# Patient Record
Sex: MALE | Race: WHITE | Employment: OTHER | URBAN - METROPOLITAN AREA
[De-identification: names, ages, dates, MRNs, and addresses within clinical notes are randomized per-mention and may not be internally consistent; named-entity substitution may affect disease eponyms.]

---

## 2017-08-10 ENCOUNTER — HOSPITAL ENCOUNTER (EMERGENCY)
Age: 77
Discharge: HOME | End: 2017-08-10
Attending: FAMILY MEDICINE

## 2017-08-10 VITALS
DIASTOLIC BLOOD PRESSURE: 84 MMHG | HEIGHT: 72 IN | HEART RATE: 103 BPM | WEIGHT: 179.7 LBS | SYSTOLIC BLOOD PRESSURE: 114 MMHG | TEMPERATURE: 97.6 F | BODY MASS INDEX: 24.34 KG/M2 | RESPIRATION RATE: 16 BRPM | OXYGEN SATURATION: 93 %

## 2017-08-10 DIAGNOSIS — R05.9 COUGH: Primary | ICD-10-CM

## 2017-08-10 RX ORDER — PREDNISONE 5 MG/1
TABLET ORAL
Qty: 21 TAB | Refills: 0 | Status: SHIPPED | OUTPATIENT
Start: 2017-08-10 | End: 2018-03-09

## 2017-08-10 RX ORDER — IPRATROPIUM BROMIDE AND ALBUTEROL SULFATE 2.5; .5 MG/3ML; MG/3ML
3 SOLUTION RESPIRATORY (INHALATION)
Status: COMPLETED | OUTPATIENT
Start: 2017-08-10 | End: 2017-08-10

## 2017-08-10 RX ORDER — IPRATROPIUM BROMIDE AND ALBUTEROL SULFATE 2.5; .5 MG/3ML; MG/3ML
3 SOLUTION RESPIRATORY (INHALATION)
Status: DISCONTINUED | OUTPATIENT
Start: 2017-08-10 | End: 2017-08-10 | Stop reason: HOSPADM

## 2017-08-10 RX ORDER — BENZONATATE 100 MG/1
100 CAPSULE ORAL
Qty: 30 CAP | Refills: 0 | Status: SHIPPED | OUTPATIENT
Start: 2017-08-10 | End: 2017-08-17

## 2017-08-10 RX ADMIN — IPRATROPIUM BROMIDE AND ALBUTEROL SULFATE 3 ML: 2.5; .5 SOLUTION RESPIRATORY (INHALATION) at 08:13

## 2017-08-10 NOTE — DISCHARGE INSTRUCTIONS
Cough: Care Instructions  Your Care Instructions  A cough is your body's response to something that bothers your throat or airways. Many things can cause a cough. You might cough because of a cold or the flu, bronchitis, or asthma. Smoking, postnasal drip, allergies, and stomach acid that backs up into your throat also can cause coughs. A cough is a symptom, not a disease. Most coughs stop when the cause, such as a cold, goes away. You can take a few steps at home to cough less and feel better. Follow-up care is a key part of your treatment and safety. Be sure to make and go to all appointments, and call your doctor if you are having problems. It's also a good idea to know your test results and keep a list of the medicines you take. How can you care for yourself at home? · Drink lots of water and other fluids. This helps thin the mucus and soothes a dry or sore throat. Honey or lemon juice in hot water or tea may ease a dry cough. · Take cough medicine as directed by your doctor. · Prop up your head on pillows to help you breathe and ease a dry cough. · Try cough drops to soothe a dry or sore throat. Cough drops don't stop a cough. Medicine-flavored cough drops are no better than candy-flavored drops or hard candy. · Do not smoke. Avoid secondhand smoke. If you need help quitting, talk to your doctor about stop-smoking programs and medicines. These can increase your chances of quitting for good. When should you call for help? Call 911 anytime you think you may need emergency care. For example, call if:  · You have severe trouble breathing. Call your doctor now or seek immediate medical care if:  · You cough up blood. · You have new or worse trouble breathing. · You have a new or higher fever. · You have a new rash.   Watch closely for changes in your health, and be sure to contact your doctor if:  · You cough more deeply or more often, especially if you notice more mucus or a change in the color of your mucus. · You have new symptoms, such as a sore throat, an earache, or sinus pain. · You do not get better as expected. Where can you learn more? Go to http://donavan-kamryn.info/. Enter D279 in the search box to learn more about \"Cough: Care Instructions. \"  Current as of: March 25, 2017  Content Version: 11.3  © 6534-9337 [employer]. Care instructions adapted under license by [employer] (which disclaims liability or warranty for this information). If you have questions about a medical condition or this instruction, always ask your healthcare professional. [address] any warranty or liability for your use of this information.

## 2017-08-10 NOTE — UC PROVIDER NOTE
Patient is a 68 y.o. male presenting with respiratory distress syndrome. The history is provided by the patient. Respiratory Distress   This is a new problem. The average episode lasts 3 days. The current episode started more than 2 days ago. The problem has not changed since onset. Associated symptoms include cough, sputum production and wheezing. Pertinent negatives include no fever, no headaches, no coryza, no rhinorrhea, no sore throat, no swollen glands, no ear pain, no neck pain, no hemoptysis, no PND, no orthopnea, no chest pain, no syncope, no abdominal pain, no rash, no leg pain, no leg swelling and no claudication. Risk factors include smoking. He has tried beta-agonist inhalers for the symptoms. The treatment provided mild relief. He has had prior hospitalizations. He has had prior ED visits. He has had no prior ICU admissions. Associated medical issues include COPD and chronic lung disease. Past Medical History:   Diagnosis Date    AAA (abdominal aortic aneurysm) (HCC)     Asthma     Cancer ([facility]     kidney ca    COPD     [facility]     Gastrointestinal disorder     ruptured esphogus    Hypertension     Other ill-defined conditions         Past Surgical History:   Procedure Laterality Date    ABDOMEN SURGERY PROC UNLISTED      hernia    ABDOMEN SURGERY PROC UNLISTED      colon resection    HX GI      part of colon removed, ruptured esophagus    HX OTHER SURGICAL      kidney removed    [facility]           Family History   Problem Relation Age of Onset    Hypertension Mother         Social History     Social History    Marital status:      Spouse name: N/A    Number of children: N/A    Years of education: N/A     Occupational History    Not on file.      Social History Main Topics    Smoking status: Current Every Day Smoker     Packs/day: 0.50     Years: 60.00    Smokeless tobacco: Not on file    Alcohol use Yes      Comment: 2 drinks a month    Drug use: Not on file    Sexual activity: Not on file     Other Topics Concern    Not on file     Social History Narrative    ** Merged History Encounter **                     ALLERGIES: Niacin and Niacin    Review of Systems   Constitutional: Negative for chills and fever. HENT: Negative for ear pain, rhinorrhea and sore throat. Respiratory: Positive for cough, sputum production and wheezing. Negative for hemoptysis. Cardiovascular: Negative for chest pain, orthopnea, claudication, leg swelling, syncope and PND. Gastrointestinal: Negative for abdominal pain. Musculoskeletal: Negative for neck pain. Skin: Negative for rash. Neurological: Negative for headaches. Vitals:    08/10/17 0805 08/10/17 0839 08/10/17 0903   BP: 114/84     Pulse: (!) 106 (!) 107 (!) 103   Resp: 22 16    Temp: 97.6 °F (36.4 °C)     SpO2: (!) 89% 91% 93%   Weight: 81.5 kg (179 lb 11.2 oz)     Height: 6' (1.829 m)         Physical Exam   Constitutional: He is oriented to person, place, and time. He appears well-developed and well-nourished. No distress. HENT:   Right Ear: External ear normal.   Left Ear: External ear normal.   Eyes: Conjunctivae and EOM are normal.   Neck: Normal range of motion. Neck supple. No JVD present. No tracheal deviation present. No thyromegaly present. Cardiovascular: Regular rhythm and normal heart sounds. Tachycardic    Pulmonary/Chest: He is in respiratory distress. He has no wheezes. He has no rales. He exhibits no tenderness. Lymphadenopathy:     He has no cervical adenopathy. Neurological: He is alert and oriented to person, place, and time. Skin: Skin is warm and dry. Psychiatric: He has a normal mood and affect. His behavior is normal. Judgment and thought content normal.   Nursing note and vitals reviewed. MDM     Differential Diagnosis; Clinical Impression; Plan:     CLINICAL IMPRESSION:  Cough  (primary encounter diagnosis)    Plan:  1. HHN with Duoneb given x 2. Improvement noted  2. Tessalon   3. Medrol dosepack  Risk of Significant Complications, Morbidity, and/or Mortality:   Presenting problems: Moderate  Diagnostic procedures: Moderate  Management options:   Moderate  Progress:   Patient progress:  Stable      Procedures

## 2017-08-10 NOTE — UC NOTE
Pt reports feeling much better. Does not appear to be in any distress at this time.  Pts sats are at 93%, pt states that is his \"norm\"

## 2018-01-01 ENCOUNTER — HOSPITAL ENCOUNTER (OUTPATIENT)
Dept: GENERAL RADIOLOGY | Age: 78
Discharge: HOME | End: 2018-05-11
Attending: INTERNAL MEDICINE
Payer: MEDICARE

## 2018-01-01 ENCOUNTER — PATIENT OUTREACH (OUTPATIENT)
Dept: CASE MANAGEMENT | Age: 78
End: 2018-01-01

## 2018-01-01 DIAGNOSIS — C90.00 MYELOMA ASSOCIATED AMYLOIDOSIS (HCC): ICD-10-CM

## 2018-01-01 DIAGNOSIS — Z51.11 ENCOUNTER FOR ANTINEOPLASTIC CHEMOTHERAPY: ICD-10-CM

## 2018-01-01 DIAGNOSIS — R11.2 NAUSEA WITH VOMITING: ICD-10-CM

## 2018-01-01 DIAGNOSIS — K90.3 PANCREATIC COLIPASE DEFICIENCY: ICD-10-CM

## 2018-01-01 DIAGNOSIS — E85.9 MYELOMA ASSOCIATED AMYLOIDOSIS (HCC): ICD-10-CM

## 2018-01-01 PROCEDURE — 77075 RADEX OSSEOUS SURVEY COMPL: CPT

## 2018-02-19 ENCOUNTER — APPOINTMENT (OUTPATIENT)
Dept: GENERAL RADIOLOGY | Age: 78
DRG: 871 | End: 2018-02-19
Attending: PHYSICIAN ASSISTANT
Payer: MEDICARE

## 2018-02-19 ENCOUNTER — HOSPITAL ENCOUNTER (INPATIENT)
Age: 78
LOS: 18 days | Discharge: NURSING, REHAB OR LONG-TERM CARE | DRG: 871 | End: 2018-03-09
Attending: EMERGENCY MEDICINE | Admitting: INTERNAL MEDICINE
Payer: MEDICARE

## 2018-02-19 ENCOUNTER — APPOINTMENT (OUTPATIENT)
Dept: CT IMAGING | Age: 78
DRG: 871 | End: 2018-02-19
Attending: EMERGENCY MEDICINE
Payer: MEDICARE

## 2018-02-19 DIAGNOSIS — J10.1 INFLUENZA A: ICD-10-CM

## 2018-02-19 DIAGNOSIS — R06.02 SOB (SHORTNESS OF BREATH): ICD-10-CM

## 2018-02-19 DIAGNOSIS — R53.81 DEBILITY: ICD-10-CM

## 2018-02-19 DIAGNOSIS — J96.00 ACUTE RESPIRATORY FAILURE, UNSPECIFIED WHETHER WITH HYPOXIA OR HYPERCAPNIA (HCC): Primary | ICD-10-CM

## 2018-02-19 DIAGNOSIS — K59.00 CONSTIPATION, UNSPECIFIED CONSTIPATION TYPE: ICD-10-CM

## 2018-02-19 DIAGNOSIS — R53.1 WEAKNESS: ICD-10-CM

## 2018-02-19 DIAGNOSIS — Z71.89 COUNSELING REGARDING GOALS OF CARE: ICD-10-CM

## 2018-02-19 DIAGNOSIS — R53.83 FATIGUE, UNSPECIFIED TYPE: ICD-10-CM

## 2018-02-19 PROBLEM — R09.02 HYPOXEMIA: Status: ACTIVE | Noted: 2018-02-19

## 2018-02-19 LAB
ALBUMIN SERPL-MCNC: 2.7 G/DL (ref 3.5–5)
ALBUMIN/GLOB SERPL: 0.4 {RATIO} (ref 1.1–2.2)
ALP SERPL-CCNC: 76 U/L (ref 45–117)
ALT SERPL-CCNC: 19 U/L (ref 12–78)
AMORPH CRY URNS QL MICRO: ABNORMAL
ANION GAP SERPL CALC-SCNC: 10 MMOL/L (ref 5–15)
ANION GAP SERPL CALC-SCNC: 7 MMOL/L (ref 5–15)
APPEARANCE UR: ABNORMAL
ARTERIAL PATENCY WRIST A: YES
AST SERPL-CCNC: 27 U/L (ref 15–37)
BACTERIA URNS QL MICRO: ABNORMAL /HPF
BASE DEFICIT BLDA-SCNC: 5.5 MMOL/L
BASOPHILS # BLD: 0 K/UL (ref 0–0.1)
BASOPHILS NFR BLD: 0 % (ref 0–1)
BDY SITE: ABNORMAL
BILIRUB SERPL-MCNC: 0.4 MG/DL (ref 0.2–1)
BILIRUB UR QL: NEGATIVE
BUN SERPL-MCNC: 41 MG/DL (ref 6–20)
BUN SERPL-MCNC: 48 MG/DL (ref 6–20)
BUN/CREAT SERPL: 16 (ref 12–20)
BUN/CREAT SERPL: 16 (ref 12–20)
CALCIUM SERPL-MCNC: 10.6 MG/DL (ref 8.5–10.1)
CALCIUM SERPL-MCNC: 11.1 MG/DL (ref 8.5–10.1)
CHLORIDE SERPL-SCNC: 103 MMOL/L (ref 97–108)
CHLORIDE SERPL-SCNC: 103 MMOL/L (ref 97–108)
CO2 SERPL-SCNC: 21 MMOL/L (ref 21–32)
CO2 SERPL-SCNC: 23 MMOL/L (ref 21–32)
COLOR UR: ABNORMAL
CREAT SERPL-MCNC: 2.49 MG/DL (ref 0.7–1.3)
CREAT SERPL-MCNC: 3.01 MG/DL (ref 0.7–1.3)
D DIMER PPP FEU-MCNC: 2.65 MG/L FEU (ref 0–0.65)
DIFFERENTIAL METHOD BLD: ABNORMAL
EOSINOPHIL # BLD: 0 K/UL (ref 0–0.4)
EOSINOPHIL NFR BLD: 0 % (ref 0–7)
EPITH CASTS URNS QL MICRO: ABNORMAL /LPF
ERYTHROCYTE [DISTWIDTH] IN BLOOD BY AUTOMATED COUNT: 17.6 % (ref 11.5–14.5)
FLUAV AG NPH QL IA: POSITIVE
FLUBV AG NOSE QL IA: NEGATIVE
GAS FLOW.O2 O2 DELIVERY SYS: 2 L/MIN
GLOBULIN SER CALC-MCNC: 6.7 G/DL (ref 2–4)
GLUCOSE BLD STRIP.AUTO-MCNC: 109 MG/DL (ref 65–100)
GLUCOSE BLD STRIP.AUTO-MCNC: 132 MG/DL (ref 65–100)
GLUCOSE SERPL-MCNC: 110 MG/DL (ref 65–100)
GLUCOSE SERPL-MCNC: 117 MG/DL (ref 65–100)
GLUCOSE UR STRIP.AUTO-MCNC: NEGATIVE MG/DL
HCO3 BLDA-SCNC: 19 MMOL/L (ref 22–26)
HCT VFR BLD AUTO: 40.5 % (ref 36.6–50.3)
HGB BLD-MCNC: 12.8 G/DL (ref 12.1–17)
HGB UR QL STRIP: ABNORMAL
IMM GRANULOCYTES # BLD: 0 K/UL (ref 0–0.04)
IMM GRANULOCYTES NFR BLD AUTO: 0 % (ref 0–0.5)
KETONES UR QL STRIP.AUTO: NEGATIVE MG/DL
LACTATE SERPL-SCNC: 1.3 MMOL/L (ref 0.4–2)
LEUKOCYTE ESTERASE UR QL STRIP.AUTO: NEGATIVE
LYMPHOCYTES # BLD: 0.8 K/UL (ref 0.8–3.5)
LYMPHOCYTES NFR BLD: 7 % (ref 12–49)
MCH RBC QN AUTO: 27.2 PG (ref 26–34)
MCHC RBC AUTO-ENTMCNC: 31.6 G/DL (ref 30–36.5)
MCV RBC AUTO: 86.2 FL (ref 80–99)
MONOCYTES # BLD: 0.6 K/UL (ref 0–1)
MONOCYTES NFR BLD: 5 % (ref 5–13)
NEUTS BAND NFR BLD MANUAL: 14 %
NEUTS SEG # BLD: 10 K/UL (ref 1.8–8)
NEUTS SEG NFR BLD: 74 % (ref 32–75)
NITRITE UR QL STRIP.AUTO: NEGATIVE
NRBC # BLD: 0 K/UL (ref 0–0.01)
NRBC BLD-RTO: 0 PER 100 WBC
PCO2 BLDA: 34 MMHG (ref 35–45)
PH BLDA: 7.37 [PH] (ref 7.35–7.45)
PH UR STRIP: 5.5 [PH] (ref 5–8)
PLATELET # BLD AUTO: 261 K/UL (ref 150–400)
PMV BLD AUTO: 10.7 FL (ref 8.9–12.9)
PO2 BLDA: 70 MMHG (ref 80–100)
POTASSIUM SERPL-SCNC: 4.3 MMOL/L (ref 3.5–5.1)
POTASSIUM SERPL-SCNC: 4.3 MMOL/L (ref 3.5–5.1)
PROT SERPL-MCNC: 9.4 G/DL (ref 6.4–8.2)
PROT UR STRIP-MCNC: 100 MG/DL
RBC # BLD AUTO: 4.7 M/UL (ref 4.1–5.7)
RBC #/AREA URNS HPF: ABNORMAL /HPF (ref 0–5)
RBC MORPH BLD: ABNORMAL
SAO2 % BLD: 94 % (ref 92–97)
SAO2% DEVICE SAO2% SENSOR NAME: ABNORMAL
SERVICE CMNT-IMP: ABNORMAL
SERVICE CMNT-IMP: ABNORMAL
SODIUM SERPL-SCNC: 133 MMOL/L (ref 136–145)
SODIUM SERPL-SCNC: 134 MMOL/L (ref 136–145)
SP GR UR REFRACTOMETRY: 1.02 (ref 1–1.03)
SPECIMEN SITE: ABNORMAL
TROPONIN I SERPL-MCNC: <0.04 NG/ML
UROBILINOGEN UR QL STRIP.AUTO: 0.2 EU/DL (ref 0.2–1)
WBC # BLD AUTO: 11.4 K/UL (ref 4.1–11.1)
WBC MORPH BLD: ABNORMAL
WBC URNS QL MICRO: ABNORMAL /HPF (ref 0–4)

## 2018-02-19 PROCEDURE — 74011000250 HC RX REV CODE- 250: Performed by: EMERGENCY MEDICINE

## 2018-02-19 PROCEDURE — 82803 BLOOD GASES ANY COMBINATION: CPT | Performed by: PHYSICIAN ASSISTANT

## 2018-02-19 PROCEDURE — 94761 N-INVAS EAR/PLS OXIMETRY MLT: CPT

## 2018-02-19 PROCEDURE — 87040 BLOOD CULTURE FOR BACTERIA: CPT | Performed by: PHYSICIAN ASSISTANT

## 2018-02-19 PROCEDURE — 93005 ELECTROCARDIOGRAM TRACING: CPT

## 2018-02-19 PROCEDURE — 82962 GLUCOSE BLOOD TEST: CPT

## 2018-02-19 PROCEDURE — 71045 X-RAY EXAM CHEST 1 VIEW: CPT

## 2018-02-19 PROCEDURE — 36600 WITHDRAWAL OF ARTERIAL BLOOD: CPT | Performed by: PHYSICIAN ASSISTANT

## 2018-02-19 PROCEDURE — 74011000250 HC RX REV CODE- 250: Performed by: PHYSICIAN ASSISTANT

## 2018-02-19 PROCEDURE — 94640 AIRWAY INHALATION TREATMENT: CPT

## 2018-02-19 PROCEDURE — 65660000000 HC RM CCU STEPDOWN

## 2018-02-19 PROCEDURE — 81001 URINALYSIS AUTO W/SCOPE: CPT | Performed by: PHYSICIAN ASSISTANT

## 2018-02-19 PROCEDURE — 96365 THER/PROPH/DIAG IV INF INIT: CPT

## 2018-02-19 PROCEDURE — 84484 ASSAY OF TROPONIN QUANT: CPT | Performed by: EMERGENCY MEDICINE

## 2018-02-19 PROCEDURE — 74011250637 HC RX REV CODE- 250/637: Performed by: EMERGENCY MEDICINE

## 2018-02-19 PROCEDURE — 71250 CT THORAX DX C-: CPT

## 2018-02-19 PROCEDURE — 77030029684 HC NEB SM VOL KT MONA -A

## 2018-02-19 PROCEDURE — 87804 INFLUENZA ASSAY W/OPTIC: CPT | Performed by: PHYSICIAN ASSISTANT

## 2018-02-19 PROCEDURE — 85379 FIBRIN DEGRADATION QUANT: CPT | Performed by: EMERGENCY MEDICINE

## 2018-02-19 PROCEDURE — 74011250636 HC RX REV CODE- 250/636: Performed by: INTERNAL MEDICINE

## 2018-02-19 PROCEDURE — 74011250637 HC RX REV CODE- 250/637: Performed by: INTERNAL MEDICINE

## 2018-02-19 PROCEDURE — 85025 COMPLETE CBC W/AUTO DIFF WBC: CPT | Performed by: PHYSICIAN ASSISTANT

## 2018-02-19 PROCEDURE — 36415 COLL VENOUS BLD VENIPUNCTURE: CPT | Performed by: EMERGENCY MEDICINE

## 2018-02-19 PROCEDURE — 80048 BASIC METABOLIC PNL TOTAL CA: CPT | Performed by: EMERGENCY MEDICINE

## 2018-02-19 PROCEDURE — 74011250636 HC RX REV CODE- 250/636: Performed by: EMERGENCY MEDICINE

## 2018-02-19 PROCEDURE — 74011000250 HC RX REV CODE- 250: Performed by: INTERNAL MEDICINE

## 2018-02-19 PROCEDURE — 80053 COMPREHEN METABOLIC PANEL: CPT | Performed by: PHYSICIAN ASSISTANT

## 2018-02-19 PROCEDURE — 83605 ASSAY OF LACTIC ACID: CPT | Performed by: PHYSICIAN ASSISTANT

## 2018-02-19 PROCEDURE — 99285 EMERGENCY DEPT VISIT HI MDM: CPT

## 2018-02-19 RX ORDER — DEXTROSE 50 % IN WATER (D50W) INTRAVENOUS SYRINGE
12.5-25 AS NEEDED
Status: DISCONTINUED | OUTPATIENT
Start: 2018-02-19 | End: 2018-03-09 | Stop reason: HOSPADM

## 2018-02-19 RX ORDER — ALBUTEROL SULFATE 0.83 MG/ML
2.5 SOLUTION RESPIRATORY (INHALATION)
Status: COMPLETED | OUTPATIENT
Start: 2018-02-19 | End: 2018-02-19

## 2018-02-19 RX ORDER — OSELTAMIVIR PHOSPHATE 30 MG/1
30 CAPSULE ORAL 2 TIMES DAILY
Status: DISCONTINUED | OUTPATIENT
Start: 2018-02-19 | End: 2018-02-20

## 2018-02-19 RX ORDER — MAGNESIUM SULFATE 100 %
4 CRYSTALS MISCELLANEOUS AS NEEDED
Status: DISCONTINUED | OUTPATIENT
Start: 2018-02-19 | End: 2018-03-09 | Stop reason: HOSPADM

## 2018-02-19 RX ORDER — FLUTICASONE FUROATE AND VILANTEROL 100; 25 UG/1; UG/1
1 POWDER RESPIRATORY (INHALATION) DAILY
Status: DISCONTINUED | OUTPATIENT
Start: 2018-02-20 | End: 2018-03-09 | Stop reason: HOSPADM

## 2018-02-19 RX ORDER — ENOXAPARIN SODIUM 100 MG/ML
40 INJECTION SUBCUTANEOUS EVERY 24 HOURS
Status: DISCONTINUED | OUTPATIENT
Start: 2018-02-19 | End: 2018-02-19 | Stop reason: DRUGHIGH

## 2018-02-19 RX ORDER — SODIUM CHLORIDE 9 MG/ML
50 INJECTION, SOLUTION INTRAVENOUS CONTINUOUS
Status: DISCONTINUED | OUTPATIENT
Start: 2018-02-19 | End: 2018-02-24

## 2018-02-19 RX ORDER — CLOPIDOGREL BISULFATE 75 MG/1
75 TABLET ORAL DAILY
Status: DISCONTINUED | OUTPATIENT
Start: 2018-02-20 | End: 2018-02-26

## 2018-02-19 RX ORDER — MONTELUKAST SODIUM 10 MG/1
10 TABLET ORAL DAILY
Status: DISCONTINUED | OUTPATIENT
Start: 2018-02-20 | End: 2018-03-09 | Stop reason: HOSPADM

## 2018-02-19 RX ORDER — ONDANSETRON 2 MG/ML
4 INJECTION INTRAMUSCULAR; INTRAVENOUS
Status: DISCONTINUED | OUTPATIENT
Start: 2018-02-19 | End: 2018-03-09 | Stop reason: HOSPADM

## 2018-02-19 RX ORDER — DILTIAZEM HYDROCHLORIDE 240 MG/1
240 CAPSULE, COATED, EXTENDED RELEASE ORAL DAILY
Status: DISCONTINUED | OUTPATIENT
Start: 2018-02-20 | End: 2018-03-09 | Stop reason: HOSPADM

## 2018-02-19 RX ORDER — IPRATROPIUM BROMIDE AND ALBUTEROL SULFATE 2.5; .5 MG/3ML; MG/3ML
3 SOLUTION RESPIRATORY (INHALATION)
Status: DISCONTINUED | OUTPATIENT
Start: 2018-02-19 | End: 2018-02-24

## 2018-02-19 RX ORDER — SODIUM CHLORIDE 0.9 % (FLUSH) 0.9 %
5-10 SYRINGE (ML) INJECTION AS NEEDED
Status: DISCONTINUED | OUTPATIENT
Start: 2018-02-19 | End: 2018-03-09 | Stop reason: HOSPADM

## 2018-02-19 RX ORDER — INSULIN LISPRO 100 [IU]/ML
INJECTION, SOLUTION INTRAVENOUS; SUBCUTANEOUS
Status: DISCONTINUED | OUTPATIENT
Start: 2018-02-19 | End: 2018-02-21

## 2018-02-19 RX ORDER — ACETAMINOPHEN 325 MG/1
650 TABLET ORAL
Status: DISCONTINUED | OUTPATIENT
Start: 2018-02-19 | End: 2018-03-09 | Stop reason: HOSPADM

## 2018-02-19 RX ORDER — ENOXAPARIN SODIUM 100 MG/ML
30 INJECTION SUBCUTANEOUS EVERY 24 HOURS
Status: DISCONTINUED | OUTPATIENT
Start: 2018-02-20 | End: 2018-02-20

## 2018-02-19 RX ORDER — IPRATROPIUM BROMIDE AND ALBUTEROL SULFATE 2.5; .5 MG/3ML; MG/3ML
3 SOLUTION RESPIRATORY (INHALATION)
Status: DISCONTINUED | OUTPATIENT
Start: 2018-02-19 | End: 2018-03-09 | Stop reason: HOSPADM

## 2018-02-19 RX ORDER — GUAIFENESIN 600 MG/1
600 TABLET, EXTENDED RELEASE ORAL 2 TIMES DAILY
Status: DISCONTINUED | OUTPATIENT
Start: 2018-02-19 | End: 2018-03-09 | Stop reason: HOSPADM

## 2018-02-19 RX ORDER — GUAIFENESIN 100 MG/5ML
81 LIQUID (ML) ORAL DAILY
Status: DISCONTINUED | OUTPATIENT
Start: 2018-02-20 | End: 2018-03-09 | Stop reason: HOSPADM

## 2018-02-19 RX ADMIN — METHYLPREDNISOLONE SODIUM SUCCINATE 40 MG: 40 INJECTION, POWDER, FOR SOLUTION INTRAMUSCULAR; INTRAVENOUS at 23:13

## 2018-02-19 RX ADMIN — ALBUTEROL SULFATE 2.5 MG: 2.5 SOLUTION RESPIRATORY (INHALATION) at 16:01

## 2018-02-19 RX ADMIN — IPRATROPIUM BROMIDE AND ALBUTEROL SULFATE 3 ML: .5; 3 SOLUTION RESPIRATORY (INHALATION) at 23:15

## 2018-02-19 RX ADMIN — OSELTAMIVIR PHOSPHATE 30 MG: 30 CAPSULE ORAL at 21:13

## 2018-02-19 RX ADMIN — ALBUTEROL SULFATE 2.5 MG: 2.5 SOLUTION RESPIRATORY (INHALATION) at 21:02

## 2018-02-19 RX ADMIN — CEFTRIAXONE SODIUM 1 G: 1 INJECTION, POWDER, FOR SOLUTION INTRAMUSCULAR; INTRAVENOUS at 18:33

## 2018-02-19 RX ADMIN — SODIUM CHLORIDE 75 ML/HR: 900 INJECTION, SOLUTION INTRAVENOUS at 23:09

## 2018-02-19 RX ADMIN — CEFTRIAXONE SODIUM 1 G: 1 INJECTION, POWDER, FOR SOLUTION INTRAMUSCULAR; INTRAVENOUS at 23:11

## 2018-02-19 RX ADMIN — GUAIFENESIN 600 MG: 600 TABLET, EXTENDED RELEASE ORAL at 23:15

## 2018-02-19 NOTE — IP AVS SNAPSHOT
[address] [facility] 
[phone] Patient: Annamarie Gale MRN: [id number] XTR:1[birth year]102 About your hospitalization You were admitted on:  February 19, 2018 You last received care in the:  [facility] CARDIOPULMONARY CARE You were discharged on:  March 9, 2018 Why you were hospitalized Your primary diagnosis was:  Not on File Your diagnoses also included:  Hypoxemia Follow-up Information Follow up With Details Comments Contact Info [facility] On 3/9/2018 This is your post-acute skilled nursing facility rehabilitation provider. [address] [facility] 
[phone] Sofía Curran Go in 25 days MD's clinic will contact patient directly to schedule appointment. Mary 4 [phone] Jeana Oliva MD Go in 1 week Continue OP Chemotherapy treatment as scheduled by MD's office  [address] [facility] 
[phone] Maddi Norris MD In 1 month  [facility] 7 [id number] [phone] Roopa Davis MD   Patient can only remember the practice name and not the physician Eleuterio Sibley MD In 3 weeks Will need a repeat EGD in 2-3 weeks when pt is stable. Pt must be off from ASA and Plavix for repeat EGD  [address] [facility] [address] [facility] 
[phone] Discharge Orders None A check alicia indicates which time of day the medication should be taken. My Medications START taking these medications Instructions Each Dose to Equal  
 Morning Noon Evening Bedtime  
 albuterol-ipratropium 2.5 mg-0.5 mg/3 ml Nebu Commonly known as:  Altagracia Elam Your last dose was: Your next dose is:    
   
   
 3 mL by Nebulization route every four (4) hours as needed. 3 mL  
    
   
   
   
  
 docusate sodium 100 mg capsule Commonly known as:  Dana Bui Your last dose was: Your next dose is: Take 1 Cap by mouth two (2) times a day for 90 days. 100 mg  
    
   
   
   
  
 insulin lispro 100 unit/mL injection Commonly known as:  HUMALOG Your last dose was: Your next dose is: INITIATE CORRECTIVE INSULIN PROTOCOL (TOAN): RX TOAN Normal Sensitivity (Average weight) For Blood Sugar (mg/dl) of:             111-150=2 units 151-200=4 units 201-250=6 units 251-300=9 units 301-350=12 units Over 350= Call MD  
     
   
   
   
  
 pantoprazole 40 mg tablet Commonly known as:  PROTONIX Your last dose was: Your next dose is: Take 1 Tab by mouth Before breakfast and dinner for 60 days. 40 mg  
    
   
   
   
  
 sucralfate 100 mg/mL suspension Commonly known as:  Kathleen Dubosegel Your last dose was: Your next dose is: Take 10 mL by mouth Before breakfast, lunch, dinner and at bedtime for 60 days. 1 g CHANGE how you take these medications Instructions Each Dose to Equal  
 Morning Noon Evening Bedtime  
 albuterol 90 mcg/actuation inhaler Commonly known as:  PROVENTIL HFA, VENTOLIN HFA, PROAIR HFA What changed:  Another medication with the same name was removed. Continue taking this medication, and follow the directions you see here. Your last dose was: Your next dose is: Take 2 Puffs by inhalation every six (6) hours as needed for Wheezing and Shortness of Breath. 2 Puff  
    
   
   
   
  
 glipiZIDE 10 mg tablet Commonly known as:  Annetta Castro What changed:  Another medication with the same name was removed. Continue taking this medication, and follow the directions you see here. Your last dose was: Your next dose is: Take 5 mg by mouth daily. 5 mg CONTINUE taking these medications Instructions Each Dose to Equal  
 Morning Noon Evening Bedtime  
 acetaminophen 325 mg tablet Commonly known as:  TYLENOL Your last dose was: Your next dose is: Take 2 Tabs by mouth every four (4) hours as needed for Fever (For temp greater than or equal to 38.5 C or 101.3 F (Unless hepatic failure or contrindicated). Give first line for fever. ).  
 650 mg  
    
   
   
   
  
 aspirin 81 mg chewable tablet Your last dose was: Your next dose is: Take 1 Tab by mouth daily. 81 mg  
    
   
   
   
  
 clopidogrel 75 mg Tab Commonly known as:  PLAVIX Your last dose was: Your next dose is: Take 1 Tab by mouth daily. 75 mg  
    
   
   
   
  
 dilTIAZem  mg ER capsule Commonly known as:  CARDIZEM CD Your last dose was: Your next dose is: Take 1 Cap by mouth daily. 240 mg  
    
   
   
   
  
 insulin  unit/mL injection Commonly known as:  Herber Parrot Your last dose was: Your next dose is:    
   
   
 25 Units by SubCUTAneous route nightly. 25 Units  
    
   
   
   
  
 montelukast 10 mg tablet Commonly known as:  SINGULAIR Your last dose was: Your next dose is: Take 10 mg by mouth daily. 10 mg  
    
   
   
   
  
 rosuvastatin 40 mg tablet Commonly known as:  CRESTOR Your last dose was: Your next dose is: Take 20 mg by mouth nightly. 20 mg  
    
   
   
   
  
 SPIRIVA WITH HANDIHALER 18 mcg inhalation capsule Generic drug:  tiotropium Your last dose was: Your next dose is: Take 1 Cap by inhalation daily. 1 Cap SYMBICORT 160-4.5 mcg/actuation Hfaa Generic drug:  budesonide-formoterol Your last dose was: Your next dose is: Take 2 Puffs by inhalation daily.     Indications: COPD ASSOCIATED WITH CHRONIC BRONCHITIS  
 2 Puff VITAMIN D3 1,000 unit Cap Generic drug:  cholecalciferol Your last dose was: Your next dose is: Take 1,000 Units by mouth daily. 1000 Units STOP taking these medications   
 loratadine 10 mg Cap NovoLIN R Regular U-100 Insuln 100 unit/mL injection Generic drug:  insulin regular  
   
  
 predniSONE 5 mg dose pack Commonly known as:  STERAPRED  
   
  
 raNITIdine 150 mg tablet Commonly known as:  ZANTAC Where to Get Your Medications These medications were sent to [facility]  AT [address] [place] [id number] Phone:  [phone]  
  albuterol-ipratropium 2.5 mg-0.5 mg/3 ml Nebu Information on where to get these meds will be given to you by the nurse or doctor. ! Ask your nurse or doctor about these medications  
  docusate sodium 100 mg capsule  
 insulin lispro 100 unit/mL injection  
 pantoprazole 40 mg tablet  
 sucralfate 100 mg/mL suspension Discharge Instructions HOSPITALIST DISCHARGE INSTRUCTIONS 
 
NAME: Chris Cruz :  1940 MRN:  [id number] Date/Time:  3/9/2018 8:52 AM 
 
ADMIT DATE: 2018 DISCHARGE DATE: 3/9/2018 Attending Physician: Familia Berger MD 
 
 
Medications: Per above medication reconciliation. Pain Management: per above medications Recommended diet: Diabetic Diet. Nutrition consult follow up Recommended activity: PT/OT Eval and Treat Wound care: Right buttock skin tear: cleanse daily with soap and water, pat dry. Apply a sacral foam dressing to protect and heal. 
 
Indwelling devices:  None Supplemental Oxygen: None Required Lab work: Per SNF routine Glucose management:  Accucheck ACHS with sliding scale per SNF protocol Code status: Full Will need a repeat EGD in 2-3 weeks when pt is stable. Pt must be off from ASA and Plavix for repeat EGD. Follow up with Dr Pancho Crum for this Please arrange or confirm weekly chemotherapy with Dr Venice Grace office. Outside physician follow up: Follow-up Information Follow up With Details Comments Contact Info [facility] On 3/9/2018 This is your post-acute skilled nursing facility rehabilitation provider. [address] [place] [address] 
[phone] oSfía Curran Go in 25 days MD's clinic will contact patient directly to schedule appointment. Mary 4 [phone] Bárbara Piedra MD Go in 1 week Continue OP Chemotherapy treatment as scheduled by MD's office  [address] [facility] 
[phone] Delphine Merino MD In 1 month  [facility] 7 [address] [phone] Phys Other, MD   Patient can only remember the practice name and not the physician Skilled nursing facility/ SNF MD responsible for above on discharge. Information obtained by : 
I understand that if any problems occur once I am at home I am to contact my physician. I understand and acknowledge receipt of the instructions indicated above. Physician's or R.N.'s Signature                                                                  Date/Time Patient or Repres ACO Transitions of Care Introducing [employer] [address] Jacqueline Fisher offers a voluntary care coordination program to provide high quality service and care to [facility] fee-for-service beneficiaries. Matthew Matias was designed to help you enhance your health and well-being through the following services: ? Transitions of Care  support for individuals who are transitioning from one care setting to another (example: Hospital to home). ? Chronic and Complex Care Coordination  support for individuals and caregivers of those with serious or chronic illnesses or with more than one chronic (ongoing) condition and those who take a number of different medications. If you meet specific medical criteria, a [address] [place] [address] Rd may call you directly to coordinate your care with your primary care physician and your other care providers. For questions about the [facility] programs, please, contact your physicians office. For general questions or additional information about Accountable Care Organizations: 
Please visit www.medicare.gov/acos. html or call 1-800-MEDICARE (1-741.711.1405) TTY users should call 5-848.186.3921. Ticketfly Announcement We are excited to announce that we are making your provider's discharge notes available to you in Ticketfly. You will see these notes when they are completed and signed by the physician that discharged you from your recent hospital stay. If you have any questions or concerns about any information you see in Ticketfly, please call the Health Information Department where you were seen or reach out to your Primary Care Provider for more information about your plan of care. Introducing [facility]! [facility] introduces Ticketfly patient portal. Now you can access parts of your medical record, email your doctor's office, and request medication refills online. 1. In your internet browser, go to [email or web address] 2. Click on the First Time User? Click Here link in the Sign In box. You will see the New Member Sign Up page. 3. Enter your Ticketfly Access Code exactly as it appears below.  You will not need to use this code after youve completed the sign-up process. If you do not sign up before the expiration date, you must request a new code. · "[employer] Access Code: [email or web address] Expires: 6/7/2018  9:03 AM 
 
4. Enter the last four digits of your Social Security Number (xxxx) and Date of Birth (mm/dd/yyyy) as indicated and click Submit. You will be taken to the next sign-up page. 5. Create a "[employer] ID. This will be your "[employer] login ID and cannot be changed, so think of one that is secure and easy to remember. 6. Create a "[employer] password. You can change your password at any time. 7. Enter your Password Reset Question and Answer. This can be used at a later time if you forget your password. 8. Enter your e-mail address. You will receive e-mail notification when new information is available in [address] 9. Click Sign Up. You can now view and download portions of your medical record. 10. Click the Download Summary menu link to download a portable copy of your medical information. If you have questions, please visit the Frequently Asked Questions section of the "[employer] website. Remember, "[employer] is NOT to be used for urgent needs. For medical emergencies, dial 911. Now available from your iPhone and Android! Providers Seen During Your Hospitalization Provider Specialty Primary office phone Erin Newman MD Emergency Medicine [phone] Nuno Coleman MD Emergency Medicine [phone] Chichi Richardson MD Internal Medicine [phone] Lucinda Anderson MD Internal Medicine [phone] Your Primary Care Physician (PCP) Primary Care Physician Office Phone Office Fax OTHER, PHYS ** None ** ** None ** You are allergic to the following Allergen Reactions Niacin Rash Niacin Unknown (comments) Hot flashes Recent Documentation Height Weight BMI Smoking Status 1.829 m 76 kg 22.72 kg/m2 Current Every Day Smoker Emergency Contacts Name Discharge Info Relation Home Work Mobile [address] [facility] CAREGIVER [3] Spouse [3] [phone] Patient Belongings The following personal items are in your possession at time of discharge: 
  Dental Appliances: None  Visual Aid: Glasses   Hearing Aids/Status: At home  Home Medications: None   Jewelry: None  Clothing: At bedside    Other Valuables: None Please provide this summary of care documentation to your next provider. Signatures-by signing, you are acknowledging that this After Visit Summary has been reviewed with you and you have received a copy. Patient Signature:  ____________________________________________________________ Date:  ____________________________________________________________  
  
[facility] Provider Signature:  ____________________________________________________________ Date:  ____________________________________________________________

## 2018-02-19 NOTE — IP AVS SNAPSHOT
[address] [facility] 
[phone] Patient: Lon Wolff MRN: [id number] XUB:0/80/6919 A check alicia indicates which time of day the medication should be taken. My Medications START taking these medications Instructions Each Dose to Equal  
 Morning Noon Evening Bedtime  
 albuterol-ipratropium 2.5 mg-0.5 mg/3 ml Nebu Commonly known as:  Cony Zoltan Your last dose was: Your next dose is:    
   
   
 3 mL by Nebulization route every four (4) hours as needed. 3 mL  
    
   
   
   
  
 docusate sodium 100 mg capsule Commonly known as:  Arlekenya Lavender Your last dose was: Your next dose is: Take 1 Cap by mouth two (2) times a day for 90 days. 100 mg  
    
   
   
   
  
 insulin lispro 100 unit/mL injection Commonly known as:  HUMALOG Your last dose was: Your next dose is: INITIATE CORRECTIVE INSULIN PROTOCOL (TOAN): RX TOAN Normal Sensitivity (Average weight) For Blood Sugar (mg/dl) of:             111-150=2 units 151-200=4 units 201-250=6 units 251-300=9 units 301-350=12 units Over 350= Call MD  
     
   
   
   
  
 pantoprazole 40 mg tablet Commonly known as:  PROTONIX Your last dose was: Your next dose is: Take 1 Tab by mouth Before breakfast and dinner for 60 days. 40 mg  
    
   
   
   
  
 sucralfate 100 mg/mL suspension Commonly known as:  Isabela Gibes Your last dose was: Your next dose is: Take 10 mL by mouth Before breakfast, lunch, dinner and at bedtime for 60 days. 1 g CHANGE how you take these medications Instructions Each Dose to Equal  
 Morning Noon Evening Bedtime  
 albuterol 90 mcg/actuation inhaler Commonly known as:  PROVENTIL HFA, VENTOLIN HFA, PROAIR HFA What changed:  Another medication with the same name was removed.  Continue taking this medication, and follow the directions you see here. Your last dose was: Your next dose is: Take 2 Puffs by inhalation every six (6) hours as needed for Wheezing and Shortness of Breath. 2 Puff  
    
   
   
   
  
 glipiZIDE 10 mg tablet Commonly known as:  Fausto Minor What changed:  Another medication with the same name was removed. Continue taking this medication, and follow the directions you see here. Your last dose was: Your next dose is: Take 5 mg by mouth daily. 5 mg CONTINUE taking these medications Instructions Each Dose to Equal  
 Morning Noon Evening Bedtime  
 acetaminophen 325 mg tablet Commonly known as:  TYLENOL Your last dose was: Your next dose is: Take 2 Tabs by mouth every four (4) hours as needed for Fever (For temp greater than or equal to 38.5 C or 101.3 F (Unless hepatic failure or contrindicated). Give first line for fever. ).  
 650 mg  
    
   
   
   
  
 aspirin 81 mg chewable tablet Your last dose was: Your next dose is: Take 1 Tab by mouth daily. 81 mg  
    
   
   
   
  
 clopidogrel 75 mg Tab Commonly known as:  PLAVIX Your last dose was: Your next dose is: Take 1 Tab by mouth daily. 75 mg  
    
   
   
   
  
 dilTIAZem  mg ER capsule Commonly known as:  CARDIZEM CD Your last dose was: Your next dose is: Take 1 Cap by mouth daily. 240 mg  
    
   
   
   
  
 insulin  unit/mL injection Commonly known as:  Westgate Manges Your last dose was: Your next dose is:    
   
   
 25 Units by SubCUTAneous route nightly. 25 Units  
    
   
   
   
  
 montelukast 10 mg tablet Commonly known as:  SINGULAIR Your last dose was: Your next dose is: Take 10 mg by mouth daily.   
 10 mg  
    
   
   
   
 rosuvastatin 40 mg tablet Commonly known as:  CRESTOR Your last dose was: Your next dose is: Take 20 mg by mouth nightly. 20 mg  
    
   
   
   
  
 SPIRIVA WITH HANDIHALER 18 mcg inhalation capsule Generic drug:  tiotropium Your last dose was: Your next dose is: Take 1 Cap by inhalation daily. 1 Cap SYMBICORT 160-4.5 mcg/actuation Hfaa Generic drug:  budesonide-formoterol Your last dose was: Your next dose is: Take 2 Puffs by inhalation daily. Indications: COPD ASSOCIATED WITH CHRONIC BRONCHITIS  
 2 Puff VITAMIN D3 1,000 unit Cap Generic drug:  cholecalciferol Your last dose was: Your next dose is: Take 1,000 Units by mouth daily. 1000 Units STOP taking these medications   
 loratadine 10 mg Cap NovoLIN R Regular U-100 Insuln 100 unit/mL injection Generic drug:  insulin regular  
   
  
 predniSONE 5 mg dose pack Commonly known as:  STERAPRED  
   
  
 raNITIdine 150 mg tablet Commonly known as:  ZANTAC Where to Get Your Medications These medications were sent to [address] AT [address] [place] [id number] Phone:  [phone]  
  albuterol-ipratropium 2.5 mg-0.5 mg/3 ml Nebu Information on where to get these meds will be given to you by the nurse or doctor. ! Ask your nurse or doctor about these medications  
  docusate sodium 100 mg capsule  
 insulin lispro 100 unit/mL injection  
 pantoprazole 40 mg tablet  
 sucralfate 100 mg/mL suspension

## 2018-02-19 NOTE — ED PROVIDER NOTES
EMERGENCY DEPARTMENT HISTORY AND PHYSICAL EXAM      Date: 2/19/2018  Patient Name: Caleb Harper     I have seen and evaluated this patient in the Express Care portion of triage for SOB and cough. The patients care will begin now and orders have been placed. This patient will be seen and provided further care in the Emergency Room. Written by Choco Posada, a scribe for TR Mclaughlin.    History of Presenting Illness     Chief Complaint   Patient presents with    Flu     per pt since saturday       History Provided By: Patient    HPI: Caleb Harper, 66 y.o. male with PMHx significant for PNA (2 years ago), presents ambulatory to the ED by referral from a walk-in clinic for possible influenza, with cc of subjective fever, constant SOB, productive cough with yellow sputum, and elevated HR x 2 days. Pt's sats were in the 70s upon arrival to the ED. He denies wearing oxygen at baseline. Pt notes taking Prednisone 2 days ago without relief. He denies any CP, nausea, vomiting , diarrhea, rashes, or other new symptoms at this time. PCP: Roopa Davis MD    There are no other complaints, changes, or physical findings at this time. Current Facility-Administered Medications   Medication Dose Route Frequency Provider Last Rate Last Dose    sodium chloride (NS) flush 5-10 mL  5-10 mL IntraVENous PRN SARAH Echevarria        oseltamivir (TAMIFLU) capsule 30 mg  30 mg Oral BID Michael Wolff MD        albuterol (PROVENTIL VENTOLIN) nebulizer solution 2.5 mg  2.5 mg Nebulization NOW Michael Wolff MD         Current Outpatient Prescriptions   Medication Sig Dispense Refill    predniSONE (STERAPRED) 5 mg dose pack See administration instruction per 5mg dose pack 21 Tab 0    insulin NPH (NOVOLIN N, HUMULIN N) 100 unit/mL injection 25 Units by SubCUTAneous route nightly.  1 Vial 1    acetaminophen (TYLENOL) 325 mg tablet Take 2 Tabs by mouth every four (4) hours as needed for Fever (For temp greater than or equal to 38.5 C or 101.3 F (Unless hepatic failure or contrindicated). Give first line for fever. ). 40 Tab 0    aspirin 81 mg chewable tablet Take 1 Tab by mouth daily. 30 Tab 1    clopidogrel (PLAVIX) 75 mg tablet Take 1 Tab by mouth daily. 30 Tab 1    cholecalciferol (VITAMIN D3) 1,000 unit cap Take 1,000 Units by mouth daily.  glipiZIDE (GLUCOTROL) 10 mg tablet Take 2.5 mg by mouth every evening.  insulin regular (NOVOLIN R) 100 unit/mL injection by SubCUTAneous route.  rosuvastatin (CRESTOR) 40 mg tablet Take 20 mg by mouth nightly.  tiotropium (SPIRIVA WITH HANDIHALER) 18 mcg inhalation capsule Take 1 Cap by inhalation daily.  ranitidine (ZANTAC) 150 mg tablet Take 150 mg by mouth daily.  glipiZIDE (GLUCOTROL) 10 mg tablet Take 5 mg by mouth daily.  budesonide-formoterol (SYMBICORT) 160-4.5 mcg/actuation HFA inhaler Take 2 Puffs by inhalation daily. Indications: COPD ASSOCIATED WITH CHRONIC BRONCHITIS      albuterol (PROVENTIL HFA, VENTOLIN HFA) 90 mcg/Actuation inhaler Take 2 Puffs by inhalation every six (6) hours as needed for Wheezing and Shortness of Breath. 1 Inhaler 2    albuterol (PROVENTIL VENTOLIN) 2.5 mg /3 mL (0.083 %) nebulizer solution 1.25 mg by Nebulization route once.  loratadine 10 mg cap Take 10 mg by mouth daily.  diltiazem CD (CARDIZEM CD) 240 mg ER capsule Take 1 Cap by mouth daily. 30 Cap 2    montelukast (SINGULAIR) 10 mg tablet Take 10 mg by mouth daily.          Past History     Past Medical History:  Past Medical History:   Diagnosis Date    [facility]     Asthma     Cancer [facility]     kidney ca    COPD     Diabetes [facility]     Gastrointestinal disorder     ruptured esphogus    Hypertension     Other ill-defined conditions(799.89)        Past Surgical History:  Past Surgical History:   Procedure Laterality Date    ABDOMEN SURGERY PROC UNLISTED      hernia    ABDOMEN SURGERY PROC UNLISTED colon resection    HX GI      part of colon removed, ruptured esophagus    HX OTHER SURGICAL      kidney removed    REMV KIDNEY,COMPLICATED         Family History:  Family History   Problem Relation Age of Onset    Hypertension Mother        Social History:  Social History   Substance Use Topics    Smoking status: Current Every Day Smoker     Packs/day: 0.50     Years: 60.00    Smokeless tobacco: Never Used    Alcohol use Yes      Comment: 2 drinks a month       Allergies: Allergies   Allergen Reactions    Niacin Rash    Niacin Unknown (comments)     Hot flashes         Review of Systems   Review of Systems   Constitutional: Positive for fever. Negative for activity change, appetite change, chills, fatigue and unexpected weight change. HENT: Negative. Negative for congestion, hearing loss, rhinorrhea, sneezing and voice change. Eyes: Negative. Negative for pain and visual disturbance. Respiratory: Positive for cough and shortness of breath. Negative for apnea, choking and chest tightness. Cardiovascular: Negative. Negative for chest pain and palpitations. Gastrointestinal: Negative. Negative for abdominal distention, abdominal pain, blood in stool, diarrhea, nausea and vomiting. Genitourinary: Negative. Negative for difficulty urinating, flank pain, frequency and urgency. No discharge   Musculoskeletal: Negative. Negative for arthralgias, back pain, myalgias and neck stiffness. Skin: Negative. Negative for color change and rash. Neurological: Negative. Negative for dizziness, seizures, syncope, speech difficulty, weakness, numbness and headaches. Hematological: Negative for adenopathy. Psychiatric/Behavioral: Negative. Negative for agitation, behavioral problems, dysphoric mood and suicidal ideas. The patient is not nervous/anxious. Physical Exam   Physical Exam   Constitutional: He is oriented to person, place, and time.  He appears well-developed and well-nourished. No distress. HENT:   Head: Normocephalic and atraumatic. Mouth/Throat: Oropharynx is clear and moist. No oropharyngeal exudate. Eyes: Conjunctivae and EOM are normal. Pupils are equal, round, and reactive to light. Right eye exhibits no discharge. Left eye exhibits no discharge. Neck: Normal range of motion. Neck supple. Cardiovascular: Regular rhythm and intact distal pulses. Tachycardia present. Exam reveals no gallop and no friction rub. No murmur heard. Pulmonary/Chest: Effort normal. Tachypnea noted. No respiratory distress. He has wheezes (mild diffuse wheezing throughout). He has no rales. He exhibits no tenderness. Cough on exam   Abdominal: Soft. Bowel sounds are normal. He exhibits no distension and no mass. There is no tenderness. There is no rebound and no guarding. Musculoskeletal: Normal range of motion. He exhibits no edema. Lymphadenopathy:     He has no cervical adenopathy. Neurological: He is alert and oriented to person, place, and time. No cranial nerve deficit. Coordination normal.   Skin: Skin is warm and dry. No rash noted. No erythema. Psychiatric: He has a normal mood and affect. Nursing note and vitals reviewed.         Diagnostic Study Results     Labs -     Recent Results (from the past 12 hour(s))   LACTIC ACID    Collection Time: 02/19/18  3:07 PM   Result Value Ref Range    Lactic acid 1.3 0.4 - 2.0 MMOL/L   URINALYSIS W/ RFLX MICROSCOPIC    Collection Time: 02/19/18  3:07 PM   Result Value Ref Range    Color YELLOW/STRAW      Appearance CLOUDY (A) CLEAR      Specific gravity 1.020 1.003 - 1.030      pH (UA) 5.5 5.0 - 8.0      Protein 100 (A) NEG mg/dL    Glucose NEGATIVE  NEG mg/dL    Ketone NEGATIVE  NEG mg/dL    Bilirubin NEGATIVE  NEG      Blood LARGE (A) NEG      Urobilinogen 0.2 0.2 - 1.0 EU/dL    Nitrites NEGATIVE  NEG      Leukocyte Esterase NEGATIVE  NEG      WBC 0-4 0 - 4 /hpf    RBC 0-5 0 - 5 /hpf    Epithelial cells FEW FEW /lpf Bacteria 1+ (A) NEG /hpf    Amorphous Crystals 2+ (A) NEG   METABOLIC PANEL, COMPREHENSIVE    Collection Time: 02/19/18  3:07 PM   Result Value Ref Range    Sodium 133 (L) 136 - 145 mmol/L    Potassium 4.3 3.5 - 5.1 mmol/L    Chloride 103 97 - 108 mmol/L    CO2 23 21 - 32 mmol/L    Anion gap 7 5 - 15 mmol/L    Glucose 117 (H) 65 - 100 mg/dL    BUN 41 (H) 6 - 20 MG/DL    Creatinine 2.49 (H) 0.70 - 1.30 MG/DL    BUN/Creatinine ratio 16 12 - 20      GFR est AA 31 (L) >60 ml/min/1.73m2    GFR est non-AA 25 (L) >60 ml/min/1.73m2    Calcium 11.1 (H) 8.5 - 10.1 MG/DL    Bilirubin, total 0.4 0.2 - 1.0 MG/DL    ALT (SGPT) 19 12 - 78 U/L    AST (SGOT) 27 15 - 37 U/L    Alk. phosphatase 76 45 - 117 U/L    Protein, total 9.4 (H) 6.4 - 8.2 g/dL    Albumin 2.7 (L) 3.5 - 5.0 g/dL    Globulin 6.7 (H) 2.0 - 4.0 g/dL    A-G Ratio 0.4 (L) 1.1 - 2.2     CBC WITH AUTOMATED DIFF    Collection Time: 02/19/18  3:07 PM   Result Value Ref Range    WBC 11.4 (H) 4.1 - 11.1 K/uL    RBC 4.70 4. 10 - 5.70 M/uL    HGB 12.8 12.1 - 17.0 g/dL    HCT 40.5 36.6 - 50.3 %    MCV 86.2 80.0 - 99.0 FL    MCH 27.2 26.0 - 34.0 PG    MCHC 31.6 30.0 - 36.5 g/dL    RDW 17.6 (H) 11.5 - 14.5 %    PLATELET 940 552 - 032 K/uL    MPV 10.7 8.9 - 12.9 FL    NRBC 0.0 0  WBC    ABSOLUTE NRBC 0.00 0.00 - 0.01 K/uL    NEUTROPHILS 74 32 - 75 %    BAND NEUTROPHILS 14 %    LYMPHOCYTES 7 (L) 12 - 49 %    MONOCYTES 5 5 - 13 %    EOSINOPHILS 0 0 - 7 %    BASOPHILS 0 0 - 1 %    IMMATURE GRANULOCYTES 0 0.0 - 0.5 %    ABS. NEUTROPHILS 10.0 (H) 1.8 - 8.0 K/UL    ABS. LYMPHOCYTES 0.8 0.8 - 3.5 K/UL    ABS. MONOCYTES 0.6 0.0 - 1.0 K/UL    ABS. EOSINOPHILS 0.0 0.0 - 0.4 K/UL    ABS. BASOPHILS 0.0 0.0 - 0.1 K/UL    ABS. IMM.  GRANS. 0.0 0.00 - 0.04 K/UL    DF MANUAL      RBC COMMENTS ANISOCYTOSIS      WBC COMMENTS LEFT SHIFT     INFLUENZA A & B AG (RAPID TEST)    Collection Time: 02/19/18  3:07 PM   Result Value Ref Range    Influenza A Antigen POSITIVE (A) NEG      Influenza B Antigen NEGATIVE  NEG     BLOOD GAS, ARTERIAL    Collection Time: 02/19/18  3:50 PM   Result Value Ref Range    pH 7.37 7.35 - 7.45      PCO2 34 (L) 35.0 - 45.0 mmHg    PO2 70 (L) 80 - 100 mmHg    O2 SAT 94 92 - 97 %    BICARBONATE 19 (L) 22 - 26 mmol/L    BASE DEFICIT 5.5 mmol/L    O2 METHOD NASAL O2      O2 FLOW RATE 2.00 L/min    Sample source ARTERIAL      SITE RIGHT RADIAL      NICOLE'S TEST YES     GLUCOSE, POC    Collection Time: 02/19/18  6:37 PM   Result Value Ref Range    Glucose (POC) 109 (H) 65 - 100 mg/dL    Performed by Romayne Pellant        Radiologic Studies -     CT Results  (Last 48 hours)               02/19/18 1744  CT CHEST WO CONT Final result    Impression:   impression: Prominent emphysema. Bibasilar infiltrates. CONTRAST: None. TECHNIQUE:  5 mm axial images were obtained through the chest. Coronal and   sagittal reconstructions were generated. CT dose reduction was achieved through   use of a standardized protocol tailored for this examination and automatic   exposure control for dose modulation. The absence of intravenous contrast reduces the sensitivity for evaluation of   the mediastinum and upper abdominal organs. Narrative:  INDICATION: Shortness of breath       COMPARISON: None the axilla, the mediastinum and the digna show no significant   abnormality or adenopathy with the limitation of no intravenous contrast. There   are some diffuse vascular calcifications and tortuosity of the thoracic aorta. There is no pericardial or pleural effusion. The heart size is normal. There is   prominent emphysema. There are bilateral posterior basilar infiltrates. CXR Results  (Last 48 hours)               02/19/18 1600  XR CHEST PORT Final result    Impression:  impression: No focal infiltrate seen. Narrative:  Clinical indication: Sepsis. Portable AP semierect view of the chest is obtained. Comparison September 9, 2016.  There is some mild increase interstitial prominence at the lung bases   likely chronic. There is no focal consolidation. Pleural thickening is seen   bilaterally and also stable. Medical Decision Making   I am the first provider for this patient. I reviewed the vital signs, available nursing notes, past medical history, past surgical history, family history and social history. Vital Signs-Reviewed the patient's vital signs. Patient Vitals for the past 12 hrs:   Temp Pulse Resp BP SpO2   02/19/18 1730 - (!) 120 26 120/59 91 %   02/19/18 1329 - 84 - 101/55 -   02/19/18 1312 98.7 °F (37.1 °C) (!) 135 30 146/76 90 %     SpO2: 93% on nasal cannula  Cardiac Monitor: 129 bpm    EKG interpretation: (Preliminary) 1509  Rhythm: sinus tachycardia with premature supraventricular complexes; and regular . Rate (approx.): 128 bpm; Axis: normal; AR interval: normal; QRS interval: normal ; ST/T wave: normal;  Written by Santo Musa, ED Scribe, as dictated by Britt Mckenna. Batsheva Hernandez MD.    Records Reviewed: Nursing Notes and Old Medical Records    Provider Notes (Medical Decision Making):   DDx: influenza, PNA, acute respiratory failure    ED Course:   Initial assessment performed. The patients presenting problems have been discussed, and they are in agreement with the care plan formulated and outlined with them. I have encouraged them to ask questions as they arise throughout their visit.    3:09 PM - I suspect that this patient has an active infection. 3:09 PM - The patient met criteria for severe sepsis at this time.     PROVIDER SEPSIS PHYSICAL EXAM EVAL  Vital signs reviewed (see nursing documentation for further details):  Vitals:    02/19/18 1312 02/19/18 1329 02/19/18 1730   BP: 146/76 101/55 120/59   Pulse: (!) 135 84 (!) 120   Resp: 30  26   Temp: 98.7 °F (37.1 °C)     SpO2: 90%  91%     Cardiac exam:regualr rate  Pulmonary exam:clear bilateral breath sounds  Peripheral pulses:normal pulses distally  Capillary refill:brisk  Skin exam:pink  Exam performed Olvin Marinelli MD    CONSULT NOTE:  8:44 PM  Aditya Anguiano MD spoke with Kaylan Green MD,  Specialty: Hospitalist  Discussed patient's hx, disposition, and available diagnostic and imaging results. Reviewed care plans. Consultant agrees with plans as outlined. Will admit. Disposition:  PLAN:  1. Admit to Hospitalist    ADMIT NOTE:  8:46 PM  Patient is being admitted to the hospital by Dr. Yony Sumner. The results of their tests and reasons for their admission have been discussed with them and/or available family. They convey agreement and understanding for the need to be admitted and for their admission diagnosis. Consultation has been made with the inpatient physician specialist for hospitalization. Diagnosis     Clinical Impression:   1. Acute respiratory failure, unspecified whether with hypoxia or hypercapnia ([address]    2. Influenza A        Attestations:    ATTESTATION:  This note is prepared by Scar Vogel, acting as Scribe for [employer] Keith [address] Latoya Anguiano MD: The scribe's documentation has been prepared under my direction and personally reviewed by me in its entirety. I confirm that the note above accurately reflects all work, treatment, procedures, and medical decision making performed by me.

## 2018-02-19 NOTE — ED TRIAGE NOTES
Assumed care of this patient. He is alert and oriented x4. Placed on monitor x3. Patient presented with c/o cough, shortness of breath. O2 sats on arrival were in low 70s. Patient doesn't wear O2 typically. Patient reporting that he has been experiencing congestion. Patient reports that he started taking prednisone on Saturday.

## 2018-02-20 ENCOUNTER — APPOINTMENT (OUTPATIENT)
Dept: ULTRASOUND IMAGING | Age: 78
DRG: 871 | End: 2018-02-20
Attending: EMERGENCY MEDICINE
Payer: MEDICARE

## 2018-02-20 LAB
ANION GAP SERPL CALC-SCNC: 11 MMOL/L (ref 5–15)
ATRIAL RATE: 128 BPM
BUN SERPL-MCNC: 60 MG/DL (ref 6–20)
BUN/CREAT SERPL: 17 (ref 12–20)
CALCIUM SERPL-MCNC: 10 MG/DL (ref 8.5–10.1)
CALCULATED P AXIS, ECG09: 73 DEGREES
CALCULATED R AXIS, ECG10: 39 DEGREES
CALCULATED T AXIS, ECG11: 73 DEGREES
CHLORIDE SERPL-SCNC: 103 MMOL/L (ref 97–108)
CO2 SERPL-SCNC: 19 MMOL/L (ref 21–32)
CREAT SERPL-MCNC: 3.63 MG/DL (ref 0.7–1.3)
DIAGNOSIS, 93000: NORMAL
EST. AVERAGE GLUCOSE BLD GHB EST-MCNC: 235 MG/DL
GLUCOSE BLD STRIP.AUTO-MCNC: 200 MG/DL (ref 65–100)
GLUCOSE BLD STRIP.AUTO-MCNC: 292 MG/DL (ref 65–100)
GLUCOSE BLD STRIP.AUTO-MCNC: 397 MG/DL (ref 65–100)
GLUCOSE SERPL-MCNC: 201 MG/DL (ref 65–100)
HBA1C MFR BLD: 9.8 % (ref 4.2–6.3)
P-R INTERVAL, ECG05: 164 MS
POTASSIUM SERPL-SCNC: 4.8 MMOL/L (ref 3.5–5.1)
Q-T INTERVAL, ECG07: 284 MS
QRS DURATION, ECG06: 86 MS
QTC CALCULATION (BEZET), ECG08: 414 MS
SERVICE CMNT-IMP: ABNORMAL
SODIUM SERPL-SCNC: 133 MMOL/L (ref 136–145)
VENTRICULAR RATE, ECG03: 128 BPM

## 2018-02-20 PROCEDURE — 82962 GLUCOSE BLOOD TEST: CPT

## 2018-02-20 PROCEDURE — 74011250637 HC RX REV CODE- 250/637: Performed by: INTERNAL MEDICINE

## 2018-02-20 PROCEDURE — 87040 BLOOD CULTURE FOR BACTERIA: CPT

## 2018-02-20 PROCEDURE — 36415 COLL VENOUS BLD VENIPUNCTURE: CPT | Performed by: EMERGENCY MEDICINE

## 2018-02-20 PROCEDURE — 94640 AIRWAY INHALATION TREATMENT: CPT

## 2018-02-20 PROCEDURE — 74011000250 HC RX REV CODE- 250: Performed by: INTERNAL MEDICINE

## 2018-02-20 PROCEDURE — 80048 BASIC METABOLIC PNL TOTAL CA: CPT | Performed by: EMERGENCY MEDICINE

## 2018-02-20 PROCEDURE — 74011250636 HC RX REV CODE- 250/636: Performed by: EMERGENCY MEDICINE

## 2018-02-20 PROCEDURE — 83036 HEMOGLOBIN GLYCOSYLATED A1C: CPT | Performed by: INTERNAL MEDICINE

## 2018-02-20 PROCEDURE — 74011636637 HC RX REV CODE- 636/637: Performed by: INTERNAL MEDICINE

## 2018-02-20 PROCEDURE — 76770 US EXAM ABDO BACK WALL COMP: CPT

## 2018-02-20 PROCEDURE — 77010033678 HC OXYGEN DAILY

## 2018-02-20 PROCEDURE — 65660000000 HC RM CCU STEPDOWN

## 2018-02-20 PROCEDURE — 74011636637 HC RX REV CODE- 636/637

## 2018-02-20 PROCEDURE — 74011250637 HC RX REV CODE- 250/637: Performed by: EMERGENCY MEDICINE

## 2018-02-20 PROCEDURE — 74011250636 HC RX REV CODE- 250/636: Performed by: INTERNAL MEDICINE

## 2018-02-20 RX ORDER — HEPARIN SODIUM 5000 [USP'U]/ML
5000 INJECTION, SOLUTION INTRAVENOUS; SUBCUTANEOUS EVERY 8 HOURS
Status: DISCONTINUED | OUTPATIENT
Start: 2018-02-21 | End: 2018-02-26

## 2018-02-20 RX ORDER — CALCIUM CARBONATE 200(500)MG
400 TABLET,CHEWABLE ORAL ONCE
Status: COMPLETED | OUTPATIENT
Start: 2018-02-20 | End: 2018-02-20

## 2018-02-20 RX ORDER — VANCOMYCIN 2 GRAM/500 ML IN 0.9 % SODIUM CHLORIDE INTRAVENOUS
2000 ONCE
Status: COMPLETED | OUTPATIENT
Start: 2018-02-20 | End: 2018-02-20

## 2018-02-20 RX ORDER — OSELTAMIVIR PHOSPHATE 30 MG/1
30 CAPSULE ORAL DAILY
Status: COMPLETED | OUTPATIENT
Start: 2018-02-21 | End: 2018-02-23

## 2018-02-20 RX ADMIN — VANCOMYCIN HYDROCHLORIDE 2000 MG: 10 INJECTION, POWDER, LYOPHILIZED, FOR SOLUTION INTRAVENOUS at 16:25

## 2018-02-20 RX ADMIN — DILTIAZEM HYDROCHLORIDE 240 MG: 240 CAPSULE, COATED, EXTENDED RELEASE ORAL at 09:45

## 2018-02-20 RX ADMIN — CEFTRIAXONE SODIUM 1 G: 1 INJECTION, POWDER, FOR SOLUTION INTRAMUSCULAR; INTRAVENOUS at 22:27

## 2018-02-20 RX ADMIN — INSULIN LISPRO 6 UNITS: 100 INJECTION, SOLUTION INTRAVENOUS; SUBCUTANEOUS at 21:51

## 2018-02-20 RX ADMIN — GUAIFENESIN 600 MG: 600 TABLET, EXTENDED RELEASE ORAL at 09:44

## 2018-02-20 RX ADMIN — INSULIN LISPRO 3 UNITS: 100 INJECTION, SOLUTION INTRAVENOUS; SUBCUTANEOUS at 09:43

## 2018-02-20 RX ADMIN — IPRATROPIUM BROMIDE AND ALBUTEROL SULFATE 3 ML: .5; 3 SOLUTION RESPIRATORY (INHALATION) at 05:14

## 2018-02-20 RX ADMIN — Medication 10 ML: at 21:17

## 2018-02-20 RX ADMIN — INSULIN LISPRO 3 UNITS: 100 INJECTION, SOLUTION INTRAVENOUS; SUBCUTANEOUS at 12:07

## 2018-02-20 RX ADMIN — IPRATROPIUM BROMIDE AND ALBUTEROL SULFATE 3 ML: .5; 3 SOLUTION RESPIRATORY (INHALATION) at 21:23

## 2018-02-20 RX ADMIN — IPRATROPIUM BROMIDE AND ALBUTEROL SULFATE 3 ML: .5; 3 SOLUTION RESPIRATORY (INHALATION) at 12:11

## 2018-02-20 RX ADMIN — OSELTAMIVIR PHOSPHATE 30 MG: 30 CAPSULE ORAL at 09:45

## 2018-02-20 RX ADMIN — GUAIFENESIN 600 MG: 600 TABLET, EXTENDED RELEASE ORAL at 18:32

## 2018-02-20 RX ADMIN — MONTELUKAST SODIUM 10 MG: 10 TABLET, COATED ORAL at 09:45

## 2018-02-20 RX ADMIN — IPRATROPIUM BROMIDE AND ALBUTEROL SULFATE 3 ML: .5; 3 SOLUTION RESPIRATORY (INHALATION) at 09:56

## 2018-02-20 RX ADMIN — SODIUM CHLORIDE 500 ML: 900 INJECTION, SOLUTION INTRAVENOUS at 15:22

## 2018-02-20 RX ADMIN — METHYLPREDNISOLONE SODIUM SUCCINATE 40 MG: 40 INJECTION, POWDER, FOR SOLUTION INTRAMUSCULAR; INTRAVENOUS at 05:14

## 2018-02-20 RX ADMIN — INSULIN HUMAN 25 UNITS: 100 INJECTION, SUSPENSION SUBCUTANEOUS at 21:17

## 2018-02-20 RX ADMIN — INSULIN HUMAN 25 UNITS: 100 INJECTION, SUSPENSION SUBCUTANEOUS at 03:18

## 2018-02-20 RX ADMIN — SODIUM CHLORIDE 125 ML/HR: 900 INJECTION, SOLUTION INTRAVENOUS at 20:09

## 2018-02-20 RX ADMIN — AZITHROMYCIN 250 MG: 500 INJECTION, POWDER, LYOPHILIZED, FOR SOLUTION INTRAVENOUS at 21:26

## 2018-02-20 RX ADMIN — ENOXAPARIN SODIUM 30 MG: 30 INJECTION SUBCUTANEOUS at 09:43

## 2018-02-20 RX ADMIN — METHYLPREDNISOLONE SODIUM SUCCINATE 40 MG: 40 INJECTION, POWDER, FOR SOLUTION INTRAMUSCULAR; INTRAVENOUS at 21:17

## 2018-02-20 RX ADMIN — SODIUM CHLORIDE 75 ML/HR: 900 INJECTION, SOLUTION INTRAVENOUS at 09:57

## 2018-02-20 RX ADMIN — CLOPIDOGREL BISULFATE 75 MG: 75 TABLET ORAL at 09:44

## 2018-02-20 RX ADMIN — METHYLPREDNISOLONE SODIUM SUCCINATE 40 MG: 40 INJECTION, POWDER, FOR SOLUTION INTRAMUSCULAR; INTRAVENOUS at 16:23

## 2018-02-20 RX ADMIN — ASPIRIN 81 MG 81 MG: 81 TABLET ORAL at 09:44

## 2018-02-20 RX ADMIN — UMECLIDINIUM 1 PUFF: 62.5 AEROSOL, POWDER ORAL at 09:47

## 2018-02-20 RX ADMIN — FLUTICASONE FUROATE AND VILANTEROL TRIFENATATE 1 PUFF: 100; 25 POWDER RESPIRATORY (INHALATION) at 09:47

## 2018-02-20 RX ADMIN — CALCIUM CARBONATE (ANTACID) CHEW TAB 500 MG 400 MG: 500 CHEW TAB at 15:45

## 2018-02-20 RX ADMIN — AZITHROMYCIN 250 MG: 500 INJECTION, POWDER, LYOPHILIZED, FOR SOLUTION INTRAVENOUS at 00:06

## 2018-02-20 NOTE — H&P
Hospitalist Admission Note    NAME: Bethany Mccabe   :  1940   MRN:  [id number]     Date/Time:  2018 8:59 PM    Patient PCP: Roopa Davis MD  _____________________________________________________________________  Given the patient's current clinical presentation, I have a high level of concern for decompensation if discharged from the emergency department. Complex decision making was performed, which includes reviewing the patient's available past medical records, laboratory results, and x-ray films. My assessment of this patient's clinical condition and my plan of care is as follows. Assessment / Plan:    Influenza with PNA  COPD exacerbation with hypoxemia  -CT chest Prominent emphysema. Bibasilar infiltrates  -start Rocephin and zithromax, mucinex  -start IV steroids and scheduled duonebs. Continue his symbicort and spiriva  -oxygen prn. Try wean off  -start tamiflu    DIANA  -suspect pre renal.  Start IVFs. Recheck labs in AM     HTN  -continue cardizem    Diabetes type 2  -continue NPH with SSI. 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  male with a history of COPD, DM, HTN, CVA and MM who was referred to this ER because of SOB and low oxygen saturations. Patient started 2 days ago with chills, cough, yellow sputum, SOB and difficulty sleeping. He has some prednisone at home on standby and he took 2 tablets.    His symptoms got worse and he presented to a walk in clinic today and found his oxygen to be in the 70s so he was referred to the ER. CT chest demonstrates bibasilar infiltrates and he is also influenza positive. We were asked to admit for work up and evaluation of the above problems. Past Medical History:   Diagnosis Date    AAA (abdominal aortic aneurysm) (HCC)     Asthma     Cancer ([facility] [address]     kidney ca    COPD     Diabetes ([facility] [address]     Gastrointestinal disorder     ruptured esphogus    Hypertension     Other ill-defined conditions(799.89)         Past Surgical History:   Procedure Laterality Date    ABDOMEN SURGERY PROC UNLISTED      hernia    ABDOMEN SURGERY PROC UNLISTED      colon resection    HX GI      part of colon removed, ruptured esophagus    HX OTHER SURGICAL      kidney removed    REMV KIDNEY,COMPLICATED         Social History   Substance Use Topics    Smoking status: Current Every Day Smoker     Packs/day: 0.50     Years: 60.00    Smokeless tobacco: Never Used    Alcohol use Yes      Comment: 2 drinks a month        Family History   Problem Relation Age of Onset    Hypertension Mother      Allergies   Allergen Reactions    Niacin Rash    Niacin Unknown (comments)     Hot flashes        Prior to Admission medications    Medication Sig Start Date End Date Taking? Authorizing Provider   predniSONE (STERAPRED) 5 mg dose pack See administration instruction per 5mg dose pack 8/10/17  Yes SARAH Atkinson   insulin NPH (NOVOLIN N, HUMULIN N) 100 unit/mL injection 25 Units by SubCUTAneous route nightly. 9/12/16  Yes Kyler Sanchez MD   acetaminophen (TYLENOL) 325 mg tablet Take 2 Tabs by mouth every four (4) hours as needed for Fever (For temp greater than or equal to 38.5 C or 101.3 F (Unless hepatic failure or contrindicated). Give first line for fever. ). 9/12/16  Yes Kyler Sanchez MD   aspirin 81 mg chewable tablet Take 1 Tab by mouth daily. 9/12/16  Yes Kyler Sanchez MD   clopidogrel (PLAVIX) 75 mg tablet Take 1 Tab by mouth daily.  9/12/16  Yes Kyler Sanchez MD   cholecalciferol (VITAMIN D3) 1,000 unit cap Take 1,000 Units by mouth daily. Yes Roopa Davis MD   glipiZIDE (GLUCOTROL) 10 mg tablet Take 2.5 mg by mouth every evening. Yes Roopa Davis MD   insulin regular (NOVOLIN R) 100 unit/mL injection by SubCUTAneous route. Yes Roopa Davis MD   rosuvastatin (CRESTOR) 40 mg tablet Take 20 mg by mouth nightly. Yes Roopa Davis MD   tiotropium (SPIRIVA WITH HANDIHALER) 18 mcg inhalation capsule Take 1 Cap by inhalation daily. Yes Historical Provider   ranitidine (ZANTAC) 150 mg tablet Take 150 mg by mouth daily. Yes Historical Provider   glipiZIDE (GLUCOTROL) 10 mg tablet Take 5 mg by mouth daily. Yes Historical Provider   budesonide-formoterol (SYMBICORT) 160-4.5 mcg/actuation HFA inhaler Take 2 Puffs by inhalation daily. Indications: COPD ASSOCIATED WITH CHRONIC BRONCHITIS   Yes Roopa Davis MD   albuterol (PROVENTIL HFA, VENTOLIN HFA) 90 mcg/Actuation inhaler Take 2 Puffs by inhalation every six (6) hours as needed for Wheezing and Shortness of Breath. 4/28/11  Yes Tati Hancock MD   albuterol (PROVENTIL VENTOLIN) 2.5 mg /3 mL (0.083 %) nebulizer solution 1.25 mg by Nebulization route once. Yes Roopa Davis MD   loratadine 10 mg cap Take 10 mg by mouth daily. Roopa Davis MD   diltiazem CD (CARDIZEM CD) 240 mg ER capsule Take 1 Cap by mouth daily. 4/28/11   Tati Hancock MD   montelukast (SINGULAIR) 10 mg tablet Take 10 mg by mouth daily.     Roopa Davis MD       REVIEW OF SYSTEMS:       Total of 12 systems reviewed as follows:       POSITIVE= underlined text  Negative = text not underlined  General:  fever, chills, sweats, generalized weakness, weight loss/gain,      loss of appetite   Eyes:    blurred vision, eye pain, loss of vision, double vision  ENT:    rhinorrhea, pharyngitis   Respiratory:   cough, sputum production, SOB, HUERTA, wheezing, pleuritic pain   Cardiology:   chest pain, palpitations, orthopnea, PND, edema, syncope Gastrointestinal:  abdominal pain , N/V, diarrhea, dysphagia, constipation, bleeding   Genitourinary:  frequency, urgency, dysuria, hematuria, incontinence   Muskuloskeletal :  arthralgia, myalgia, back pain  Hematology:  easy bruising, nose or gum bleeding, lymphadenopathy   Dermatological: rash, ulceration, pruritis, color change / jaundice  Endocrine:   hot flashes or polydipsia   Neurological:  headache, dizziness, confusion, focal weakness, paresthesia,     Speech difficulties, memory loss, gait difficulty  Psychological: Feelings of anxiety, depression, agitation    Objective:   VITALS:    Visit Vitals    /59    Pulse (!) 120    Temp 98.7 °F (37.1 °C)    Resp 26    Ht 6' (1.829 m)    Wt 77.7 kg (171 lb 4.8 oz)    SpO2 91%    BMI 23.23 kg/m2       PHYSICAL EXAM:    General:    Alert, cooperative, no distress, appears stated age. HEENT: Atraumatic, anicteric sclerae, pink conjunctivae     No oral ulcers, mucosa moist, throat clear, dentition fair  Neck:  Supple, symmetrical,  thyroid: non tender  Lungs:   Coarse BS, rhonchi and mild wheezing  Chest wall:  No tenderness  No Accessory muscle use. Heart:   Regular  rhythm,  No edema  Abdomen:   Soft, non-tender. Not distended. Bowel sounds normal  Extremities: No cyanosis. No clubbing,  Skin turgor normal, Capillary refill normal, Radial dial pulse 2+  Skin:     Not pale. Not Jaundiced  No rashes   Psych:  Good insight. Not depressed. Not anxious or agitated. Neurologic: EOMs intact. No facial asymmetry. No aphasia or slurred speech. Symmetrical strength, Sensation grossly intact.  Alert and oriented X 4.     _______________________________________________________________________  Care Plan discussed with:    Comments   Patient x    Family      RN     Care Manager                    Consultant:      _______________________________________________________________________  Expected  Disposition:   Home with Family x   HH/PT/OT/RN    SNF/LTC [facility]    ________________________________________________________________________  TOTAL TIME:  39  Minutes    Critical Care Provided     Minutes non procedure based      Comments    x Reviewed previous records   >50% of visit spent in counseling and coordination of care  Discussion with patient and/or family and questions answered       Given the patient's current clinical presentation, I have a high level of concern for decompensation if discharged from the ED. Complex decision making was performed which includes reviewing the patient's available past medical records, laboratory results, and Xray films. I have also directly communicated my plan and discussed this case with the involved ED physician.     ____________________________________________________________________  Isabela Dunlap MD    Procedures: see electronic medical records for all procedures/Xrays and details which were not copied into this note but were reviewed prior to creation of Plan.     LAB DATA REVIEWED:    Recent Results (from the past 24 hour(s))   LACTIC ACID    Collection Time: 02/19/18  3:07 PM   Result Value Ref Range    Lactic acid 1.3 0.4 - 2.0 MMOL/L   URINALYSIS W/ RFLX MICROSCOPIC    Collection Time: 02/19/18  3:07 PM   Result Value Ref Range    Color YELLOW/STRAW      Appearance CLOUDY (A) CLEAR      Specific gravity 1.020 1.003 - 1.030      pH (UA) 5.5 5.0 - 8.0      Protein 100 (A) NEG mg/dL    Glucose NEGATIVE  NEG mg/dL    Ketone NEGATIVE  NEG mg/dL    Bilirubin NEGATIVE  NEG      Blood LARGE (A) NEG      Urobilinogen 0.2 0.2 - 1.0 EU/dL    Nitrites NEGATIVE  NEG      Leukocyte Esterase NEGATIVE  NEG      WBC 0-4 0 - 4 /hpf    RBC 0-5 0 - 5 /hpf    Epithelial cells FEW FEW /lpf    Bacteria 1+ (A) NEG /hpf    Amorphous Crystals 2+ (A) NEG   METABOLIC PANEL, COMPREHENSIVE    Collection Time: 02/19/18  3:07 PM   Result Value Ref Range    Sodium 133 (L) 136 - 145 mmol/L    Potassium 4.3 3.5 - 5.1 mmol/L    Chloride 103 97 - 108 mmol/L    CO2 23 21 - 32 mmol/L    Anion gap 7 5 - 15 mmol/L    Glucose 117 (H) 65 - 100 mg/dL    BUN 41 (H) 6 - 20 MG/DL    Creatinine 2.49 (H) 0.70 - 1.30 MG/DL    BUN/Creatinine ratio 16 12 - 20      GFR est AA 31 (L) >60 ml/min/1.73m2    GFR est non-AA 25 (L) >60 ml/min/1.73m2    Calcium 11.1 (H) 8.5 - 10.1 MG/DL    Bilirubin, total 0.4 0.2 - 1.0 MG/DL    ALT (SGPT) 19 12 - 78 U/L    AST (SGOT) 27 15 - 37 U/L    Alk. phosphatase 76 45 - 117 U/L    Protein, total 9.4 (H) 6.4 - 8.2 g/dL    Albumin 2.7 (L) 3.5 - 5.0 g/dL    Globulin 6.7 (H) 2.0 - 4.0 g/dL    A-G Ratio 0.4 (L) 1.1 - 2.2     CBC WITH AUTOMATED DIFF    Collection Time: 02/19/18  3:07 PM   Result Value Ref Range    WBC 11.4 (H) 4.1 - 11.1 K/uL    RBC 4.70 4. 10 - 5.70 M/uL    HGB 12.8 12.1 - 17.0 g/dL    HCT 40.5 36.6 - 50.3 %    MCV 86.2 80.0 - 99.0 FL    MCH 27.2 26.0 - 34.0 PG    MCHC 31.6 30.0 - 36.5 g/dL    RDW 17.6 (H) 11.5 - 14.5 %    PLATELET 394 471 - 438 K/uL    MPV 10.7 8.9 - 12.9 FL    NRBC 0.0 0  WBC    ABSOLUTE NRBC 0.00 0.00 - 0.01 K/uL    NEUTROPHILS 74 32 - 75 %    BAND NEUTROPHILS 14 %    LYMPHOCYTES 7 (L) 12 - 49 %    MONOCYTES 5 5 - 13 %    EOSINOPHILS 0 0 - 7 %    BASOPHILS 0 0 - 1 %    IMMATURE GRANULOCYTES 0 0.0 - 0.5 %    ABS. NEUTROPHILS 10.0 (H) 1.8 - 8.0 K/UL    ABS. LYMPHOCYTES 0.8 0.8 - 3.5 K/UL    ABS. MONOCYTES 0.6 0.0 - 1.0 K/UL    ABS. EOSINOPHILS 0.0 0.0 - 0.4 K/UL    ABS. BASOPHILS 0.0 0.0 - 0.1 K/UL    ABS. IMM.  GRANS. 0.0 0.00 - 0.04 K/UL    DF MANUAL      RBC COMMENTS ANISOCYTOSIS      WBC COMMENTS LEFT SHIFT     INFLUENZA A & B AG (RAPID TEST)    Collection Time: 02/19/18  3:07 PM   Result Value Ref Range    Influenza A Antigen POSITIVE (A) NEG      Influenza B Antigen NEGATIVE  NEG     BLOOD GAS, ARTERIAL    Collection Time: 02/19/18  3:50 PM   Result Value Ref Range    pH 7.37 7.35 - 7.45      PCO2 34 (L) 35.0 - 45.0 mmHg    PO2 70 (L) 80 - 100 mmHg    O2 SAT 94 92 - 97 %    BICARBONATE 19 (L) 22 - 26 mmol/L    BASE DEFICIT 5.5 mmol/L    O2 METHOD NASAL O2      O2 FLOW RATE 2.00 L/min    Sample source ARTERIAL      SITE RIGHT RADIAL      NICOLE'S TEST YES     GLUCOSE, POC    Collection Time: 02/19/18  6:37 PM   Result Value Ref Range    Glucose (POC) 109 (H) 65 - 100 mg/dL    Performed by [employer]

## 2018-02-20 NOTE — PROGRESS NOTES
Problem: Falls - Risk of  Goal: *Absence of Falls  Document Conrad Fall Risk and appropriate interventions in the flowsheet.    Outcome: Progressing Towards Goal  Fall Risk Interventions:  Mobility Interventions: Assess mobility with egress test, Mechanical lift, OT consult for ADLs, PT Consult for mobility concerns, PT Consult for assist device competence         Medication Interventions: Evaluate medications/consider consulting pharmacy, Patient to call before getting OOB, Teach patient to arise slowly, Utilize gait belt for transfers/ambulation

## 2018-02-20 NOTE — ED NOTES
Offered to reposition patient in stretcher. Patient comfortable at this time. Neb administered, fluids continue infusing. Will continue to monitor.

## 2018-02-20 NOTE — PROGRESS NOTES
1900 Received report from [employer]. Assumed patient care. 2045 Pt . Pt currently taking Solumedrol every 8 hours. Contacted on call physician x3. Dr Edith Osgood verbally ordered Humalog 6 units. Administered to pt and will monitor. Bedside shift change report given to Alda Jackson RN (oncoming nurse) by Nela Rizo RN (offgoing nurse). Report included the following information SBAR, Kardex, Intake/Output, MAR, Recent Results and Cardiac Rhythm NSR.

## 2018-02-20 NOTE — PROGRESS NOTES
Hospitalist Progress Note    NAME: Jose D Aguirre   :  1940   MRN:  [id number]       Assessment / Plan:    Acute hypoxemic respiratory failure secondary to influenza A with pneumonia and acute on chronic COPD exacerbation  Sepsis developing on admission  -patient still requiring oxygen, will wean as tolerated we will try to obtain some sputum for cultures continue Rocephin and azithromycin for possible community-acquired pneumonia as well as Tamiflu. We will continue IV steroids and nebulizer treatments and add a flutter valve to improve expectoration  -CT chest Prominent emphysema. Bibasilar infiltrates      Acute kidney injury on chronic kidney disease stage II patient's creatinine normally 1.1 was 2.49 on admission and today his creatinine is up to 3.63 and he denies any urine output I cannot palpate his bladder he looks dry on exam we will check a postvoid residual bladder scan and ultrasound. his urine had specific gravity 1030 no infection we will give him an IV fluid bolus and increase his IV fluids I suspect that this is prerenal but consider nephrology if his renal function worsens next    Positive blood cultures his cultures 1 out of 2- no microbiology for Gram stain available yet we will repeat blood cultures and follow-up cultures to narrow antibiotics consider broadening antibiotics depending on Gram stain, will add vancomycin in the meantime and consider infectious disease as needed.         HTN  -continue cardizem if BP tolerates     Diabetes type 2 uncontrolled  -continue NPH with SSI.   Hold po meds  Wean steroids      Hx CVA  -continue plavix and statin     Multiple myeloma  Hx renal cell cancer s/p nephrectomy  S/p colectomy for diverticulitis  Hx esophageal rupture from violent vomiting  Abdominal aortic aneurysm   -hx of aortic sleeve years ago     Code Status:  Full  Surrogate Decision Maker: wife     DVT Prophylaxis: heparin sq, mobilize as tolerated  GI Prophylaxis: not indicated     Baseline: Body mass index is 23.23 kg/(m^2). Subjective:     Chief Complaint / Reason for Physician Visit  Hypoxemia     Patient is a long-term ongoing smoker who got his flu shot and is up-to-date with pneumonia shot and over the weekend got progressively more short of breath coughing up yellow sputum feeling white headed dizzy. He says he feels better than last night when he came in. He has not coughed anything up today. Denies any chest pain. Does not normally use oxygen but is currently on oxygen and feels overall winded and tired. He has very poor appetite. He had keep keeping up with his fluid intake at home but really has not had much to drink here and says he has not urinated yet today. He denies any nausea vomiting or abdominal pain. Patient was evaluated at 1:15 PM    Discussed with RN events overnight. Review of Systems:  Symptom Y/N Comments  Symptom Y/N Comments   Fever/Chills    Chest Pain n    Poor Appetite y   Edema     Cough y   Abdominal Pain n    Sputum y   Joint Pain     SOB/HUERTA y   Pruritis/Rash     Nausea/vomit n   Tolerating PT/OT     Diarrhea    Tolerating Diet     Constipation    Other       Could NOT obtain due to:      Objective:     VITALS:   Last 24hrs VS reviewed since prior progress note.  Most recent are:  Patient Vitals for the past 24 hrs:   Temp Pulse Resp BP SpO2   02/20/18 1927 97.5 °F (36.4 °C) 86 22 127/66 94 %   02/20/18 1752 98 °F (36.7 °C) 91 22 142/64 93 %   02/20/18 1515 - 92 16 116/53 96 %   02/20/18 1500 - 96 19 126/58 96 %   02/20/18 1445 - 95 17 116/53 93 %   02/20/18 1430 - 97 17 123/53 95 %   02/20/18 1415 - 99 17 121/53 96 %   02/20/18 1400 - 99 19 130/56 95 %   02/20/18 1345 - 99 17 111/53 95 %   02/20/18 1330 - (!) 102 19 111/53 94 %   02/20/18 1315 - (!) 104 17 113/61 94 %   02/20/18 1300 - (!) 109 19 114/69 95 %   02/20/18 1245 - (!) 107 22 132/62 95 %   02/20/18 1230 - (!) 107 22 135/63 95 %   02/20/18 1215 - (!) 104 18 122/65 94 %   02/20/18 1200 - 100 20 121/59 94 %   02/20/18 1154 98.1 °F (36.7 °C) - - - -   02/20/18 1145 - 99 18 117/58 93 %   02/20/18 1100 - (!) 102 19 110/49 93 %   02/20/18 1045 - (!) 103 19 114/53 93 %   02/20/18 1030 - (!) 103 23 116/56 94 %   02/20/18 1015 - (!) 102 21 108/58 94 %   02/20/18 1000 - 96 20 115/56 95 %   02/20/18 0945 - 99 20 108/52 96 %   02/20/18 0930 - (!) 101 19 110/67 95 %   02/20/18 0915 - (!) 104 24 106/59 93 %   02/20/18 0900 - (!) 107 21 121/65 93 %   02/20/18 0845 - 100 23 101/62 94 %   02/20/18 0830 - 96 19 104/61 96 %   02/20/18 0815 - 98 19 117/61 96 %   02/20/18 0800 - 95 17 115/54 96 %   02/20/18 0745 - 98 19 110/54 93 %   02/20/18 0730 - 98 19 115/50 95 %   02/20/18 0700 - 100 19 107/53 95 %   02/20/18 0645 - (!) 102 20 100/54 93 %   02/20/18 0630 - (!) 104 18 118/50 94 %   02/20/18 0600 - (!) 102 19 111/52 94 %   02/20/18 0545 - 100 20 116/53 94 %   02/20/18 0530 - (!) 101 19 127/57 95 %   02/20/18 0515 - 99 20 118/54 93 %   02/20/18 0500 - 99 21 101/56 93 %   02/20/18 0445 - 100 23 106/59 93 %   02/20/18 0400 - (!) 102 21 109/53 94 %   02/20/18 0330 - (!) 104 22 115/56 94 %   02/20/18 0300 - (!) 103 20 105/55 94 %   02/20/18 0245 - (!) 105 22 113/58 94 %   02/20/18 0200 - (!) 110 26 112/57 92 %   02/20/18 0145 - (!) 110 24 116/55 95 %   02/20/18 0100 - (!) 110 24 109/53 95 %   02/20/18 0001 - (!) 122 29 116/55 94 %   02/19/18 2313 - (!) 117 26 133/52 92 %   02/19/18 2100 - (!) 116 26 120/49 93 %   02/19/18 1945 - (!) 115 25 122/59 92 %       Intake/Output Summary (Last 24 hours) at 02/20/18 1938  Last data filed at 02/20/18 1524   Gross per 24 hour   Intake                0 ml   Output              600 ml   Net             -600 ml        PHYSICAL EXAM:  He is mildly ill-appearing on nasal cannula oriented ×3 awake and alert no respiratory distress occasional cough extraocular movements are intact sclera nonicteric conjunctiva pink oropharynx very dry mucous membranes dry no thrush or lesions neck is supple nontender cardiovascular regular rate slightly tachycardic no obvious murmurs rubs or gallops lungs diffuse bilateral wheezing and rhonchi moderate air movement no crackles appreciated abdomen bowel sounds present soft nontender no obvious bladder distention bowel sounds present extremities no clubbing cyanosis or edema neuro exam nonfocal    Reviewed most current lab test results and cultures  YES  Reviewed most current radiology test results   YES  Review and summation of old records today    NO  Reviewed patient's current orders and MAR    YES  PMH/SH reviewed - no change compared to H&P  ________________________________________________________________________  Care Plan discussed with:    Comments   Patient y    Family      RN y    Care Manager     Consultant                        Multidiciplinary team rounds were held today with , nursing, pharmacist and clinical coordinator. Patient's plan of care was discussed; medications were reviewed and discharge planning was addressed. ________________________________________________________________________  Total NON critical care TIME:     Minutes    Total CRITICAL CARE TIME Spent:   Minutes non procedure based      Comments   >50% of visit spent in counseling and coordination of care     ________________________________________________________________________  Marisol Downey MD     Procedures: see electronic medical records for all procedures/Xrays and details which were not copied into this note but were reviewed prior to creation of Plan. LABS:  I reviewed today's most current labs and imaging studies.   Pertinent labs include:  Recent Labs      02/19/18   1507   WBC  11.4*   HGB  12.8   HCT  40.5   PLT  261     Recent Labs      02/20/18   0514  02/19/18 2102 02/19/18   1507   NA  133*  134*  133*   K  4.8  4.3  4.3   CL  103  103  103   CO2  19*  21  23   GLU  201*  110*  117*   BUN  60*  48*  41*   CREA  3.63* 3.01*  2.49*   CA  10.0  10.6*  11.1*   ALB   --    --   2.7*   TBILI   --    --   0.4   SGOT   --    --   27   ALT   --    --   19       Signed: Emily Duckworth MD

## 2018-02-20 NOTE — PROGRESS NOTES
Pharmacy Automatic Renal Dosing Protocol - Antimicrobials/Vancomycin    Indication for Antimicrobials: Influenza A Positive/CAP/bacteremia    Current Regimen of Each Antimicrobial:  Oseltamivir 30 mg BID (Start Date ; Day # 2)  Azithromycin 250 mg IV q 24 hours (start Date: ; day #1)  Ceftriaxone 1 g IV q 24 hours (start date: ; day #2)  Vancomycin IV (Start date: ; Day #1)    Vancomycin trough goal: 15-20 mcg/mL    Vancomycin Resulted Levels: N/A    Previous Antimicrobial Therapy: N/A    Significant Cultures:   -blood: no growth x 14 hours-PRELIM  -influenza AG: influenza A +-FINAL    Labs:  Recent Labs      18   0514  18   2102  18   1507   CREA  3.63*  3.01*  2.49*   BUN  60*  48*  41*   WBC   --    --   11.4*     Temp (24hrs), Av.1 °F (36.7 °C), Min:98.1 °F (36.7 °C), Max:98.1 °F (36.7 °C)    Paralysis, amputations, malnutrition: N/A  Creatinine Clearance (mL/min) or Dialysis: 18.4    Impression/Plan:    Scr con't to worsen 3.01-->3.63 mg/dL   Vancomycin IV initiated as a 2000 mg IV (~25 mg/kg) x 1 loading dose, followed by \"dosing per levels\" as renal function too unstable at this time to place on a scheduled regimen   No vancomycin level has been ordered to be collected yet at this time, renal function will need to be evaluated  to determine vancomycin maintenance regimen and level collection   Oseltamivir dosing adjusted to 30 mg daily per protocol for renal function and indication-please consider placing a 5 day stop date on regimen   Azithromycin/ceftriaxone not renally adjusted   BMP daily     Pharmacy will follow daily and adjust medications as appropriate for renal function and/or serum levels.     Thank you,    Chu Basilio, PharmD, [address] [facility] Specialist

## 2018-02-20 NOTE — ED NOTES
Bedside and Verbal shift change report given to Raul Ruiz RN (oncoming nurse). Report included the following information: SBAR, ED Summary, MAR and Recent Results.

## 2018-02-20 NOTE — ED NOTES
TRANSFER - OUT REPORT:    Verbal report given to [facility] (name) on Michelle Hamilton  being transferred to [facility](unit) for routine progression of care       Report consisted of patients Situation, Background, Assessment and   Recommendations(SBAR). Information from the following report(s) SBAR, Kardex and ED Summary was reviewed with the receiving nurse. Lines:   Peripheral IV 02/20/18 Left Antecubital (Active)   Site Assessment Clean, dry, & intact 2/20/2018  4:33 PM   Phlebitis Assessment 0 2/20/2018  4:33 PM   Infiltration Assessment 0 2/20/2018  4:33 PM   Dressing Status Clean, dry, & intact 2/20/2018  4:33 PM       Peripheral IV 02/20/18 Right Antecubital (Active)   Site Assessment Clean, dry, & intact 2/20/2018  4:34 PM   Phlebitis Assessment 0 2/20/2018  4:34 PM   Infiltration Assessment 0 2/20/2018  4:34 PM   Dressing Status Clean, dry, & intact 2/20/2018  4:34 PM        Opportunity for questions and clarification was provided.       Patient transported with:   [employer]

## 2018-02-20 NOTE — ED NOTES
Bedside and Verbal shift change received from Adi Dhaliwal RN. Report included the following information: SBAR, ED Summary, MAR and Recent Results. Nebulizer administered at [id number], next one was due at 0000. Dose at 0000 held by previous RN, will administer 0400 dose to satisfy q4 hour requirement.

## 2018-02-20 NOTE — PROGRESS NOTES
Pharmacy Automatic Renal Dosing Protocol - Antimicrobials    Indication for Antimicrobials: Influenza A Positive    Current Regimen of Each Antimicrobial:  Oseltamivir 75 mg BID (Start Date ; Day # 1)    Previous Antimicrobial Therapy:    Vancomycin Goal Level:     Measured / Extrapolated Vancomycin Level:     Significant Cultures:       Labs:  Recent Labs      18   1507   CREA  2.49*   BUN  41*   WBC  11.4*     Temp (24hrs), Av.7 °F (37.1 °C), Min:98.7 °F (37.1 °C), Max:98.7 °F (37.1 °C)        Paralysis, amputations, malnutrition:   Creatinine Clearance (mL/min) or Dialysis: 26.8    Impression/Plan:   Dose adjusted to 30 mg BID per renal dosing protocol  BMP daily     Pharmacy will follow daily and adjust medications as appropriate for renal function and/or serum levels. Thank you,  Oralia Vasquez, [address] [facility]      Recommended duration of therapy  http://[facility]/Pharmacy/Clinical%20Companion/Duration%20of%20ABX%20therapy. docx    Renal Dosing  http://[facility]/[place]/[facility]/Pharmacy/Clinical%20Companion/Renal%20Dosing%35q84614. pdf

## 2018-02-20 NOTE — ED NOTES
Patient's 2130 dose of NPH not administered prior to this RN assuming care. Spoke with pharmacy who states it is fine to administer NPH at this time. Will administer as ordered.

## 2018-02-20 NOTE — PROGRESS NOTES
TRANSFER - IN REPORT:    Verbal report received from Aline Cain (name) on Delfina Stovall  being received from ED (unit) for routine progression of care      Report consisted of patients Situation, Background, Assessment and   Recommendations(SBAR). Information from the following report(s) SBAR was reviewed with the receiving nurse. Opportunity for questions and clarification was provided. Primary Nurse Isabella Paredes and Burt Romo RN performed a dual skin assessment on this patient No impairment noted  Sidney score is [address] called to come get pt's cash. Bedside shift change report given to Kofi Anderson (oncoming nurse) by Amadou Marte RN (offgoing nurse). Report included the following information SBAR. SHIFT SUMMARY:            [address] NURSING NOTE   Admission Date 2/19/2018   Admission Diagnosis Hypoxemia   Consults None      Cardiac Monitoring [x] Yes [] No      Purposeful Hourly Rounding [x] Yes    Conrad Score Total Score: 2   Conrad score 3 or > [x] Bed Alarm [] Avasys [] 1:1 sitter [] Patient refused (Place signed refusal form in chart)   Sidney Score Sidney Score: 18   Sidney score 14 or < [] PMT consult [] Wound Care consult    []  Specialty bed  [] Nutrition consult      Influenza Vaccine Received Flu Vaccine for Current Season (usually Sept-March): Yes    Patient/Guardian Refused (Notify MD): No      Oxygen needs?  [] Room air Oxygen @  []1L    []2L    [x]3L   []4L    []5L   []6L     Use home O2? [] Yes [x] No  Perform O2 challenge test using  smartphrase (.Homeoxygen)      Last bowel movement Last Bowel Movement Date: 02/19/18      Urinary Catheter             LDAs               Peripheral IV 02/20/18 Left Antecubital (Active)   Site Assessment Clean, dry, & intact 2/20/2018  4:33 PM   Phlebitis Assessment 0 2/20/2018  4:33 PM   Infiltration Assessment 0 2/20/2018  4:33 PM   Dressing Status Clean, dry, & intact 2/20/2018  4:33 PM       Peripheral IV 02/20/18 Right Antecubital (Active)   Site Assessment Clean, dry, & intact 2/20/2018  4:34 PM   Phlebitis Assessment 0 2/20/2018  4:34 PM   Infiltration Assessment 0 2/20/2018  4:34 PM   Dressing Status Clean, dry, & intact 2/20/2018  4:34 PM                         Readmission Risk Assessment Tool Score High Risk            29       Total Score        2 . Living with Significant Other. Assisted Living. LTAC. SNF. or   Rehab    5 Pt.  Coverage (Medicare=5 , Medicaid, or Self-Pay=4)    22 Charlson Comorbidity Score (Age + Comorbid Conditions)        Criteria that do not apply:    Has Seen PCP in Last 6 Months (Yes=3, No=0)    Patient Length of Stay (>5 days = 3)    IP Visits Last 12 Months (1-3=4, 4=9, >4=11)       Expected Length of Stay - - -   Actual Length of Stay 1

## 2018-02-20 NOTE — ED NOTES
Bedside and Verbal shift change report given to Fili Fernando.TOI (oncoming nurse) by Satish Pagan RN (offgoing nurse). Report included the following information SBAR, Kardex, ED Summary, MAR, Accordion and Recent Results.

## 2018-02-21 ENCOUNTER — APPOINTMENT (OUTPATIENT)
Dept: GENERAL RADIOLOGY | Age: 78
DRG: 871 | End: 2018-02-21
Attending: INTERNAL MEDICINE
Payer: MEDICARE

## 2018-02-21 LAB
ANION GAP SERPL CALC-SCNC: 8 MMOL/L (ref 5–15)
BASOPHILS # BLD: 0 K/UL (ref 0–0.1)
BASOPHILS NFR BLD: 0 % (ref 0–1)
BNP SERPL-MCNC: 2557 PG/ML (ref 0–450)
BUN SERPL-MCNC: 80 MG/DL (ref 6–20)
BUN/CREAT SERPL: 17 (ref 12–20)
CALCIUM SERPL-MCNC: 9.4 MG/DL (ref 8.5–10.1)
CHLORIDE SERPL-SCNC: 106 MMOL/L (ref 97–108)
CK SERPL-CCNC: 98 U/L (ref 39–308)
CO2 SERPL-SCNC: 20 MMOL/L (ref 21–32)
CREAT SERPL-MCNC: 4.77 MG/DL (ref 0.7–1.3)
CREAT UR-MCNC: 34.6 MG/DL
DIFFERENTIAL METHOD BLD: ABNORMAL
EOSINOPHIL # BLD: 0 K/UL (ref 0–0.4)
EOSINOPHIL #/AREA URNS HPF: NEGATIVE /[HPF]
EOSINOPHIL NFR BLD: 0 % (ref 0–7)
ERYTHROCYTE [DISTWIDTH] IN BLOOD BY AUTOMATED COUNT: 17.4 % (ref 11.5–14.5)
GLUCOSE BLD STRIP.AUTO-MCNC: 248 MG/DL (ref 65–100)
GLUCOSE BLD STRIP.AUTO-MCNC: 294 MG/DL (ref 65–100)
GLUCOSE BLD STRIP.AUTO-MCNC: 320 MG/DL (ref 65–100)
GLUCOSE BLD STRIP.AUTO-MCNC: 398 MG/DL (ref 65–100)
GLUCOSE SERPL-MCNC: 303 MG/DL (ref 65–100)
HCT VFR BLD AUTO: 34 % (ref 36.6–50.3)
HGB BLD-MCNC: 10.7 G/DL (ref 12.1–17)
IMM GRANULOCYTES # BLD: 0 K/UL (ref 0–0.04)
IMM GRANULOCYTES NFR BLD AUTO: 0 % (ref 0–0.5)
LYMPHOCYTES # BLD: 0.4 K/UL (ref 0.8–3.5)
LYMPHOCYTES NFR BLD: 3 % (ref 12–49)
MAGNESIUM SERPL-MCNC: 2.1 MG/DL (ref 1.6–2.4)
MCH RBC QN AUTO: 27 PG (ref 26–34)
MCHC RBC AUTO-ENTMCNC: 31.5 G/DL (ref 30–36.5)
MCV RBC AUTO: 85.6 FL (ref 80–99)
MONOCYTES # BLD: 0.6 K/UL (ref 0–1)
MONOCYTES NFR BLD: 5 % (ref 5–13)
NEUTS SEG # BLD: 11 K/UL (ref 1.8–8)
NEUTS SEG NFR BLD: 92 % (ref 32–75)
NRBC # BLD: 0 K/UL (ref 0–0.01)
NRBC BLD-RTO: 0 PER 100 WBC
PHOSPHATE SERPL-MCNC: 4.8 MG/DL (ref 2.6–4.7)
PLATELET # BLD AUTO: 199 K/UL (ref 150–400)
PMV BLD AUTO: 10.1 FL (ref 8.9–12.9)
POTASSIUM SERPL-SCNC: 4.7 MMOL/L (ref 3.5–5.1)
PROT UR-MCNC: 125 MG/DL (ref 0–11.9)
PROT/CREAT UR-RTO: 3.6
RBC # BLD AUTO: 3.97 M/UL (ref 4.1–5.7)
RBC MORPH BLD: ABNORMAL
RBC MORPH BLD: ABNORMAL
SERVICE CMNT-IMP: ABNORMAL
SODIUM SERPL-SCNC: 134 MMOL/L (ref 136–145)
TSH SERPL DL<=0.05 MIU/L-ACNC: 0.03 UIU/ML (ref 0.36–3.74)
WBC # BLD AUTO: 12 K/UL (ref 4.1–11.1)

## 2018-02-21 PROCEDURE — 87205 SMEAR GRAM STAIN: CPT | Performed by: INTERNAL MEDICINE

## 2018-02-21 PROCEDURE — 97161 PT EVAL LOW COMPLEX 20 MIN: CPT | Performed by: PHYSICAL THERAPIST

## 2018-02-21 PROCEDURE — G8978 MOBILITY CURRENT STATUS: HCPCS | Performed by: PHYSICAL THERAPIST

## 2018-02-21 PROCEDURE — G8979 MOBILITY GOAL STATUS: HCPCS | Performed by: PHYSICAL THERAPIST

## 2018-02-21 PROCEDURE — 74011636637 HC RX REV CODE- 636/637: Performed by: INTERNAL MEDICINE

## 2018-02-21 PROCEDURE — 83735 ASSAY OF MAGNESIUM: CPT | Performed by: EMERGENCY MEDICINE

## 2018-02-21 PROCEDURE — 97116 GAIT TRAINING THERAPY: CPT | Performed by: PHYSICAL THERAPIST

## 2018-02-21 PROCEDURE — 82962 GLUCOSE BLOOD TEST: CPT

## 2018-02-21 PROCEDURE — 77075 RADEX OSSEOUS SURVEY COMPL: CPT

## 2018-02-21 PROCEDURE — 74011000250 HC RX REV CODE- 250: Performed by: INTERNAL MEDICINE

## 2018-02-21 PROCEDURE — 74011250636 HC RX REV CODE- 250/636: Performed by: INTERNAL MEDICINE

## 2018-02-21 PROCEDURE — 84443 ASSAY THYROID STIM HORMONE: CPT

## 2018-02-21 PROCEDURE — 84156 ASSAY OF PROTEIN URINE: CPT | Performed by: INTERNAL MEDICINE

## 2018-02-21 PROCEDURE — 97530 THERAPEUTIC ACTIVITIES: CPT | Performed by: PHYSICAL THERAPIST

## 2018-02-21 PROCEDURE — 84100 ASSAY OF PHOSPHORUS: CPT | Performed by: EMERGENCY MEDICINE

## 2018-02-21 PROCEDURE — 77010033678 HC OXYGEN DAILY

## 2018-02-21 PROCEDURE — 74011636637 HC RX REV CODE- 636/637: Performed by: EMERGENCY MEDICINE

## 2018-02-21 PROCEDURE — 84155 ASSAY OF PROTEIN SERUM: CPT | Performed by: INTERNAL MEDICINE

## 2018-02-21 PROCEDURE — 74011250636 HC RX REV CODE- 250/636: Performed by: EMERGENCY MEDICINE

## 2018-02-21 PROCEDURE — 94640 AIRWAY INHALATION TREATMENT: CPT

## 2018-02-21 PROCEDURE — 82784 ASSAY IGA/IGD/IGG/IGM EACH: CPT | Performed by: INTERNAL MEDICINE

## 2018-02-21 PROCEDURE — 74011250637 HC RX REV CODE- 250/637: Performed by: INTERNAL MEDICINE

## 2018-02-21 PROCEDURE — 85025 COMPLETE CBC W/AUTO DIFF WBC: CPT | Performed by: EMERGENCY MEDICINE

## 2018-02-21 PROCEDURE — 74011250637 HC RX REV CODE- 250/637: Performed by: EMERGENCY MEDICINE

## 2018-02-21 PROCEDURE — 36415 COLL VENOUS BLD VENIPUNCTURE: CPT | Performed by: EMERGENCY MEDICINE

## 2018-02-21 PROCEDURE — 80048 BASIC METABOLIC PNL TOTAL CA: CPT | Performed by: EMERGENCY MEDICINE

## 2018-02-21 PROCEDURE — 83883 ASSAY NEPHELOMETRY NOT SPEC: CPT | Performed by: INTERNAL MEDICINE

## 2018-02-21 PROCEDURE — 83880 ASSAY OF NATRIURETIC PEPTIDE: CPT | Performed by: EMERGENCY MEDICINE

## 2018-02-21 PROCEDURE — 82550 ASSAY OF CK (CPK): CPT | Performed by: INTERNAL MEDICINE

## 2018-02-21 PROCEDURE — 65660000000 HC RM CCU STEPDOWN

## 2018-02-21 RX ORDER — PANTOPRAZOLE SODIUM 40 MG/1
40 TABLET, DELAYED RELEASE ORAL
Status: DISCONTINUED | OUTPATIENT
Start: 2018-02-22 | End: 2018-02-21

## 2018-02-21 RX ORDER — AZITHROMYCIN 250 MG/1
250 TABLET, FILM COATED ORAL
Status: COMPLETED | OUTPATIENT
Start: 2018-02-21 | End: 2018-02-23

## 2018-02-21 RX ORDER — MAG HYDROX/ALUMINUM HYD/SIMETH 200-200-20
30 SUSPENSION, ORAL (FINAL DOSE FORM) ORAL
Status: COMPLETED | OUTPATIENT
Start: 2018-02-22 | End: 2018-02-21

## 2018-02-21 RX ORDER — PANTOPRAZOLE SODIUM 40 MG/1
40 TABLET, DELAYED RELEASE ORAL
Status: DISCONTINUED | OUTPATIENT
Start: 2018-02-22 | End: 2018-02-22

## 2018-02-21 RX ORDER — INSULIN LISPRO 100 [IU]/ML
INJECTION, SOLUTION INTRAVENOUS; SUBCUTANEOUS
Status: DISCONTINUED | OUTPATIENT
Start: 2018-02-21 | End: 2018-03-09 | Stop reason: HOSPADM

## 2018-02-21 RX ORDER — INSULIN GLARGINE 100 [IU]/ML
30 INJECTION, SOLUTION SUBCUTANEOUS
Status: DISCONTINUED | OUTPATIENT
Start: 2018-02-21 | End: 2018-02-22

## 2018-02-21 RX ADMIN — GUAIFENESIN 600 MG: 600 TABLET, EXTENDED RELEASE ORAL at 09:01

## 2018-02-21 RX ADMIN — INSULIN LISPRO 5 UNITS: 100 INJECTION, SOLUTION INTRAVENOUS; SUBCUTANEOUS at 12:43

## 2018-02-21 RX ADMIN — ASPIRIN 81 MG 81 MG: 81 TABLET ORAL at 09:01

## 2018-02-21 RX ADMIN — INSULIN LISPRO 10 UNITS: 100 INJECTION, SOLUTION INTRAVENOUS; SUBCUTANEOUS at 16:53

## 2018-02-21 RX ADMIN — AZITHROMYCIN 250 MG: 250 TABLET, FILM COATED ORAL at 21:33

## 2018-02-21 RX ADMIN — METHYLPREDNISOLONE SODIUM SUCCINATE 40 MG: 40 INJECTION, POWDER, FOR SOLUTION INTRAMUSCULAR; INTRAVENOUS at 05:02

## 2018-02-21 RX ADMIN — HEPARIN SODIUM 5000 UNITS: 5000 INJECTION, SOLUTION INTRAVENOUS; SUBCUTANEOUS at 16:53

## 2018-02-21 RX ADMIN — SODIUM CHLORIDE 125 ML/HR: 900 INJECTION, SOLUTION INTRAVENOUS at 05:00

## 2018-02-21 RX ADMIN — UMECLIDINIUM 1 PUFF: 62.5 AEROSOL, POWDER ORAL at 09:07

## 2018-02-21 RX ADMIN — IPRATROPIUM BROMIDE AND ALBUTEROL SULFATE 3 ML: .5; 3 SOLUTION RESPIRATORY (INHALATION) at 20:53

## 2018-02-21 RX ADMIN — IPRATROPIUM BROMIDE AND ALBUTEROL SULFATE 3 ML: .5; 3 SOLUTION RESPIRATORY (INHALATION) at 00:20

## 2018-02-21 RX ADMIN — INSULIN LISPRO 5 UNITS: 100 INJECTION, SOLUTION INTRAVENOUS; SUBCUTANEOUS at 21:33

## 2018-02-21 RX ADMIN — CEFTRIAXONE SODIUM 1 G: 1 INJECTION, POWDER, FOR SOLUTION INTRAMUSCULAR; INTRAVENOUS at 22:10

## 2018-02-21 RX ADMIN — IPRATROPIUM BROMIDE AND ALBUTEROL SULFATE 3 ML: .5; 3 SOLUTION RESPIRATORY (INHALATION) at 11:29

## 2018-02-21 RX ADMIN — INSULIN GLARGINE 30 UNITS: 100 INJECTION, SOLUTION SUBCUTANEOUS at 16:53

## 2018-02-21 RX ADMIN — DILTIAZEM HYDROCHLORIDE 240 MG: 240 CAPSULE, COATED, EXTENDED RELEASE ORAL at 09:01

## 2018-02-21 RX ADMIN — MONTELUKAST SODIUM 10 MG: 10 TABLET, COATED ORAL at 09:01

## 2018-02-21 RX ADMIN — ALUMINUM HYDROXIDE, MAGNESIUM HYDROXIDE, AND SIMETHICONE 30 ML: 200; 200; 20 SUSPENSION ORAL at 23:52

## 2018-02-21 RX ADMIN — IPRATROPIUM BROMIDE AND ALBUTEROL SULFATE 3 ML: .5; 3 SOLUTION RESPIRATORY (INHALATION) at 23:59

## 2018-02-21 RX ADMIN — CLOPIDOGREL BISULFATE 75 MG: 75 TABLET ORAL at 09:01

## 2018-02-21 RX ADMIN — PANTOPRAZOLE SODIUM 40 MG: 40 TABLET, DELAYED RELEASE ORAL at 23:52

## 2018-02-21 RX ADMIN — SODIUM CHLORIDE 125 ML/HR: 900 INJECTION, SOLUTION INTRAVENOUS at 12:45

## 2018-02-21 RX ADMIN — Medication 10 ML: at 21:34

## 2018-02-21 RX ADMIN — IPRATROPIUM BROMIDE AND ALBUTEROL SULFATE 3 ML: .5; 3 SOLUTION RESPIRATORY (INHALATION) at 15:35

## 2018-02-21 RX ADMIN — Medication 10 ML: at 05:02

## 2018-02-21 RX ADMIN — IPRATROPIUM BROMIDE AND ALBUTEROL SULFATE 3 ML: .5; 3 SOLUTION RESPIRATORY (INHALATION) at 04:10

## 2018-02-21 RX ADMIN — FLUTICASONE FUROATE AND VILANTEROL TRIFENATATE 1 PUFF: 100; 25 POWDER RESPIRATORY (INHALATION) at 09:07

## 2018-02-21 RX ADMIN — SODIUM CHLORIDE 125 ML/HR: 900 INJECTION, SOLUTION INTRAVENOUS at 21:54

## 2018-02-21 RX ADMIN — IPRATROPIUM BROMIDE AND ALBUTEROL SULFATE 3 ML: .5; 3 SOLUTION RESPIRATORY (INHALATION) at 08:11

## 2018-02-21 RX ADMIN — HEPARIN SODIUM 5000 UNITS: 5000 INJECTION, SOLUTION INTRAVENOUS; SUBCUTANEOUS at 09:01

## 2018-02-21 RX ADMIN — INSULIN LISPRO 3 UNITS: 100 INJECTION, SOLUTION INTRAVENOUS; SUBCUTANEOUS at 09:01

## 2018-02-21 RX ADMIN — GUAIFENESIN 600 MG: 600 TABLET, EXTENDED RELEASE ORAL at 18:42

## 2018-02-21 RX ADMIN — OSELTAMIVIR PHOSPHATE 30 MG: 30 CAPSULE ORAL at 10:01

## 2018-02-21 NOTE — CONSULTS
Hematology Oncology Consultation    Reason for consult: Smoldering Myeloma, DIANA    Admitting Diagnosis:    HPI:   Donya Brooke is a 66 y.o.   male who has a history of ckd stage 2-3a. Last seen by  Dr. Concha Conley (calcium was high in Nov '17). He cut back on cheese and dairy. 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 Has been followed expectantly. He has been admitted with the flu with PNA. He presented to the ER with SOB and low oxygen saturations. He had chills, cough, yellow sputum, SOB and difficulty sleeping for 2 days prior to admission. Feroz Gramajo took some prednisone at home on standby. His appetite has not been good, but he has been hydrating quite well (lots of water, gatorade and carnation shakes). No rash. No edema. No muscle or new bone pains. Has had worsening renal function on this admission, despite hydration. His calcium has responded to fluids    Impression/Plan:  Smoldering Myeloma/MGUS - now with FLU, and worsened renal function. Baseline Creat 1.2, now at 4.7, was hypercalcemic on admit also. Dr. Concha Conley had recommended a bone marrow biopsy as an outpatient .  -I do think his Myeloma status needs re-assessment. Agree with [facility], add quant Immunoglobulins, Skeletal survey to assess for lytic lesions and consider a Bone marrow biopsy as an inpatient. Discussed risks and benefits . He would like to proceed.  Given his worsening renal failure, needs to be done as inpatient    Thanks Dr. Avelina Berger:  Reviewed    Labs:    Recent Results (from the past 24 hour(s))   CULTURE, BLOOD, PAIRED    Collection Time: 02/20/18  3:55 PM   Result Value Ref Range    Special Requests: NO SPECIAL REQUESTS      Culture result: NO GROWTH AFTER 18 HOURS     GLUCOSE, POC    Collection Time: 02/20/18  8:33 PM   Result Value Ref Range    Glucose (POC) 397 (H) 65 - 100 mg/dL    Performed by Cory Orozco (PCT)    METABOLIC PANEL, BASIC    Collection Time: 02/21/18  3:20 AM   Result Value Ref Range Sodium 134 (L) 136 - 145 mmol/L    Potassium 4.7 3.5 - 5.1 mmol/L    Chloride 106 97 - 108 mmol/L    CO2 20 (L) 21 - 32 mmol/L    Anion gap 8 5 - 15 mmol/L    Glucose 303 (H) 65 - 100 mg/dL    BUN 80 (H) 6 - 20 MG/DL    Creatinine 4.77 (H) 0.70 - 1.30 MG/DL    BUN/Creatinine ratio 17 12 - 20      GFR est AA 14 (L) >60 ml/min/1.73m2    GFR est non-AA 12 (L) >60 ml/min/1.73m2    Calcium 9.4 8.5 - 10.1 MG/DL   CBC WITH AUTOMATED DIFF    Collection Time: 02/21/18  3:20 AM   Result Value Ref Range    WBC 12.0 (H) 4.1 - 11.1 K/uL    RBC 3.97 (L) 4.10 - 5.70 M/uL    HGB 10.7 (L) 12.1 - 17.0 g/dL    HCT 34.0 (L) 36.6 - 50.3 %    MCV 85.6 80.0 - 99.0 FL    MCH 27.0 26.0 - 34.0 PG    MCHC 31.5 30.0 - 36.5 g/dL    RDW 17.4 (H) 11.5 - 14.5 %    PLATELET 892 468 - 264 K/uL    MPV 10.1 8.9 - 12.9 FL    NRBC 0.0 0  WBC    ABSOLUTE NRBC 0.00 0.00 - 0.01 K/uL    NEUTROPHILS 92 (H) 32 - 75 %    LYMPHOCYTES 3 (L) 12 - 49 %    MONOCYTES 5 5 - 13 %    EOSINOPHILS 0 0 - 7 %    BASOPHILS 0 0 - 1 %    IMMATURE GRANULOCYTES 0 0.0 - 0.5 %    ABS. NEUTROPHILS 11.0 (H) 1.8 - 8.0 K/UL    ABS. LYMPHOCYTES 0.4 (L) 0.8 - 3.5 K/UL    ABS. MONOCYTES 0.6 0.0 - 1.0 K/UL    ABS. EOSINOPHILS 0.0 0.0 - 0.4 K/UL    ABS. BASOPHILS 0.0 0.0 - 0.1 K/UL    ABS. IMM.  GRANS. 0.0 0.00 - 0.04 K/UL    DF MANUAL      RBC COMMENTS ANISOCYTOSIS  1+        RBC COMMENTS CATHIE CELLS  PRESENT       MAGNESIUM    Collection Time: 02/21/18  3:20 AM   Result Value Ref Range    Magnesium 2.1 1.6 - 2.4 mg/dL   PHOSPHORUS    Collection Time: 02/21/18  3:20 AM   Result Value Ref Range    Phosphorus 4.8 (H) 2.6 - 4.7 MG/DL   TSH 3RD GENERATION    Collection Time: 02/21/18  3:20 AM   Result Value Ref Range    TSH 0.03 (L) 0.36 - 3.74 uIU/mL   NT-PRO BNP    Collection Time: 02/21/18  3:20 AM   Result Value Ref Range    NT pro-BNP 2557 (H) 0 - 450 PG/ML   GLUCOSE, POC    Collection Time: 02/21/18  8:12 AM   Result Value Ref Range    Glucose (POC) 248 (H) 65 - 100 mg/dL Performed by Jeremy Tony    GLUCOSE, POC    Collection Time: 02/21/18 12:07 PM   Result Value Ref Range    Glucose (POC) 294 (H) 65 - 100 mg/dL    Performed by Jeremy Tony    CK    Collection Time: 02/21/18  2:23 PM   Result Value Ref Range    CK 98 39 - 308 U/L   PROTEIN/CREATININE RATIO, URINE    Collection Time: 02/21/18  2:35 PM   Result Value Ref Range    Protein, urine random 125 (H) 0.0 - 11.9 mg/dL    Creatinine, urine 34.60 mg/dL    Protein/Creat. urine Ratio 3.6         History:  Past Medical History:   Diagnosis Date    AAA (abdominal aortic aneurysm) (HCC)     Asthma     Cancer ([facility]     kidney ca    COPD     Diabetes ([facility]     Gastrointestinal disorder     ruptured esphogus    Hypertension     Other ill-defined conditions(799.89)       Past Surgical History:   Procedure Laterality Date    ABDOMEN SURGERY PROC UNLISTED      hernia    ABDOMEN SURGERY PROC UNLISTED      colon resection    HX GI      part of colon removed, ruptured esophagus    HX OTHER SURGICAL      kidney removed    Zayda Mann        Prior to Admission medications    Medication Sig Start Date End Date Taking? Authorizing Provider   predniSONE (STERAPRED) 5 mg dose pack See administration instruction per 5mg dose pack 8/10/17  Yes SARAH Stanford   insulin NPH (NOVOLIN N, HUMULIN N) 100 unit/mL injection 25 Units by SubCUTAneous route nightly. 9/12/16  Yes Kaleb Iqbal MD   acetaminophen (TYLENOL) 325 mg tablet Take 2 Tabs by mouth every four (4) hours as needed for Fever (For temp greater than or equal to 38.5 C or 101.3 F (Unless hepatic failure or contrindicated). Give first line for fever. ). 9/12/16  Yes Kaleb Iqbal MD   aspirin 81 mg chewable tablet Take 1 Tab by mouth daily. 9/12/16  Yes Kaleb Iqbal MD   clopidogrel (PLAVIX) 75 mg tablet Take 1 Tab by mouth daily.  9/12/16  Yes Kaleb Iqbal MD   cholecalciferol (VITAMIN D3) 1,000 unit cap Take 1,000 Units by mouth daily. Yes Roopa Davis MD   glipiZIDE (GLUCOTROL) 10 mg tablet Take 2.5 mg by mouth every evening. Yes Roopa Davis MD   insulin regular (NOVOLIN R) 100 unit/mL injection by SubCUTAneous route. Yes Roopa Davis MD   rosuvastatin (CRESTOR) 40 mg tablet Take 20 mg by mouth nightly. Yes Roopa Davis MD   tiotropium (SPIRIVA WITH HANDIHALER) 18 mcg inhalation capsule Take 1 Cap by inhalation daily. Yes Historical Provider   ranitidine (ZANTAC) 150 mg tablet Take 150 mg by mouth daily. Yes Historical Provider   glipiZIDE (GLUCOTROL) 10 mg tablet Take 5 mg by mouth daily. Yes Historical Provider   budesonide-formoterol (SYMBICORT) 160-4.5 mcg/actuation HFA inhaler Take 2 Puffs by inhalation daily. Indications: COPD ASSOCIATED WITH CHRONIC BRONCHITIS   Yes Roopa Davis MD   albuterol (PROVENTIL HFA, VENTOLIN HFA) 90 mcg/Actuation inhaler Take 2 Puffs by inhalation every six (6) hours as needed for Wheezing and Shortness of Breath. 4/28/11  Yes Malachi Newell MD   albuterol (PROVENTIL VENTOLIN) 2.5 mg /3 mL (0.083 %) nebulizer solution 1.25 mg by Nebulization route once. Yes Roopa Davis MD   loratadine 10 mg cap Take 10 mg by mouth daily. Roopa Daivs MD   diltiazem CD (CARDIZEM CD) 240 mg ER capsule Take 1 Cap by mouth daily. 4/28/11   Malachi Newell MD   montelukast (SINGULAIR) 10 mg tablet Take 10 mg by mouth daily.     Roopa Davis MD     Allergies   Allergen Reactions    Niacin Rash    Niacin Unknown (comments)     Hot flashes      Social History   Substance Use Topics    Smoking status: Current Every Day Smoker     Packs/day: 0.50     Years: 60.00    Smokeless tobacco: Never Used    Alcohol use Yes      Comment: 2 drinks a month      Family History   Problem Relation Age of Onset    Hypertension Mother         Current Medications:  Current Facility-Administered Medications   Medication Dose Route Frequency    VANCOMYCIN INFORMATION NOTE   Other Rx Dosing/Monitoring    oseltamivir (TAMIFLU) capsule 30 mg  30 mg Oral DAILY    heparin (porcine) injection 5,000 Units  5,000 Units SubCUTAneous Q8H    Vancomycin Dosing per Levels  1 Each Other DAILY    sodium chloride (NS) flush 5-10 mL  5-10 mL IntraVENous PRN    guaiFENesin ER (MUCINEX) tablet 600 mg  600 mg Oral BID    albuterol-ipratropium (DUO-NEB) 2.5 MG-0.5 MG/3 ML  3 mL Nebulization Q4H RT    albuterol-ipratropium (DUO-NEB) 2.5 MG-0.5 MG/3 ML  3 mL Nebulization Q4H PRN    azithromycin (ZITHROMAX) 250 mg in 0.9% sodium chloride 250 mL IVPB  250 mg IntraVENous Q24H    cefTRIAXone (ROCEPHIN) 1 g/50 mL NS IVPB  1 g IntraVENous Q24H    0.9% sodium chloride infusion  125 mL/hr IntraVENous CONTINUOUS    acetaminophen (TYLENOL) tablet 650 mg  650 mg Oral Q4H PRN    ondansetron (ZOFRAN) injection 4 mg  4 mg IntraVENous Q4H PRN    insulin lispro (HUMALOG) injection   SubCUTAneous AC&HS    glucose chewable tablet 16 g  4 Tab Oral PRN    dextrose (D50W) injection syrg 12.5-25 g  12.5-25 g IntraVENous PRN    glucagon (GLUCAGEN) injection 1 mg  1 mg IntraMUSCular PRN    aspirin chewable tablet 81 mg  81 mg Oral DAILY    clopidogrel (PLAVIX) tablet 75 mg  75 mg Oral DAILY    dilTIAZem CD (CARDIZEM CD) capsule 240 mg  240 mg Oral DAILY    insulin NPH (NOVOLIN N, HUMULIN N) injection 25 Units  25 Units SubCUTAneous QHS    montelukast (SINGULAIR) tablet 10 mg  10 mg Oral DAILY    umeclidinium (INCRUSE ELLIPTA) 62.5 mcg/actuation  1 Puff Inhalation DAILY    fluticasone-vilanterol (BREO ELLIPTA) 100mcg-25mcg/puff  1 Puff Inhalation DAILY    methylPREDNISolone (PF) (SOLU-MEDROL) injection 40 mg  40 mg IntraVENous Q8H         Review of Systems:  12 point ROS done. Negative except for symptoms mentioned in HPI. Physical Exam:  MS-Alert, or X 3, on Nasal O2  General -Well nourished,   Neck-Supple, No JVD  CVS-S1,S2 normal  Chest wall-ok  RS-CTA b/l  Abdomen- Soft, NT,ND, BS+  Extre- No c/c/e  Neuro- No FND.

## 2018-02-21 NOTE — PROGRESS NOTES
Problem: Falls - Risk of  Goal: *Absence of Falls  Document Conrad Fall Risk and appropriate interventions in the flowsheet.    Outcome: Progressing Towards Goal  Fall Risk Interventions:  Mobility Interventions: Bed/chair exit alarm         Medication Interventions: Bed/chair exit alarm

## 2018-02-21 NOTE — PROGRESS NOTES
ADULT PROTOCOL: JET AEROSOL ASSESSMENT    Patient  Boris Oconnor     66 y.o.   male     2/20/2018  10:29 PM    Breath Sounds Pre Procedure: Right Breath Sounds: Scattered wheezing                               Left Breath Sounds: Scattered wheezing    Breath Sounds Post Procedure: Right Breath Sounds: Scattered wheezing                                 Left Breath Sounds: Scattered wheezing    Breathing pattern: Pre procedure Breathing Pattern: Regular          Post procedure Breathing Pattern: Regular    Heart Rate: Pre procedure Pulse: 89           Post procedure Pulse: 88    Resp Rate: Pre procedure Respirations: 20           Post procedure Respirations: 20            Cough: Pre procedure Cough: Non-productive               Post procedure Cough: Non-productive      Oxygen: O2 Device: Nasal cannula   Flow rate (L/min) 3     Changed: NO    SpO2: Pre procedure SpO2: 93 %   with oxygen              Post procedure SpO2: 94 %  with oxygen    Nebulizer Therapy: Current medications Aerosolized Medications: DuoNeb      Changed: NO, pt takes home nebs    Smoking History:    Smoking status: Current Every Day Smoker       Packs/day: 0.50       Years: 60.00         Problem List:   Patient Active Problem List   Diagnosis Code    Acute respiratory failure ([facility]) J96.00    COPD with acute exacerbation ([facility] J44.1    HTN (hypertension) I10    HTN (hypertension) I10    COPD (chronic obstructive pulmonary disease) ([facility]) J44.9    Cancer of kidney ([facility] C64.9    Hypoglycemia, unspecified E16.2    Acute renal failure ([facility] N17.9    Hyperkalemia E87.5    S/P partial colectomy Z90.49    Diarrhea R19.7    DM (diabetes mellitus), type 2, uncontrolled ([facility] E11.65    S/p nephrectomy Z90.5    AAA (abdominal aortic aneurysm) ([facility]) I71.4    Gastrointestinal disorder K92.9    Cancer ([facility] C80.1    Anemia D64.9    Multiple myeloma ([facility] C90.00    Stroke (cerebrum) ([facility]) I63.9    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

## 2018-02-21 NOTE — PROGRESS NOTES
Bedside shift change report given to Brielle Chan RN (oncoming nurse) by Eric Zhang RN (offgoing nurse). Report included the following information SBAR. Bedside shift change report given to Jose Eduardo Dey (oncoming nurse) by Brielle Chan RN (offgoing nurse). Report included the following information SBAR. SHIFT SUMMARY:            [address] NURSING NOTE   Admission Date 2/19/2018   Admission Diagnosis Hypoxemia   Consults IP CONSULT TO NEPHROLOGY  IP CONSULT TO ONCOLOGY      Cardiac Monitoring [x] Yes [] No      Purposeful Hourly Rounding [x] Yes    Conrad Score Total Score: 2   Conrad score 3 or > [x] Bed Alarm [] Avasys [] 1:1 sitter [] Patient refused (Place signed refusal form in chart)   Sidney Score Sidney Score: 18   Sidney score 14 or < [] PMT consult [] Wound Care consult    []  Specialty bed  [] Nutrition consult      Influenza Vaccine Received Flu Vaccine for Current Season (usually Sept-March): Yes    Patient/Guardian Refused (Notify MD): No      Oxygen needs? [] Room air Oxygen @  []1L    []2L    [x]3L   []4L    []5L   []6L     Use home O2? [] Yes [x] No  Perform O2 challenge test using  smartphrase (.Homeoxygen)      Last bowel movement Last Bowel Movement Date: 02/20/18      Urinary Catheter             LDAs               Peripheral IV 02/20/18 Left Antecubital (Active)   Site Assessment Clean, dry, & intact 2/21/2018  7:59 AM   Phlebitis Assessment 0 2/21/2018  7:59 AM   Infiltration Assessment 0 2/21/2018  7:59 AM   Dressing Status Clean, dry, & intact 2/21/2018  7:59 AM   Dressing Type Tape 2/21/2018  7:59 AM   Hub Color/Line Status Pink 2/21/2018  7:59 AM                         Readmission Risk Assessment Tool Score High Risk            29       Total Score        2 . Living with Significant Other. Assisted Living. LTAC. SNF. or   Rehab    5 Pt.  Coverage (Medicare=5 , Medicaid, or Self-Pay=4)    22 Charlson Comorbidity Score (Age + Comorbid Conditions)        Criteria that do not apply:    Has Seen PCP in Last 6 Months (Yes=3, No=0)    Patient Length of Stay (>5 days = 3)    IP Visits Last 12 Months (1-3=4, 4=9, >4=11)       Expected Length of Stay 4d 21h   Actual Length of Stay 2

## 2018-02-21 NOTE — PROGRESS NOTES
ADULT PROTOCOL: JET AEROSOL  REASSESSMENT    Patient  Ana Lombardi     66 y.o.   male     2/21/2018  10:29 AM    Breath Sounds Pre Procedure: Right Breath Sounds: Diminished, Coarse                               Left Breath Sounds: Diminished, Coarse    Breath Sounds Post Procedure: Right Breath Sounds: Diminished, Coarse                                 Left Breath Sounds: Diminished, Coarse    Breathing pattern: Pre procedure Breathing Pattern: Regular          Post procedure Breathing Pattern: Regular    Heart Rate: Pre procedure Pulse: 76           Post procedure Pulse: 88    Resp Rate: Pre procedure Respirations: 16           Post procedure Respirations: 16      Cough: Pre procedure Cough: Non-productive               Post procedure Cough: Non-productive      Oxygen: O2 Device: Nasal cannula   Flow rate (L/min) 3 lpm     Changed: NO    SpO2: Pre procedure SpO2: 95 %   with oxygen              Post procedure SpO2: 95 %  with oxygen    Nebulizer Therapy: Current medications Aerosolized Medications: DuoNeb      Changed: NO    Smoking History: current smoker    Problem List:   Patient Active Problem List   Diagnosis Code    Acute respiratory failure ([facility]) J96.00    COPD with acute exacerbation ([facility]) J44.1    HTN (hypertension) I10    HTN (hypertension) I10    COPD (chronic obstructive pulmonary disease) ([facility]) J44.9    [facility] C64.9    Hypoglycemia, unspecified E16.2    Acute renal failure ([facility] N17.9    Hyperkalemia E87.5    S/P partial colectomy Z90.49    Diarrhea R19.7    DM (diabetes mellitus), type 2, uncontrolled ([facility] E11.65    S/p nephrectomy Z90.5    AAA (abdominal aortic aneurysm) ([facility] I71.4    Gastrointestinal disorder K92.9    Cancer ([facility] C80.1    Anemia D64.9    Multiple myeloma ([facility] C90.00    Stroke (cerebrum) ([facility]) I63.9    Thrombotic stroke involving left cerebellar artery ([facility] S35.096    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

## 2018-02-21 NOTE — DIABETES MGMT
DTC Progress Note    Recommendations/ Comments: If appropriate, please consider adding NPH to be dosed with steroids. Would recommend NPH 20 units q 8 hours with steroids    Chart reviewed on [facility]il Alfredo. Patient is a 66 y.o. male with known diabetes on NPH and Glipizide at home. A1c:   Lab Results   Component Value Date/Time    Hemoglobin A1c 9.8 (H) 02/20/2018 05:14 AM    Hemoglobin A1c 8.7 (H) 09/10/2016 02:07 AM       Recent Glucose Results:   Lab Results   Component Value Date/Time     (H) 02/21/2018 03:20 AM    GLUCPOC 248 (H) 02/21/2018 08:12 AM    GLUCPOC 397 (H) 02/20/2018 08:33 PM    GLUCPOC 292 (H) 02/20/2018 11:35 AM        Lab Results   Component Value Date/Time    Creatinine 4.77 (H) 02/21/2018 03:20 AM     Estimated Creatinine Clearance: 14 mL/min (based on Cr of 4.77). Active Orders   Diet    DIET DIABETIC CONSISTENT CARB Regular        PO intake: No data found. Current hospital DM medication: Lispro Correctional insulin with normal sensitivity, NPH 25 units at bedtime    Will continue to follow as needed. Thank you.   Masha Sage, [address] [place] [address], [address]  Office:  [phone]

## 2018-02-21 NOTE — PROGRESS NOTES
Hospitalist Progress Note    NAME: Braxton Del Rosario   :  1940   MRN:  [id number]       Assessment / Plan:    Acute hypoxemic respiratory failure secondary to influenza A with pneumonia and acute on chronic COPD exacerbation  Sepsis developing on admission  -patient still requiring oxygen, will wean as tolerated we will try to obtain some sputum for cultures   -continue Rocephin and azithromycin for possible community-acquired pneumonia as well as Tamiflu. -We will continue IV steroids and nebulizer treatments and a flutter valve to improve expectoration  -CT chest Prominent emphysema. Bibasilar infiltrates      Acute kidney injury on chronic kidney disease stage II  - patient's creatinine normally 1.1 was 2.49 on admission and today his creatinine is up to 4.7  -urine output improved with IVFs  -renal us no hydro, prior nephrectomy  -cont IVFs, looks more hydrated on exam today  appreciate renal eval  -eval for MM and onc consult    Positive blood cultures his cultures 1 out of 2- appears to be CNS, likely contaminant, will dc vanco tomorrow if f/u cx negative.       HTN  -continue cardizem if BP tolerates     Diabetes type 2 uncontrolled  -2nd to steroids  -aic 9.8  -change NPH to lantus with adjust SSI. 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      Dispo- home hopefully, with home health, ? o2         Subjective:     Chief Complaint / Reason for Physician Visit  Hypoxemia     2/20 Patient is a long-term ongoing smoker who got his flu shot and is up-to-date with pneumonia shot and over the weekend got progressively more short of breath coughing up yellow sputum feeling white headed dizzy. He says he feels better than last night when he came in. He has not coughed anything up today. Denies any chest pain. Does not normally use oxygen but is currently on oxygen and feels overall winded and tired. He has very poor appetite. He had keep keeping up with his fluid intake at home but really has not had much to drink here and says he has not urinated yet today. He denies any nausea vomiting or abdominal pain. 2/21 pt still no appetite, but overall feels better. denies any trouble urination today, says good output improved since yesterday. SOB better,+ cough but unable to bring up sputum now, no cp    Patient was evaluated at 10:45 AM    Discussed with RN events overnight. Review of Systems:  Symptom Y/N Comments  Symptom Y/N Comments   Fever/Chills    Chest Pain n    Poor Appetite y   Edema     Cough y   Abdominal Pain n    Sputum n   Joint Pain     SOB/HUERTA y   Pruritis/Rash     Nausea/vomit n   Tolerating PT/OT     Diarrhea    Tolerating Diet     Constipation    Other       Could NOT obtain due to:      Objective:     VITALS:   Last 24hrs VS reviewed since prior progress note.  Most recent are:  Patient Vitals for the past 24 hrs:   Temp Pulse Resp BP SpO2   02/21/18 1620 - - - - 94 %   02/21/18 1618 - 91 - 126/61 (!) 85 %   02/21/18 1608 - 98 - 127/65 91 %   02/21/18 1601 - 85 - - 94 %   02/21/18 1555 97.6 °F (36.4 °C) 86 - 141/63 (!) 88 %   02/21/18 1535 - - - - 93 %   02/21/18 1129 - - - - 92 %   02/21/18 1114 97.6 °F (36.4 °C) 83 20 149/68 92 %   02/21/18 0833 97.8 °F (36.6 °C) - - - -   02/21/18 0819 - 77 20 136/71 100 %   02/21/18 0811 - - - - 95 %   02/21/18 0410 - - - - 91 %   02/21/18 0356 97.5 °F (36.4 °C) 72 20 116/62 93 %   02/21/18 0020 - - - - 96 %   02/20/18 2320 97.3 °F (36.3 °C) 75 22 116/54 96 %   02/20/18 2124 - - - - 93 %   02/20/18 1927 97.5 °F (36.4 °C) 86 22 127/66 94 %   02/20/18 1752 98 °F (36.7 °C) 91 22 142/64 93 %       Intake/Output Summary (Last 24 hours) at 02/21/18 1638  Last data filed at 02/21/18 0328   Gross per 24 hour   Intake          3841.67 ml   Output              525 ml   Net          3316.67 ml        PHYSICAL EXAM:  He is mildly ill-appearing on nasal cannula but looks brighter than yesterday, oriented ×3 awake and alert no respiratory distress occasional cough extraocular movements are intact sclera nonicteric conjunctiva pink oropharynx, less dry mucous membranes dry no thrush or lesions neck is supple nontender cardiovascular regular rate no obvious murmurs rubs or gallops lungs  bilateral wheezing and rhonchi moderate air movement improved from yesterday no crackles appreciated abdomen bowel sounds present soft nontender no obvious bladder distention bowel sounds present extremities no clubbing cyanosis or edema neuro exam nonfocal    Reviewed most current lab test results and cultures  YES  Reviewed most current radiology test results   YES  Review and summation of old records today    NO  Reviewed patient's current orders and MAR    YES  PMH/ reviewed - no change compared to H&P  ________________________________________________________________________  Care Plan discussed with:    Comments   Patient y    Family  y    RN y    Care Manager     Consultant  y                      Multidiciplinary team rounds were held today with , nursing, pharmacist and clinical coordinator. Patient's plan of care was discussed; medications were reviewed and discharge planning was addressed.      ________________________________________________________________________  Total NON critical care TIME:     Minutes    Total CRITICAL CARE TIME Spent:   Minutes non procedure based      Comments   >50% of visit spent in counseling and coordination of care     ________________________________________________________________________  Olga Larson MD     Procedures: see electronic medical records for all procedures/Xrays and details which were not copied into this note but were reviewed prior to creation of Plan. LABS:  I reviewed today's most current labs and imaging studies.   Pertinent labs include:  Recent Labs      02/21/18   0320  02/19/18   1507   WBC  12.0*  11.4*   HGB  10.7*  12.8   HCT  34.0*  40.5   PLT  199  261     Recent Labs      02/21/18   0320  02/20/18   0514  02/19/18   2102  02/19/18   1507   NA  134*  133*  134*  133*   K  4.7  4.8  4.3  4.3   CL  106  103  103  103   CO2  20*  19*  21  23   GLU  303*  201*  110*  117*   BUN  80*  60*  48*  41*   CREA  4.77*  3.63*  3.01*  2.49*   CA  9.4  10.0  10.6*  11.1*   MG  2.1   --    --    --    PHOS  4.8*   --    --    --    ALB   --    --    --   2.7*   TBILI   --    --    --   0.4   SGOT   --    --    --   27   ALT   --    --    --   19       Signed: Olga Larson MD

## 2018-02-21 NOTE — PROGRESS NOTES
Problem: Mobility Impaired (Adult and Pediatric)  Goal: *Acute Goals and Plan of Care (Insert Text)  Physical Therapy Goals  Initiated 2/21/2018  1. Patient will move from supine to sit and sit to supine , scoot up and down and roll side to side in bed with independence within 7 day(s). 2.  Patient will transfer from bed to chair and chair to bed with independence using the least restrictive device within 7 day(s). 3.  Patient will perform sit to stand with independence within 7 day(s). 4.  Patient will ambulate with independence for 200 feet with the least restrictive device within 7 day(s). 5.  Patient will ascend/descend 12 stairs with 1 handrail(s) with modified independence within 7 day(s). physical Therapy EVALUATION  Patient: Bethany Mccabe (51 y.o. male)  Date: 2/21/2018  Primary Diagnosis: Hypoxemia        Precautions: falls; droplet- flu       ASSESSMENT :  Based on the objective data described below, the patient presents with generalized weakness and impaired functional mobility, balance, endurance and activity tolerance. Patient requiring supervision to [facility] for overall mobility. Gait is unsteady with attempts to use RW secondary to increased impulsiveness with ambulation. . O2 sats decreasing to 85% on 2 l/min with minima exertion and patient symptomatic with increased c/o SOB and dizziness. Prior to admission patient was completely independent with all mobility. Anticipate good progress with therapy once respiratory status improves. Depending on further progress with therapy recommend HHPT vs none. Of note, patient's wife was diagnosed with flu today. If patient requires any assistance at discharge, wife may not be able to provide supervision. Patient will benefit from skilled intervention to address the above impairments.   Patients rehabilitation potential is considered to be Good  Factors which may influence rehabilitation potential include:   [x]         None noted  []         Mental ability/status  []         Medical condition  []         Home/family situation and support systems  []         Safety awareness  []         Pain tolerance/management  []         Other:      PLAN :  Recommendations and Planned Interventions:  [x]           Bed Mobility Training             []    Neuromuscular Re-Education  [x]           Transfer Training                   []    Orthotic/Prosthetic Training  [x]           Gait Training                         []    Modalities  []           Therapeutic Exercises           []    Edema Management/Control  [x]           Therapeutic Activities            [x]    Patient and Family Training/Education  [x]           Other (comment):stairs    Frequency/Duration: Patient will be followed by physical therapy  4 times a week to address goals. Discharge Recommendations: To Be Determined by further progress with therapy- HHPT vs none  Further Equipment Recommendations for Discharge: none anticiapted     SUBJECTIVE:   Patient stated I just started coughing after the breathing treatment.     OBJECTIVE DATA SUMMARY:   HISTORY:    Past Medical History:   Diagnosis Date    [facility]     Asthma     Cancer ([facility] [address] [facility]     kidney ca    COPD     Diabetes [facility] [facility]     Gastrointestinal disorder     ruptured esphogus    Hypertension     Other ill-defined conditions(799.89)      Past Surgical History:   Procedure Laterality Date    ABDOMEN SURGERY PROC UNLISTED      hernia    ABDOMEN SURGERY PROC UNLISTED      colon resection    HX GI      part of colon removed, ruptured esophagus    HX OTHER SURGICAL      kidney removed    REMV KIDNEY,COMPLICATED       Prior Level of Function/Home Situation: patient reports complete independence with all mobility and ADLs      Home Situation  Home Environment: Private residence  # Steps to Enter: 0 (den on ground floor but 2 steps to rest of house)  Rails to Enter: Yes  [employer] : Right  One/Two Story Residence: Two story (sleeps on couch downstairs)  # of Interior Steps: 12  Living Alone: No  Support Systems: Spouse/Significant Other/Partner  Patient Expects to be Discharged to[de-identified] Private residence  Current DME Used/Available at Home: Grab bars, Raised toilet seat, Cane, straight, Wheelchair  Tub or Shower Type: Tub/Shower combination    EXAMINATION/PRESENTATION/DECISION MAKING:   Critical Behavior:  Neurologic State: Alert, Appropriate for age           Hearing: Auditory  Auditory Impairment: Hard of hearing, bilateral, Hearing aid(s)  Hearing Aids/Status:  At home    Range Of Motion:  AROM: Generally decreased, functional                       Strength:    Strength: Generally decreased, functional                    Tone & Sensation:   Tone: Normal                              Coordination:  Coordination: Within functional limits  Functional Mobility:  Bed Mobility:     Supine to Sit: Supervision     Scooting: Supervision  Transfers:  Sit to Stand: Contact guard assistance  Stand to Sit: Contact guard assistance        Bed to Chair: Contact guard assistance              Balance:   Sitting: Intact  Standing: Impaired  Standing - Static: Good  Standing - Dynamic : Fair  Ambulation/Gait Training:  Distance (ft): 15 Feet (ft)  Assistive Device: Walker, rolling;Gait belt  Ambulation - Level of Assistance: Contact guard assistance        Gait Abnormalities: Decreased step clearance (increased forward flexion)        Base of Support: Widened     Speed/Veronica: Shuffled  Step Length: Left shortened;Right shortened      Gait is unsteady secondary to impulsiveness; widened base of support and short shuffled steps       Functional Measure:    Elder Mobility Scale    9/20         EMS and G-code impairment scale:  Percentage of impairment CH  0% CI  1-19% CJ  20-39% CK  40-59% CL  60-79% CM  80-99% CN  100%   EMS Score 0-20 20 17-19 13-16 9-12 5-8 1-4 0      Scores under 10  generally these patients are dependent in mobility maneuvers; require help with  basic ADL, such as transfers, toileting and dressing. Scores between 10  13  generally these patients are borderline in terms of safe mobility and  independence in ADL i.e. they require some help with some mobility maneuvers. Scores over 14  Generally these patients are able to perform mobility maneuvers alone and safely  and are independent in basic ADL. G codes: In compliance with CMSs Claims Based Outcome Reporting, the following G-code set was chosen for this patient based on their primary functional limitation being treated: The outcome measure chosen to determine the severity of the functional limitation was the Elderly Mobility scale with a score of 9/20 which was correlated with the impairment scale. ? Mobility - Walking and Moving Around:     - CURRENT STATUS: CK - 40%-59% impaired, limited or restricted    - GOAL STATUS: CI - 1%-19% impaired, limited or restricted    - D/C STATUS:  ---------------To be determined---------------       Pain:  Pain Scale 1: Numeric (0 - 10)  Pain Intensity 1: 0              Activity Tolerance:   Pulse Oximetry Assessment    91% at rest on 1LPM  88% sitting on 1 LPM  92% sitting on 2 LPM  85% while ambulating on 2LPM  91% post ambulation on 3 l/min  Please refer to the flowsheet for vital signs taken during this treatment. After treatment:   [x]         Patient left in no apparent distress sitting up in chair  []         Patient left in no apparent distress in bed  [x]         Call bell left within reach  [x]         Nursing notified  [x]         Caregiver present- nurse  [x]         Bed alarm activated    COMMUNICATION/EDUCATION:   The patients plan of care was discussed with: Registered Nurse. [x]         Fall prevention education was provided and the patient/caregiver indicated understanding. [x]         Patient/family have participated as able in goal setting and plan of care.   [x]         Patient/family agree to work toward stated goals and plan of care. []         Patient understands intent and goals of therapy, but is neutral about his/her participation. []         Patient is unable to participate in goal setting and plan of care.     Thank you for this referral.  Jonathan Ambrocio, PT   Time Calculation: 44 mins

## 2018-02-21 NOTE — CONSULTS
Consultation Note    NAME: Lovely Pham   :  1940   MRN:  [id number]     Date/Time:  2018 9:07 AM    I have been asked to see this patient by Dr. Mayra Lara  for advice/opinion re: DIANA. 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 Calcium 11.1 on admission, now 9.4    Check spot urine protein:creatinine; check cpk; will repeat free light chains, immunofixation, spep; check urine eos; consulting Oncology     Agree with IVF's       Subjective:   CHIEF COMPLAINT:  diana    HISTORY OF PRESENT ILLNESS:     Alfred Lozada is a 66 y.o.   male who has a history of ckd stage 2-3a. Last seen by me in the office in . He follows every 6 months with Dr. Kye Corona (calcium was high in ). He cut back on cheese and dairy. He has been admitted with the flu with PNA. He presented to the ER with SOB and low oxygen saturations. He had chills, cough, yellow sputum, SOB and difficulty sleeping for 2 days prior to admission. He took some prednisone at home on standby. His appetite has not been good, but he has been hydrating quite well (lots of water, gatorade and carnation shakes). No rash. No edema. No muscle or new bone pains. We discussed his rapidly worsening kidney function; he would want to try HD if it is needed.     Past Medical History:   Diagnosis Date    AAA (abdominal aortic aneurysm) ([address]     Asthma     [facility]     kidney ca    COPD     [facility]     Gastrointestinal disorder     ruptured esphogus    Hypertension     Other ill-defined conditions(089.89)       Past Surgical History:   Procedure Laterality Date    ABDOMEN SURGERY PROC UNLISTED      hernia    ABDOMEN SURGERY PROC UNLISTED      colon resection    HX GI      part of colon removed, ruptured esophagus    HX OTHER SURGICAL      kidney removed    REMV KIDNEY,COMPLICATED       Social History   Substance Use Topics    Smoking status: Current Every Day Smoker     Packs/day: 0.50     Years: 60.00    Smokeless tobacco: Never Used    Alcohol use Yes      Comment: 2 drinks a month      Family History   Problem Relation Age of Onset    Hypertension Mother       Allergies   Allergen Reactions    Niacin Rash    Niacin Unknown (comments)     Hot flashes      Prior to Admission medications    Medication Sig Start Date End Date Taking? Authorizing Provider   predniSONE (STERAPRED) 5 mg dose pack See administration instruction per 5mg dose pack 8/10/17  Yes SARAH Sanchez   insulin NPH (NOVOLIN N, HUMULIN N) 100 unit/mL injection 25 Units by SubCUTAneous route nightly. 9/12/16  Yes Jefry Preciado MD   acetaminophen (TYLENOL) 325 mg tablet Take 2 Tabs by mouth every four (4) hours as needed for Fever (For temp greater than or equal to 38.5 C or 101.3 F (Unless hepatic failure or contrindicated). Give first line for fever. ). 9/12/16  Yes Jefry Preciado MD   aspirin 81 mg chewable tablet Take 1 Tab by mouth daily. 9/12/16  Yes Jefry Preciado MD   clopidogrel (PLAVIX) 75 mg tablet Take 1 Tab by mouth daily. 9/12/16  Yes Jefry Preciado MD   cholecalciferol (VITAMIN D3) 1,000 unit cap Take 1,000 Units by mouth daily. Yes Roopa Davis MD   glipiZIDE (GLUCOTROL) 10 mg tablet Take 2.5 mg by mouth every evening. Yes Roopa Davis MD   insulin regular (NOVOLIN R) 100 unit/mL injection by SubCUTAneous route. Yes Roopa Davis MD   rosuvastatin (CRESTOR) 40 mg tablet Take 20 mg by mouth nightly. Yes Roopa Davis MD   tiotropium (SPIRIVA WITH HANDIHALER) 18 mcg inhalation capsule Take 1 Cap by inhalation daily. Yes Historical Provider   ranitidine (ZANTAC) 150 mg tablet Take 150 mg by mouth daily.    Yes Historical Provider   glipiZIDE (GLUCOTROL) 10 mg tablet Take 5 mg by mouth daily. Yes Historical Provider   budesonide-formoterol (SYMBICORT) 160-4.5 mcg/actuation HFA inhaler Take 2 Puffs by inhalation daily. Indications: COPD ASSOCIATED WITH CHRONIC BRONCHITIS   Yes Roopa Davis MD   albuterol (PROVENTIL HFA, VENTOLIN HFA) 90 mcg/Actuation inhaler Take 2 Puffs by inhalation every six (6) hours as needed for Wheezing and Shortness of Breath. 4/28/11  Yes Debbie Brandon MD   albuterol (PROVENTIL VENTOLIN) 2.5 mg /3 mL (0.083 %) nebulizer solution 1.25 mg by Nebulization route once. Yes Roopa Davis MD   loratadine 10 mg cap Take 10 mg by mouth daily. Roopa Davis MD   diltiazem CD (CARDIZEM CD) 240 mg ER capsule Take 1 Cap by mouth daily. 4/28/11   Debbie Brandon MD   montelukast (SINGULAIR) 10 mg tablet Take 10 mg by mouth daily.     Roopa Davis MD     REVIEW OF SYSTEMS:     []  Unable to obtain reliable ROS due to  [] mental status  [] sedated   [] intubated   [x] Total of 12 systems reviewed as follows:  Constitutional: + fever, + chills, negative weight loss  Eyes:   negative visual changes  ENT:   negative sore throat, tongue or lip swelling  Respiratory:  + cough, + dyspnea  Cards:  negative for chest pain, palpitations, lower extremity edema  GI:   negative for nausea, vomiting, diarrhea, and abdominal pain  :  negative for frequency, dysuria  Integument:  negative for rash and pruritus  Heme:  negative for easy bruising and gum/nose bleeding  Musculoskel: negative for myalgias,  back pain and muscle weakness  Neuro:  negative for headaches, dizziness, vertigo  Psych:  negative for feelings of anxiety, depression   Travel?: none    Objective:   VITALS:    Visit Vitals    /71 (BP 1 Location: Right arm, BP Patient Position: At rest)    Pulse 77    Temp 97.8 °F (36.6 °C)    Resp 20    Ht 6' (1.829 m)    Wt 78 kg (171 lb 15.3 oz)    SpO2 100%    BMI 23.32 kg/m2     PHYSICAL EXAM:  Gen:  [x]  WD [x]  WN  [] cachectic []  thin []  obese []  disheveled             []  ill apearing  []   Critical  []   Chronic    [x]  No acute distress    HEENT:   [x] NC/AT/PERRLA/EOMI    [] pink conjunctivae      [] pale conjunctivae                  PERRL  [] yes  [] no      [] moist mucosa    [] dry mucosa    hearing intact to voice [x] yes  [] No                 NECK:   supple [x] yes  [] no        masses [] yes  [] No               thyroid  []  non tender  []  tender    RESP:   [] CTA bilaterally/no wheezing/rhonchi/rales/crackles    [] rhonchi bilaterally - no dullness  [] wheezing   [x] rhonchi   [] crackles     use of accessory muscles [] yes [x] no    CARD:   [x]  regular rate and rhythm/No murmurs/rubs/gallops    murmur  [] yes ()  [] no      Rubs  [] yes  [] no       Gallops [] yes  [] no    Rate []  regular  []  irregular        carotid bruits  [] Right  []  Left                 LE edema [] yes  [x] no           JVP  []  yes   [x]  no    ABD:    [x] soft/non distended/non tender/+bowel sounds/no HSM    []  Rigid    tenderness [] yes [] no   Liver enlargement  []  yes []  no                Spleen enlargement  []  yes []  no     distended []  yes [] no     bowel sound  [] hypoactive   [] hyperactive    LYMPH:    Neck []  yes [x]  no       Axillae []  yes []  no    SKIN:   Rashes []  yes   [x]  no    Ulcers []  yes   []  no               [] tight to palpitation    skin turgor []  good  [] poor  [] decreased               Cyanosis/clubbing []  yes []  no    NEUR:   [x] cranial nerves II-XII grossly intact       [] Cranial nerves deficit                 []  facial droop    []  slurred speech   [] aphasic     [] Strength normal     []  weakness  []  LUE  []   RUE/ []  LLE  []   RLE    follows commands  [x]  yes []  no           PSYCH:   insight [] poor [x] good   Alert and Oriented to  [x] person  [x] place  [x]  time                    [] depressed [] anxious [] agitated  [] lethargic [] stuporous [] sedated     LAB DATA REVIEWED:    Recent Labs      02/21/18   0320  02/19/18   1507   WBC  12.0*  11.4*   HGB  10.7*  12.8   HCT  34.0*  40.5   PLT  199  261     Recent Labs      02/21/18   0320  02/20/18   0514  02/19/18   2102   NA  134*  133*  134*   K  4.7  4.8  4.3   CL  106  103  103   CO2  20*  19*  21   BUN  80*  60*  48*   CREA  4.77*  3.63*  3.01*   GLU  303*  201*  110*   CA  9.4  10.0  10.6*   MG  2.1   --    --    PHOS  4.8*   --    --      Recent Labs      02/19/18   1507   SGOT  27   ALT  19   AP  76   TBILI  0.4   ALB  2.7*   GLOB  6.7*     No results for input(s): INR, PTP, APTT in the last 72 hours. No lab exists for component: INREXT   No results for input(s): FE, TIBC, PSAT, FERR in the last 72 hours. Recent Labs      02/19/18   1550   PH  7.37   PCO2  34*   PO2  70*     No results for input(s): CPK, CKMB in the last 72 hours. No lab exists for component: TROPONINI  Lab Results   Component Value Date/Time    Glucose (POC) 248 (H) 02/21/2018 08:12 AM    Glucose (POC) 397 (H) 02/20/2018 08:33 PM    Glucose (POC) 292 (H) 02/20/2018 11:35 AM    Glucose (POC) 200 (H) 02/20/2018 08:39 AM    Glucose (POC) 132 (H) 02/19/2018 10:59 PM       Procedures: see electronic medical records for all procedures/Xrays and details which were not copied into this note but were reviewed prior to creation of Plan.    ________________________________________________________________________       ___________________________________________________  Consulting Physician:  Alberto Prader, MD

## 2018-02-21 NOTE — PROGRESS NOTES
Pharmacy Automatic Renal Dosing Protocol - Antimicrobials/Vancomycin    Indication for Antimicrobials: Influenza A Positive/CAP/bacteremia    Current Regimen of Each Antimicrobial:  Oseltamivir 30 mg daily (Start Date ; Day # 3)  Azithromycin 250 mg IV q 24 hours (start Date: ; day #2)  Ceftriaxone 1 g IV q 24 hours (start date: ; day #3)  Vancomycin IV (Start date: ; Day #2)      Impression/Plan:    Possible contaminant in blood culture, but too soon to tell   Scr con't to worsen - continuing to hold vancomycin today, will also get a random level tomorrow AM.  Suspect that much of loading dose has note been eliminated.  Oseltamivir to continue at 30 mg daily per protocol for renal function and indication-please consider placing a 5 day stop date on regimen   Azithromycin/ceftriaxone not renally adjusted   BMP daily       Vancomycin trough goal: 15-20 mcg/mL    Vancomycin Resulted Levels: N/A    Previous Antimicrobial Therapy: N/A    Significant Cultures:    blood: staph - pending  -blood: no growth x 2d- pending   blood: ng 18 hours  -influenza AG: influenza A +-FINAL    Labs:  Recent Labs      18   0320  18   0514  18   2102  18   1507   CREA  4.77*  3.63*  3.01*  2.49*   BUN  80*  60*  48*  41*   WBC  12.0*   --    --   11.4*     Temp (24hrs), Av.6 °F (36.4 °C), Min:97.3 °F (36.3 °C), Max:98 °F (36.7 °C)    Paralysis, amputations, malnutrition: N/A  Creatinine Clearance (mL/min) or Dialysis: 14    Pharmacy will follow daily and adjust medications as appropriate for renal function and/or serum levels.     Thank you,  Trip Montano, PHARMD

## 2018-02-21 NOTE — PROGRESS NOTES
1900 Received report from [facility]. Assumed patient care. 18 Pt complaining of acid reflux. Takes Zantac at home but does not currently have ordered while here. VSS. Contacted on call physician. Orders placed by Dr Yadira Veras for Protonix 40mg before breakfast and a dose now. Now dose of Mylanta also ordered. Medications administered to pt. Will continue to check in to see if he is getting any relief.       0100 Heparin injection held due to CT Bx bone marrow and aspiration on 2/22/18.           0430 Pt /76. No prn medications for BP. Contacted on call physician. Dr Yadira Veras gave verbal order for Hydralazine 20mg IV every 6 hours prn for BP >160/90. Order placed. Bedside shift change report given to Glynn Chow RN (oncoming nurse) by Miriam Olivo RN (offgoing nurse). Report included the following information SBAR, Kardex, Intake/Output, MAR, Recent Results and Cardiac Rhythm Sinus Arrythmia.

## 2018-02-22 ENCOUNTER — APPOINTMENT (OUTPATIENT)
Dept: INTERVENTIONAL RADIOLOGY/VASCULAR | Age: 78
DRG: 871 | End: 2018-02-22
Attending: INTERNAL MEDICINE
Payer: MEDICARE

## 2018-02-22 ENCOUNTER — APPOINTMENT (OUTPATIENT)
Dept: CT IMAGING | Age: 78
DRG: 871 | End: 2018-02-22
Attending: INTERNAL MEDICINE
Payer: MEDICARE

## 2018-02-22 LAB
ALBUMIN SERPL-MCNC: 2.2 G/DL (ref 3.5–5)
ALBUMIN/GLOB SERPL: 0.4 {RATIO} (ref 1.1–2.2)
ALP SERPL-CCNC: 76 U/L (ref 45–117)
ALT SERPL-CCNC: 24 U/L (ref 12–78)
ANION GAP SERPL CALC-SCNC: 12 MMOL/L (ref 5–15)
AST SERPL-CCNC: 22 U/L (ref 15–37)
BASOPHILS # BLD: 0 K/UL (ref 0–0.1)
BASOPHILS NFR BLD: 0 % (ref 0–1)
BILIRUB DIRECT SERPL-MCNC: <0.1 MG/DL (ref 0–0.2)
BILIRUB SERPL-MCNC: 0.4 MG/DL (ref 0.2–1)
BUN SERPL-MCNC: 107 MG/DL (ref 6–20)
BUN/CREAT SERPL: 19 (ref 12–20)
CALCIUM SERPL-MCNC: 10 MG/DL (ref 8.5–10.1)
CHLORIDE SERPL-SCNC: 103 MMOL/L (ref 97–108)
CO2 SERPL-SCNC: 20 MMOL/L (ref 21–32)
CREAT SERPL-MCNC: 5.49 MG/DL (ref 0.7–1.3)
DIFFERENTIAL METHOD BLD: ABNORMAL
EOSINOPHIL # BLD: 0 K/UL (ref 0–0.4)
EOSINOPHIL NFR BLD: 0 % (ref 0–7)
ERYTHROCYTE [DISTWIDTH] IN BLOOD BY AUTOMATED COUNT: 17.5 % (ref 11.5–14.5)
ERYTHROCYTE [SEDIMENTATION RATE] IN BLOOD: 69 MM/HR (ref 0–20)
GLOBULIN SER CALC-MCNC: 5.7 G/DL (ref 2–4)
GLUCOSE BLD STRIP.AUTO-MCNC: 326 MG/DL (ref 65–100)
GLUCOSE BLD STRIP.AUTO-MCNC: 346 MG/DL (ref 65–100)
GLUCOSE BLD STRIP.AUTO-MCNC: 433 MG/DL (ref 65–100)
GLUCOSE BLD STRIP.AUTO-MCNC: 437 MG/DL (ref 65–100)
GLUCOSE SERPL-MCNC: 329 MG/DL (ref 65–100)
HCT VFR BLD AUTO: 35.1 % (ref 36.6–50.3)
HGB BLD-MCNC: 11.6 G/DL (ref 12.1–17)
IMM GRANULOCYTES # BLD: 0.1 K/UL (ref 0–0.04)
IMM GRANULOCYTES NFR BLD AUTO: 1 % (ref 0–0.5)
LYMPHOCYTES # BLD: 0.4 K/UL (ref 0.8–3.5)
LYMPHOCYTES NFR BLD: 3 % (ref 12–49)
MAGNESIUM SERPL-MCNC: 2.3 MG/DL (ref 1.6–2.4)
MCH RBC QN AUTO: 27.2 PG (ref 26–34)
MCHC RBC AUTO-ENTMCNC: 33 G/DL (ref 30–36.5)
MCV RBC AUTO: 82.4 FL (ref 80–99)
MONOCYTES # BLD: 0.5 K/UL (ref 0–1)
MONOCYTES NFR BLD: 4 % (ref 5–13)
NEUTS SEG # BLD: 11.8 K/UL (ref 1.8–8)
NEUTS SEG NFR BLD: 92 % (ref 32–75)
NRBC # BLD: 0 K/UL (ref 0–0.01)
NRBC BLD-RTO: 0 PER 100 WBC
PHOSPHATE SERPL-MCNC: 3.7 MG/DL (ref 2.6–4.7)
PLATELET # BLD AUTO: 236 K/UL (ref 150–400)
PMV BLD AUTO: 10.6 FL (ref 8.9–12.9)
POTASSIUM SERPL-SCNC: 3.8 MMOL/L (ref 3.5–5.1)
PROT SERPL-MCNC: 7.9 G/DL (ref 6.4–8.2)
RBC # BLD AUTO: 4.26 M/UL (ref 4.1–5.7)
RBC MORPH BLD: ABNORMAL
SERVICE CMNT-IMP: ABNORMAL
SODIUM SERPL-SCNC: 135 MMOL/L (ref 136–145)
T3FREE SERPL-MCNC: 2.2 PG/ML (ref 2.2–4)
T4 FREE SERPL-MCNC: 1.4 NG/DL (ref 0.8–1.5)
VANCOMYCIN SERPL-MCNC: 18.6 UG/ML
WBC # BLD AUTO: 12.8 K/UL (ref 4.1–11.1)

## 2018-02-22 PROCEDURE — 74011250636 HC RX REV CODE- 250/636: Performed by: RADIOLOGY

## 2018-02-22 PROCEDURE — 86335 IMMUNFIX E-PHORSIS/URINE/CSF: CPT | Performed by: INTERNAL MEDICINE

## 2018-02-22 PROCEDURE — 84439 ASSAY OF FREE THYROXINE: CPT

## 2018-02-22 PROCEDURE — 77030003666 HC NDL SPINAL BD -A

## 2018-02-22 PROCEDURE — 74011000250 HC RX REV CODE- 250: Performed by: EMERGENCY MEDICINE

## 2018-02-22 PROCEDURE — 5A1D70Z PERFORMANCE OF URINARY FILTRATION, INTERMITTENT, LESS THAN 6 HOURS PER DAY: ICD-10-PCS | Performed by: INTERNAL MEDICINE

## 2018-02-22 PROCEDURE — 74011250637 HC RX REV CODE- 250/637: Performed by: EMERGENCY MEDICINE

## 2018-02-22 PROCEDURE — 74011636637 HC RX REV CODE- 636/637: Performed by: NURSE PRACTITIONER

## 2018-02-22 PROCEDURE — 74011250636 HC RX REV CODE- 250/636: Performed by: EMERGENCY MEDICINE

## 2018-02-22 PROCEDURE — 80076 HEPATIC FUNCTION PANEL: CPT | Performed by: INTERNAL MEDICINE

## 2018-02-22 PROCEDURE — 84100 ASSAY OF PHOSPHORUS: CPT

## 2018-02-22 PROCEDURE — 77030003375 HC MRK BIOP BEEK -A

## 2018-02-22 PROCEDURE — 74011636637 HC RX REV CODE- 636/637: Performed by: EMERGENCY MEDICINE

## 2018-02-22 PROCEDURE — 88184 FLOWCYTOMETRY/ TC 1 MARKER: CPT | Performed by: INTERNAL MEDICINE

## 2018-02-22 PROCEDURE — 87340 HEPATITIS B SURFACE AG IA: CPT

## 2018-02-22 PROCEDURE — 85097 BONE MARROW INTERPRETATION: CPT | Performed by: INTERNAL MEDICINE

## 2018-02-22 PROCEDURE — 77030018719 HC DRSG PTCH ANTIMIC J&J -A

## 2018-02-22 PROCEDURE — 88264 CHROMOSOME ANALYSIS 20-25: CPT | Performed by: INTERNAL MEDICINE

## 2018-02-22 PROCEDURE — 77030031139 HC SUT VCRL2 J&J -A

## 2018-02-22 PROCEDURE — 88185 FLOWCYTOMETRY/TC ADD-ON: CPT | Performed by: INTERNAL MEDICINE

## 2018-02-22 PROCEDURE — 74011000250 HC RX REV CODE- 250: Performed by: RADIOLOGY

## 2018-02-22 PROCEDURE — 88313 SPECIAL STAINS GROUP 2: CPT | Performed by: INTERNAL MEDICINE

## 2018-02-22 PROCEDURE — 84481 FREE ASSAY (FT-3): CPT

## 2018-02-22 PROCEDURE — 77001 FLUOROGUIDE FOR VEIN DEVICE: CPT

## 2018-02-22 PROCEDURE — 88342 IMHCHEM/IMCYTCHM 1ST ANTB: CPT | Performed by: INTERNAL MEDICINE

## 2018-02-22 PROCEDURE — 99152 MOD SED SAME PHYS/QHP 5/>YRS: CPT

## 2018-02-22 PROCEDURE — 74011250637 HC RX REV CODE- 250/637: Performed by: INTERNAL MEDICINE

## 2018-02-22 PROCEDURE — 86706 HEP B SURFACE ANTIBODY: CPT

## 2018-02-22 PROCEDURE — 82962 GLUCOSE BLOOD TEST: CPT

## 2018-02-22 PROCEDURE — C1892 INTRO/SHEATH,FIXED,PEEL-AWAY: HCPCS

## 2018-02-22 PROCEDURE — 82232 ASSAY OF BETA-2 PROTEIN: CPT | Performed by: INTERNAL MEDICINE

## 2018-02-22 PROCEDURE — 65660000000 HC RM CCU STEPDOWN

## 2018-02-22 PROCEDURE — 88365 INSITU HYBRIDIZATION (FISH): CPT | Performed by: INTERNAL MEDICINE

## 2018-02-22 PROCEDURE — 80048 BASIC METABOLIC PNL TOTAL CA: CPT

## 2018-02-22 PROCEDURE — 07DR3ZX EXTRACTION OF ILIAC BONE MARROW, PERCUTANEOUS APPROACH, DIAGNOSTIC: ICD-10-PCS | Performed by: RADIOLOGY

## 2018-02-22 PROCEDURE — 85025 COMPLETE CBC W/AUTO DIFF WBC: CPT

## 2018-02-22 PROCEDURE — 74011000250 HC RX REV CODE- 250: Performed by: INTERNAL MEDICINE

## 2018-02-22 PROCEDURE — C1752 CATH,HEMODIALYSIS,SHORT-TERM: HCPCS

## 2018-02-22 PROCEDURE — 77030028872 HC BN BIOP NDL ON CNTRL TY TELE -C

## 2018-02-22 PROCEDURE — 83735 ASSAY OF MAGNESIUM: CPT

## 2018-02-22 PROCEDURE — 94640 AIRWAY INHALATION TREATMENT: CPT

## 2018-02-22 PROCEDURE — 80202 ASSAY OF VANCOMYCIN: CPT

## 2018-02-22 PROCEDURE — 85652 RBC SED RATE AUTOMATED: CPT | Performed by: INTERNAL MEDICINE

## 2018-02-22 PROCEDURE — 88374 M/PHMTRC ALYS ISHQUANT/SEMIQ: CPT | Performed by: INTERNAL MEDICINE

## 2018-02-22 PROCEDURE — 02HV33Z INSERTION OF INFUSION DEVICE INTO SUPERIOR VENA CAVA, PERCUTANEOUS APPROACH: ICD-10-PCS | Performed by: RADIOLOGY

## 2018-02-22 PROCEDURE — 88305 TISSUE EXAM BY PATHOLOGIST: CPT | Performed by: INTERNAL MEDICINE

## 2018-02-22 PROCEDURE — 88237 TISSUE CULTURE BONE MARROW: CPT | Performed by: INTERNAL MEDICINE

## 2018-02-22 PROCEDURE — 88311 DECALCIFY TISSUE: CPT | Performed by: INTERNAL MEDICINE

## 2018-02-22 PROCEDURE — 90935 HEMODIALYSIS ONE EVALUATION: CPT

## 2018-02-22 PROCEDURE — 36415 COLL VENOUS BLD VENIPUNCTURE: CPT

## 2018-02-22 RX ORDER — INSULIN GLARGINE 100 [IU]/ML
35 INJECTION, SOLUTION SUBCUTANEOUS
Status: DISCONTINUED | OUTPATIENT
Start: 2018-02-22 | End: 2018-02-24

## 2018-02-22 RX ORDER — LIDOCAINE HYDROCHLORIDE 20 MG/ML
20 INJECTION, SOLUTION INFILTRATION; PERINEURAL ONCE
Status: COMPLETED | OUTPATIENT
Start: 2018-02-22 | End: 2018-02-22

## 2018-02-22 RX ORDER — SODIUM CHLORIDE 9 MG/ML
25 INJECTION, SOLUTION INTRAVENOUS CONTINUOUS
Status: DISCONTINUED | OUTPATIENT
Start: 2018-02-22 | End: 2018-02-22

## 2018-02-22 RX ORDER — LANOLIN ALCOHOL/MO/W.PET/CERES
3 CREAM (GRAM) TOPICAL
Status: DISCONTINUED | OUTPATIENT
Start: 2018-02-22 | End: 2018-03-09 | Stop reason: HOSPADM

## 2018-02-22 RX ORDER — PANTOPRAZOLE SODIUM 40 MG/1
40 TABLET, DELAYED RELEASE ORAL
Status: DISCONTINUED | OUTPATIENT
Start: 2018-02-22 | End: 2018-02-22 | Stop reason: SDUPTHER

## 2018-02-22 RX ORDER — PANTOPRAZOLE SODIUM 40 MG/1
40 TABLET, DELAYED RELEASE ORAL
Status: DISCONTINUED | OUTPATIENT
Start: 2018-02-22 | End: 2018-03-09 | Stop reason: HOSPADM

## 2018-02-22 RX ORDER — INSULIN LISPRO 100 [IU]/ML
15 INJECTION, SOLUTION INTRAVENOUS; SUBCUTANEOUS ONCE
Status: COMPLETED | OUTPATIENT
Start: 2018-02-22 | End: 2018-02-22

## 2018-02-22 RX ORDER — MIDAZOLAM HYDROCHLORIDE 1 MG/ML
5 INJECTION, SOLUTION INTRAMUSCULAR; INTRAVENOUS
Status: DISCONTINUED | OUTPATIENT
Start: 2018-02-22 | End: 2018-03-09 | Stop reason: HOSPADM

## 2018-02-22 RX ORDER — FENTANYL CITRATE 50 UG/ML
100 INJECTION, SOLUTION INTRAMUSCULAR; INTRAVENOUS
Status: DISCONTINUED | OUTPATIENT
Start: 2018-02-22 | End: 2018-02-22

## 2018-02-22 RX ORDER — INSULIN LISPRO 100 [IU]/ML
5 INJECTION, SOLUTION INTRAVENOUS; SUBCUTANEOUS
Status: DISCONTINUED | OUTPATIENT
Start: 2018-02-22 | End: 2018-03-09 | Stop reason: HOSPADM

## 2018-02-22 RX ORDER — HEPARIN SODIUM 200 [USP'U]/100ML
400 INJECTION, SOLUTION INTRAVENOUS ONCE
Status: COMPLETED | OUTPATIENT
Start: 2018-02-22 | End: 2018-02-22

## 2018-02-22 RX ORDER — LIDOCAINE HYDROCHLORIDE 10 MG/ML
10 INJECTION, SOLUTION EPIDURAL; INFILTRATION; INTRACAUDAL; PERINEURAL
Status: COMPLETED | OUTPATIENT
Start: 2018-02-22 | End: 2018-02-22

## 2018-02-22 RX ORDER — HEPARIN 100 UNIT/ML
300 SYRINGE INTRAVENOUS ONCE
Status: COMPLETED | OUTPATIENT
Start: 2018-02-22 | End: 2018-02-22

## 2018-02-22 RX ORDER — SUCRALFATE 1 G/10ML
1 SUSPENSION ORAL
Status: DISCONTINUED | OUTPATIENT
Start: 2018-02-22 | End: 2018-03-09 | Stop reason: HOSPADM

## 2018-02-22 RX ORDER — HYDRALAZINE HYDROCHLORIDE 20 MG/ML
20 INJECTION INTRAMUSCULAR; INTRAVENOUS
Status: DISCONTINUED | OUTPATIENT
Start: 2018-02-22 | End: 2018-03-09 | Stop reason: HOSPADM

## 2018-02-22 RX ORDER — LIDOCAINE HYDROCHLORIDE 10 MG/ML
10 INJECTION, SOLUTION EPIDURAL; INFILTRATION; INTRACAUDAL; PERINEURAL
Status: DISPENSED | OUTPATIENT
Start: 2018-02-22 | End: 2018-02-23

## 2018-02-22 RX ORDER — INSULIN GLARGINE 100 [IU]/ML
10 INJECTION, SOLUTION SUBCUTANEOUS
Status: COMPLETED | OUTPATIENT
Start: 2018-02-22 | End: 2018-02-22

## 2018-02-22 RX ADMIN — INSULIN GLARGINE 35 UNITS: 100 INJECTION, SOLUTION SUBCUTANEOUS at 21:14

## 2018-02-22 RX ADMIN — GUAIFENESIN 600 MG: 600 TABLET, EXTENDED RELEASE ORAL at 11:23

## 2018-02-22 RX ADMIN — PANTOPRAZOLE SODIUM 40 MG: 40 TABLET, DELAYED RELEASE ORAL at 17:14

## 2018-02-22 RX ADMIN — SODIUM CHLORIDE 125 ML/HR: 900 INJECTION, SOLUTION INTRAVENOUS at 06:28

## 2018-02-22 RX ADMIN — GUAIFENESIN 600 MG: 600 TABLET, EXTENDED RELEASE ORAL at 17:14

## 2018-02-22 RX ADMIN — MONTELUKAST SODIUM 10 MG: 10 TABLET, COATED ORAL at 11:23

## 2018-02-22 RX ADMIN — INSULIN LISPRO 15 UNITS: 100 INJECTION, SOLUTION INTRAVENOUS; SUBCUTANEOUS at 13:15

## 2018-02-22 RX ADMIN — IPRATROPIUM BROMIDE AND ALBUTEROL SULFATE 3 ML: .5; 3 SOLUTION RESPIRATORY (INHALATION) at 11:26

## 2018-02-22 RX ADMIN — AZITHROMYCIN 250 MG: 250 TABLET, FILM COATED ORAL at 21:14

## 2018-02-22 RX ADMIN — CLOPIDOGREL BISULFATE 75 MG: 75 TABLET ORAL at 11:23

## 2018-02-22 RX ADMIN — HEPARIN SODIUM IN SODIUM CHLORIDE: 200 INJECTION INTRAVENOUS at 16:28

## 2018-02-22 RX ADMIN — INSULIN LISPRO 5 UNITS: 100 INJECTION, SOLUTION INTRAVENOUS; SUBCUTANEOUS at 21:14

## 2018-02-22 RX ADMIN — MIDAZOLAM 1 MG: 1 INJECTION INTRAMUSCULAR; INTRAVENOUS at 09:00

## 2018-02-22 RX ADMIN — METHYLPREDNISOLONE SODIUM SUCCINATE 40 MG: 40 INJECTION, POWDER, FOR SOLUTION INTRAMUSCULAR; INTRAVENOUS at 21:15

## 2018-02-22 RX ADMIN — FENTANYL CITRATE 25 MCG: 50 INJECTION, SOLUTION INTRAMUSCULAR; INTRAVENOUS at 09:02

## 2018-02-22 RX ADMIN — INSULIN LISPRO 5 UNITS: 100 INJECTION, SOLUTION INTRAVENOUS; SUBCUTANEOUS at 17:18

## 2018-02-22 RX ADMIN — OSELTAMIVIR PHOSPHATE 30 MG: 30 CAPSULE ORAL at 11:23

## 2018-02-22 RX ADMIN — SODIUM CHLORIDE, PRESERVATIVE FREE 500 UNITS: 5 INJECTION INTRAVENOUS at 16:28

## 2018-02-22 RX ADMIN — SUCRALFATE 1 G: 1 SUSPENSION ORAL at 17:14

## 2018-02-22 RX ADMIN — IPRATROPIUM BROMIDE AND ALBUTEROL SULFATE 3 ML: .5; 3 SOLUTION RESPIRATORY (INHALATION) at 04:17

## 2018-02-22 RX ADMIN — LIDOCAINE HYDROCHLORIDE 8 ML: 10 INJECTION, SOLUTION EPIDURAL; INFILTRATION; INTRACAUDAL; PERINEURAL at 09:04

## 2018-02-22 RX ADMIN — SUCRALFATE 1 G: 1 SUSPENSION ORAL at 21:14

## 2018-02-22 RX ADMIN — INSULIN GLARGINE 10 UNITS: 100 INJECTION, SOLUTION SUBCUTANEOUS at 17:13

## 2018-02-22 RX ADMIN — ASPIRIN 81 MG 81 MG: 81 TABLET ORAL at 11:23

## 2018-02-22 RX ADMIN — PANTOPRAZOLE SODIUM 40 MG: 40 TABLET, DELAYED RELEASE ORAL at 11:22

## 2018-02-22 RX ADMIN — HYDRALAZINE HYDROCHLORIDE 20 MG: 20 INJECTION INTRAMUSCULAR; INTRAVENOUS at 06:31

## 2018-02-22 RX ADMIN — FLUTICASONE FUROATE AND VILANTEROL TRIFENATATE 1 PUFF: 100; 25 POWDER RESPIRATORY (INHALATION) at 11:24

## 2018-02-22 RX ADMIN — LIDOCAINE HYDROCHLORIDE 10 ML: 20 INJECTION, SOLUTION INFILTRATION; PERINEURAL at 16:28

## 2018-02-22 RX ADMIN — IPRATROPIUM BROMIDE AND ALBUTEROL SULFATE 3 ML: .5; 3 SOLUTION RESPIRATORY (INHALATION) at 20:48

## 2018-02-22 RX ADMIN — DILTIAZEM HYDROCHLORIDE 240 MG: 240 CAPSULE, COATED, EXTENDED RELEASE ORAL at 11:23

## 2018-02-22 RX ADMIN — LIDOCAINE HYDROCHLORIDE 40 ML: 20 SOLUTION ORAL; TOPICAL at 17:14

## 2018-02-22 RX ADMIN — HEPARIN SODIUM 5000 UNITS: 5000 INJECTION, SOLUTION INTRAVENOUS; SUBCUTANEOUS at 17:14

## 2018-02-22 RX ADMIN — UMECLIDINIUM 1 PUFF: 62.5 AEROSOL, POWDER ORAL at 11:24

## 2018-02-22 RX ADMIN — METHYLPREDNISOLONE SODIUM SUCCINATE 40 MG: 40 INJECTION, POWDER, FOR SOLUTION INTRAMUSCULAR; INTRAVENOUS at 11:22

## 2018-02-22 RX ADMIN — HEPARIN SODIUM 5000 UNITS: 5000 INJECTION, SOLUTION INTRAVENOUS; SUBCUTANEOUS at 11:23

## 2018-02-22 RX ADMIN — INSULIN LISPRO 10 UNITS: 100 INJECTION, SOLUTION INTRAVENOUS; SUBCUTANEOUS at 17:19

## 2018-02-22 NOTE — PROCEDURES
PROCEDURE:Harvinder catheter placement. INDICATION:dialysis. ANESTHESIA:local.  COMPLICATION:NONE. SPECIMENS REMOVED:none. BLOOD LOSS:NONE. /ASSISTANT:DARBY Sanchez RECOMMENDATIONS:may use. CONSENT OBTAINED:YES.  NOTES:none.

## 2018-02-22 NOTE — DISCHARGE INSTRUCTIONS
HOSPITALIST DISCHARGE INSTRUCTIONS    NAME: Suzette Bello   :  1940   MRN:  [id number]     Date/Time:  3/9/2018 8:52 AM    ADMIT DATE: 2018   DISCHARGE DATE: 3/9/2018     Attending Physician: Herminio Acharya MD      Medications: Per above medication reconciliation. Pain Management: per above medications    Recommended diet: Diabetic Diet. Nutrition consult follow up    Recommended activity: PT/OT Eval and Treat    Wound care: Right buttock skin tear: cleanse daily with soap and water, pat dry. Apply a sacral foam dressing to protect and heal.    Indwelling devices:  None    Supplemental Oxygen: None    Required Lab work: Per SNF routine    Glucose management:  Accucheck ACHS with sliding scale per SNF protocol    Code status: Full    Will need a repeat EGD in 2-3 weeks when pt is stable. Pt must be off from ASA and Plavix for repeat EGD. Follow up with Dr Li Montoya for this    Please arrange or confirm weekly chemotherapy with Dr Brooks Repress office. Outside physician follow up: Follow-up Information     Follow up With Details Comments Contact [facility] On 3/9/2018 This is your post-acute skilled nursing facility rehabilitation provider. [address] [facility]  [phone]      Briana Curran in 25 days MD's clinic will contact patient directly to schedule appointment. [address]  [phone]    Carina Boyd MD Go in 1 week Continue OP Chemotherapy treatment as scheduled by MD's office  [address] [facility] [address]  [address]      Cezar Adkins MD In 1 month  [address] [facility] [address] [phone]      Roopa Davis MD   Patient can only remember the practice name and not the physician                 Skilled nursing facility/ SNF MD responsible for above on discharge.          Information obtained by :  I understand that if any problems occur once I am at home I am to contact my physician. I understand and acknowledge receipt of the instructions indicated above.                                                                                                                                            Physician's or R.N.'s Signature                                                                  Date/Time                                                                                                                                              Patient or Repres

## 2018-02-22 NOTE — PROGRESS NOTES
Reviewed the chart and patient continues to need 3L of oxygen. Therapy has mobilized and indicated patient may need home health. Will follow hospital course for may need to arrange home oxygen and home health services prior to discharge.     [phone]

## 2018-02-22 NOTE — H&P
Radiology History and Physical    Patient: Irina Vance 66 y.o. male       Chief Complaint: Flu (per pt since saturday)      History of Present Illness: ct guided bone marrow biopsy and aspirate    History:    Past Medical History:   Diagnosis Date    [facility]     Asthma     Cancer ([facility]     kidney ca    COPD     Diabetes ([facility]     Gastrointestinal disorder     ruptured esphogus    Hypertension     Other ill-defined conditions(799.89)      Family History   Problem Relation Age of Onset    Hypertension Mother      Social History     Social History    Marital status:      Spouse name: N/A    Number of children: N/A    Years of education: N/A     Occupational History    Not on file. Social History Main Topics    Smoking status: Current Every Day Smoker     Packs/day: 0.50     Years: 60.00    Smokeless tobacco: Never Used    Alcohol use Yes      Comment: 2 drinks a month    Drug use: No    Sexual activity: Not on file     Other Topics Concern    Not on file     Social History Narrative    ** Merged History Encounter **            Allergies:    Allergies   Allergen Reactions    Niacin Rash    Niacin Unknown (comments)     Hot flashes       Current Medications:  Current Facility-Administered Medications   Medication Dose Route Frequency    hydrALAZINE (APRESOLINE) 20 mg/mL injection 20 mg  20 mg IntraVENous Q6H PRN    midazolam (VERSED) injection 5 mg  5 mg IntraVENous Multiple    lidocaine (PF) (XYLOCAINE) 10 mg/mL (1 %) injection 10 mL  10 mL SubCUTAneous RAD ONCE    heparin (porcine) pf 300 Units  300 Units InterCATHeter ONCE    heparinized saline 2 units/mL infusion 800 Units  400 mL Irrigation ONCE    lidocaine (XYLOCAINE) 20 mg/mL (2 %) injection 400 mg  20 mL SubCUTAneous ONCE    mylanta/viscous lidocaine (TOAN)(GI COCKTAIL)  40 mL Oral ONCE    pantoprazole (PROTONIX) tablet 40 mg  40 mg Oral ACB&D    sucralfate (CARAFATE) 100 mg/mL oral suspension 1 g  1 g Oral AC&HS    insulin glargine (LANTUS) injection 10 Units  10 Units SubCUTAneous NOW    insulin lispro (HUMALOG) injection 5 Units  5 Units SubCUTAneous TIDAC    methylPREDNISolone (PF) (SOLU-MEDROL) injection 40 mg  40 mg IntraVENous Q12H    insulin glargine (LANTUS) injection 30 Units  30 Units SubCUTAneous QHS    insulin lispro (HUMALOG) injection   SubCUTAneous AC&HS    azithromycin (ZITHROMAX) tablet 250 mg  250 mg Oral QHS    oseltamivir (TAMIFLU) capsule 30 mg  30 mg Oral DAILY    heparin (porcine) injection 5,000 Units  5,000 Units SubCUTAneous Q8H    sodium chloride (NS) flush 5-10 mL  5-10 mL IntraVENous PRN    guaiFENesin ER (MUCINEX) tablet 600 mg  600 mg Oral BID    albuterol-ipratropium (DUO-NEB) 2.5 MG-0.5 MG/3 ML  3 mL Nebulization Q4H RT    albuterol-ipratropium (DUO-NEB) 2.5 MG-0.5 MG/3 ML  3 mL Nebulization Q4H PRN    cefTRIAXone (ROCEPHIN) 1 g/50 mL NS IVPB  1 g IntraVENous Q24H    0.9% sodium chloride infusion  50 mL/hr IntraVENous CONTINUOUS    acetaminophen (TYLENOL) tablet 650 mg  650 mg Oral Q4H PRN    ondansetron (ZOFRAN) injection 4 mg  4 mg IntraVENous Q4H PRN    glucose chewable tablet 16 g  4 Tab Oral PRN    dextrose (D50W) injection syrg 12.5-25 g  12.5-25 g IntraVENous PRN    glucagon (GLUCAGEN) injection 1 mg  1 mg IntraMUSCular PRN    aspirin chewable tablet 81 mg  81 mg Oral DAILY    clopidogrel (PLAVIX) tablet 75 mg  75 mg Oral DAILY    dilTIAZem CD (CARDIZEM CD) capsule 240 mg  240 mg Oral DAILY    montelukast (SINGULAIR) tablet 10 mg  10 mg Oral DAILY    umeclidinium (INCRUSE ELLIPTA) 62.5 mcg/actuation  1 Puff Inhalation DAILY    fluticasone-vilanterol (BREO ELLIPTA) 100mcg-25mcg/puff  1 Puff Inhalation DAILY        Physical Exam:  Blood pressure 171/59, pulse 78, temperature 97.6 °F (36.4 °C), resp. rate 22, height 6' (1.829 m), weight 80.3 kg (177 lb 1.6 oz), SpO2 92 %.   LUNG: clear to auscultation bilaterally, HEART: regular rate and rhythm, S1, S2 normal, no murmur, click, rub or gallop      Alerts:    Hospital Problems  Date Reviewed: 9/12/2016          Codes Class Noted POA    Hypoxemia ICD-10-CM: R09.02  ICD-9-CM: 799.02  2/19/2018 Unknown              Laboratory:      Recent Labs      02/22/18   0023   HGB  11.6*   HCT  35.1*   WBC  12.8*   PLT  236   BUN  107*   CREA  5.49*   K  3.8         Plan of Care/Planned Procedure:  Risks, benefits, and alternatives reviewed with patient and he agrees to proceed with the procedure.        Derrick Messer MD

## 2018-02-22 NOTE — PROGRESS NOTES
ADULT PROTOCOL: JET AEROSOL  REASSESSMENT    Patient  Latoya Parish     66 y.o.   male     2/22/2018  3:28 PM    Breath Sounds Pre Procedure: Right Breath Sounds: Coarse                               Left Breath Sounds: Coarse    Breath Sounds Post Procedure: Right Breath Sounds: Coarse                                 Left Breath Sounds: Coarse    Breathing pattern: Pre procedure Breathing Pattern: Regular          Post procedure Breathing Pattern: Regular    Heart Rate: Pre procedure Pulse: 105           Post procedure Pulse: 87    Resp Rate: Pre procedure Respirations: 20           Post procedure Respirations: 20      Cough: Pre procedure Cough: Non-productive               Post procedure Cough: Moist      Oxygen: O2 Device: Nasal cannula   3L       SpO2: Pre procedure SpO2: 92 %                 Post procedure SpO2: 92 %      Nebulizer Therapy: Current medications Aerosolized Medications: DuoNeb q4hrs      Changed: NO    Smoking History:  Current    Problem List:   Patient Active Problem List   Diagnosis Code    Acute respiratory failure (HCC) J96.00    COPD with acute exacerbation ([facility]) J44.1    HTN (hypertension) I10    HTN (hypertension) I10    COPD (chronic obstructive pulmonary disease) ([facility]) J44.9    Cancer of kidney ([facility] C64.9    Hypoglycemia, unspecified E16.2    Acute renal failure ([facility] N17.9    Hyperkalemia E87.5    S/P partial colectomy Z90.49    Diarrhea R19.7    DM (diabetes mellitus), type 2, uncontrolled ([facility] E11.65    S/p nephrectomy Z90.5    AAA (abdominal aortic aneurysm) ([facility]) I71.4    Gastrointestinal disorder K92.9    Cancer ([facility] C80.1    Anemia D64.9    Multiple myeloma ([facility]) C90.00    Stroke (cerebrum) ([facility]) I63.9    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

## 2018-02-22 NOTE — DIABETES MGMT
DTC Progress Note    Recommendations/ Comments: Noted NPH changed to lantus last night. BG today 400s. Please consider beginning NPH 15units Q12hrs linked and timed with solu-medrol as the two medications will peak together and aid in greater BG control. NPH should be given in addition to lantus and humalog correction already ordered. Once po intake improves, pt may also benefit from prandial humalog. Chart reviewed on [facility]. Patient is a 66 y.o. male with known diabetes on NPH and Glipizide at home. A1c:   Lab Results   Component Value Date/Time    Hemoglobin A1c 9.8 (H) 02/20/2018 05:14 AM    Hemoglobin A1c 8.7 (H) 09/10/2016 02:07 AM       Recent Glucose Results:   Lab Results   Component Value Date/Time     (H) 02/22/2018 12:23 AM    GLUCPOC 437 (H) 02/22/2018 11:34 AM    GLUCPOC 433 (H) 02/22/2018 10:51 AM    GLUCPOC 320 (H) 02/21/2018 09:04 PM        Lab Results   Component Value Date/Time    Creatinine 5.49 (H) 02/22/2018 12:23 AM     Estimated Creatinine Clearance: 12.2 mL/min (based on Cr of 5.49). Active Orders   Diet    DIET DIABETIC CONSISTENT CARB Regular        PO intake: No data found. Current hospital DM medication: Lispro Correctional insulin with normal sensitivity, lantus 30units Qhs    Will continue to follow as needed. Thank you.   RA Branham, RN, [address]

## 2018-02-22 NOTE — PROGRESS NOTES
Resting comfortably post procedure (bone marrow biopsy). Sitting up in bed able to take sips of water. Dressing at [facility] BX site clean and dry. Will monitor, and call report.

## 2018-02-22 NOTE — PROGRESS NOTES
1900 Received report from [place], [address] [facility]. Assumed patient care. Bedside shift change report given to Deann Cohen RN (oncoming nurse) by Larry Moncada RN (offgoing nurse). Report included the following information SBAR, Kardex, Intake/Output, MAR, Recent Results and Cardiac Rhythm NSR.

## 2018-02-22 NOTE — PROGRESS NOTES
Hospitalist Progress Note    NAME: Susan Art   :  1940   MRN:  [id number]       Interim Hospital Summary: 66 y.o. male whom presented on 2018 with      Assessment / Plan:  Smoldering Myeloma/MGUS  Multiple myeloma  - heme/onc following; bone marrow bx done. perceived as transformation from a smoldering myeloma to active myeloma. SPEP, S FLC assay pending. 24 hour urine for UPEP/Bence Reece proteins. DIANA on CKD  Hx renal cell cancer s/p nephrectomy  - solitary kidney (left); Permacath placement today. Will start on HD.  - rapidly worsening renal function; creat 5.49 today vs 2.49 upon admission  - nephrology following    Acute hypoxemic respiratory failure secondary to influenza A with pneumonia acute on chronic COPD exacerbation  Sepsis developing on admission  - wean off O2 as tolerate; O2 Sat 93% @ 3L via n/c  - staph coag (-); perceived as contaminant at this time. Vanc has been d/c'd.  - continue with tamiflu  - continue with IV steroid, zithromax, rocephin  - continue with breo and duoneb  - send sputum cx if avilable  - CT chest Prominent emphysema. Bibasilar infiltrates      HTN  - continue cardizem if BP tolerates      Diabetes type 2 uncontrolled  - continue NPH with SSI.  Hold po meds  - Hgb A1C 9.8      Hx CVA  - continue plavix and statin    GERD  - increase PPI & sucralfate was added. 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 Recommended Disposition: Home health      Subjective:     Chief Complaint / Reason for Physician Visit  \"i have heartburn\". Discussed with RN events overnight.      Review of Systems:  Symptom Y/N Comments  Symptom Y/N Comments   Fever/Chills n   Chest Pain n    Poor Appetite    Edema     Cough y   Abdominal Pain     Sputum n   Joint Pain     SOB/HUERTA y   Pruritis/Rash     Nausea/vomit n   Tolerating PT/OT     Diarrhea n   Tolerating Diet     Constipation n   Other       Could NOT obtain due to:      Objective:     VITALS:   Last 24hrs VS reviewed since prior progress note. Most recent are:  Patient Vitals for the past 24 hrs:   Temp Pulse Resp BP SpO2   02/22/18 1705 - - - - 93 %   02/22/18 1650 97.4 °F (36.3 °C) 94 18 126/56 93 %   02/22/18 1539 97.6 °F (36.4 °C) - - - -   02/22/18 1126 - - - - 92 %   02/22/18 1101 96 °F (35.6 °C) - 22 171/59 91 %   02/22/18 0945 - 78 18 147/58 98 %   02/22/18 0930 - 76 18 148/59 97 %   02/22/18 0925 - 78 18 144/61 97 %   02/22/18 0910 - 84 20 135/56 100 %   02/22/18 0905 - 84 16 124/64 100 %   02/22/18 0900 - 84 18 140/71 100 %   02/22/18 0820 - 86 22 153/59 100 %   02/22/18 0609 96.8 °F (36 °C) 81 20 176/76 90 %   02/22/18 0417 - - - - 94 %   02/22/18 0414 97.2 °F (36.2 °C) 92 20 189/76 90 %   02/21/18 2359 - - - - 92 %   02/21/18 2301 97.6 °F (36.4 °C) 93 20 173/78 91 %   02/21/18 2053 - - - - 91 %   02/21/18 1908 97.6 °F (36.4 °C) 76 18 146/60 93 %       Intake/Output Summary (Last 24 hours) at 02/22/18 1759  Last data filed at 02/22/18 0643   Gross per 24 hour   Intake           3702.5 ml   Output              975 ml   Net           2727.5 ml        PHYSICAL EXAM:  General: WD, WN. Alert, cooperative, no acute distress    EENT:  EOMI. Anicteric sclerae. MMM  Resp:  Coarse breath sounds in apex with a few wheezing  No accessory muscle use  CV:  Regular  rhythm,  No edema  GI:  Soft, Non distended, Non tender.  +Bowel sounds  Neurologic:  Alert and oriented X 3, normal speech,   Psych:   Good insight. Not anxious nor agitated  Skin:  No rashes.   No jaundice    Reviewed most current lab test results and cultures  YES  Reviewed most current radiology test results   YES  Review and summation of old records today    NO  Reviewed patient's current orders and MAR    YES  PMH/SH reviewed - no change compared to H&P  ________________________________________________________________________  Care Plan discussed with:    Comments   Patient y    Family      RN y    Care Manager y    Consultant  y Dr. Cathryn Ahumada                    y 1915 [address] team rounds were held today with , nursing, pharmacist and clinical coordinator. Patient's plan of care was discussed; medications were reviewed and discharge planning was addressed. ________________________________________________________________________  Total NON critical care TIME:  30   Minutes    Total CRITICAL CARE TIME Spent:   Minutes non procedure based      Comments   >50% of visit spent in counseling and coordination of care     ________________________________________________________________________  Yaima Esparza NP     Procedures: see electronic medical records for all procedures/Xrays and details which were not copied into this note but were reviewed prior to creation of Plan. LABS:  I reviewed today's most current labs and imaging studies.   Pertinent labs include:  Recent Labs      02/22/18   0023  02/21/18   0320   WBC  12.8*  12.0*   HGB  11.6*  10.7*   HCT  35.1*  34.0*   PLT  236  199     Recent Labs      02/22/18   1110  02/22/18   0023  02/21/18   0320  02/20/18   0514   NA   --   135*  134*  133*   K   --   3.8  4.7  4.8   CL   --   103  106  103   CO2   --   20*  20*  19*   GLU   --   329*  303*  201*   BUN   --   107*  80*  60*   CREA   --   5.49*  4.77*  3.63*   CA   --   10.0  9.4  10.0   MG   --   2.3  2.1   --    PHOS   --   3.7  4.8*   --    ALB  2.2*   --    --    --    TBILI  0.4   --    --    --    SGOT  22   --    --    --    ALT  24   --    --    --        Signed: )David Gil, NP

## 2018-02-22 NOTE — ROUTINE PROCESS
Name of procedure: Bone Marrow Biopsy      Complications, if any, r/t procedure: none      Sedation medications given: 1 mg Versed, 25 mcg Fentanyl      Sedation tolerated:  well      VS : Stable vs Unstable: stable    Post Procedure Care Needed/order sets in [facility]: yes      Any other specific needs to pt. Care: n/a    Report called to Katy Maldonado RN at ext. [phone]. SBAR items covered.

## 2018-02-22 NOTE — PROGRESS NOTES
[facility] Progress Note      Luis Esquivel  1940  [id number]      2/22/2018  12:24 PM    Follow-up for: Smoldering Myeloma, DIANA     [x] Chart notes since last visit reviewed     [x] Medications reviewed for allergies and interactions    Patient complains of the following:    Just had his BMB. Recd some sedation with versed and fentanyl. Feels more tired today and mildly sob with more wheezing. Subjective:     12 point ROS negative except as in HPI and above      Objective:     Patient Vitals for the past 24 hrs:   BP Temp Pulse Resp SpO2 Weight   02/22/18 1126 - - - - 92 % -   02/22/18 1101 171/59 96 °F (35.6 °C) - 22 91 % -   02/22/18 0945 147/58 - 78 18 98 % -   02/22/18 0930 148/59 - 76 18 97 % -   02/22/18 0925 144/61 - 78 18 97 % -   02/22/18 0910 135/56 - 84 20 100 % -   02/22/18 0905 124/64 - 84 16 100 % -   02/22/18 0900 140/71 - 84 18 100 % -   02/22/18 0820 153/59 - 86 22 100 % -   02/22/18 0609 176/76 96.8 °F (36 °C) 81 20 90 % -   02/22/18 0417 - - - - 94 % -   02/22/18 0414 189/76 97.2 °F (36.2 °C) 92 20 90 % -   02/21/18 2359 - - - - 92 % -   02/21/18 2301 173/78 97.6 °F (36.4 °C) 93 20 91 % -   02/21/18 2133 - - - - - 80.3 kg (177 lb 1.6 oz)   02/21/18 2053 - - - - 91 % -   02/21/18 1908 146/60 97.6 °F (36.4 °C) 76 18 93 % -   02/21/18 1620 - - - - 94 % -   02/21/18 1618 126/61 - 91 - (!) 85 % -   02/21/18 1608 127/65 - 98 - 91 % -   02/21/18 1601 - - 85 - 94 % -   02/21/18 1555 141/63 97.6 °F (36.4 °C) 86 16 (!) 88 % -   02/21/18 1535 - - - - 93 % -       Physical Exam:  MS-Alert, or X 3  General - chronically ill appearing  Neck-Supple, No JVD  CVS-S1,S2 normal  RS-Scattered b/l wheezes  Abdomen- Soft, NT,ND, BS+  Extre- No c/c/e  Neuro- No FND.     Available labs reviewed:  Labs:    Recent Results (from the past 24 hour(s))   CK    Collection Time: 02/21/18  2:23 PM   Result Value Ref Range    CK 98 39 - 308 U/L   EOSINOPHILS, URINE    Collection Time: 02/21/18  2:35 PM Result Value Ref Range    Eosinophils,urine NEGATIVE      PROTEIN/CREATININE RATIO, URINE    Collection Time: 02/21/18  2:35 PM   Result Value Ref Range    Protein, urine random 125 (H) 0.0 - 11.9 mg/dL    Creatinine, urine 34.60 mg/dL    Protein/Creat. urine Ratio 3.6     GLUCOSE, POC    Collection Time: 02/21/18  4:11 PM   Result Value Ref Range    Glucose (POC) 398 (H) 65 - 100 mg/dL    Performed by [address] [place] [address], POC    Collection Time: 02/21/18  9:04 PM   Result Value Ref Range    Glucose (POC) 320 (H) 65 - 100 mg/dL    Performed by [facility]    METABOLIC PANEL, BASIC    Collection Time: 02/22/18 12:23 AM   Result Value Ref Range    Sodium 135 (L) 136 - 145 mmol/L    Potassium 3.8 3.5 - 5.1 mmol/L    Chloride 103 97 - 108 mmol/L    CO2 20 (L) 21 - 32 mmol/L    Anion gap 12 5 - 15 mmol/L    Glucose 329 (H) 65 - 100 mg/dL     (H) 6 - 20 MG/DL    Creatinine 5.49 (H) 0.70 - 1.30 MG/DL    BUN/Creatinine ratio 19 12 - 20      GFR est AA 12 (L) >60 ml/min/1.73m2    GFR est non-AA 10 (L) >60 ml/min/1.73m2    Calcium 10.0 8.5 - 10.1 MG/DL   VANCOMYCIN, RANDOM    Collection Time: 02/22/18 12:23 AM   Result Value Ref Range    Vancomycin, random 18.6 UG/ML   CBC WITH AUTOMATED DIFF    Collection Time: 02/22/18 12:23 AM   Result Value Ref Range    WBC 12.8 (H) 4.1 - 11.1 K/uL    RBC 4.26 4.10 - 5.70 M/uL    HGB 11.6 (L) 12.1 - 17.0 g/dL    HCT 35.1 (L) 36.6 - 50.3 %    MCV 82.4 80.0 - 99.0 FL    MCH 27.2 26.0 - 34.0 PG    MCHC 33.0 30.0 - 36.5 g/dL    RDW 17.5 (H) 11.5 - 14.5 %    PLATELET 839 270 - 643 K/uL    MPV 10.6 8.9 - 12.9 FL    NRBC 0.0 0  WBC    ABSOLUTE NRBC 0.00 0.00 - 0.01 K/uL    NEUTROPHILS 92 (H) 32 - 75 %    LYMPHOCYTES 3 (L) 12 - 49 %    MONOCYTES 4 (L) 5 - 13 %    EOSINOPHILS 0 0 - 7 %    BASOPHILS 0 0 - 1 %    IMMATURE GRANULOCYTES 1 (H) 0.0 - 0.5 %    ABS. NEUTROPHILS 11.8 (H) 1.8 - 8.0 K/UL    ABS. LYMPHOCYTES 0.4 (L) 0.8 - 3.5 K/UL    ABS.  MONOCYTES 0.5 0.0 - 1.0 K/UL    ABS. EOSINOPHILS 0.0 0.0 - 0.4 K/UL    ABS. BASOPHILS 0.0 0.0 - 0.1 K/UL    ABS. IMM. GRANS. 0.1 (H) 0.00 - 0.04 K/UL    DF AUTOMATED      RBC COMMENTS ANISOCYTOSIS  1+       MAGNESIUM    Collection Time: 02/22/18 12:23 AM   Result Value Ref Range    Magnesium 2.3 1.6 - 2.4 mg/dL   PHOSPHORUS    Collection Time: 02/22/18 12:23 AM   Result Value Ref Range    Phosphorus 3.7 2.6 - 4.7 MG/DL   T4, FREE    Collection Time: 02/22/18 12:23 AM   Result Value Ref Range    T4, Free 1.4 0.8 - 1.5 NG/DL   T3, FREE    Collection Time: 02/22/18 12:23 AM   Result Value Ref Range    Free Triiodothyronine (T3) 2.2 2.2 - 4.0 pg/mL   GLUCOSE, POC    Collection Time: 02/22/18 10:51 AM   Result Value Ref Range    Glucose (POC) 433 (H) 65 - 100 mg/dL    Performed by Reilly Kamara    HEPATIC FUNCTION PANEL    Collection Time: 02/22/18 11:10 AM   Result Value Ref Range    Protein, total 7.9 6.4 - 8.2 g/dL    Albumin 2.2 (L) 3.5 - 5.0 g/dL    Globulin 5.7 (H) 2.0 - 4.0 g/dL    A-G Ratio 0.4 (L) 1.1 - 2.2      Bilirubin, total 0.4 0.2 - 1.0 MG/DL    Bilirubin, direct <0.1 0.0 - 0.2 MG/DL    Alk. phosphatase 76 45 - 117 U/L    AST (SGOT) 22 15 - 37 U/L    ALT (SGPT) 24 12 - 78 U/L   SED RATE (ESR)    Collection Time: 02/22/18 11:10 AM   Result Value Ref Range    Sed rate, automated 69 (H) 0 - 20 mm/hr   GLUCOSE, POC    Collection Time: 02/22/18 11:34 AM   Result Value Ref Range    Glucose (POC) 437 (H) 65 - 100 mg/dL    Performed by Barbie Hayes (PCT)        Imaging:  CXR Results  (Last 48 hours)    None        CT Results  (Last 48 hours)               02/22/18 0928  CT BX BONE MARROW AND ASPIRATION Final result    Impression:  Impression: CT-guided bone marrow biopsy and aspirate. Intravenous sedation   performed with 1mg Versed 25 mcg of fentanyl with pulse oximetry and nursing   assistance provided for monitoring.            Narrative:  Clinical Indication: Myeloma           Using CT guidance, after adequate skin preparation and local anesthesia with a   posterior approach, a 14-gauge bone biopsy needle was placed into the posterior   aspect of the iliac bone. Bone marrow aspirate and biopsy were performed , patient tolerated the procedure   well. Sample appears adequate. Drill technique                       Assessment:   Influenza  Rapidly worsening DIANA - Baseline Creat 1.1 to 1.2  Known Smoldering Myeloma- since 2012- Followed by Dr. Sandhya Rojo- last BMB was in 5/2012- Had 10% plasma cells. Hypercalcemia  DM- worsened by steroids    Plan:     - Await Bone marrow biopsy results- done this am. Have d/w Dr. Ag Santos from Pathology. She will page me with prelim results after reviewing his aspirate.  -Skeletal survey - 2/21/2018 - with multiple small lytic lesions in skull, humeri  The entire picture is very concerning for transformation from a smoldering myeloma to active Myeloma- SPEP, S FLC assay is still pending. Check 24 Hr urine for UPEP/Bence Reece proteins. Given the very rapid deterioration of his renal function and a very high index of suspicion for Active myeloma, would like to consider starting chemotherapy - possibly with high Dose Dex and Velcade, Cytocan, nova if his prelim bmb aspirate shows marked increase in plasma cells. I have discussed this with Mr. Rickie Draper myself today and once I have his BMB rersults I will reach out to his wife, who is at home with the Flu. He will need optimization of his Blood sugars -His anti-Myeloma therapy will make this worse. ? Endocrinology Consult  D/W Dr. Denis Stack- she is considering starting dialysis and we discussed the possibility of Plasmapharesis- but without his SPEP and FLC assay results, it is difficult to take that decision. Unfortunately , in this hospital- It can take 5-7 days to get spep and flc assay results!! His Pulmonary status also needs some optimizing.

## 2018-02-22 NOTE — ROUTINE PROCESS
Started 24 hr urine at 10:40 on Thursday, will end at 10:40 am on Friday 2/23. [facility]. Pt educated on both procedures.

## 2018-02-22 NOTE — PROGRESS NOTES
NAME: Suzette Bello        :  1940        MRN:  [id number]        Assessment :    Plan:  --DIANA  Solitary kidney (left)  Smoldering Myeloma (follows with Dr. Alexandra Roger)  Hypercalcemia  Mild anemia  DM2  HTN  Influenza A  COPD --Baseline creatinine is 1.1 to 1.2; now on a rapid rise (4.8 to 5.5); BUN > 100     Will go ahead and initiate HD today    If high light chains, and he gets started on chemo (which is likely), then I think will plasma exchange as well     Calcium 11.1 on admission, now 10     spot urine protein:creatinine 3.6 grams    W/u in progress for progression of smoldering myeloma; Dr. Li Montoya following       Subjective:     Chief Complaint:  Feeling poorly today. SOB. Nausea. Heartburn. He is making some urine still. S/p bone marrow biopsy. We had a long discussion about the above. We discussed the HD procedure. He is still agreeable to HD. Review of Systems:    Symptom Y/N Comments  Symptom Y/N Comments   Fever/Chills    Chest Pain n    Poor Appetite y   Edema n    Cough y   Abdominal Pain     Sputum    Joint Pain     SOB/HUERTA y   Pruritis/Rash     Nausea/vomit y   Tolerating PT/OT     Diarrhea    Tolerating Diet     Constipation    Other       Could not obtain due to:      Objective:     VITALS:   Last 24hrs VS reviewed since prior progress note. Most recent are:  Visit Vitals    /76 (BP 1 Location: Right arm, BP Patient Position: At rest)    Pulse 81    Temp 96.8 °F (36 °C)    Resp 20    Ht 6' (1.829 m)    Wt 80.3 kg (177 lb 1.6 oz)    SpO2 90%    BMI 24.02 kg/m2       Intake/Output Summary (Last 24 hours) at 18 07  Last data filed at 18 6004   Gross per 24 hour   Intake           3702.5 ml   Output              975 ml   Net           2727.5 ml      Telemetry Reviewed:     PHYSICAL EXAM:  General: Ill appearing. WD, WN. Alert, cooperative, no acute distress  Resp:  Wheezing/Rhonchi. CV:  Regular  rhythm,  No murmur (), No Rubs, No Gallops. No edema  GI:  Soft, Non distended, Non tender.  +Bowel sounds, no HSM      Lab Data Reviewed: (see below)    Medications Reviewed: (see below)    PMH/SH reviewed - no change compared to H&P  ________________________________________________________________________  Care Plan discussed with:  Patient y    Family      RN y    Care Manager                    Consultant:          Comments   >50% of visit spent in counseling and coordination of care       ________________________________________________________________________  Thompson Garcia MD     Procedures: see electronic medical records for all procedures/Xrays and details which  were not copied into this note but were reviewed prior to creation of Plan. LABS:  Recent Labs      02/22/18   0023  02/21/18   0320   WBC  12.8*  12.0*   HGB  11.6*  10.7*   HCT  35.1*  34.0*   PLT  236  199     Recent Labs      02/22/18   0023  02/21/18   0320  02/20/18   0514   NA  135*  134*  133*   K  3.8  4.7  4.8   CL  103  106  103   CO2  20*  20*  19*   BUN  107*  80*  60*   CREA  5.49*  4.77*  3.63*   GLU  329*  303*  201*   CA  10.0  9.4  10.0   MG  2.3  2.1   --    PHOS  3.7  4.8*   --      Recent Labs      02/19/18   1507   SGOT  27   AP  76   TP  9.4*   ALB  2.7*   GLOB  6.7*     No results for input(s): INR, PTP, APTT in the last 72 hours. No lab exists for component: INREXT   No results for input(s): FE, TIBC, PSAT, FERR in the last 72 hours.    No results found for: FOL, RBCF   Recent Labs      02/19/18   1550   PH  7.37   PCO2  34*   PO2  70*     Recent Labs      02/21/18   1423   CPK  98     No components found for: [place]  Lab Results   Component Value Date/Time    Color YELLOW/STRAW 02/19/2018 03:07 PM    Appearance CLOUDY (A) 02/19/2018 03:07 PM    Specific gravity 1.020 02/19/2018 03:07 PM    pH (UA) 5.5 02/19/2018 03:07 PM    Protein 100 (A) 02/19/2018 03:07 PM    Glucose NEGATIVE  02/19/2018 03:07 PM Ketone NEGATIVE  02/19/2018 03:07 PM    Bilirubin NEGATIVE  02/19/2018 03:07 PM    Urobilinogen 0.2 02/19/2018 03:07 PM    Nitrites NEGATIVE  02/19/2018 03:07 PM    Leukocyte Esterase NEGATIVE  02/19/2018 03:07 PM    Epithelial cells FEW 02/19/2018 03:07 PM    Bacteria 1+ (A) 02/19/2018 03:07 PM    WBC 0-4 02/19/2018 03:07 PM    RBC 0-5 02/19/2018 03:07 PM       MEDICATIONS:  Current Facility-Administered Medications   Medication Dose Route Frequency    hydrALAZINE (APRESOLINE) 20 mg/mL injection 20 mg  20 mg IntraVENous Q6H PRN    VANCOMYCIN INFORMATION NOTE   Other Rx Dosing/Monitoring    methylPREDNISolone (PF) (SOLU-MEDROL) injection 40 mg  40 mg IntraVENous Q12H    insulin glargine (LANTUS) injection 30 Units  30 Units SubCUTAneous QHS    insulin lispro (HUMALOG) injection   SubCUTAneous AC&HS    azithromycin (ZITHROMAX) tablet 250 mg  250 mg Oral QHS    pantoprazole (PROTONIX) tablet 40 mg  40 mg Oral ACB    oseltamivir (TAMIFLU) capsule 30 mg  30 mg Oral DAILY    heparin (porcine) injection 5,000 Units  5,000 Units SubCUTAneous Q8H    Vancomycin Dosing per Levels  1 Each Other DAILY    sodium chloride (NS) flush 5-10 mL  5-10 mL IntraVENous PRN    guaiFENesin ER (MUCINEX) tablet 600 mg  600 mg Oral BID    albuterol-ipratropium (DUO-NEB) 2.5 MG-0.5 MG/3 ML  3 mL Nebulization Q4H RT    albuterol-ipratropium (DUO-NEB) 2.5 MG-0.5 MG/3 ML  3 mL Nebulization Q4H PRN    cefTRIAXone (ROCEPHIN) 1 g/50 mL NS IVPB  1 g IntraVENous Q24H    0.9% sodium chloride infusion  125 mL/hr IntraVENous CONTINUOUS    acetaminophen (TYLENOL) tablet 650 mg  650 mg Oral Q4H PRN    ondansetron (ZOFRAN) injection 4 mg  4 mg IntraVENous Q4H PRN    glucose chewable tablet 16 g  4 Tab Oral PRN    dextrose (D50W) injection syrg 12.5-25 g  12.5-25 g IntraVENous PRN    glucagon (GLUCAGEN) injection 1 mg  1 mg IntraMUSCular PRN    aspirin chewable tablet 81 mg  81 mg Oral DAILY    clopidogrel (PLAVIX) tablet 75 mg  75 mg Oral DAILY    dilTIAZem CD (CARDIZEM CD) capsule 240 mg  240 mg Oral DAILY    montelukast (SINGULAIR) tablet 10 mg  10 mg Oral DAILY    umeclidinium (INCRUSE ELLIPTA) 62.5 mcg/actuation  1 Puff Inhalation DAILY    fluticasone-vilanterol (BREO ELLIPTA) 100mcg-25mcg/puff  1 Puff Inhalation DAILY

## 2018-02-22 NOTE — PROGRESS NOTES
Problem: Falls - Risk of  Goal: *Absence of Falls  Document Conrad Fall Risk and appropriate interventions in the flowsheet.    Outcome: Progressing Towards Goal  Fall Risk Interventions:  Mobility Interventions: Assess mobility with egress test         Medication Interventions: Bed/chair exit alarm

## 2018-02-23 ENCOUNTER — APPOINTMENT (OUTPATIENT)
Dept: GENERAL RADIOLOGY | Age: 78
DRG: 871 | End: 2018-02-23
Attending: EMERGENCY MEDICINE
Payer: MEDICARE

## 2018-02-23 LAB
ALBUMIN SERPL ELPH-MCNC: 2.2 G/DL (ref 2.9–4.4)
ALBUMIN/GLOB SERPL: 0.5 {RATIO} (ref 0.7–1.7)
ALPHA1 GLOB SERPL ELPH-MCNC: 0.4 G/DL (ref 0–0.4)
ALPHA2 GLOB SERPL ELPH-MCNC: 1 G/DL (ref 0.4–1)
ANION GAP SERPL CALC-SCNC: 10 MMOL/L (ref 5–15)
B-GLOBULIN SERPL ELPH-MCNC: 0.8 G/DL (ref 0.7–1.3)
BASOPHILS # BLD: 0 K/UL (ref 0–0.1)
BASOPHILS NFR BLD: 0 % (ref 0–1)
BUN SERPL-MCNC: 82 MG/DL (ref 6–20)
BUN/CREAT SERPL: 18 (ref 12–20)
CALCIUM SERPL-MCNC: 9.3 MG/DL (ref 8.5–10.1)
CHLORIDE SERPL-SCNC: 107 MMOL/L (ref 97–108)
CO2 SERPL-SCNC: 20 MMOL/L (ref 21–32)
CREAT SERPL-MCNC: 4.64 MG/DL (ref 0.7–1.3)
DIFFERENTIAL METHOD BLD: ABNORMAL
EOSINOPHIL # BLD: 0 K/UL (ref 0–0.4)
EOSINOPHIL NFR BLD: 0 % (ref 0–7)
ERYTHROCYTE [DISTWIDTH] IN BLOOD BY AUTOMATED COUNT: 17.5 % (ref 11.5–14.5)
GAMMA GLOB SERPL ELPH-MCNC: 2.1 G/DL (ref 0.4–1.8)
GLOBULIN SER CALC-MCNC: 4.2 G/DL (ref 2.2–3.9)
GLUCOSE BLD STRIP.AUTO-MCNC: 244 MG/DL (ref 65–100)
GLUCOSE BLD STRIP.AUTO-MCNC: 289 MG/DL (ref 65–100)
GLUCOSE BLD STRIP.AUTO-MCNC: 365 MG/DL (ref 65–100)
GLUCOSE SERPL-MCNC: 243 MG/DL (ref 65–100)
HBV SURFACE AB SER QL: NONREACTIVE
HBV SURFACE AB SER-ACNC: <3.1 MIU/ML
HBV SURFACE AG SER QL: <0.1 INDEX
HBV SURFACE AG SER QL: NEGATIVE
HCT VFR BLD AUTO: 32.5 % (ref 36.6–50.3)
HGB BLD-MCNC: 10.8 G/DL (ref 12.1–17)
IGA SERPL-MCNC: 108 MG/DL (ref 61–437)
IGG SERPL-MCNC: 2293 MG/DL (ref 700–1600)
IGM SERPL-MCNC: 52 MG/DL (ref 15–143)
IMM GRANULOCYTES # BLD: 0 K/UL (ref 0–0.04)
IMM GRANULOCYTES NFR BLD AUTO: 0 % (ref 0–0.5)
KAPPA LC FREE SER-MCNC: 26.6 MG/L (ref 3.3–19.4)
KAPPA LC FREE/LAMBDA FREE SER: 0.01 {RATIO} (ref 0.26–1.65)
LAMBDA LC FREE SERPL-MCNC: 1780.4 MG/L (ref 5.7–26.3)
LYMPHOCYTES # BLD: 0.2 K/UL (ref 0.8–3.5)
LYMPHOCYTES NFR BLD: 2 % (ref 12–49)
M PROTEIN SERPL ELPH-MCNC: 1.9 G/DL
MAGNESIUM SERPL-MCNC: 2.6 MG/DL (ref 1.6–2.4)
MCH RBC QN AUTO: 27.1 PG (ref 26–34)
MCHC RBC AUTO-ENTMCNC: 33.2 G/DL (ref 30–36.5)
MCV RBC AUTO: 81.5 FL (ref 80–99)
MONOCYTES # BLD: 0.1 K/UL (ref 0–1)
MONOCYTES NFR BLD: 1 % (ref 5–13)
NEUTS BAND NFR BLD MANUAL: 2 %
NEUTS SEG # BLD: 8.2 K/UL (ref 1.8–8)
NEUTS SEG NFR BLD: 95 % (ref 32–75)
NRBC # BLD: 0.02 K/UL (ref 0–0.01)
NRBC BLD-RTO: 0.2 PER 100 WBC
PLATELET # BLD AUTO: 206 K/UL (ref 150–400)
PMV BLD AUTO: 9.9 FL (ref 8.9–12.9)
POTASSIUM SERPL-SCNC: 4.6 MMOL/L (ref 3.5–5.1)
PROT PATTERN SERPL IFE-IMP: ABNORMAL
PROT SERPL-MCNC: 6.4 G/DL (ref 6–8.5)
RBC # BLD AUTO: 3.99 M/UL (ref 4.1–5.7)
RBC MORPH BLD: ABNORMAL
SERVICE CMNT-IMP: ABNORMAL
SODIUM SERPL-SCNC: 137 MMOL/L (ref 136–145)
WBC # BLD AUTO: 8.5 K/UL (ref 4.1–11.1)

## 2018-02-23 PROCEDURE — C1892 INTRO/SHEATH,FIXED,PEEL-AWAY: HCPCS

## 2018-02-23 PROCEDURE — 77030031139 HC SUT VCRL2 J&J -A

## 2018-02-23 PROCEDURE — 74011000258 HC RX REV CODE- 258: Performed by: INTERNAL MEDICINE

## 2018-02-23 PROCEDURE — 77010033678 HC OXYGEN DAILY

## 2018-02-23 PROCEDURE — 74011636637 HC RX REV CODE- 636/637: Performed by: EMERGENCY MEDICINE

## 2018-02-23 PROCEDURE — 85025 COMPLETE CBC W/AUTO DIFF WBC: CPT | Performed by: INTERNAL MEDICINE

## 2018-02-23 PROCEDURE — 83735 ASSAY OF MAGNESIUM: CPT | Performed by: INTERNAL MEDICINE

## 2018-02-23 PROCEDURE — 74011250636 HC RX REV CODE- 250/636: Performed by: INTERNAL MEDICINE

## 2018-02-23 PROCEDURE — 74011250636 HC RX REV CODE- 250/636: Performed by: NURSE PRACTITIONER

## 2018-02-23 PROCEDURE — P9045 ALBUMIN (HUMAN), 5%, 250 ML: HCPCS | Performed by: INTERNAL MEDICINE

## 2018-02-23 PROCEDURE — 74011250637 HC RX REV CODE- 250/637: Performed by: INTERNAL MEDICINE

## 2018-02-23 PROCEDURE — 6A551Z3 PHERESIS OF PLASMA, MULTIPLE: ICD-10-PCS | Performed by: INTERNAL MEDICINE

## 2018-02-23 PROCEDURE — 36514 APHERESIS PLASMA: CPT

## 2018-02-23 PROCEDURE — 36415 COLL VENOUS BLD VENIPUNCTURE: CPT | Performed by: INTERNAL MEDICINE

## 2018-02-23 PROCEDURE — 71046 X-RAY EXAM CHEST 2 VIEWS: CPT

## 2018-02-23 PROCEDURE — 74011250637 HC RX REV CODE- 250/637: Performed by: EMERGENCY MEDICINE

## 2018-02-23 PROCEDURE — 65660000000 HC RM CCU STEPDOWN

## 2018-02-23 PROCEDURE — C1788 PORT, INDWELLING, IMP: HCPCS

## 2018-02-23 PROCEDURE — 94640 AIRWAY INHALATION TREATMENT: CPT

## 2018-02-23 PROCEDURE — 74011000250 HC RX REV CODE- 250: Performed by: INTERNAL MEDICINE

## 2018-02-23 PROCEDURE — 74011250636 HC RX REV CODE- 250/636: Performed by: EMERGENCY MEDICINE

## 2018-02-23 PROCEDURE — 74011636637 HC RX REV CODE- 636/637: Performed by: NURSE PRACTITIONER

## 2018-02-23 PROCEDURE — 74011250637 HC RX REV CODE- 250/637: Performed by: NURSE PRACTITIONER

## 2018-02-23 PROCEDURE — 80048 BASIC METABOLIC PNL TOTAL CA: CPT | Performed by: INTERNAL MEDICINE

## 2018-02-23 PROCEDURE — 82962 GLUCOSE BLOOD TEST: CPT

## 2018-02-23 PROCEDURE — 77030010507 HC ADH SKN DERMBND J&J -B

## 2018-02-23 PROCEDURE — 97530 THERAPEUTIC ACTIVITIES: CPT

## 2018-02-23 RX ORDER — GRANISETRON HYDROCHLORIDE 1 MG/ML
1 INJECTION, SOLUTION INTRAVENOUS ONCE
Status: DISCONTINUED | OUTPATIENT
Start: 2018-02-23 | End: 2018-02-23

## 2018-02-23 RX ORDER — ONDANSETRON 2 MG/ML
4 INJECTION INTRAMUSCULAR; INTRAVENOUS
Status: ACTIVE | OUTPATIENT
Start: 2018-02-24 | End: 2018-02-26

## 2018-02-23 RX ORDER — ALBUMIN HUMAN 50 G/1000ML
175 SOLUTION INTRAVENOUS
Status: DISCONTINUED | OUTPATIENT
Start: 2018-02-23 | End: 2018-03-09 | Stop reason: HOSPADM

## 2018-02-23 RX ORDER — GRANISETRON HYDROCHLORIDE 1 MG/ML
1 INJECTION, SOLUTION INTRAVENOUS ONCE
Status: COMPLETED | OUTPATIENT
Start: 2018-02-24 | End: 2018-02-24

## 2018-02-23 RX ORDER — INSULIN GLARGINE 100 [IU]/ML
15 INJECTION, SOLUTION SUBCUTANEOUS DAILY
Status: DISCONTINUED | OUTPATIENT
Start: 2018-02-24 | End: 2018-02-24

## 2018-02-23 RX ADMIN — HEPARIN SODIUM 5000 UNITS: 5000 INJECTION, SOLUTION INTRAVENOUS; SUBCUTANEOUS at 09:48

## 2018-02-23 RX ADMIN — UMECLIDINIUM 1 PUFF: 62.5 AEROSOL, POWDER ORAL at 09:55

## 2018-02-23 RX ADMIN — ASPIRIN 81 MG 81 MG: 81 TABLET ORAL at 09:48

## 2018-02-23 RX ADMIN — IPRATROPIUM BROMIDE AND ALBUTEROL SULFATE 3 ML: .5; 3 SOLUTION RESPIRATORY (INHALATION) at 21:23

## 2018-02-23 RX ADMIN — SUCRALFATE 1 G: 1 SUSPENSION ORAL at 09:47

## 2018-02-23 RX ADMIN — CEFTRIAXONE SODIUM 1 G: 1 INJECTION, POWDER, FOR SOLUTION INTRAMUSCULAR; INTRAVENOUS at 00:31

## 2018-02-23 RX ADMIN — GUAIFENESIN 600 MG: 600 TABLET, EXTENDED RELEASE ORAL at 17:06

## 2018-02-23 RX ADMIN — HEPARIN SODIUM 5000 UNITS: 5000 INJECTION, SOLUTION INTRAVENOUS; SUBCUTANEOUS at 00:31

## 2018-02-23 RX ADMIN — FLUTICASONE FUROATE AND VILANTEROL TRIFENATATE 1 PUFF: 100; 25 POWDER RESPIRATORY (INHALATION) at 09:53

## 2018-02-23 RX ADMIN — IPRATROPIUM BROMIDE AND ALBUTEROL SULFATE 3 ML: .5; 3 SOLUTION RESPIRATORY (INHALATION) at 15:06

## 2018-02-23 RX ADMIN — OSELTAMIVIR PHOSPHATE 30 MG: 30 CAPSULE ORAL at 09:48

## 2018-02-23 RX ADMIN — GUAIFENESIN 600 MG: 600 TABLET, EXTENDED RELEASE ORAL at 09:48

## 2018-02-23 RX ADMIN — INSULIN LISPRO 15 UNITS: 100 INJECTION, SOLUTION INTRAVENOUS; SUBCUTANEOUS at 11:37

## 2018-02-23 RX ADMIN — SUCRALFATE 1 G: 1 SUSPENSION ORAL at 17:06

## 2018-02-23 RX ADMIN — INSULIN GLARGINE 35 UNITS: 100 INJECTION, SOLUTION SUBCUTANEOUS at 22:00

## 2018-02-23 RX ADMIN — DILTIAZEM HYDROCHLORIDE 240 MG: 240 CAPSULE, COATED, EXTENDED RELEASE ORAL at 09:48

## 2018-02-23 RX ADMIN — PANTOPRAZOLE SODIUM 40 MG: 40 TABLET, DELAYED RELEASE ORAL at 17:06

## 2018-02-23 RX ADMIN — PANTOPRAZOLE SODIUM 40 MG: 40 TABLET, DELAYED RELEASE ORAL at 09:48

## 2018-02-23 RX ADMIN — AZITHROMYCIN 250 MG: 250 TABLET, FILM COATED ORAL at 21:10

## 2018-02-23 RX ADMIN — INSULIN LISPRO 5 UNITS: 100 INJECTION, SOLUTION INTRAVENOUS; SUBCUTANEOUS at 17:54

## 2018-02-23 RX ADMIN — CLOPIDOGREL BISULFATE 75 MG: 75 TABLET ORAL at 09:48

## 2018-02-23 RX ADMIN — SUCRALFATE 1 G: 1 SUSPENSION ORAL at 11:37

## 2018-02-23 RX ADMIN — METHYLPREDNISOLONE SODIUM SUCCINATE 40 MG: 40 INJECTION, POWDER, FOR SOLUTION INTRAMUSCULAR; INTRAVENOUS at 09:48

## 2018-02-23 RX ADMIN — IPRATROPIUM BROMIDE AND ALBUTEROL SULFATE 3 ML: .5; 3 SOLUTION RESPIRATORY (INHALATION) at 11:45

## 2018-02-23 RX ADMIN — IPRATROPIUM BROMIDE AND ALBUTEROL SULFATE 3 ML: .5; 3 SOLUTION RESPIRATORY (INHALATION) at 03:46

## 2018-02-23 RX ADMIN — IPRATROPIUM BROMIDE AND ALBUTEROL SULFATE 3 ML: .5; 3 SOLUTION RESPIRATORY (INHALATION) at 07:27

## 2018-02-23 RX ADMIN — CALCIUM GLUCONATE 1 G: 94 INJECTION, SOLUTION INTRAVENOUS at 18:37

## 2018-02-23 RX ADMIN — METHYLPREDNISOLONE SODIUM SUCCINATE 40 MG: 40 INJECTION, POWDER, FOR SOLUTION INTRAMUSCULAR; INTRAVENOUS at 21:11

## 2018-02-23 RX ADMIN — ALBUMIN (HUMAN) 175 G: 12.5 INJECTION, SOLUTION INTRAVENOUS at 18:38

## 2018-02-23 RX ADMIN — SUCRALFATE 1 G: 1 SUSPENSION ORAL at 21:10

## 2018-02-23 RX ADMIN — CEFTRIAXONE SODIUM 1 G: 1 INJECTION, POWDER, FOR SOLUTION INTRAMUSCULAR; INTRAVENOUS at 21:10

## 2018-02-23 RX ADMIN — INSULIN LISPRO 4 UNITS: 100 INJECTION, SOLUTION INTRAVENOUS; SUBCUTANEOUS at 17:53

## 2018-02-23 RX ADMIN — HEPARIN SODIUM 5000 UNITS: 5000 INJECTION, SOLUTION INTRAVENOUS; SUBCUTANEOUS at 17:06

## 2018-02-23 RX ADMIN — INSULIN LISPRO 2 UNITS: 100 INJECTION, SOLUTION INTRAVENOUS; SUBCUTANEOUS at 22:08

## 2018-02-23 RX ADMIN — MONTELUKAST SODIUM 10 MG: 10 TABLET, COATED ORAL at 09:48

## 2018-02-23 NOTE — PROGRESS NOTES
Hospitalist Progress Note    NAME: Dioni Davis   :  1940   MRN:  [id number]       Interim Hospital Summary: 66 y.o. male whom presented on 2018 with      Assessment / Plan:  Smoldering Myeloma/MGUS  Multiple myeloma  - heme/onc following; bone marrow bx done. perceived as transformation from a smoldering myeloma to active myeloma. - Grossly abnormal bone marrow; plan to start with plasma exchange in conjunction with chemo; Cytoxan with 20% dose reduction and full dose of Velcade with 40 mg Dex after HD and plamspharesis. This will be a weekly regimen;next dose due on Mar 2    - SPEP, S FLC assay pending. 24 hour urine for UPEP/Bence Reece proteins. DIANA on CKD  Hx renal cell cancer s/p nephrectomy  - solitary kidney (left); Permacath placement today. HD started on 2018  - rapidly worsening renal function; creat 4.64 today vs 2.49 upon admission  - nephrology following; HD every other day with plasmapheresis in alternative day      Acute hypoxemic respiratory failure secondary to influenza A with pneumonia acute on chronic COPD exacerbation  Sepsis developing on admission  - wean off O2 as tolerate; O2 Sat 93% @ 3L via n/c  - staph coag (-); perceived as contaminant at this time. Vanc has been d/c'd.  - continue with tamiflu  - continue with IV steroid, zithromax, rocephin  - continue with breo and duoneb  - send sputum cx if avilable  - repeat CXR: New small left pleural effusion. - CT chest Prominent emphysema.  Bibasilar infiltrates      HTN  - continue cardizem       Diabetes type 2 uncontrolled  - Lantus BID; dose will be adjusted as we monitor her blood glucose  - Hgb A1C 9.8      Hx CVA  - continue plavix and statin     GERD  - continue with PPI & sucralfate was added.     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       Recommended Disposition: Home health   Pt will be transfer to heme/onc floor, continue with telemetry monitoring         Subjective:     Chief Complaint / Reason for Physician Visit  \"I am feeling tired\". Discussed with RN events overnight. Review of Systems:  Symptom Y/N Comments  Symptom Y/N Comments   Fever/Chills n   Chest Pain n    Poor Appetite    Edema     Cough y   Abdominal Pain     Sputum n   Joint Pain     SOB/HUERTA y   Pruritis/Rash     Nausea/vomit n   Tolerating PT/OT     Diarrhea n   Tolerating Diet     Constipation n   Other       Could NOT obtain due to:      Objective:     VITALS:   Last 24hrs VS reviewed since prior progress note. Most recent are:  Patient Vitals for the past 24 hrs:   Temp Pulse Resp BP SpO2   02/23/18 1506 - - - - 93 %   02/23/18 1450 97.6 °F (36.4 °C) 67 20 115/53 93 %   02/23/18 1145 - - - - 91 %   02/23/18 1059 97.5 °F (36.4 °C) 73 20 136/58 95 %   02/23/18 0830 97.6 °F (36.4 °C) 81 20 141/64 93 %   02/23/18 0727 - - - - 94 %   02/23/18 0352 97.5 °F (36.4 °C) 75 18 133/68 92 %   02/23/18 0346 - - - - 95 %   02/23/18 0055 - 72 18 116/62 -   02/23/18 0025 - 71 - 116/61 -   02/22/18 2355 - 64 - 110/59 -   02/22/18 2325 - 64 20 115/63 -   02/22/18 2255 97.6 °F (36.4 °C) 67 20 133/71 -   02/22/18 2225 97.6 °F (36.4 °C) 60 - 129/89 -   02/22/18 2048 - - - - 92 %   02/22/18 1905 97.5 °F (36.4 °C) 80 18 117/54 94 %       Intake/Output Summary (Last 24 hours) at 02/23/18 1744  Last data filed at 02/23/18 0657   Gross per 24 hour   Intake          1657.91 ml   Output             1075 ml   Net           582.91 ml        PHYSICAL EXAM:  General: Ill apearing. Alert, cooperative, no acute distress    EENT:  EOMI. Anicteric sclerae. MMM  Resp:  Clear in apex with decreased breath sounds at bases, no wheezing or rales.   No accessory muscle use  CV:  Regular rhythm,  No edema  GI:  Soft, Non distended, Non tender.  +Bowel sounds  Neurologic:  Alert and oriented X 3, normal speech,   Psych:   Good insight. Not anxious nor agitated  Skin:  No rashes. No jaundice    Reviewed most current lab test results and cultures  YES  Reviewed most current radiology test results   YES  Review and summation of old records today    NO  Reviewed patient's current orders and MAR    YES  PMH/SH reviewed - no change compared to H&P  ________________________________________________________________________  Care Plan discussed with:    Comments   Patient y    Family      RN y    Care Manager y    Consultant                       y Multidiciplinary team rounds were held today with , nursing, pharmacist and clinical coordinator. Patient's plan of care was discussed; medications were reviewed and discharge planning was addressed. ________________________________________________________________________  Total NON critical care TIME:  30  Minutes    Total CRITICAL CARE TIME Spent:   Minutes non procedure based      Comments   >50% of visit spent in counseling and coordination of care     ________________________________________________________________________  Ryan Decker NP     Procedures: see electronic medical records for all procedures/Xrays and details which were not copied into this note but were reviewed prior to creation of Plan. LABS:  I reviewed today's most current labs and imaging studies.   Pertinent labs include:  Recent Labs      02/23/18   0351  02/22/18   0023  02/21/18   0320   WBC  8.5  12.8*  12.0*   HGB  10.8*  11.6*  10.7*   HCT  32.5*  35.1*  34.0*   PLT  206  236  199     Recent Labs      02/23/18   0351  02/22/18   1110  02/22/18   0023  02/21/18   0320   NA  137   --   135*  134*   K  4.6   --   3.8  4.7   CL  107   --   103  106   CO2  20*   --   20*  20*   GLU  243*   --   329*  303*   BUN  82*   --   107*  80*   CREA  4.64*   --   5.49*  4.77* CA  9.3   --   10.0  9.4   MG  2.6*   --   2.3  2.1   PHOS   --    --   3.7  4.8*   ALB   --   2.2*   --    --    TBILI   --   0.4   --    --    SGOT   --   22   --    --    ALT   --   24   --    --        Signed: )David Waite Chi, NP

## 2018-02-23 NOTE — PROGRESS NOTES
Problem: Mobility Impaired (Adult and Pediatric)  Goal: *Acute Goals and Plan of Care (Insert Text)  Physical Therapy Goals  Initiated 2/21/2018  1. Patient will move from supine to sit and sit to supine , scoot up and down and roll side to side in bed with independence within 7 day(s). 2.  Patient will transfer from bed to chair and chair to bed with independence using the least restrictive device within 7 day(s). 3.  Patient will perform sit to stand with independence within 7 day(s). 4.  Patient will ambulate with independence for 200 feet with the least restrictive device within 7 day(s). 5.  Patient will ascend/descend 12 stairs with 1 handrail(s) with modified independence within 7 day(s). physical Therapy TREATMENT  Patient: Andrea Farris (68 y.o. male)  Date: 2/23/2018  Diagnosis: Hypoxemia <principal problem not specified>       Precautions:    Chart, physical therapy assessment, plan of care and goals were reviewed. ASSESSMENT:  Pt received supine in bed and agreeable to working with therapy. Pt on 3L/min supplemental oxygen at rest and removed for walking oximetry test. Pt maintained saturations with bed mobility however did exhibit increased SOB and mouth breathing. Sit<>stand with SBA. Required use of RW d/t instability and weakness. Pt ambulated around the foot of the bed and quickly became excessively fatigued. Replaced NC on 3L/min and returned to sitting on the EOB. Transitioned to supine once pt was able to recover breathing rate and oxygen saturations recovered. Pt required 3-4 minutes on supplemental oxygen to recover O2 saturations >90%. Left supine in bed with all needs met. Pt moves well, but is limited by severely decreased activity tolerance and impaired pulmonary response to activity.      Pulse Oximetry Assessment    93% at rest on room air  86% while ambulating on room air  98% at rest on 3LPM  90% while ambulating on 3LPM    Progression toward goals:  []    Improving appropriately and progressing toward goals  [x]    Improving slowly and progressing toward goals  []    Not making progress toward goals and plan of care will be adjusted     PLAN:  Patient continues to benefit from skilled intervention to address the above impairments. Continue treatment per established plan of care. Discharge Recommendations:  Home Health  Further Equipment Recommendations for Discharge:  portable oxygen and RW     SUBJECTIVE:   Patient stated I think I'm about pooped out.     OBJECTIVE DATA SUMMARY:   Critical Behavior:  Neurologic State: Alert           Functional Mobility Training:  Bed Mobility:     Supine to Sit: Supervision              Transfers:  Sit to Stand: Stand-by assistance  Stand to Sit: Stand-by assistance                             Balance:  Sitting: Intact  Standing: Impaired  Standing - Static: Good  Standing - Dynamic : Fair  Ambulation/Gait Training:  Distance (ft): 12 Feet (ft)  Assistive Device: Walker, rolling;Gait belt  Ambulation - Level of Assistance: Contact guard assistance        Gait Abnormalities: Decreased step clearance;Shuffling gait        Base of Support: Widened     Speed/Veronica: Pace decreased (<100 feet/min); Shuffled  Step Length: Right shortened;Left shortened           Activity Tolerance:   Fair-limited activity d/t SOB  Please refer to the flowsheet for vital signs taken during this treatment.   After treatment:   []    Patient left in no apparent distress sitting up in chair  [x]    Patient left in no apparent distress in bed  [x]    Call bell left within reach  [x]    Nursing notified  []    Caregiver present  [x]    Bed alarm activated    COMMUNICATION/COLLABORATION:   The patients plan of care was discussed with: Registered Nurse    Jessica Garcia, PT   Time Calculation: 15 mins

## 2018-02-23 NOTE — ROUTINE PROCESS
Called to unit per nurse as pt having oozing at [facility] site. Area assessed and clot formation noted at entrance. Area reinforced and tegaderm placed. Pt HOB elevated. Night nurse in to observe site. Holding pressure at area and leaving clot process taught. No further oozing at reinforcement.

## 2018-02-23 NOTE — PROCEDURES
[facility]   [phone]   Vitals Pre Post Assessment Pre Post   /73 130/60 LOC A&Ox4 A&Ox4   HR 62 66 Lungs Clear&bronchovescicularx5, even&unlaboured. Clear&broncho  vescicularx5, even&unlabour  ed. Temp 97.8 97.7 Cardiac Reg rate&NSR Reg rate&NSR   Resp 22 20 Skin Warm & dry Warm & dry   Weight 80kg 80kg Edema None noted None noted   Height 6' 0\" 6' 0\" Pain denies denies     Plasmapheresis Orders   Product: 5% Albumin IV                                            Volume: 3.5L                                               Duration: min Start: 1815 End: 1915 Total: >70min     Fluids    Volume Processed: 7241ml   Plasma Removed: 4242ml   Replacement Fluid: 3490ml   Citrate: 85ml   NS: 100ml (solvent for Calcium Gluconate)     Access   Type & Location: RIJ catheter   Comments: Newly placed-placement verified radiologically. Exhibits good flows upon aspiration; new dressing dry and intact and s drainage nor inflammation noted around biopatch. Post-tx all possible blood returned via full rinceback. Both catheter ports flushed and indwelled c 0.9% NS and capped and taped. Meds Given   Name Dose Route        Calcium Gluconate 1gram administered IVPB over tx course s difficulties               Labs   Obtained/Reviewed  Critical Results Called CBC, plts  MD aware     Comments:  Pre-tx consents obtained/iceboat time-out performed. Tx progressed well and without incident; writer left pts room c pt in no new acute distress and denying complaints c stable vss WNL, bed in lowest position c side rails upx3, wheels lockedx4, and call bell within reach. Reported off to [employer] Deb Martínez RN.

## 2018-02-23 NOTE — PROGRESS NOTES
[facility] Hematology-Oncology Progress Note      Luis Esquivel  1940  [id number]      2/23/2018  12:24 PM    Follow-up for: Smoldering Myeloma, DIANA     [x] Chart notes since last visit reviewed     [x] Medications reviewed for allergies and interactions    Patient complains of the following:    Feels better today, less wheezing when seen earlier today. Subjective:     12 point ROS negative except as in HPI and above      Objective:     Patient Vitals for the past 24 hrs:   BP Temp Temp src Pulse Resp SpO2 Weight   02/23/18 1506 - - - - - 93 % -   02/23/18 1450 115/53 97.6 °F (36.4 °C) - 67 20 93 % -   02/23/18 1145 - - - - - 91 % -   02/23/18 1059 136/58 97.5 °F (36.4 °C) - 73 20 95 % -   02/23/18 0830 141/64 97.6 °F (36.4 °C) - 81 20 93 % -   02/23/18 0727 - - - - - 94 % -   02/23/18 0352 133/68 97.5 °F (36.4 °C) - 75 18 92 % -   02/23/18 0346 - - - - - 95 % -   02/23/18 0257 - - - - - - 80.1 kg (176 lb 9.6 oz)   02/23/18 0055 116/62 - - 72 18 - -   02/23/18 0025 116/61 - - 71 - - -   02/22/18 2355 110/59 - - 64 - - -   02/22/18 2325 115/63 - - 64 20 - -   02/22/18 2255 133/71 97.6 °F (36.4 °C) Oral 67 20 - -   02/22/18 2225 129/89 97.6 °F (36.4 °C) - 60 - - -   02/22/18 2048 - - - - - 92 % -   02/22/18 1905 117/54 97.5 °F (36.4 °C) - 80 18 94 % -   02/22/18 1705 - - - - - 93 % -       Physical Exam:  MS-Alert, or X 3  General - chronically ill appearing  Neck-Supple, No JVD  CVS-S1,S2 normal  RS-Scattered b/l wheezes  Abdomen- Soft, NT,ND, BS+  Extre- No c/c/e  Neuro- No FND.     Available labs reviewed:  Labs:    Recent Results (from the past 24 hour(s))   GLUCOSE, POC    Collection Time: 02/22/18  8:58 PM   Result Value Ref Range    Glucose (POC) 326 (H) 65 - 100 mg/dL    Performed by Rohit Galvin ([facility])    METABOLIC PANEL, BASIC    Collection Time: 02/23/18  3:51 AM   Result Value Ref Range    Sodium 137 136 - 145 mmol/L    Potassium 4.6 3.5 - 5.1 mmol/L    Chloride 107 97 - 108 mmol/L    CO2 20 (L) 21 - 32 mmol/L    Anion gap 10 5 - 15 mmol/L    Glucose 243 (H) 65 - 100 mg/dL    BUN 82 (H) 6 - 20 MG/DL    Creatinine 4.64 (H) 0.70 - 1.30 MG/DL    BUN/Creatinine ratio 18 12 - 20      GFR est AA 15 (L) >60 ml/min/1.73m2    GFR est non-AA 12 (L) >60 ml/min/1.73m2    Calcium 9.3 8.5 - 10.1 MG/DL   CBC WITH AUTOMATED DIFF    Collection Time: 02/23/18  3:51 AM   Result Value Ref Range    WBC 8.5 4.1 - 11.1 K/uL    RBC 3.99 (L) 4.10 - 5.70 M/uL    HGB 10.8 (L) 12.1 - 17.0 g/dL    HCT 32.5 (L) 36.6 - 50.3 %    MCV 81.5 80.0 - 99.0 FL    MCH 27.1 26.0 - 34.0 PG    MCHC 33.2 30.0 - 36.5 g/dL    RDW 17.5 (H) 11.5 - 14.5 %    PLATELET 814 974 - 695 K/uL    MPV 9.9 8.9 - 12.9 FL    NRBC 0.2 (H) 0  WBC    ABSOLUTE NRBC 0.02 (H) 0.00 - 0.01 K/uL    NEUTROPHILS 95 (H) 32 - 75 %    BAND NEUTROPHILS 2 %    LYMPHOCYTES 2 (L) 12 - 49 %    MONOCYTES 1 (L) 5 - 13 %    EOSINOPHILS 0 0 - 7 %    BASOPHILS 0 0 - 1 %    IMMATURE GRANULOCYTES 0 0.0 - 0.5 %    ABS. NEUTROPHILS 8.2 (H) 1.8 - 8.0 K/UL    ABS. LYMPHOCYTES 0.2 (L) 0.8 - 3.5 K/UL    ABS. MONOCYTES 0.1 0.0 - 1.0 K/UL    ABS. EOSINOPHILS 0.0 0.0 - 0.4 K/UL    ABS. BASOPHILS 0.0 0.0 - 0.1 K/UL    ABS. IMM. GRANS. 0.0 0.00 - 0.04 K/UL    DF AUTOMATED      RBC COMMENTS NORMOCYTIC, NORMOCHROMIC     MAGNESIUM    Collection Time: 02/23/18  3:51 AM   Result Value Ref Range    Magnesium 2.6 (H) 1.6 - 2.4 mg/dL   GLUCOSE, POC    Collection Time: 02/23/18 11:03 AM   Result Value Ref Range    Glucose (POC) 365 (H) 65 - 100 mg/dL    Performed by Pallavi Ambrosio    GLUCOSE, POC    Collection Time: 02/23/18  4:41 PM   Result Value Ref Range    Glucose (POC) 289 (H) 65 - 100 mg/dL    Performed by VIRGINIA MONACO(CON)        Imaging:  CXR Results  (Last 48 hours)               02/23/18 1540  XR CHEST PA LAT Final result    Impression:  IMPRESSION:   1.   New small left pleural effusion.    2.  Small bibasilar airspace opacities seen on prior cross-sectional imaging are   difficult to appreciate radiographically. Narrative:  EXAM:  XR CHEST PA LAT       INDICATION: reeval pneumonia/hypoxia       COMPARISON: Chest x-ray 2/19/2018 and chest CT 2/19/2018. TECHNIQUE: Frontal and lateral views of the chest       FINDINGS:  A small left pleural effusion has developed since the comparison   exams. Small bibasilar airspace opacities are difficult to appreciate   radiographically but were seen on cross-sectional imaging. No visualized   pneumothorax. A right IJ catheter terminates near the cavoatrial junction. Unchanged cardiomediastinal silhouette with atherosclerotic calcifications of   the aorta. .                  CT Results  (Last 48 hours)               02/22/18 0928  CT BX BONE MARROW AND ASPIRATION Final result    Impression:  Impression: CT-guided bone marrow biopsy and aspirate. Intravenous sedation   performed with 1mg Versed 25 mcg of fentanyl with pulse oximetry and nursing   assistance provided for monitoring. Narrative:  Clinical Indication: Myeloma           Using CT guidance, after adequate skin preparation and local anesthesia with a   posterior approach, a 14-gauge bone biopsy needle was placed into the posterior   aspect of the iliac bone. Bone marrow aspirate and biopsy were performed , patient tolerated the procedure   well. Sample appears adequate. Drill technique                       Assessment:   Influenza  Rapidly worsening DIANA - Baseline Creat 1.1 to 1.2  Known Smoldering Myeloma- since 2012- Followed by Dr. Warren De La Cruz- last BMB was in 5/2012- Had 10% plasma cells. Hypercalcemia  DM- worsened by steroids    Plan:     - Await final  Bone marrow biopsy results-  -Skeletal survey - 2/21/2018 - with multiple small lytic lesions in skull, humeri  The entire picture is very concerning for transformation from a smoldering myeloma to active Myeloma- SPEP, S FLC assay is still pending. Check 24 Hr urine for UPEP/Bence Reece proteins.   I recd a call from Dr. Haydee More that his initial BM aspirate smears are showing at least 60% involvement by plasma cells with atypical , almost plasmablastic appearance, indicating an aggressive process- Multiple Myeloma. So far , no evidence of plasma cell leukemia. Given the very rapid deterioration of his renal function and a very high index of suspicion for Active myeloma, I will be starting chemotherapy today  high Dose Dex and Velcade, Cytoxan  - CyBoRD regimen - C1D1- 2/23/2018 - Cytoxan with 20% dose reduction and full dose of Velcade with 40 mg Dex after HD and plamspharesis. This will be a weekly regimen- next dose due on Mar 2 , 2018. Hold the solumedrol dose around the time of his Dex and then resume the low dose solumedrol, per Dr. Anitra Fletcher, his hospitalist.  -Emaline Bream him on the risks and benefits, and talked to his wife who was at bedside as well.     -Watch for constipation on Velcade! He will need optimization of his Blood sugars -His anti-Myeloma therapy will make this worse. DIANA  sec to underlying Multiple Myeloma. D/W Dr. Ana Damon- she is starting dialysis today and will be pharesing him as well. Unfortunately , in this hospital- It can take 5-7 days to get spep and flc assay results!! Watch calcium and coags in am    -Consider starting pcp prophylaxis -need to discuss use of Bactrim with Dr. Ana Damon first.    -Will defer initiation of denosumab to Dr. Tahmina Shen as outpatient- given the DIANA, I dont to give him a dose of Zoledronic acid.     Following closely

## 2018-02-23 NOTE — PROGRESS NOTES
1600: Received bedside report from [facility] off going nurse. Assumed care of patient. 1900: Bedside shift change report given to Fidelia (oncoming nurse) by Caroll Aase (offgoing nurse). Report included the following information SBAR, Kardex and Intake/Output. SHIFT SUMMARY:      [address] NURSING NOTE   Admission Date 2/19/2018   Admission Diagnosis Hypoxemia   Consults IP CONSULT TO NEPHROLOGY  IP CONSULT TO ONCOLOGY      Cardiac Monitoring [x] Yes [] No      Purposeful Hourly Rounding [x] Yes    Conrad Score Total Score: 4   Conrad score 3 or > [x] Bed Alarm [] Avasys [] 1:1 sitter [] Patient refused (Place signed refusal form in chart)   Sidney Score Sidney Score: 17   Sidney score 14 or < [] PMT consult [] Wound Care consult    []  Specialty bed  [] Nutrition consult      Influenza Vaccine Received Flu Vaccine for Current Season (usually Sept-March): Yes    Patient/Guardian Refused (Notify MD): No      Oxygen needs? [] Room air Oxygen @  []1L    []2L    [x]3L   []4L    []5L   []6L     Use home O2? [] Yes [x] No  Perform O2 challenge test using  smartphrase (.Homeoxygen)      Last bowel movement Last Bowel Movement Date: 02/20/18      Urinary Catheter             LDAs               Peripheral IV 02/20/18 Left Antecubital (Active)   Site Assessment Clean, dry, & intact 2/22/2018  3:20 AM   Phlebitis Assessment 0 2/22/2018  3:20 AM   Infiltration Assessment 0 2/22/2018  3:20 AM   Dressing Status Clean, dry, & intact 2/22/2018  3:20 AM   Dressing Type Tape;Transparent 2/22/2018  3:20 AM   Hub Color/Line Status Pink; Infusing;Flushed;Patent 2/22/2018  3:20 AM        Hemodialysis Access 02/22/18 (Active)                     Readmission Risk Assessment Tool Score High Risk            29       Total Score        2 . Living with Significant Other. Assisted Living. LTAC. SNF. or   Rehab    5 Pt.  Coverage (Medicare=5 , Medicaid, or Self-Pay=4)    22 Charlson Comorbidity Score (Age + Comorbid Conditions)        Criteria that do not apply:    Has Seen PCP in Last 6 Months (Yes=3, No=0)    Patient Length of Stay (>5 days = 3)    IP Visits Last 12 Months (1-3=4, 4=9, >4=11)       Expected Length of Stay 4d 21h   Actual Length of Stay 3

## 2018-02-23 NOTE — DIALYSIS
[facility] D[facility] Team [facility]  [phone]    Vitals   Pre   Post   Assessment   Pre   Post     Temp 97.6  97.6 LOC  A & O X 3 A & O X 3   HR   60   73 Lungs   DIMINISHED DIMINISHED   B/P  129/89    126/68 Cardiac   S1S2  S1S2   Resp   Resp Rate: 20 (02/22/18 2325) 18 Skin   WARM/DRY  WARM/DRY   Pain level  Pain Intensity 1: 0 (02/22/18 1905) 0 Edema  GENERALIZED     GENERALIZED   Orders:    Duration:   Start:   1856 End:   2941 Total:   2 HRS   Dialyzer:   Dialyzer/Set Up Inspection: Kilo Grahamis (02/22/18 2255)   K Bath:   Dialysate K (mEq/L): 3 (02/22/18 2255)   Ca Bath:   Dialysate CA (mEq/L): 2.5 (02/22/18 2255)   Na/Bicarb:   Dialysate NA (mEq/L): 140 (02/22/18 2255)   Target Fluid Removal:   Goal/Amount of Fluid to Remove (mL): 1000 mL (02/22/18 2255)   Access     Type & Location:   RIGHT TEMP CVC   Labs     Obtained/Reviewed   Critical Results Called   Date when labs were drawn-  Hgb-    HGB   Date Value Ref Range Status   02/22/2018 11.6 (L) 12.1 - 17.0 g/dL Final     K-    Potassium   Date Value Ref Range Status   02/22/2018 3.8 3.5 - 5.1 mmol/L Final     Ca-   Calcium   Date Value Ref Range Status   02/22/2018 10.0 8.5 - 10.1 MG/DL Final     Bun-   BUN   Date Value Ref Range Status   02/22/2018 107 (H) 6 - 20 MG/DL Final     Creat-   Creatinine   Date Value Ref Range Status   02/22/2018 5.49 (H) 0.70 - 1.30 MG/DL Final        Medications/ Blood Products Given     Name   Dose   Route and Time     NA                Blood Volume Processed (BVP):    34. 4NA Liters Net Fluid   Removed:  1000mL   Comments   Time Out Done: 0469  Primary Nurse Rpt Pre: Neha Alvarado RN  Primary Nurse Rpt Post: Neha Alvarado RN  Pt Education: Procedural  Care Plan: Follow up with Nephrology  Tx Summary: Pt tolerated tx well without incident via RIJ temp CVC. +aspiration/+flush x 2 ports. Dressing dry and intact with gauze over site. Access visible and lines secure during tx. All possible blood rinsed back post tx.  Catheter flushed with ns and capped. Report exchanged with primary rn. Admiting Diagnosis:  Pt's previous clinic- NA  Consent signed - Informed Consent Verified: Yes (02/22/18 3453)  Gio Consent - Obtained  Hepatitis Status- Pending  Machine #- Machine Number: [id number] (02/22/18 2255)  Telemetry status-Remote  Pre-dialysis wt. - Pre-Dialysis Weight: 80.3 kg (177 lb 0.5 oz) (02/22/18 2255)

## 2018-02-23 NOTE — PROGRESS NOTES
Spiritual Care Assessment/Progress Notes    Braxton Del Rosario [id number]  [id number]    1940  66 y.o.  male    Patient Telephone Number: [phone] (home)   Baptism Affiliation: Religious   Language: English   Extended Emergency Contact Information  Primary Emergency Contact: [address] Phone: [phone]  Mobile Phone: [phone]  Relation: Spouse   Patient Active Problem List    Diagnosis Date Noted    Hypoxemia 02/19/2018    Stenosis of both internal carotid arteries 09/13/2016    Diabetic peripheral neuropathy associated with type 2 diabetes mellitus ([address] 09/13/2016    Thrombotic stroke involving left cerebellar artery ([facility] 09/12/2016    Stroke (cerebrum) ([facility] 09/09/2016    Multiple myeloma ([facility] 10/26/2015    Anemia 05/13/2012    Diarrhea 05/12/2012    DM (diabetes mellitus), type 2, uncontrolled ([address] 05/12/2012    S/p nephrectomy 05/12/2012    AAA (abdominal aortic aneurysm) ([facility] 05/12/2012    Gastrointestinal disorder     Cancer ([facility]     COPD (chronic obstructive pulmonary disease) ([facility] 05/11/2012    Cancer of kidney ([facility] 05/11/2012    Hypoglycemia, unspecified 05/11/2012    Acute renal failure ([facility] 05/11/2012    Hyperkalemia 05/11/2012    S/P partial colectomy 05/11/2012    HTN (hypertension) 04/28/2011    Acute respiratory failure ([facility] 04/26/2011    COPD with acute exacerbation ([facility] 04/26/2011    HTN (hypertension) 04/26/2011        Date: 2/23/2018       Level of Baptism/Spiritual Activity:  []         Involved in javy tradition/spiritual practice    []         Not involved in javy tradition/spiritual practice  []         Spiritually oriented    []         Claims no spiritual orientation    []         seeking spiritual identity  []         Feels alienated from Cheondoism practice/tradition  []         Feels angry about Cheondoism practice/tradition  []         Spirituality/Cheondoism tradition  a resource for coping at this time.  [x]         Not able to assess     Services Provided Today per family:  []         crisis intervention    []         reading Scriptures  [x]         spiritual assessment    []         prayer  [x]         empathic listening/emotional support  []         rites and rituals (cite in comments)  []         life review     []         Advent support  []         theological development    []         advocacy  []         ethical dialog     []         blessing  []         bereavement support    [x]         support to family  []         anticipatory grief support   []         help with AMD  []         spiritual guidance    []         meditation      Spiritual Care Needs  [x]         Emotional Support  []         Spiritual/Muslim Care  []         Loss/Adjustment  []         Advocacy/Referral                /Ethics  []         No needs expressed at               this time  []         Other: (note in               comments)  Spiritual Care Plan  [x]         Follow up visits with               pt/family  []         Provide materials  []         Schedule sacraments  []         Contact Community               Clergy  []         Follow up as needed  []         Other: (note in               comments)     Comments: Initial visit in 2290. Patient was out of the room at the time of visit but patient's oldest daughter was present. Introduced self and provided empathetic listening as daughter recounted events since patient's arrival to the hospital including the discovery of several other medical conditions. Daughter became tearful as she shared that today was the first time she has ever known her father to cry and expressed her concerns for how he is coping. Talked about the difficulty of processing all the new information for the patient and family and encouraged daughter to continue expressing emotions, including fears and hopes, as she repeatedly stated over and over that everything would be just fine.  Visit concluded as patient returned to room and daughter did not wish him to see her upset. Advised family of  availability and assured of ongoing support as needed or desired, which daughter indicated family would be welcoming of. Introduced self to patient and indicated that chaplains would attempt to visit at another time which patient was receptive to. Chaplain Evelyne Jones M.Div.    Paging Service [phone])

## 2018-02-23 NOTE — PROGRESS NOTES
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 Florencio Cortes following       Subjective:     Chief Complaint:  Feeling a bit better today. Improved sob. We had a very long talk about the above. We discussed that it is not clear if chemo/plex/HD will improve things, but without these aggressive therapies he will not survive. He wants to try to survive this. We discussed the PLEX procedure. He is agreeable to starting PLEX and to continuing HD. Review of Systems:    Symptom Y/N Comments  Symptom Y/N Comments   Fever/Chills    Chest Pain n    Poor Appetite n   Edema n    Cough y   Abdominal Pain     Sputum    Joint Pain     SOB/HUERTA y better  Pruritis/Rash     Nausea/vomit n   Tolerating PT/OT     Diarrhea    Tolerating Diet     Constipation    Other       Could not obtain due to:      Objective:     VITALS:   Last 24hrs VS reviewed since prior progress note.  Most recent are:  Visit Vitals    /68    Pulse 75    Temp 97.5 °F (36.4 °C)    Resp 18    Ht 6' (1.829 m)    Wt 80.1 kg (176 lb 9.6 oz)    SpO2 92%    BMI 23.95 kg/m2       Intake/Output Summary (Last 24 hours) at 18 0658  Last data filed at 18 0657   Gross per 24 hour   Intake 1657.91 ml   Output             1075 ml   Net           582.91 ml      Telemetry Reviewed:     PHYSICAL EXAM:  General: Ill appearing. WD, WN. Alert, cooperative, no acute distress  Resp:  Wheezing/Rhonchi. CV:  Regular  rhythm,  No murmur (), No Rubs, No Gallops. No edema  GI:  Soft, Non distended, Non tender.  +Bowel sounds, no HSM      Lab Data Reviewed: (see below)    Medications Reviewed: (see below)    PMH/SH reviewed - no change compared to H&P  ________________________________________________________________________  Care Plan discussed with:  Patient y    Family      RN y    Care Manager                    Consultant:          Comments   >50% of visit spent in counseling and coordination of care       ________________________________________________________________________  Regulo Sims MD     Procedures: see electronic medical records for all procedures/Xrays and details which  were not copied into this note but were reviewed prior to creation of Plan. LABS:  Recent Labs      02/23/18   0351  02/22/18   0023   WBC  8.5  12.8*   HGB  10.8*  11.6*   HCT  32.5*  35.1*   PLT  206  236     Recent Labs      02/23/18   0351  02/22/18   0023  02/21/18   0320   NA  137  135*  134*   K  4.6  3.8  4.7   CL  107  103  106   CO2  20*  20*  20*   BUN  82*  107*  80*   CREA  4.64*  5.49*  4.77*   GLU  243*  329*  303*   CA  9.3  10.0  9.4   MG  2.6*  2.3  2.1   PHOS   --   3.7  4.8*     Recent Labs      02/22/18   1110   SGOT  22   AP  76   TP  7.9   ALB  2.2*   GLOB  5.7*     No results for input(s): INR, PTP, APTT in the last 72 hours. No lab exists for component: INREXT, INREXT   No results for input(s): FE, TIBC, PSAT, FERR in the last 72 hours. No results found for: FOL, RBCF   No results for input(s): PH, PCO2, PO2 in the last 72 hours.   Recent Labs      02/21/18   1423   CPK  98     No components found for: [place]  Lab Results   Component Value Date/Time    Color YELLOW/STRAW 02/19/2018 03:07 PM Appearance CLOUDY (A) 02/19/2018 03:07 PM    Specific gravity 1.020 02/19/2018 03:07 PM    pH (UA) 5.5 02/19/2018 03:07 PM    Protein 100 (A) 02/19/2018 03:07 PM    Glucose NEGATIVE  02/19/2018 03:07 PM    Ketone NEGATIVE  02/19/2018 03:07 PM    Bilirubin NEGATIVE  02/19/2018 03:07 PM    Urobilinogen 0.2 02/19/2018 03:07 PM    Nitrites NEGATIVE  02/19/2018 03:07 PM    Leukocyte Esterase NEGATIVE  02/19/2018 03:07 PM    Epithelial cells FEW 02/19/2018 03:07 PM    Bacteria 1+ (A) 02/19/2018 03:07 PM    WBC 0-4 02/19/2018 03:07 PM    RBC 0-5 02/19/2018 03:07 PM       MEDICATIONS:  Current Facility-Administered Medications   Medication Dose Route Frequency    hydrALAZINE (APRESOLINE) 20 mg/mL injection 20 mg  20 mg IntraVENous Q6H PRN    midazolam (VERSED) injection 5 mg  5 mg IntraVENous Multiple    pantoprazole (PROTONIX) tablet 40 mg  40 mg Oral ACB&D    sucralfate (CARAFATE) 100 mg/mL oral suspension 1 g  1 g Oral AC&HS    insulin lispro (HUMALOG) injection 5 Units  5 Units SubCUTAneous TIDAC    insulin glargine (LANTUS) injection 35 Units  35 Units SubCUTAneous QHS    melatonin tablet 3 mg  3 mg Oral QHS PRN    methylPREDNISolone (PF) (SOLU-MEDROL) injection 40 mg  40 mg IntraVENous Q12H    insulin lispro (HUMALOG) injection   SubCUTAneous AC&HS    azithromycin (ZITHROMAX) tablet 250 mg  250 mg Oral QHS    oseltamivir (TAMIFLU) capsule 30 mg  30 mg Oral DAILY    heparin (porcine) injection 5,000 Units  5,000 Units SubCUTAneous Q8H    sodium chloride (NS) flush 5-10 mL  5-10 mL IntraVENous PRN    guaiFENesin ER (MUCINEX) tablet 600 mg  600 mg Oral BID    albuterol-ipratropium (DUO-NEB) 2.5 MG-0.5 MG/3 ML  3 mL Nebulization Q4H RT    albuterol-ipratropium (DUO-NEB) 2.5 MG-0.5 MG/3 ML  3 mL Nebulization Q4H PRN    cefTRIAXone (ROCEPHIN) 1 g/50 mL NS IVPB  1 g IntraVENous Q24H    0.9% sodium chloride infusion  50 mL/hr IntraVENous CONTINUOUS    acetaminophen (TYLENOL) tablet 650 mg  650 mg Oral Q4H PRN    ondansetron (ZOFRAN) injection 4 mg  4 mg IntraVENous Q4H PRN    glucose chewable tablet 16 g  4 Tab Oral PRN    dextrose (D50W) injection syrg 12.5-25 g  12.5-25 g IntraVENous PRN    glucagon (GLUCAGEN) injection 1 mg  1 mg IntraMUSCular PRN    aspirin chewable tablet 81 mg  81 mg Oral DAILY    clopidogrel (PLAVIX) tablet 75 mg  75 mg Oral DAILY    dilTIAZem CD (CARDIZEM CD) capsule 240 mg  240 mg Oral DAILY    montelukast (SINGULAIR) tablet 10 mg  10 mg Oral DAILY    umeclidinium (INCRUSE ELLIPTA) 62.5 mcg/actuation  1 Puff Inhalation DAILY    fluticasone-vilanterol (BREO ELLIPTA) 100mcg-25mcg/puff  1 Puff Inhalation DAILY

## 2018-02-23 NOTE — PROGRESS NOTES
Problem: Falls - Risk of  Goal: *Absence of Falls  Document Conrad Fall Risk and appropriate interventions in the flowsheet.    Outcome: Progressing Towards Goal  Fall Risk Interventions:  Mobility Interventions: Bed/chair exit alarm, Communicate number of staff needed for ambulation/transfer, OT consult for ADLs, Patient to call before getting OOB, Strengthening exercises (ROM-active/passive), PT Consult for assist device competence, PT Consult for mobility concerns, Utilize walker, cane, or other assitive device         Medication Interventions: Bed/chair exit alarm, Evaluate medications/consider consulting pharmacy, Patient to call before getting OOB, Teach patient to arise slowly

## 2018-02-24 LAB
ANION GAP SERPL CALC-SCNC: 11 MMOL/L (ref 5–15)
BASOPHILS # BLD: 0 K/UL (ref 0–0.1)
BASOPHILS NFR BLD: 0 % (ref 0–1)
BUN SERPL-MCNC: 99 MG/DL (ref 6–20)
BUN/CREAT SERPL: 18 (ref 12–20)
CALCIUM SERPL-MCNC: 9 MG/DL (ref 8.5–10.1)
CHLORIDE SERPL-SCNC: 109 MMOL/L (ref 97–108)
CO2 SERPL-SCNC: 17 MMOL/L (ref 21–32)
CREAT SERPL-MCNC: 5.39 MG/DL (ref 0.7–1.3)
DIFFERENTIAL METHOD BLD: ABNORMAL
EOSINOPHIL # BLD: 0 K/UL (ref 0–0.4)
EOSINOPHIL NFR BLD: 0 % (ref 0–7)
ERYTHROCYTE [DISTWIDTH] IN BLOOD BY AUTOMATED COUNT: 17.7 % (ref 11.5–14.5)
GLUCOSE BLD STRIP.AUTO-MCNC: 154 MG/DL (ref 65–100)
GLUCOSE BLD STRIP.AUTO-MCNC: 182 MG/DL (ref 65–100)
GLUCOSE BLD STRIP.AUTO-MCNC: 312 MG/DL (ref 65–100)
GLUCOSE BLD STRIP.AUTO-MCNC: 341 MG/DL (ref 65–100)
GLUCOSE SERPL-MCNC: 307 MG/DL (ref 65–100)
HCT VFR BLD AUTO: 28.2 % (ref 36.6–50.3)
HGB BLD-MCNC: 9.6 G/DL (ref 12.1–17)
IMM GRANULOCYTES # BLD: 0.1 K/UL (ref 0–0.04)
IMM GRANULOCYTES NFR BLD AUTO: 1 % (ref 0–0.5)
LYMPHOCYTES # BLD: 0.6 K/UL (ref 0.8–3.5)
LYMPHOCYTES NFR BLD: 7 % (ref 12–49)
MAGNESIUM SERPL-MCNC: 2.3 MG/DL (ref 1.6–2.4)
MCH RBC QN AUTO: 27.6 PG (ref 26–34)
MCHC RBC AUTO-ENTMCNC: 34 G/DL (ref 30–36.5)
MCV RBC AUTO: 81 FL (ref 80–99)
MONOCYTES # BLD: 0.3 K/UL (ref 0–1)
MONOCYTES NFR BLD: 3 % (ref 5–13)
NEUTS SEG # BLD: 8.4 K/UL (ref 1.8–8)
NEUTS SEG NFR BLD: 90 % (ref 32–75)
NRBC # BLD: 0.02 K/UL (ref 0–0.01)
NRBC BLD-RTO: 0.2 PER 100 WBC
PLATELET # BLD AUTO: 166 K/UL (ref 150–400)
PMV BLD AUTO: 10.3 FL (ref 8.9–12.9)
POTASSIUM SERPL-SCNC: 5 MMOL/L (ref 3.5–5.1)
RBC # BLD AUTO: 3.48 M/UL (ref 4.1–5.7)
SERVICE CMNT-IMP: ABNORMAL
SODIUM SERPL-SCNC: 137 MMOL/L (ref 136–145)
WBC # BLD AUTO: 9.4 K/UL (ref 4.1–11.1)

## 2018-02-24 PROCEDURE — 77010033678 HC OXYGEN DAILY

## 2018-02-24 PROCEDURE — 85025 COMPLETE CBC W/AUTO DIFF WBC: CPT | Performed by: NURSE PRACTITIONER

## 2018-02-24 PROCEDURE — 36415 COLL VENOUS BLD VENIPUNCTURE: CPT | Performed by: NURSE PRACTITIONER

## 2018-02-24 PROCEDURE — 74011250637 HC RX REV CODE- 250/637: Performed by: INTERNAL MEDICINE

## 2018-02-24 PROCEDURE — 74011250636 HC RX REV CODE- 250/636: Performed by: NURSE PRACTITIONER

## 2018-02-24 PROCEDURE — 74011636637 HC RX REV CODE- 636/637: Performed by: EMERGENCY MEDICINE

## 2018-02-24 PROCEDURE — 74011250637 HC RX REV CODE- 250/637: Performed by: EMERGENCY MEDICINE

## 2018-02-24 PROCEDURE — 77030018719 HC DRSG PTCH ANTIMIC J&J -A

## 2018-02-24 PROCEDURE — 74011636637 HC RX REV CODE- 636/637: Performed by: NURSE PRACTITIONER

## 2018-02-24 PROCEDURE — 74011000258 HC RX REV CODE- 258: Performed by: INTERNAL MEDICINE

## 2018-02-24 PROCEDURE — 74011000250 HC RX REV CODE- 250: Performed by: INTERNAL MEDICINE

## 2018-02-24 PROCEDURE — 80048 BASIC METABOLIC PNL TOTAL CA: CPT | Performed by: NURSE PRACTITIONER

## 2018-02-24 PROCEDURE — 65660000000 HC RM CCU STEPDOWN

## 2018-02-24 PROCEDURE — 83735 ASSAY OF MAGNESIUM: CPT | Performed by: NURSE PRACTITIONER

## 2018-02-24 PROCEDURE — 74011250636 HC RX REV CODE- 250/636: Performed by: EMERGENCY MEDICINE

## 2018-02-24 PROCEDURE — 90935 HEMODIALYSIS ONE EVALUATION: CPT

## 2018-02-24 PROCEDURE — 74011250636 HC RX REV CODE- 250/636: Performed by: INTERNAL MEDICINE

## 2018-02-24 PROCEDURE — 94640 AIRWAY INHALATION TREATMENT: CPT

## 2018-02-24 PROCEDURE — 82962 GLUCOSE BLOOD TEST: CPT

## 2018-02-24 RX ORDER — PREDNISONE 20 MG/1
40 TABLET ORAL DAILY
Status: COMPLETED | OUTPATIENT
Start: 2018-02-25 | End: 2018-02-26

## 2018-02-24 RX ORDER — INSULIN GLARGINE 100 [IU]/ML
20 INJECTION, SOLUTION SUBCUTANEOUS DAILY
Status: DISCONTINUED | OUTPATIENT
Start: 2018-02-25 | End: 2018-02-26

## 2018-02-24 RX ORDER — IPRATROPIUM BROMIDE AND ALBUTEROL SULFATE 2.5; .5 MG/3ML; MG/3ML
3 SOLUTION RESPIRATORY (INHALATION)
Status: DISCONTINUED | OUTPATIENT
Start: 2018-02-24 | End: 2018-03-01

## 2018-02-24 RX ORDER — PREDNISONE 20 MG/1
20 TABLET ORAL
Status: DISCONTINUED | OUTPATIENT
Start: 2018-02-27 | End: 2018-03-01

## 2018-02-24 RX ORDER — INSULIN GLARGINE 100 [IU]/ML
38 INJECTION, SOLUTION SUBCUTANEOUS
Status: DISCONTINUED | OUTPATIENT
Start: 2018-02-24 | End: 2018-02-26

## 2018-02-24 RX ADMIN — INSULIN GLARGINE 38 UNITS: 100 INJECTION, SOLUTION SUBCUTANEOUS at 23:36

## 2018-02-24 RX ADMIN — GUAIFENESIN 600 MG: 600 TABLET, EXTENDED RELEASE ORAL at 18:19

## 2018-02-24 RX ADMIN — IPRATROPIUM BROMIDE AND ALBUTEROL SULFATE 3 ML: .5; 3 SOLUTION RESPIRATORY (INHALATION) at 00:15

## 2018-02-24 RX ADMIN — CEFTRIAXONE SODIUM 1 G: 1 INJECTION, POWDER, FOR SOLUTION INTRAMUSCULAR; INTRAVENOUS at 21:45

## 2018-02-24 RX ADMIN — INSULIN LISPRO 5 UNITS: 100 INJECTION, SOLUTION INTRAVENOUS; SUBCUTANEOUS at 12:43

## 2018-02-24 RX ADMIN — SUCRALFATE 1 G: 1 SUSPENSION ORAL at 21:45

## 2018-02-24 RX ADMIN — MONTELUKAST SODIUM 10 MG: 10 TABLET, COATED ORAL at 10:42

## 2018-02-24 RX ADMIN — ASPIRIN 81 MG 81 MG: 81 TABLET ORAL at 10:39

## 2018-02-24 RX ADMIN — GRANISETRON HYDROCHLORIDE 1 MG: 1 INJECTION, SOLUTION INTRAVENOUS at 08:20

## 2018-02-24 RX ADMIN — PANTOPRAZOLE SODIUM 40 MG: 40 TABLET, DELAYED RELEASE ORAL at 10:40

## 2018-02-24 RX ADMIN — CLOPIDOGREL BISULFATE 75 MG: 75 TABLET ORAL at 10:39

## 2018-02-24 RX ADMIN — GUAIFENESIN 600 MG: 600 TABLET, EXTENDED RELEASE ORAL at 10:39

## 2018-02-24 RX ADMIN — UMECLIDINIUM 1 PUFF: 62.5 AEROSOL, POWDER ORAL at 10:44

## 2018-02-24 RX ADMIN — BORTEZOMIB 3.1 MG: 3.5 INJECTION, POWDER, LYOPHILIZED, FOR SOLUTION INTRAVENOUS; SUBCUTANEOUS at 08:58

## 2018-02-24 RX ADMIN — INSULIN LISPRO 3 UNITS: 100 INJECTION, SOLUTION INTRAVENOUS; SUBCUTANEOUS at 18:22

## 2018-02-24 RX ADMIN — IPRATROPIUM BROMIDE AND ALBUTEROL SULFATE 3 ML: .5; 3 SOLUTION RESPIRATORY (INHALATION) at 19:23

## 2018-02-24 RX ADMIN — INSULIN LISPRO 5 UNITS: 100 INJECTION, SOLUTION INTRAVENOUS; SUBCUTANEOUS at 18:21

## 2018-02-24 RX ADMIN — SUCRALFATE 1 G: 1 SUSPENSION ORAL at 18:19

## 2018-02-24 RX ADMIN — PANTOPRAZOLE SODIUM 40 MG: 40 TABLET, DELAYED RELEASE ORAL at 18:22

## 2018-02-24 RX ADMIN — Medication 10 ML: at 21:46

## 2018-02-24 RX ADMIN — SUCRALFATE 1 G: 1 SUSPENSION ORAL at 10:40

## 2018-02-24 RX ADMIN — INSULIN GLARGINE 15 UNITS: 100 INJECTION, SOLUTION SUBCUTANEOUS at 10:41

## 2018-02-24 RX ADMIN — INSULIN LISPRO 10 UNITS: 100 INJECTION, SOLUTION INTRAVENOUS; SUBCUTANEOUS at 12:42

## 2018-02-24 RX ADMIN — CYCLOPHOSPHAMIDE 500 MG: 500 INJECTION, POWDER, FOR SOLUTION INTRAVENOUS; ORAL at 09:01

## 2018-02-24 RX ADMIN — FLUTICASONE FUROATE AND VILANTEROL TRIFENATATE 1 PUFF: 100; 25 POWDER RESPIRATORY (INHALATION) at 10:44

## 2018-02-24 RX ADMIN — IPRATROPIUM BROMIDE AND ALBUTEROL SULFATE 3 ML: .5; 3 SOLUTION RESPIRATORY (INHALATION) at 03:34

## 2018-02-24 RX ADMIN — IPRATROPIUM BROMIDE AND ALBUTEROL SULFATE 3 ML: .5; 3 SOLUTION RESPIRATORY (INHALATION) at 07:21

## 2018-02-24 RX ADMIN — DEXAMETHASONE SODIUM PHOSPHATE 40 MG: 4 INJECTION, SOLUTION INTRA-ARTICULAR; INTRALESIONAL; INTRAMUSCULAR; INTRAVENOUS; SOFT TISSUE at 08:10

## 2018-02-24 RX ADMIN — HEPARIN SODIUM 5000 UNITS: 5000 INJECTION, SOLUTION INTRAVENOUS; SUBCUTANEOUS at 10:42

## 2018-02-24 NOTE — PROCEDURES
[facility]  [phone]    Vitals   Pre   Post   Assessment   Pre   Post     Temp  Temp: 98 °F (36.7 °C) (02/24/18 1411)  98 LOC  Alert and oriented same   HR   Pulse (Heart Rate): 69 (02/24/18 1411) 80 Lungs   Diminished on 3lnc  same   B/P   BP: 143/72 (02/24/18 1411) 119/63 Cardiac   B/p wnl  same   Resp   Resp Rate: 20 (02/24/18 1411) 20 Skin   Multiple bruises noted on arms  same   Pain level  0 0 Edema  Generalized +1     same   Orders:    Duration:   Start:   1408 End:   1640 Total:   2.5hrs   Dialyzer:   Dialyzer/Set Up Inspection: Don Hutchinson (02/24/18 1411)   K Bath:   Dialysate K (mEq/L): 2 (02/24/18 1411)   Ca Bath:   Dialysate CA (mEq/L): 2.5 (02/24/18 1411)   Na/Bicarb:   Dialysate NA (mEq/L): 140 (02/24/18 1411)   Target Fluid Removal:   Goal/Amount of Fluid to Remove (mL): 1000 mL (02/24/18 1411)   Access     Type & Location:   Rt IJ john paul, ports cleaned, blood aspirated and lines flushed with saline. Good blood flow present, no signs of infection noted.  Dressing intact from 2/22   Labs     Obtained/Reviewed   Critical Results Called   Date when labs were drawn-  Hgb-    HGB   Date Value Ref Range Status   02/24/2018 9.6 (L) 12.1 - 17.0 g/dL Final     K-    Potassium   Date Value Ref Range Status   02/24/2018 5.0 3.5 - 5.1 mmol/L Final     Ca-   Calcium   Date Value Ref Range Status   02/24/2018 9.0 8.5 - 10.1 MG/DL Final     Bun-   BUN   Date Value Ref Range Status   02/24/2018 99 (H) 6 - 20 MG/DL Final     Creat-   Creatinine   Date Value Ref Range Status   02/24/2018 5.39 (H) 0.70 - 1.30 MG/DL Final        Medications/ Blood Products Given     Name   Dose   Route and Time        none ordered              Blood Volume Processed (BVP):    35.6 Net Fluid   Removed:  1kg   Comments   Time Out Done: 3453  Primary Nurse Rpt Pre: Tyler Gil at 1300  Primary Nurse Rpt Post:same nurse at [address]: acute renal failure, receiving 2.5hr dialysis treatment today  Tx Summary:  1408 dialysis started  1640 Dialysis completed and blood rinsed back. New caps placed on each port. Previous CVC dressing loose at this time, dressing reinforced, no signs of infection noted. Report given to nurse, pt fransisca Dang  Pt's previous clinic-not assigned  Consent signed - Informed Consent Verified: Yes (02/24/18 1411)  [facility] Consent - on file  Hepatitis Status- NEG AB as of 2/14/18  Machine #- Machine Number: [id number] (02/24/18 1411)  Telemetry status-  Pre-dialysis wt. - Pre-Dialysis Weight: 80.3 kg (177 lb 0.5 oz) (02/22/18 3011)

## 2018-02-24 NOTE — PROGRESS NOTES
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     Hypercalcemia    Mild anemia    DM2    HTN    Influenza A    COPD --Initiated HD on ; HD # 2 today (every other day for now); stop ivf's    I suspect that his DIANA is r/t to plasma cell d/o     Free light chains - lambda 1780 on ; 24 hour urine light chains pending    Just starting chemo for MM; initiated plasma exchange in conjunction with chemo on  (monitor response in light chains - look for a 50-60 % drop after 5 PLEX treatments); every other day PLEX x 5; 1 plasma volume; replace with albumin      spot urine protein:creatinine 3.6 grams    Oncology following closely       Subjective:     Chief Complaint:  Feeling ok today. Improved sob. We had another talk about the above. So far he is doing well with HD and with PLEX. Review of Systems:    Symptom Y/N Comments  Symptom Y/N Comments   Fever/Chills    Chest Pain n    Poor Appetite n   Edema n    Cough y   Abdominal Pain     Sputum    Joint Pain     SOB/HUERTA y better  Pruritis/Rash     Nausea/vomit n   Tolerating PT/OT     Diarrhea    Tolerating Diet     Constipation    Other       Could not obtain due to:      Objective:     VITALS:   Last 24hrs VS reviewed since prior progress note. Most recent are:  Visit Vitals    /54 (BP 1 Location: Right arm, BP Patient Position: At rest)    Pulse 78    Temp 97.9 °F (36.6 °C)    Resp 20    Ht 6' (1.829 m)    Wt 83.6 kg (184 lb 4.9 oz)    SpO2 93%    BMI 25 kg/m2       Intake/Output Summary (Last 24 hours) at 18 5514  Last data filed at 18 6500   Gross per 24 hour   Intake              408 ml   Output             4567 ml   Net            -4159 ml      Telemetry Reviewed:     PHYSICAL EXAM:  General: WD, WN.  Alert, cooperative, no acute distress  Resp:  Wheezing/Rhonchi. CV:  Regular  rhythm,  No murmur (), No Rubs, No Gallops. No edema  GI:  Soft, Non distended, Non tender.  +Bowel sounds, no HSM      Lab Data Reviewed: (see below)    Medications Reviewed: (see below)    PMH/SH reviewed - no change compared to H&P  ________________________________________________________________________  Care Plan discussed with:  Patient y    Family      RN y    Care Manager                    Consultant:          Comments   >50% of visit spent in counseling and coordination of care       ________________________________________________________________________  Evelyn Herrera MD     Procedures: see electronic medical records for all procedures/Xrays and details which  were not copied into this note but were reviewed prior to creation of Plan. LABS:  Recent Labs      02/24/18   0325  02/23/18   0351   WBC  9.4  8.5   HGB  9.6*  10.8*   HCT  28.2*  32.5*   PLT  166  206     Recent Labs      02/24/18   0325  02/23/18   0351  02/22/18   0023   NA  137  137  135*   K  5.0  4.6  3.8   CL  109*  107  103   CO2  17*  20*  20*   BUN  99*  82*  107*   CREA  5.39*  4.64*  5.49*   GLU  307*  243*  329*   CA  9.0  9.3  10.0   MG  2.3  2.6*  2.3   PHOS   --    --   3.7     Recent Labs      02/22/18   1110  02/21/18   1423   SGOT  22   --    AP  76   --    TP  7.9  6.4   ALB  2.2*   --    GLOB  5.7*   --      No results for input(s): INR, PTP, APTT in the last 72 hours. No lab exists for component: INREXT, INREXT   No results for input(s): FE, TIBC, PSAT, FERR in the last 72 hours. No results found for: FOL, RBCF   No results for input(s): PH, PCO2, PO2 in the last 72 hours.   Recent Labs      02/21/18   1423   CPK  98     No components found for: [place]  Lab Results   Component Value Date/Time    Color YELLOW/STRAW 02/19/2018 03:07 PM    Appearance CLOUDY (A) 02/19/2018 03:07 PM    Specific gravity 1.020 02/19/2018 03:07 PM    pH (UA) 5.5 02/19/2018 03:07 PM Protein 100 (A) 02/19/2018 03:07 PM    Glucose NEGATIVE  02/19/2018 03:07 PM    Ketone NEGATIVE  02/19/2018 03:07 PM    Bilirubin NEGATIVE  02/19/2018 03:07 PM    Urobilinogen 0.2 02/19/2018 03:07 PM    Nitrites NEGATIVE  02/19/2018 03:07 PM    Leukocyte Esterase NEGATIVE  02/19/2018 03:07 PM    Epithelial cells FEW 02/19/2018 03:07 PM    Bacteria 1+ (A) 02/19/2018 03:07 PM    WBC 0-4 02/19/2018 03:07 PM    RBC 0-5 02/19/2018 03:07 PM       MEDICATIONS:  Current Facility-Administered Medications   Medication Dose Route Frequency    albumin human 5% (BUMINATE) solution 175 g  175 g IntraVENous Q48H    insulin glargine (LANTUS) injection 15 Units  15 Units SubCUTAneous DAILY    bortezomib (VELCADE) 3.1 mg in sodium chloride SQ chemo syringe  3.1 mg SubCUTAneous ONCE    cyclophosphamide (CYTOXAN) 500 mg in 0.9% sodium chloride 250 mL, overfill volume 25 mL chemo infusion  500 mg IntraVENous ONCE    dexamethasone (DECADRON) 40 mg in 0.9% sodium chloride 50 mL IVPB  40 mg IntraVENous ONCE    granisetron (PF) (KYTRIL) injection 1 mg  1 mg IntraVENous ONCE    methylPREDNISolone (PF) (SOLU-MEDROL) injection 40 mg  40 mg IntraVENous Q12H    ondansetron (ZOFRAN) injection 4 mg  4 mg IntraVENous Q6H PRN    hydrALAZINE (APRESOLINE) 20 mg/mL injection 20 mg  20 mg IntraVENous Q6H PRN    midazolam (VERSED) injection 5 mg  5 mg IntraVENous Multiple    pantoprazole (PROTONIX) tablet 40 mg  40 mg Oral ACB&D    sucralfate (CARAFATE) 100 mg/mL oral suspension 1 g  1 g Oral AC&HS    insulin lispro (HUMALOG) injection 5 Units  5 Units SubCUTAneous TIDAC    insulin glargine (LANTUS) injection 35 Units  35 Units SubCUTAneous QHS    melatonin tablet 3 mg  3 mg Oral QHS PRN    insulin lispro (HUMALOG) injection   SubCUTAneous AC&HS    heparin (porcine) injection 5,000 Units  5,000 Units SubCUTAneous Q8H    sodium chloride (NS) flush 5-10 mL  5-10 mL IntraVENous PRN    guaiFENesin ER (MUCINEX) tablet 600 mg  600 mg Oral BID    albuterol-ipratropium (DUO-NEB) 2.5 MG-0.5 MG/3 ML  3 mL Nebulization Q4H RT    albuterol-ipratropium (DUO-NEB) 2.5 MG-0.5 MG/3 ML  3 mL Nebulization Q4H PRN    cefTRIAXone (ROCEPHIN) 1 g/50 mL NS IVPB  1 g IntraVENous Q24H    0.9% sodium chloride infusion  50 mL/hr IntraVENous CONTINUOUS    acetaminophen (TYLENOL) tablet 650 mg  650 mg Oral Q4H PRN    ondansetron (ZOFRAN) injection 4 mg  4 mg IntraVENous Q4H PRN    glucose chewable tablet 16 g  4 Tab Oral PRN    dextrose (D50W) injection syrg 12.5-25 g  12.5-25 g IntraVENous PRN    glucagon (GLUCAGEN) injection 1 mg  1 mg IntraMUSCular PRN    aspirin chewable tablet 81 mg  81 mg Oral DAILY    clopidogrel (PLAVIX) tablet 75 mg  75 mg Oral DAILY    dilTIAZem CD (CARDIZEM CD) capsule 240 mg  240 mg Oral DAILY    montelukast (SINGULAIR) tablet 10 mg  10 mg Oral DAILY    umeclidinium (INCRUSE ELLIPTA) 62.5 mcg/actuation  1 Puff Inhalation DAILY    fluticasone-vilanterol (BREO ELLIPTA) 100mcg-25mcg/puff  1 Puff Inhalation DAILY

## 2018-02-24 NOTE — PROGRESS NOTES
I Hematology-Oncology Progress Note      Braxton Del Rosario  1940  [id number]      2/24/2018  12:24 PM    Follow-up for: Smoldering Myeloma, DIANA     [x] Chart notes since last visit reviewed     [] Medications reviewed for allergies and interactions    Patient complains of the following:  Myeloma      Subjective:     Some difficulty swallowing- stable/ chronic  No f/c, no n/v      Objective:     Patient Vitals for the past 24 hrs:   BP Temp Pulse Resp SpO2 Weight   02/24/18 1026 131/54 98.1 °F (36.7 °C) 78 20 93 % -   02/24/18 0727 125/46 97.8 °F (36.6 °C) 79 20 94 % -   02/24/18 0721 - - - - 91 % -   02/24/18 0334 - - - - 93 % -   02/24/18 0331 - - - - - 83.6 kg (184 lb 4.9 oz)   02/24/18 0323 125/54 97.9 °F (36.6 °C) 78 20 93 % -   02/24/18 0016 - - - - 93 % -   02/23/18 2315 131/55 97.3 °F (36.3 °C) 81 20 95 % -   02/23/18 2123 - - - - 95 % -   02/23/18 1913 130/66 97.7 °F (36.5 °C) 66 20 - -   02/23/18 1905 133/60 97.7 °F (36.5 °C) 65 22 - -   02/23/18 1850 136/67 97.6 °F (36.4 °C) 64 24 - -   02/23/18 1835 154/68 97.8 °F (36.6 °C) 64 22 - -   02/23/18 1821 153/68 97.8 °F (36.6 °C) 62 22 - -   02/23/18 1817 159/73 97.8 °F (36.6 °C) 62 22 95 % -   02/23/18 1506 - - - - 93 % -   02/23/18 1450 115/53 97.6 °F (36.4 °C) 67 20 93 % -       Physical Exam:  MS-Alert,  General - chronically ill appearing  CVS-S1,S2 normal  RS-Scattered b/l wheezes  Abdomen- Soft, NT,ND, BS+  Extre- No c/c/e  Neuro- No FND.     Available labs reviewed:  Labs:    Recent Results (from the past 24 hour(s))   GLUCOSE, POC    Collection Time: 02/23/18  4:41 PM   Result Value Ref Range    Glucose (POC) 289 (H) 65 - 100 mg/dL    Performed by VIRGINIA MONACO(CON)    GLUCOSE, POC    Collection Time: 02/23/18  9:06 PM   Result Value Ref Range    Glucose (POC) 244 (H) 65 - 100 mg/dL    Performed by Jennifer Mark [facility] BASIC    Collection Time: 02/24/18  3:25 AM   Result Value Ref Range    Sodium 137 136 - 145 mmol/L    Potassium 5.0 3.5 - 5.1 mmol/L    Chloride 109 (H) 97 - 108 mmol/L    CO2 17 (L) 21 - 32 mmol/L    Anion gap 11 5 - 15 mmol/L    Glucose 307 (H) 65 - 100 mg/dL    BUN 99 (H) 6 - 20 MG/DL    Creatinine 5.39 (H) 0.70 - 1.30 MG/DL    BUN/Creatinine ratio 18 12 - 20      GFR est AA 13 (L) >60 ml/min/1.73m2    GFR est non-AA 10 (L) >60 ml/min/1.73m2    Calcium 9.0 8.5 - 10.1 MG/DL   CBC WITH AUTOMATED DIFF    Collection Time: 02/24/18  3:25 AM   Result Value Ref Range    WBC 9.4 4.1 - 11.1 K/uL    RBC 3.48 (L) 4.10 - 5.70 M/uL    HGB 9.6 (L) 12.1 - 17.0 g/dL    HCT 28.2 (L) 36.6 - 50.3 %    MCV 81.0 80.0 - 99.0 FL    MCH 27.6 26.0 - 34.0 PG    MCHC 34.0 30.0 - 36.5 g/dL    RDW 17.7 (H) 11.5 - 14.5 %    PLATELET 288 439 - 672 K/uL    MPV 10.3 8.9 - 12.9 FL    NRBC 0.2 (H) 0  WBC    ABSOLUTE NRBC 0.02 (H) 0.00 - 0.01 K/uL    NEUTROPHILS 90 (H) 32 - 75 %    LYMPHOCYTES 7 (L) 12 - 49 %    MONOCYTES 3 (L) 5 - 13 %    EOSINOPHILS 0 0 - 7 %    BASOPHILS 0 0 - 1 %    IMMATURE GRANULOCYTES 1 (H) 0.0 - 0.5 %    ABS. NEUTROPHILS 8.4 (H) 1.8 - 8.0 K/UL    ABS. LYMPHOCYTES 0.6 (L) 0.8 - 3.5 K/UL    ABS. MONOCYTES 0.3 0.0 - 1.0 K/UL    ABS. EOSINOPHILS 0.0 0.0 - 0.4 K/UL    ABS. BASOPHILS 0.0 0.0 - 0.1 K/UL    ABS. IMM. GRANS. 0.1 (H) 0.00 - 0.04 K/UL    DF AUTOMATED     MAGNESIUM    Collection Time: 02/24/18  3:25 AM   Result Value Ref Range    Magnesium 2.3 1.6 - 2.4 mg/dL   GLUCOSE, POC    Collection Time: 02/24/18  8:08 AM   Result Value Ref Range    Glucose (POC) 312 (H) 65 - 100 mg/dL    Performed by Anushka Brock) Marc Part, POC    Collection Time: 02/24/18 11:05 AM   Result Value Ref Range    Glucose (POC) 341 (H) 65 - 100 mg/dL    Performed by Anushka Serrano        Imaging:  CXR Results  (Last 48 hours)               02/23/18 1540  XR CHEST PA LAT Final result    Impression:  IMPRESSION:   1.   New small left pleural effusion.    2.  Small bibasilar airspace opacities seen on prior cross-sectional imaging are difficult to appreciate radiographically. Narrative:  EXAM:  XR CHEST PA LAT       INDICATION: reeval pneumonia/hypoxia       COMPARISON: Chest x-ray 2/19/2018 and chest CT 2/19/2018. TECHNIQUE: Frontal and lateral views of the chest       FINDINGS:  A small left pleural effusion has developed since the comparison   exams. Small bibasilar airspace opacities are difficult to appreciate   radiographically but were seen on cross-sectional imaging. No visualized   pneumothorax. A right IJ catheter terminates near the cavoatrial junction. Unchanged cardiomediastinal silhouette with atherosclerotic calcifications of   the aorta. .                  CT Results  (Last 48 hours)    None            Assessment:   Influenza  History of smoldering myeloma  Acute on chronic renal failure attributed to myeloma  Hypercalcemia  DM- worsened by steroids    Plan:     Multiple myeloma- IgG lambda on JAIMIE, total IgG 2293. SFLC: lambda 1780.  ---- Skeletal survey - 2/21/2018 - with multiple small lytic lesions in skull, humeri  ---- renal failure  ---- hypercalcemia  ---- bmbx 2/22/18: 50% involvement by monoclonal plasma cells  ---- started CyBoRD regimen - C1D1- 2/23/2018 - Cytoxan with 20% dose reduction and full dose of Velcade with 40 mg Dex after HD and plamspharesis. Continue weekly    DIANA  sec to underlying Multiple Myeloma.  HD, plasmapheresis per nephrology  ---- deferring antiresorptive tx for now given AOCRF

## 2018-02-24 NOTE — PROGRESS NOTES
Pt's family has complained twice that the pt has had to use the bathroom since 0600, pts wife stated earlier that the urge had passed and the second time I checked with the pt and he said he did NOT have to use the restroom, he said he would try later for a BM. Pt's urinal is at bedside which he is using. Pt has no other complaints. 1400 - additional family member is visiting pt and has same complaints as above and that the pt's date on the white board hadn't been updated and his room was dirty. I again asked the pt if he needed to use the restroom and he said no not right now, family member is aware. I also pointed out to the family member that the date on the board had indeed been updated and asked what they would like me to clean in his room, the family member stated \"I guess somebody has cleaned it since before\". Pt's room was straightened earlier and family did not request any additional cleaning. They also complained the pt did not get a bath, I explained the pt was getting on HD at the moment and we could get a tech to get him cleaned up after, they agreed. Pt has no complaints and when I asked the pt if there was anything I could do for him he said \"no\".

## 2018-02-24 NOTE — DIALYSIS
Was called by patient's nurse to evaluate CVC dressing that was bleeding. Pt had dialysis earlier today, dressing removed, oozing around insertion site present, cleansed, split 4 x 4 applied with tegaderm on top, nurse will continue to monitor dressing.

## 2018-02-24 NOTE — PROGRESS NOTES
Follow up visit in 2290. Pt on dialysis but awake. Engaged with  who provided empathic listening. Pt shared of dealing with new diagnosis and spoke of leaning on his javy in God at this time as he wasn't sure how much time he had left now. Pt spoke of his family and  advised of availability of chaplains to process through things if and when trying to shield family. Pt expressed appreciation and did not identify specific needs at this time. Provided words of encouragement and prayer. Will follow up as needed/able. JAZIEL Barreto. Div

## 2018-02-24 NOTE — PROGRESS NOTES
Pt's HD catheter is bleeding from site, the HD nurse was called and she came and changed the dressing. New dressing was applied, 4x4 gauze under transparent dressing, there is still some oozing from site. Dr. Lear Spurling was notified, received order to hold this evenings heparin dose. Dr. Lear Spurling said to apply pressure dressing if it continues to bleed. Will continue to monitor pt condition. 1900 - bedside shift report given to [facility], RN.

## 2018-02-24 NOTE — PROGRESS NOTES
ADULT PROTOCOL: JET AEROSOL  REASSESSMENT    Patient  Michelle Hamilton     66 y.o.   male     2/24/2018  10:55 AM    Breath Sounds Pre Procedure: Right Breath Sounds: Diminished                               Left Breath Sounds: Diminished    Breath Sounds Post Procedure: Right Breath Sounds: Coarse                                 Left Breath Sounds: Coarse    Breathing pattern: Pre procedure Breathing Pattern: Regular          Post procedure Breathing Pattern: Regular    Heart Rate: Pre procedure Pulse: 75           Post procedure Pulse: 91    Resp Rate: Pre procedure Respirations: 16           Post procedure Respirations: 20         Cough: Pre procedure Cough: Non-productive               Post procedure Cough: Non-productive    Oxygen: 3L/NC     Changed: NO    SpO2: Pre procedure SpO2: 91 %                 Post procedure SpO2: 93 %     Nebulizer Therapy: Current medications Aerosolized Medications: DuoNeb      Changed:Yes BID    Problem List:   Patient Active Problem List   Diagnosis Code    Acute respiratory failure ([facility]) J96.00    COPD with acute exacerbation ([facility]) J44.1    HTN (hypertension) I10    HTN (hypertension) I10    COPD (chronic obstructive pulmonary disease) ([facility]) J44.9    Cancer of kidney ([facility]) C64.9    Hypoglycemia, unspecified E16.2    Acute renal failure ([facility] [address] N17.9    Hyperkalemia E87.5    S/P partial colectomy Z90.49    Diarrhea R19.7    DM (diabetes mellitus), type 2, uncontrolled ([address] E11.65    S/p nephrectomy Z90.5    AAA (abdominal aortic aneurysm) ([facility]) I71.4    Gastrointestinal disorder K92.9    Cancer ([facility]) C80.1    Anemia D64.9    Multiple myeloma ([facility]) C90.00    Stroke (cerebrum) ([facility]) I63.9    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

## 2018-02-24 NOTE — PROGRESS NOTES
Problem: Falls - Risk of  Goal: *Absence of Falls  Document Conrad Fall Risk and appropriate interventions in the flowsheet.    Outcome: Progressing Towards Goal  Fall Risk Interventions:  Mobility Interventions: Bed/chair exit alarm, Utilize walker, cane, or other assitive device, Patient to call before getting OOB, Strengthening exercises (ROM-active/passive)         Medication Interventions: Bed/chair exit alarm, Teach patient to arise slowly, Patient to call before getting OOB    Elimination Interventions: Bed/chair exit alarm, Call light in reach, Urinal in reach

## 2018-02-24 NOTE — PROGRESS NOTES
Hospitalist Progress Note    NAME: Donavon Lagos   :  1940   MRN:  [id number]       Interim Hospital Summary: 66 y.o. male whom presented on 2018 with      Assessment / Plan:  Smoldering Myeloma/MGUS  Multiple myeloma  - heme/onc following; bone marrow bx from 2018 revealed 60% involvement by plasma cells with atypical , almost plasmablastic appearance, indicating an aggressive process. No evidence of plasma cell leukemia   - start with plasma exchange in conjunction with chemo; Cytoxan with 20% dose reduction and full dose of Velcade with 40 mg Dex after HD and plamspharesis. This will be a weekly regimen;next dose due on Mar 2. Solumedrol will be on hold when pt is schedule to receive Dex. - SPEP, S FLC assay pending. 24 hour urine for UPEP/Bence Reece proteins.      DIANA on CKD  Hx renal cell cancer s/p nephrectomy  - solitary kidney (left); Permacath placement today. HD started on 2018  - rapidly worsening renal function; creat 5.3 today vs 2.49 upon admission  - nephrology following; HD every other day with plasmapheresis in alternative day       Acute hypoxemic respiratory failure secondary to influenza A with pneumonia acute on chronic COPD exacerbation  Sepsis developing on admission  - wean off O2 as tolerate; O2 Sat 93% @ 3L via n/c  - staph coag (-); perceived as contaminant at this time. Vanc has been d/c'd.  - continue with tamiflu  - steroid taper, will hold when he is schedule to receive Dex. Completed zithromax. Scheduled to finish rocephin on 2018  - continue with breo and duoneb  - send sputum cx if avilable  - repeat CXR: New small left pleural effusion.   - CT chest Prominent emphysema.  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         Recommended Disposition: Home health   Pt will be transfer to heme/onc floor, continue with telemetry monitoring  Discussed with pt and his wife about current medical therapy and code status. Mrs. Ruddy Hernandez would like to think about this and then discuss with the pt. Pt would like to talk over with his wife. Subjective:     Chief Complaint / Reason for Physician Visit  \"I feel ok. I would like to have some carnation milk shake\". Discussed with RN events overnight. Review of Systems:  Symptom Y/N Comments  Symptom Y/N Comments   Fever/Chills n   Chest Pain n    Poor Appetite    Edema     Cough n   Abdominal Pain     Sputum    Joint Pain     SOB/HUERTA y   Pruritis/Rash     Nausea/vomit n   Tolerating PT/OT     Diarrhea    Tolerating Diet     Constipation    Other       Could NOT obtain due to:      Objective:     VITALS:   Last 24hrs VS reviewed since prior progress note.  Most recent are:  Patient Vitals for the past 24 hrs:   Temp Pulse Resp BP SpO2   02/24/18 0727 97.8 °F (36.6 °C) 79 20 125/46 94 %   02/24/18 0721 - - - - 91 %   02/24/18 0334 - - - - 93 %   02/24/18 0323 97.9 °F (36.6 °C) 78 20 125/54 93 %   02/24/18 0016 - - - - 93 %   02/23/18 2315 97.3 °F (36.3 °C) 81 20 131/55 95 %   02/23/18 2123 - - - - 95 %   02/23/18 1913 97.7 °F (36.5 °C) 66 20 130/66 -   02/23/18 1905 97.7 °F (36.5 °C) 65 22 133/60 -   02/23/18 1850 97.6 °F (36.4 °C) 64 24 136/67 -   02/23/18 1835 97.8 °F (36.6 °C) 64 22 154/68 -   02/23/18 1821 97.8 °F (36.6 °C) 62 22 153/68 -   02/23/18 1817 97.8 °F (36.6 °C) 62 22 159/73 95 %   02/23/18 1506 - - - - 93 %   02/23/18 1450 97.6 °F (36.4 °C) 67 20 115/53 93 %   02/23/18 1145 - - - - 91 %   02/23/18 1059 97.5 °F (36.4 °C) 73 20 136/58 95 %       Intake/Output Summary (Last 24 hours) at 02/24/18 0940  Last data filed at 02/24/18 0323   Gross per 24 hour   Intake              408 ml   Output             4492 ml   Net            -4084 ml        PHYSICAL EXAM:  General: Ill appearing. Alert, cooperative, no acute distress    EENT:  EOMI. Anicteric sclerae. MMM  Resp:  Coarse breath sound in apex with decreased breath sounds at bases, no wheezing or rales. No accessory muscle use  CV:  Regular  rhythm,  No edema  GI:  Soft, Non distended, Non tender.  +Bowel sounds  Neurologic:  Alert and oriented X 3, normal speech,   Psych:   Good insight. Not anxious nor agitated  Skin:  No rashes. No jaundice    Reviewed most current lab test results and cultures  YES  Reviewed most current radiology test results   YES  Review and summation of old records today    NO  Reviewed patient's current orders and MAR    YES  PMH/ reviewed - no change compared to H&P  ________________________________________________________________________  Care Plan discussed with:    Comments   Patient y    Family      RN y    Care Manager     Consultant                        Multidiciplinary team rounds were held today with , nursing, pharmacist and clinical coordinator. Patient's plan of care was discussed; medications were reviewed and discharge planning was addressed. ________________________________________________________________________  Total NON critical care TIME:  30   Minutes    Total CRITICAL CARE TIME Spent:   Minutes non procedure based      Comments   >50% of visit spent in counseling and coordination of care     ________________________________________________________________________  Kate Foots, NP     Procedures: see electronic medical records for all procedures/Xrays and details which were not copied into this note but were reviewed prior to creation of Plan.       LABS:  I reviewed today's most current labs and imaging studies.   Pertinent labs include:  Recent Labs      02/24/18 0325 02/23/18   0351  02/22/18   0023   WBC  9.4  8.5  12.8*   HGB  9.6*  10.8*  11.6*   HCT  28.2*  32.5*  35.1*   PLT  166  206  236     Recent Labs      02/24/18 0325  02/23/18   0351  02/22/18   1110  02/22/18   0023   NA  137  137   --   135*   K  5.0  4.6   --   3.8   CL  109*  107   --   103   CO2  17*  20*   --   20*   GLU  307*  243*   --   329*   BUN  99*  82*   --   107*   CREA  5.39*  4.64*   --   5.49*   CA  9.0  9.3   --   10.0   MG  2.3  2.6*   --   2.3   PHOS   --    --    --   3.7   ALB   --    --   2.2*   --    TBILI   --    --   0.4   --    SGOT   --    --   22   --    ALT   --    --   24   --        Signed: )David Gil, NP

## 2018-02-25 LAB
ALBUMIN SERPL-MCNC: 2.9 G/DL (ref 3.5–5)
ALBUMIN/GLOB SERPL: 1.1 {RATIO} (ref 1.1–2.2)
ALP SERPL-CCNC: 32 U/L (ref 45–117)
ALT SERPL-CCNC: 23 U/L (ref 12–78)
ANION GAP SERPL CALC-SCNC: 10 MMOL/L (ref 5–15)
AST SERPL-CCNC: 19 U/L (ref 15–37)
B2 MICROGLOB SERPL-MCNC: 12.1 MG/L (ref 0.6–2.4)
BACTERIA SPEC CULT: ABNORMAL
BACTERIA SPEC CULT: ABNORMAL
BACTERIA SPEC CULT: NORMAL
BACTERIA SPEC CULT: NORMAL
BILIRUB SERPL-MCNC: 0.5 MG/DL (ref 0.2–1)
BUN SERPL-MCNC: 78 MG/DL (ref 6–20)
BUN/CREAT SERPL: 17 (ref 12–20)
CALCIUM SERPL-MCNC: 9.1 MG/DL (ref 8.5–10.1)
CHLORIDE SERPL-SCNC: 106 MMOL/L (ref 97–108)
CO2 SERPL-SCNC: 22 MMOL/L (ref 21–32)
CREAT SERPL-MCNC: 4.61 MG/DL (ref 0.7–1.3)
ERYTHROCYTE [DISTWIDTH] IN BLOOD BY AUTOMATED COUNT: 17.2 % (ref 11.5–14.5)
GLOBULIN SER CALC-MCNC: 2.6 G/DL (ref 2–4)
GLUCOSE BLD STRIP.AUTO-MCNC: 154 MG/DL (ref 65–100)
GLUCOSE BLD STRIP.AUTO-MCNC: 185 MG/DL (ref 65–100)
GLUCOSE BLD STRIP.AUTO-MCNC: 208 MG/DL (ref 65–100)
GLUCOSE BLD STRIP.AUTO-MCNC: 293 MG/DL (ref 65–100)
GLUCOSE SERPL-MCNC: 144 MG/DL (ref 65–100)
HCT VFR BLD AUTO: 30.1 % (ref 36.6–50.3)
HGB BLD-MCNC: 10.1 G/DL (ref 12.1–17)
INR PPP: 1.2 (ref 0.9–1.1)
MAGNESIUM SERPL-MCNC: 2.3 MG/DL (ref 1.6–2.4)
MCH RBC QN AUTO: 26.9 PG (ref 26–34)
MCHC RBC AUTO-ENTMCNC: 33.6 G/DL (ref 30–36.5)
MCV RBC AUTO: 80.1 FL (ref 80–99)
NRBC # BLD: 0 K/UL (ref 0–0.01)
NRBC BLD-RTO: 0 PER 100 WBC
PHOSPHATE SERPL-MCNC: 4.9 MG/DL (ref 2.6–4.7)
PLATELET # BLD AUTO: 185 K/UL (ref 150–400)
PMV BLD AUTO: 11.1 FL (ref 8.9–12.9)
POTASSIUM SERPL-SCNC: 4.7 MMOL/L (ref 3.5–5.1)
PROT SERPL-MCNC: 5.5 G/DL (ref 6.4–8.2)
PROTHROMBIN TIME: 12.2 SEC (ref 9–11.1)
RBC # BLD AUTO: 3.76 M/UL (ref 4.1–5.7)
SERVICE CMNT-IMP: ABNORMAL
SERVICE CMNT-IMP: NORMAL
SERVICE CMNT-IMP: NORMAL
SODIUM SERPL-SCNC: 138 MMOL/L (ref 136–145)
WBC # BLD AUTO: 12.2 K/UL (ref 4.1–11.1)

## 2018-02-25 PROCEDURE — 85027 COMPLETE CBC AUTOMATED: CPT | Performed by: INTERNAL MEDICINE

## 2018-02-25 PROCEDURE — 82962 GLUCOSE BLOOD TEST: CPT

## 2018-02-25 PROCEDURE — 74011250637 HC RX REV CODE- 250/637: Performed by: NURSE PRACTITIONER

## 2018-02-25 PROCEDURE — 84100 ASSAY OF PHOSPHORUS: CPT | Performed by: INTERNAL MEDICINE

## 2018-02-25 PROCEDURE — 77010033678 HC OXYGEN DAILY

## 2018-02-25 PROCEDURE — 74011250636 HC RX REV CODE- 250/636: Performed by: NURSE PRACTITIONER

## 2018-02-25 PROCEDURE — 65660000000 HC RM CCU STEPDOWN

## 2018-02-25 PROCEDURE — 74011636637 HC RX REV CODE- 636/637: Performed by: EMERGENCY MEDICINE

## 2018-02-25 PROCEDURE — 77030018719 HC DRSG PTCH ANTIMIC J&J -A

## 2018-02-25 PROCEDURE — 36514 APHERESIS PLASMA: CPT

## 2018-02-25 PROCEDURE — 77030031139 HC SUT VCRL2 J&J -A

## 2018-02-25 PROCEDURE — 83735 ASSAY OF MAGNESIUM: CPT | Performed by: INTERNAL MEDICINE

## 2018-02-25 PROCEDURE — 36415 COLL VENOUS BLD VENIPUNCTURE: CPT | Performed by: INTERNAL MEDICINE

## 2018-02-25 PROCEDURE — 80053 COMPREHEN METABOLIC PANEL: CPT | Performed by: INTERNAL MEDICINE

## 2018-02-25 PROCEDURE — 74011000258 HC RX REV CODE- 258: Performed by: INTERNAL MEDICINE

## 2018-02-25 PROCEDURE — 85610 PROTHROMBIN TIME: CPT | Performed by: NURSE PRACTITIONER

## 2018-02-25 PROCEDURE — 74011636637 HC RX REV CODE- 636/637: Performed by: NURSE PRACTITIONER

## 2018-02-25 PROCEDURE — 77030002996 HC SUT SLK J&J -A

## 2018-02-25 PROCEDURE — 74011250637 HC RX REV CODE- 250/637: Performed by: INTERNAL MEDICINE

## 2018-02-25 PROCEDURE — 77030014006 HC SPNG HEMSTAT J&J -A

## 2018-02-25 PROCEDURE — 74011250636 HC RX REV CODE- 250/636: Performed by: EMERGENCY MEDICINE

## 2018-02-25 PROCEDURE — P9045 ALBUMIN (HUMAN), 5%, 250 ML: HCPCS | Performed by: INTERNAL MEDICINE

## 2018-02-25 PROCEDURE — 74011250636 HC RX REV CODE- 250/636: Performed by: INTERNAL MEDICINE

## 2018-02-25 PROCEDURE — 74011000250 HC RX REV CODE- 250: Performed by: INTERNAL MEDICINE

## 2018-02-25 PROCEDURE — 94640 AIRWAY INHALATION TREATMENT: CPT

## 2018-02-25 PROCEDURE — 74011250637 HC RX REV CODE- 250/637: Performed by: EMERGENCY MEDICINE

## 2018-02-25 RX ORDER — NYSTATIN 100000 [USP'U]/ML
500000 SUSPENSION ORAL 4 TIMES DAILY
Status: COMPLETED | OUTPATIENT
Start: 2018-02-25 | End: 2018-02-27

## 2018-02-25 RX ADMIN — IPRATROPIUM BROMIDE AND ALBUTEROL SULFATE 3 ML: .5; 3 SOLUTION RESPIRATORY (INHALATION) at 11:01

## 2018-02-25 RX ADMIN — INSULIN LISPRO 2 UNITS: 100 INJECTION, SOLUTION INTRAVENOUS; SUBCUTANEOUS at 22:38

## 2018-02-25 RX ADMIN — SUCRALFATE 1 G: 1 SUSPENSION ORAL at 16:31

## 2018-02-25 RX ADMIN — GUAIFENESIN 600 MG: 600 TABLET, EXTENDED RELEASE ORAL at 09:39

## 2018-02-25 RX ADMIN — ALBUMIN (HUMAN) 175 G: 12.5 INJECTION, SOLUTION INTRAVENOUS at 15:30

## 2018-02-25 RX ADMIN — INSULIN LISPRO 5 UNITS: 100 INJECTION, SOLUTION INTRAVENOUS; SUBCUTANEOUS at 12:47

## 2018-02-25 RX ADMIN — NYSTATIN 500000 UNITS: 100000 SUSPENSION ORAL at 17:39

## 2018-02-25 RX ADMIN — INSULIN LISPRO 5 UNITS: 100 INJECTION, SOLUTION INTRAVENOUS; SUBCUTANEOUS at 09:44

## 2018-02-25 RX ADMIN — DILTIAZEM HYDROCHLORIDE 240 MG: 240 CAPSULE, COATED, EXTENDED RELEASE ORAL at 09:48

## 2018-02-25 RX ADMIN — INSULIN LISPRO 3 UNITS: 100 INJECTION, SOLUTION INTRAVENOUS; SUBCUTANEOUS at 09:43

## 2018-02-25 RX ADMIN — SUCRALFATE 1 G: 1 SUSPENSION ORAL at 11:46

## 2018-02-25 RX ADMIN — FLUTICASONE FUROATE AND VILANTEROL TRIFENATATE 1 PUFF: 100; 25 POWDER RESPIRATORY (INHALATION) at 09:48

## 2018-02-25 RX ADMIN — HEPARIN SODIUM 5000 UNITS: 5000 INJECTION, SOLUTION INTRAVENOUS; SUBCUTANEOUS at 17:39

## 2018-02-25 RX ADMIN — IPRATROPIUM BROMIDE AND ALBUTEROL SULFATE 3 ML: .5; 3 SOLUTION RESPIRATORY (INHALATION) at 19:18

## 2018-02-25 RX ADMIN — PANTOPRAZOLE SODIUM 40 MG: 40 TABLET, DELAYED RELEASE ORAL at 09:40

## 2018-02-25 RX ADMIN — GUAIFENESIN 600 MG: 600 TABLET, EXTENDED RELEASE ORAL at 17:39

## 2018-02-25 RX ADMIN — Medication 10 ML: at 22:36

## 2018-02-25 RX ADMIN — INSULIN GLARGINE 20 UNITS: 100 INJECTION, SOLUTION SUBCUTANEOUS at 09:00

## 2018-02-25 RX ADMIN — INSULIN GLARGINE 38 UNITS: 100 INJECTION, SOLUTION SUBCUTANEOUS at 22:36

## 2018-02-25 RX ADMIN — CALCIUM GLUCONATE 1 G: 94 INJECTION, SOLUTION INTRAVENOUS at 15:29

## 2018-02-25 RX ADMIN — SUCRALFATE 1 G: 1 SUSPENSION ORAL at 09:39

## 2018-02-25 RX ADMIN — PREDNISONE 40 MG: 20 TABLET ORAL at 09:40

## 2018-02-25 RX ADMIN — INSULIN LISPRO 7 UNITS: 100 INJECTION, SOLUTION INTRAVENOUS; SUBCUTANEOUS at 12:48

## 2018-02-25 RX ADMIN — CEFTRIAXONE SODIUM 1 G: 1 INJECTION, POWDER, FOR SOLUTION INTRAMUSCULAR; INTRAVENOUS at 22:35

## 2018-02-25 RX ADMIN — MONTELUKAST SODIUM 10 MG: 10 TABLET, COATED ORAL at 09:40

## 2018-02-25 RX ADMIN — SUCRALFATE 1 G: 1 SUSPENSION ORAL at 22:35

## 2018-02-25 RX ADMIN — INSULIN LISPRO 3 UNITS: 100 INJECTION, SOLUTION INTRAVENOUS; SUBCUTANEOUS at 18:04

## 2018-02-25 RX ADMIN — UMECLIDINIUM 1 PUFF: 62.5 AEROSOL, POWDER ORAL at 09:48

## 2018-02-25 RX ADMIN — NYSTATIN 500000 UNITS: 100000 SUSPENSION ORAL at 22:35

## 2018-02-25 RX ADMIN — PANTOPRAZOLE SODIUM 40 MG: 40 TABLET, DELAYED RELEASE ORAL at 16:31

## 2018-02-25 NOTE — PROGRESS NOTES
Mr Ana Lugo site was bleeding for several hours. Attempts to stop it with pressure and dressing changes proved futile. I undressed and cleansed the area with chloroprep, draped numbed and   purse- stringed the insertion site with 2-0 vicryl. Site was dressed using a bio-patch, a gel foam and a tegederm. There is no oozing currently.

## 2018-02-25 NOTE — PROGRESS NOTES
1430: Greyson RN at bedside to perform plasmapheresis on patient. 1100: Someone from IR has come by and put stitches in to help with the bleeding around the hemodialysis line. 0920: Patient has bleeding from his hemodialysis line. Bev Lezama NP made aware. Bev Lezama paged and talked to IR and requests that someone come and do something to help with the bleeding.

## 2018-02-25 NOTE — PROGRESS NOTES
Problem: Falls - Risk of  Goal: *Absence of Falls  Document Conrad Fall Risk and appropriate interventions in the flowsheet.    Outcome: Progressing Towards Goal  Fall Risk Interventions:  Mobility Interventions: Bed/chair exit alarm         Medication Interventions: Bed/chair exit alarm    Elimination Interventions: Call light in reach, Bed/chair exit alarm

## 2018-02-25 NOTE — PROGRESS NOTES
[facility] Hematology-Oncology Progress Note      Ponce Begin  1940  [id number]      2/25/2018  12:24 PM    Follow-up for: Smoldering Myeloma, DIANA     [x] Chart notes since last visit reviewed     [] Medications reviewed for allergies and interactions    Patient complains of the following:  Myeloma      Subjective:     Some difficulty swallowing- stable/ chronic  Remains w/o f/c, no n/v      Objective:     Patient Vitals for the past 24 hrs:   BP Temp Pulse Resp SpO2 Weight   02/25/18 0800 142/70 97.6 °F (36.4 °C) 92 20 95 % -   02/25/18 0702 - - - - - 78.9 kg (173 lb 15.1 oz)   02/25/18 0312 133/61 98 °F (36.7 °C) 87 20 95 % -   02/24/18 2352 127/49 97.7 °F (36.5 °C) 87 20 95 % -   02/24/18 1921 - - - - 93 % -   02/24/18 1814 146/72 97.9 °F (36.6 °C) 89 22 95 % -   02/24/18 1640 119/63 98 °F (36.7 °C) 80 20 - -   02/24/18 1600 118/58 - 75 18 - -   02/24/18 1530 114/64 - 71 18 - -   02/24/18 1500 136/64 - 74 20 - -   02/24/18 1430 124/61 - 66 18 - -   02/24/18 1411 143/72 98 °F (36.7 °C) 69 20 - -       Physical Exam:  MS-Alert,  General - chronically ill appearing  CVS-S1,S2 normal  RS-Scattered b/l wheezes  Abdomen- Soft, NT,ND, BS+  Extre- No c/c/e  Neuro- No FND.     Available labs reviewed:  Labs:    Recent Results (from the past 24 hour(s))   GLUCOSE, POC    Collection Time: 02/24/18 11:05 AM   Result Value Ref Range    Glucose (POC) 341 (H) 65 - 100 mg/dL    Performed by Cinthia Simental, POC    Collection Time: 02/24/18  5:36 PM   Result Value Ref Range    Glucose (POC) 182 (H) 65 - 100 mg/dL    Performed by Rosalba Chong (PCT)    GLUCOSE, POC    Collection Time: 02/24/18  9:54 PM   Result Value Ref Range    Glucose (POC) 154 (H) 65 - 100 mg/dL    Performed by Carlito Maki    METABOLIC PANEL, COMPREHENSIVE    Collection Time: 02/25/18  3:46 AM   Result Value Ref Range    Sodium 138 136 - 145 mmol/L    Potassium 4.7 3.5 - 5.1 mmol/L    Chloride 106 97 - 108 mmol/L    CO2 22 21 - 32 mmol/L    Anion gap 10 5 - 15 mmol/L    Glucose 144 (H) 65 - 100 mg/dL    BUN 78 (H) 6 - 20 MG/DL    Creatinine 4.61 (H) 0.70 - 1.30 MG/DL    BUN/Creatinine ratio 17 12 - 20      GFR est AA 15 (L) >60 ml/min/1.73m2    GFR est non-AA 12 (L) >60 ml/min/1.73m2    Calcium 9.1 8.5 - 10.1 MG/DL    Bilirubin, total 0.5 0.2 - 1.0 MG/DL    ALT (SGPT) 23 12 - 78 U/L    AST (SGOT) 19 15 - 37 U/L    Alk. phosphatase 32 (L) 45 - 117 U/L    Protein, total 5.5 (L) 6.4 - 8.2 g/dL    Albumin 2.9 (L) 3.5 - 5.0 g/dL    Globulin 2.6 2.0 - 4.0 g/dL    A-G Ratio 1.1 1.1 - 2.2     PHOSPHORUS    Collection Time: 02/25/18  3:46 AM   Result Value Ref Range    Phosphorus 4.9 (H) 2.6 - 4.7 MG/DL   MAGNESIUM    Collection Time: 02/25/18  3:46 AM   Result Value Ref Range    Magnesium 2.3 1.6 - 2.4 mg/dL   CBC W/O DIFF    Collection Time: 02/25/18  3:46 AM   Result Value Ref Range    WBC 12.2 (H) 4.1 - 11.1 K/uL    RBC 3.76 (L) 4.10 - 5.70 M/uL    HGB 10.1 (L) 12.1 - 17.0 g/dL    HCT 30.1 (L) 36.6 - 50.3 %    MCV 80.1 80.0 - 99.0 FL    MCH 26.9 26.0 - 34.0 PG    MCHC 33.6 30.0 - 36.5 g/dL    RDW 17.2 (H) 11.5 - 14.5 %    PLATELET 429 425 - 875 K/uL    MPV 11.1 8.9 - 12.9 FL    NRBC 0.0 0  WBC    ABSOLUTE NRBC 0.00 0.00 - 0.01 K/uL   PROTHROMBIN TIME + INR    Collection Time: 02/25/18  3:46 AM   Result Value Ref Range    INR 1.2 (H) 0.9 - 1.1      Prothrombin time 12.2 (H) 9.0 - 11.1 sec   GLUCOSE, POC    Collection Time: 02/25/18  8:04 AM   Result Value Ref Range    Glucose (POC) 154 (H) 65 - 100 mg/dL    Performed by Omega Diaz        Imaging:  CXR Results  (Last 48 hours)               02/23/18 1540  XR CHEST PA LAT Final result    Impression:  IMPRESSION:   1.   New small left pleural effusion. 2.  Small bibasilar airspace opacities seen on prior cross-sectional imaging are   difficult to appreciate radiographically.            Narrative:  EXAM:  XR CHEST PA LAT       INDICATION: reeval pneumonia/hypoxia       COMPARISON: Chest x-ray 2/19/2018 and chest CT 2/19/2018. TECHNIQUE: Frontal and lateral views of the chest       FINDINGS:  A small left pleural effusion has developed since the comparison   exams. Small bibasilar airspace opacities are difficult to appreciate   radiographically but were seen on cross-sectional imaging. No visualized   pneumothorax. A right IJ catheter terminates near the cavoatrial junction. Unchanged cardiomediastinal silhouette with atherosclerotic calcifications of   the aorta. .                  CT Results  (Last 48 hours)    None            Assessment:   Influenza  History of smoldering myeloma  Acute on chronic renal failure attributed to myeloma  Hypercalcemia  DM- worsened by steroids    Plan:     Multiple myeloma- IgG lambda on JAIMIE, total IgG 2293. SFLC: lambda 1780.  ---- Skeletal survey - 2/21/2018 - with multiple small lytic lesions in skull, humeri  ---- renal failure  ---- hypercalcemia  ---- bmbx 2/22/18: 50% involvement by monoclonal plasma cells  ---- started CyBoRD regimen - C1D1- 2/23/2018 - Cytoxan with 20% dose reduction and full dose of Velcade with 40 mg Dex after HD and plamspharesis. Continue weekly    DIANA  sec to underlying Multiple Myeloma.  HD, plasmapheresis per nephrology  ---- deferring antiresorptive tx for now given AOCRF    Stable, continue plan as above

## 2018-02-25 NOTE — PROCEDURES
[facility]   [phone]   Vitals Pre Post Assessment Pre Post   /67 121/62 LOC A&Ox4 A&Ox4   HR 76 83 Lungs Clear&bronchovescicularx5, even&unlaboured. Clear&broncho  vescicularx5, even&unlabour  ed. Temp 97.7 97.6 Cardiac Reg rate&NSR Reg rate&NSR   Resp 18 16 Skin Warm & dry Warm & dry   Weight 80kg 80kg Edema None noted None noted   Height 6' 0\" 6' 0\" Pain denies denies     Plasmapheresis Orders   Product: 5% Albumin IV                                            Volume: 3.5L                                               Duration: min Start: 1517 End: 1605 Total: >70     Fluids    Volume Processed: 6357ml   Plasma Removed: 4054ml   Replacement Fluid: 3426ml   Citrate: 41ml   NS: 100ml (solvent for calcium gluconate)     Access   Type & Location: RIJ catheter   Comments: Exhibits good flows upon aspiration; new dressing dry and intact and s drainage nor inflammation noted around biopatch. Post-tx all possible blood returned via full rinceback. Both catheter ports flushed and indwelled c 0.9% NS and capped and taped. Meds Given   Name Dose Route        Calcium Gluconate 1gram administered slow IVPB over tx course s difficulties               Labs   Obtained/Reviewed  Critical Results Called CBC, plts  MD aware     Comments:  Pre-tx consents verified/ICEBOAT time-out performed. Tx progressed well and without incident; writer left pts room c pt in no new acute distress and denying complaints c stable vss WNL, bed in lowest position c side rails upx3, wheels lockedx4, and call bell within reach. Reported off to [employer]. Tanisha Seymour RN.

## 2018-02-25 NOTE — PROGRESS NOTES
Dialysis catheter site draining blood. Draining around to back of neck and down chest. Clean area and applied new dressing. 2045  Dressing around dialysis cathether change due to bloody drainage. Pt has no complaints of pain. Will cont to monitor. 2330  Dressing change around dialysis cathether change around. Will cont to monitor pt. Pt has no complaints        2/25/18    0400 Patient bleeding from dialysis catheter. Pt has blood draining from site going down to Left side of neck to his back. Change patient's gown. Change dressing. Change linens. No c/o pain. Will cont to monitor pt    0754 inform Dr. Mami Herrera of catheter site bleeding. Per Dr. Mami Herrera, apply surgifoam dressing and if it continues to bleed then call radiology to possible put a stitch in.  Inform day shift nurse

## 2018-02-25 NOTE — PROGRESS NOTES
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     Hypercalcemia    Mild anemia    DM2    HTN    Influenza A    COPD --Initiated HD on ; HD yesterday and every other day for now    I suspect that his DIANA is r/t to plasma cell d/o     Free light chains - lambda 1780 on ; 24 hour urine light chains pending    Intiated chemo for MM; initiated plasma exchange in conjunction with chemo on  (monitor response in light chains - look for a 50-60 % drop after 5 PLEX treatments); every other day PLEX x 5; 1 plasma volume; replace with albumin; PLEX # 2 today      spot urine protein:creatinine 3.6 grams    Oncology following closely       Subjective:     Chief Complaint:  Feeling ok today; looks good considering. not sob. We had another talk about the above. So far he is doing well with HD and with PLEX. Oozing from [facility] line has finally stopped. C/o heartburn. Review of Systems:    Symptom Y/N Comments  Symptom Y/N Comments   Fever/Chills    Chest Pain n    Poor Appetite n   Edema n    Cough y   Abdominal Pain     Sputum    Joint Pain     SOB/HUERTA n better  Pruritis/Rash     Nausea/vomit n   Tolerating PT/OT     Diarrhea    Tolerating Diet     Constipation    Other       Could not obtain due to:      Objective:     VITALS:   Last 24hrs VS reviewed since prior progress note.  Most recent are:  Visit Vitals    /61 (BP 1 Location: Right arm)    Pulse 87    Temp 98 °F (36.7 °C)    Resp 20    Ht 6' (1.829 m)    Wt 83.6 kg (184 lb 4.9 oz)    SpO2 95%    BMI 25 kg/m2       Intake/Output Summary (Last 24 hours) at 18 0625  Last data filed at 18 0002   Gross per 24 hour   Intake                0 ml   Output             1850 ml   Net            -1850 ml      Telemetry Reviewed: PHYSICAL EXAM:  General: WD, WN. Alert, cooperative, no acute distress  Resp:  A few Rhonchi. CV:  Regular  rhythm,  No murmur (), No Rubs, No Gallops. trace edema  GI:  Soft, Non distended, Non tender.  +Bowel sounds, no HSM      Lab Data Reviewed: (see below)    Medications Reviewed: (see below)    PMH/SH reviewed - no change compared to H&P  ________________________________________________________________________  Care Plan discussed with:  Patient y    Family      RN y    Care Manager                    Consultant:          Comments   >50% of visit spent in counseling and coordination of care       ________________________________________________________________________  Vilma Latham MD     Procedures: see electronic medical records for all procedures/Xrays and details which  were not copied into this note but were reviewed prior to creation of Plan. LABS:  Recent Labs      02/25/18   0346  02/24/18   0325   WBC  12.2*  9.4   HGB  10.1*  9.6*   HCT  30.1*  28.2*   PLT  185  166     Recent Labs      02/25/18   0346  02/24/18   0325  02/23/18   0351   NA  138  137  137   K  4.7  5.0  4.6   CL  106  109*  107   CO2  22  17*  20*   BUN  78*  99*  82*   CREA  4.61*  5.39*  4.64*   GLU  144*  307*  243*   CA  9.1  9.0  9.3   MG  2.3  2.3  2.6*   PHOS  4.9*   --    --      Recent Labs      02/25/18   0346  02/22/18   1110   SGOT  19  22   AP  32*  76   TP  5.5*  7.9   ALB  2.9*  2.2*   GLOB  2.6  5.7*     Recent Labs      02/25/18   0346   INR  1.2*   PTP  12.2*      No results for input(s): FE, TIBC, PSAT, FERR in the last 72 hours. No results found for: FOL, RBCF   No results for input(s): PH, PCO2, PO2 in the last 72 hours. No results for input(s): CPK, CKMB in the last 72 hours.     No lab exists for component: TROPONINI  No components found for: [place]  Lab Results   Component Value Date/Time    Color YELLOW/STRAW 02/19/2018 03:07 PM    Appearance CLOUDY (A) 02/19/2018 03:07 PM    Specific gravity 1.020 02/19/2018 03:07 PM    pH (UA) 5.5 02/19/2018 03:07 PM    Protein 100 (A) 02/19/2018 03:07 PM    Glucose NEGATIVE  02/19/2018 03:07 PM    Ketone NEGATIVE  02/19/2018 03:07 PM    Bilirubin NEGATIVE  02/19/2018 03:07 PM    Urobilinogen 0.2 02/19/2018 03:07 PM    Nitrites NEGATIVE  02/19/2018 03:07 PM    Leukocyte Esterase NEGATIVE  02/19/2018 03:07 PM    Epithelial cells FEW 02/19/2018 03:07 PM    Bacteria 1+ (A) 02/19/2018 03:07 PM    WBC 0-4 02/19/2018 03:07 PM    RBC 0-5 02/19/2018 03:07 PM       MEDICATIONS:  Current Facility-Administered Medications   Medication Dose Route Frequency    insulin glargine (LANTUS) injection 38 Units  38 Units SubCUTAneous QHS    insulin glargine (LANTUS) injection 20 Units  20 Units SubCUTAneous DAILY    predniSONE (DELTASONE) tablet 40 mg  40 mg Oral DAILY    [START ON 2/27/2018] predniSONE (DELTASONE) tablet 20 mg  20 mg Oral Once per day on Tue Wed Thu    albuterol-ipratropium (DUO-NEB) 2.5 MG-0.5 MG/3 ML  3 mL Nebulization BID RT    calcium gluconate 1 g in 0.9% sodium chloride 100 mL IVPB  1 g IntraVENous Q48H PRN    albumin human 5% (BUMINATE) solution 175 g  175 g IntraVENous Q48H    ondansetron (ZOFRAN) injection 4 mg  4 mg IntraVENous Q6H PRN    hydrALAZINE (APRESOLINE) 20 mg/mL injection 20 mg  20 mg IntraVENous Q6H PRN    midazolam (VERSED) injection 5 mg  5 mg IntraVENous Multiple    pantoprazole (PROTONIX) tablet 40 mg  40 mg Oral ACB&D    sucralfate (CARAFATE) 100 mg/mL oral suspension 1 g  1 g Oral AC&HS    insulin lispro (HUMALOG) injection 5 Units  5 Units SubCUTAneous TIDAC    melatonin tablet 3 mg  3 mg Oral QHS PRN    insulin lispro (HUMALOG) injection   SubCUTAneous AC&HS    heparin (porcine) injection 5,000 Units  5,000 Units SubCUTAneous Q8H    sodium chloride (NS) flush 5-10 mL  5-10 mL IntraVENous PRN    guaiFENesin ER (MUCINEX) tablet 600 mg  600 mg Oral BID    albuterol-ipratropium (DUO-NEB) 2.5 MG-0.5 MG/3 ML  3 mL Nebulization Q4H PRN    cefTRIAXone (ROCEPHIN) 1 g/50 mL NS IVPB  1 g IntraVENous Q24H    acetaminophen (TYLENOL) tablet 650 mg  650 mg Oral Q4H PRN    ondansetron (ZOFRAN) injection 4 mg  4 mg IntraVENous Q4H PRN    glucose chewable tablet 16 g  4 Tab Oral PRN    dextrose (D50W) injection syrg 12.5-25 g  12.5-25 g IntraVENous PRN    glucagon (GLUCAGEN) injection 1 mg  1 mg IntraMUSCular PRN    aspirin chewable tablet 81 mg  81 mg Oral DAILY    clopidogrel (PLAVIX) tablet 75 mg  75 mg Oral DAILY    dilTIAZem CD (CARDIZEM CD) capsule 240 mg  240 mg Oral DAILY    montelukast (SINGULAIR) tablet 10 mg  10 mg Oral DAILY    umeclidinium (INCRUSE ELLIPTA) 62.5 mcg/actuation  1 Puff Inhalation DAILY    fluticasone-vilanterol (BREO ELLIPTA) 100mcg-25mcg/puff  1 Puff Inhalation DAILY

## 2018-02-25 NOTE — PROGRESS NOTES
Hospitalist Progress Note    NAME: Delfina Stovall   :  1940   MRN:  [id number]       Interim Hospital Summary: 66 y.o. male whom presented on 2018 with      Assessment / Plan:  Hx of Smoldering Myeloma/MGUS  Multiple myeloma  - heme/onc following; bone marrow bx from 2018 revealed 60% involvement by plasma cells with atypical , almost plasmablastic appearance, indicating an aggressive process. No evidence of plasma cell leukemia   - continue with plasma exchange in conjunction with chemo; Cytoxan with 20% dose reduction and full dose of Velcade with 40 mg Dex after HD and plamspharesis. This will be a weekly regimen;next dose due on Mar 2. Solumedrol will be on hold when pt is schedule to receive Dex. - SPEP, S FLC assay pending. 24 hour urine for UPEP/Bence Reece proteins.     DIANA on CKD  Hx renal cell cancer s/p nephrectomy  - solitary kidney (left); Permacath placement  (continuous of bleeding from [facility] cath site in am. We held ASA, plavix, and heparin. Bleeding resolved after seen by angio tech). HD started on 2018  - rapidly worsening renal function; creat 5.3 today vs 2.49 upon admission  - nephrology following; HD every other day with plasmapheresis in alternative day       Acute hypoxemic respiratory failure secondary to influenza A with pneumonia acute on chronic COPD exacerbation  Sepsis developing on admission  - difficult to wean off O2. Requires supplemental O2 between 3-4L to maintain O2 Sat 93%   - staph coag (-) from ; perceived as contaminant at this time. Vanc has been d/c'd.  - conmpleted tamiflu.  blood cx no growth  - steroid taper, will hold when he is schedule to receive Dex. Completed zithromax. Complete rocephin for 10 days (last dose should be )  - continue with breo and duoneb  - send sputum cx if avilable  - repeat CXR: New small left pleural effusion.   - CT chest Prominent emphysema.  Bibasilar infiltrates      HTN  - continue cardizem     Diabetes type 2 uncontrolled  - Lantus BID; dose will be adjusted as we monitor her blood glucose  - Hgb A1C 9.8      Hx CVA  - continue plavix and statin      GERD  - continue with PPI & sucralfate    ? Esophageal candidasis  - posterior pharynx mildly erythematous without white spots or coating. However, pt has been c/o burning sensation when swallowing and feels like food/liquid not going down freely. We will initiated with nystatin swish and swallow. We will consider GI consult tomorrow if the problem persists.      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         Recommended Disposition: Home health   Pt will be transfer to heme/onc floor, continue with telemetry monitoring  Discussed with pt and his wife about current medical therapy and code status. Mrs. Monica Sequeira would like to think about this and then discuss with the pt. Pt would like to talk over with his wife. Subjective:     Chief Complaint / Reason for Physician Visit  \"I have burning sensation with swallowing and feels like it is stuck or going down slowly. I am not coughing after I eat or drink\". Discussed with RN events overnight. Review of Systems:  Symptom Y/N Comments  Symptom Y/N Comments   Fever/Chills n   Chest Pain n    Poor Appetite    Edema     Cough n   Abdominal Pain     Sputum    Joint Pain     SOB/HUERTA y   Pruritis/Rash     Nausea/vomit n   Tolerating PT/OT     Diarrhea    Tolerating Diet     Constipation    Other       Could NOT obtain due to:      Objective:     VITALS:   Last 24hrs VS reviewed since prior progress note.  Most recent are:  Patient Vitals for the past 24 hrs:   Temp Pulse Resp BP SpO2   02/25/18 1149 97.5 °F (36.4 °C) 92 20 125/71 96 %   02/25/18 1119 97.7 °F (36.5 °C) 76 20 126/56 97 %   02/25/18 1101 - - - - 94 %   02/25/18 0800 97.6 °F (36.4 °C) 92 20 142/70 95 %   02/25/18 0312 98 °F (36.7 °C) 87 20 133/61 95 %   02/24/18 2352 97.7 °F (36.5 °C) 87 20 127/49 95 %   02/24/18 1921 - - - - 93 %   02/24/18 1814 97.9 °F (36.6 °C) 89 22 146/72 95 %   02/24/18 1640 98 °F (36.7 °C) 80 20 119/63 -   02/24/18 1600 - 75 18 118/58 -   02/24/18 1530 - 71 18 114/64 -   02/24/18 1500 - 74 20 136/64 -   02/24/18 1430 - 66 18 124/61 -   02/24/18 1411 98 °F (36.7 °C) 69 20 143/72 -       Intake/Output Summary (Last 24 hours) at 02/25/18 1352  Last data filed at 02/25/18 1251   Gross per 24 hour   Intake                0 ml   Output             1650 ml   Net            -1650 ml        PHYSICAL EXAM:  General: WD, WN. Alert, cooperative, no acute distress    EENT:  EOMI. Anicteric sclerae. MMM  Resp:  Coarse breath sounds with a few exp wheezing. No accessory muscle use  CV:  Regular  rhythm,  No edema  GI:  Soft, Non distended, Non tender.  +Bowel sounds  Neurologic:  Alert and oriented X 3, normal speech,   Psych:   Good insight. Not anxious nor agitated  Skin:  No rashes. No jaundice    Reviewed most current lab test results and cultures  YES  Reviewed most current radiology test results   YES  Review and summation of old records today    NO  Reviewed patient's current orders and MAR    YES  PMH/SH reviewed - no change compared to H&P  ________________________________________________________________________  Care Plan discussed with:    Comments   Patient y    Family      RN y    Care Manager     Consultant                        Multidiciplinary team rounds were held today with , nursing, pharmacist and clinical coordinator. Patient's plan of care was discussed; medications were reviewed and discharge planning was addressed.      ________________________________________________________________________  Total NON critical care TIME:  30 Minutes    Total CRITICAL CARE TIME Spent:   Minutes non procedure based      Comments   >50% of visit spent in counseling and coordination of care     ________________________________________________________________________  Kate Foots, NP     Procedures: see electronic medical records for all procedures/Xrays and details which were not copied into this note but were reviewed prior to creation of Plan. LABS:  I reviewed today's most current labs and imaging studies.   Pertinent labs include:  Recent Labs      02/25/18 0346 02/24/18 0325 02/23/18   0351   WBC  12.2*  9.4  8.5   HGB  10.1*  9.6*  10.8*   HCT  30.1*  28.2*  32.5*   PLT  185  166  206     Recent Labs      02/25/18 0346 02/24/18 0325  02/23/18   0351   NA  138  137  137   K  4.7  5.0  4.6   CL  106  109*  107   CO2  22  17*  20*   GLU  144*  307*  243*   BUN  78*  99*  82*   CREA  4.61*  5.39*  4.64*   CA  9.1  9.0  9.3   MG  2.3  2.3  2.6*   PHOS  4.9*   --    --    ALB  2.9*   --    --    TBILI  0.5   --    --    SGOT  19   --    --    ALT  23   --    --    INR  1.2*   --    --        Signed: )David Julian NP

## 2018-02-26 ENCOUNTER — APPOINTMENT (OUTPATIENT)
Dept: GENERAL RADIOLOGY | Age: 78
DRG: 871 | End: 2018-02-26
Attending: PHYSICIAN ASSISTANT
Payer: MEDICARE

## 2018-02-26 LAB
ALBUMIN 24H MFR UR ELPH: 21.3 %
ALPHA1 GLOB 24H MFR UR ELPH: 2 %
ALPHA2 GLOB 24H MFR UR ELPH: 10.3 %
ANION GAP SERPL CALC-SCNC: 11 MMOL/L (ref 5–15)
B-GLOBULIN MFR UR ELPH: 50.7 %
BUN SERPL-MCNC: 99 MG/DL (ref 6–20)
BUN/CREAT SERPL: 19 (ref 12–20)
CALCIUM SERPL-MCNC: 9 MG/DL (ref 8.5–10.1)
CHLORIDE SERPL-SCNC: 111 MMOL/L (ref 97–108)
CO2 SERPL-SCNC: 19 MMOL/L (ref 21–32)
COLLECT DURATION TIME UR: 24 HR
CREAT SERPL-MCNC: 5.09 MG/DL (ref 0.7–1.3)
ERYTHROCYTE [DISTWIDTH] IN BLOOD BY AUTOMATED COUNT: 17.3 % (ref 11.5–14.5)
GAMMA GLOB 24H MFR UR ELPH: 15.7 %
GLUCOSE BLD STRIP.AUTO-MCNC: 135 MG/DL (ref 65–100)
GLUCOSE BLD STRIP.AUTO-MCNC: 164 MG/DL (ref 65–100)
GLUCOSE BLD STRIP.AUTO-MCNC: 178 MG/DL (ref 65–100)
GLUCOSE BLD STRIP.AUTO-MCNC: 279 MG/DL (ref 65–100)
GLUCOSE BLD STRIP.AUTO-MCNC: 57 MG/DL (ref 65–100)
GLUCOSE BLD STRIP.AUTO-MCNC: 69 MG/DL (ref 65–100)
GLUCOSE BLD STRIP.AUTO-MCNC: 70 MG/DL (ref 65–100)
GLUCOSE BLD STRIP.AUTO-MCNC: 74 MG/DL (ref 65–100)
GLUCOSE SERPL-MCNC: 80 MG/DL (ref 65–100)
HCT VFR BLD AUTO: 29.2 % (ref 36.6–50.3)
HGB BLD-MCNC: 9.8 G/DL (ref 12.1–17)
INTERPRETATION UR IFE-IMP: ABNORMAL
LIPASE SERPL-CCNC: 159 U/L (ref 73–393)
M PROTEIN 24H MFR UR ELPH: 31.3 %
M PROTEIN 24H UR ELPH-MRATE: 166 MG/24 HR
MAGNESIUM SERPL-MCNC: 2.3 MG/DL (ref 1.6–2.4)
MCH RBC QN AUTO: 27.2 PG (ref 26–34)
MCHC RBC AUTO-ENTMCNC: 33.6 G/DL (ref 30–36.5)
MCV RBC AUTO: 81.1 FL (ref 80–99)
NOTE, 149533: ABNORMAL
NRBC # BLD: 0 K/UL (ref 0–0.01)
NRBC BLD-RTO: 0 PER 100 WBC
PHOSPHATE SERPL-MCNC: 5.6 MG/DL (ref 2.6–4.7)
PLATELET # BLD AUTO: 154 K/UL (ref 150–400)
PMV BLD AUTO: 10.5 FL (ref 8.9–12.9)
POTASSIUM SERPL-SCNC: 4.9 MMOL/L (ref 3.5–5.1)
PROT 24H UR-MRATE: 529 MG/24 HR (ref 30–150)
PROT UR-MCNC: 48.1 MG/DL
RBC # BLD AUTO: 3.6 M/UL (ref 4.1–5.7)
SERVICE CMNT-IMP: ABNORMAL
SERVICE CMNT-IMP: NORMAL
SODIUM SERPL-SCNC: 141 MMOL/L (ref 136–145)
SPECIMEN VOL ?TM UR: 1100 ML
WBC # BLD AUTO: 11.7 K/UL (ref 4.1–11.1)

## 2018-02-26 PROCEDURE — 74011250637 HC RX REV CODE- 250/637: Performed by: NURSE PRACTITIONER

## 2018-02-26 PROCEDURE — 97116 GAIT TRAINING THERAPY: CPT

## 2018-02-26 PROCEDURE — 74011636637 HC RX REV CODE- 636/637: Performed by: EMERGENCY MEDICINE

## 2018-02-26 PROCEDURE — 83690 ASSAY OF LIPASE: CPT | Performed by: NURSE PRACTITIONER

## 2018-02-26 PROCEDURE — 85027 COMPLETE CBC AUTOMATED: CPT | Performed by: INTERNAL MEDICINE

## 2018-02-26 PROCEDURE — 74011000250 HC RX REV CODE- 250: Performed by: INTERNAL MEDICINE

## 2018-02-26 PROCEDURE — 65660000000 HC RM CCU STEPDOWN

## 2018-02-26 PROCEDURE — 94640 AIRWAY INHALATION TREATMENT: CPT

## 2018-02-26 PROCEDURE — 82962 GLUCOSE BLOOD TEST: CPT

## 2018-02-26 PROCEDURE — 84100 ASSAY OF PHOSPHORUS: CPT | Performed by: INTERNAL MEDICINE

## 2018-02-26 PROCEDURE — 74011636637 HC RX REV CODE- 636/637: Performed by: NURSE PRACTITIONER

## 2018-02-26 PROCEDURE — 74011250636 HC RX REV CODE- 250/636: Performed by: NURSE PRACTITIONER

## 2018-02-26 PROCEDURE — 83735 ASSAY OF MAGNESIUM: CPT | Performed by: INTERNAL MEDICINE

## 2018-02-26 PROCEDURE — 76450000000

## 2018-02-26 PROCEDURE — 74011250637 HC RX REV CODE- 250/637: Performed by: EMERGENCY MEDICINE

## 2018-02-26 PROCEDURE — 36415 COLL VENOUS BLD VENIPUNCTURE: CPT | Performed by: INTERNAL MEDICINE

## 2018-02-26 PROCEDURE — 74220 X-RAY XM ESOPHAGUS 1CNTRST: CPT

## 2018-02-26 PROCEDURE — 74011250637 HC RX REV CODE- 250/637: Performed by: INTERNAL MEDICINE

## 2018-02-26 PROCEDURE — 77010033678 HC OXYGEN DAILY

## 2018-02-26 PROCEDURE — 80048 BASIC METABOLIC PNL TOTAL CA: CPT | Performed by: INTERNAL MEDICINE

## 2018-02-26 RX ORDER — INSULIN GLARGINE 100 [IU]/ML
15 INJECTION, SOLUTION SUBCUTANEOUS DAILY
Status: DISCONTINUED | OUTPATIENT
Start: 2018-02-27 | End: 2018-02-28

## 2018-02-26 RX ORDER — INSULIN GLARGINE 100 [IU]/ML
30 INJECTION, SOLUTION SUBCUTANEOUS
Status: DISCONTINUED | OUTPATIENT
Start: 2018-02-26 | End: 2018-02-27

## 2018-02-26 RX ADMIN — CEFTRIAXONE SODIUM 1 G: 1 INJECTION, POWDER, FOR SOLUTION INTRAMUSCULAR; INTRAVENOUS at 22:47

## 2018-02-26 RX ADMIN — NYSTATIN 500000 UNITS: 100000 SUSPENSION ORAL at 17:29

## 2018-02-26 RX ADMIN — CLOPIDOGREL BISULFATE 75 MG: 75 TABLET ORAL at 12:33

## 2018-02-26 RX ADMIN — NYSTATIN 500000 UNITS: 100000 SUSPENSION ORAL at 10:40

## 2018-02-26 RX ADMIN — UMECLIDINIUM 1 PUFF: 62.5 AEROSOL, POWDER ORAL at 10:42

## 2018-02-26 RX ADMIN — ASPIRIN 81 MG 81 MG: 81 TABLET ORAL at 12:33

## 2018-02-26 RX ADMIN — INSULIN GLARGINE 30 UNITS: 100 INJECTION, SOLUTION SUBCUTANEOUS at 21:32

## 2018-02-26 RX ADMIN — INSULIN LISPRO 4 UNITS: 100 INJECTION, SOLUTION INTRAVENOUS; SUBCUTANEOUS at 21:32

## 2018-02-26 RX ADMIN — PREDNISONE 40 MG: 20 TABLET ORAL at 12:34

## 2018-02-26 RX ADMIN — GUAIFENESIN 600 MG: 600 TABLET, EXTENDED RELEASE ORAL at 17:29

## 2018-02-26 RX ADMIN — MONTELUKAST SODIUM 10 MG: 10 TABLET, COATED ORAL at 12:34

## 2018-02-26 RX ADMIN — IPRATROPIUM BROMIDE AND ALBUTEROL SULFATE 3 ML: .5; 3 SOLUTION RESPIRATORY (INHALATION) at 21:52

## 2018-02-26 RX ADMIN — DEXTROSE MONOHYDRATE 25 G: 25 INJECTION, SOLUTION INTRAVENOUS at 09:11

## 2018-02-26 RX ADMIN — NYSTATIN 500000 UNITS: 100000 SUSPENSION ORAL at 12:34

## 2018-02-26 RX ADMIN — GUAIFENESIN 600 MG: 600 TABLET, EXTENDED RELEASE ORAL at 12:33

## 2018-02-26 RX ADMIN — INSULIN LISPRO 3 UNITS: 100 INJECTION, SOLUTION INTRAVENOUS; SUBCUTANEOUS at 11:30

## 2018-02-26 RX ADMIN — SUCRALFATE 1 G: 1 SUSPENSION ORAL at 16:21

## 2018-02-26 RX ADMIN — INSULIN LISPRO 5 UNITS: 100 INJECTION, SOLUTION INTRAVENOUS; SUBCUTANEOUS at 11:30

## 2018-02-26 RX ADMIN — SUCRALFATE 1 G: 1 SUSPENSION ORAL at 12:34

## 2018-02-26 RX ADMIN — SUCRALFATE 1 G: 1 SUSPENSION ORAL at 21:32

## 2018-02-26 RX ADMIN — NYSTATIN 500000 UNITS: 100000 SUSPENSION ORAL at 21:32

## 2018-02-26 RX ADMIN — SUCRALFATE 1 G: 1 SUSPENSION ORAL at 08:31

## 2018-02-26 RX ADMIN — FLUTICASONE FUROATE AND VILANTEROL TRIFENATATE 1 PUFF: 100; 25 POWDER RESPIRATORY (INHALATION) at 10:42

## 2018-02-26 RX ADMIN — IPRATROPIUM BROMIDE AND ALBUTEROL SULFATE 3 ML: .5; 3 SOLUTION RESPIRATORY (INHALATION) at 08:09

## 2018-02-26 RX ADMIN — DILTIAZEM HYDROCHLORIDE 240 MG: 240 CAPSULE, COATED, EXTENDED RELEASE ORAL at 12:33

## 2018-02-26 RX ADMIN — PANTOPRAZOLE SODIUM 40 MG: 40 TABLET, DELAYED RELEASE ORAL at 16:21

## 2018-02-26 RX ADMIN — PANTOPRAZOLE SODIUM 40 MG: 40 TABLET, DELAYED RELEASE ORAL at 08:30

## 2018-02-26 RX ADMIN — DEXTROSE MONOHYDRATE 25 G: 25 INJECTION, SOLUTION INTRAVENOUS at 16:18

## 2018-02-26 NOTE — PROGRESS NOTES
Consult received. Chart reviewed. We plan to visit w/ pt tomorrow. Full, initial consult note to follow. Should you have questions/concerns or the need for a bedside visit by our team, please do not hesitate to contact us at [phone] Thank you for providing us w/ the opportunity to be involved in this patient's care.       Chris Fowler MD  Palliative Care Team

## 2018-02-26 NOTE — PROGRESS NOTES
ADULT PROTOCOL: JET AEROSOL  REASSESSMENT    Patient  Andrea Farris     66 y.o.   male     2/26/2018  3:10 PM    Breath Sounds Pre Procedure: Right Breath Sounds: Diminished                               Left Breath Sounds: Diminished    Breath Sounds Post Procedure: Right Breath Sounds: Expiratory wheezing                                 Left Breath Sounds: Expiratory wheezing    Breathing pattern: Pre procedure Breathing Pattern: Regular          Post procedure Breathing Pattern: Regular    Heart Rate: Pre procedure Pulse: 78           Post procedure Pulse: 80    Resp Rate: Pre procedure Respirations: 20           Post procedure Respirations: 18            Cough: Pre procedure Cough: Non-productive               Post procedure Cough: Non-productive    Oxygen: O2 Device: Nasal cannula       Changed: no    SpO2: Pre procedure SpO2: 96 %                Post procedure SpO2: 93 %    Nebulizer Therapy: Current medications Aerosolized Medications: DuoNeb      Changed:no    Problem List:   Patient Active Problem List   Diagnosis Code    Acute respiratory failure ([facility]) J96.00    COPD with acute exacerbation ([facility]) J44.1    HTN (hypertension) I10    HTN (hypertension) I10    COPD (chronic obstructive pulmonary disease) ([facility]) J44.9    Cancer of kidney ([facility]) C64.9    Hypoglycemia, unspecified E16.2    Acute renal failure ([facility] N17.9    Hyperkalemia E87.5    S/P partial colectomy Z90.49    Diarrhea R19.7    DM (diabetes mellitus), type 2, uncontrolled ([facility] E11.65    S/p nephrectomy Z90.5    AAA (abdominal aortic aneurysm) ([facility]) I71.4    Gastrointestinal disorder K92.9    Cancer ([facility] C80.1    Anemia D64.9    Multiple myeloma ([facility]) C90.00    Stroke (cerebrum) (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) E11.42    Hypoxemia R09.02 Respiratory Therapist: Digna Eduardo, RT

## 2018-02-26 NOTE — PROGRESS NOTES
Reviewed the chart and CM continuous to follow hospital course and assess for discharge needs. Patient continues to need 3 L of oxygen and will need home health services at discharge. CM to arrange prior to discharge.     [phone]

## 2018-02-26 NOTE — PROGRESS NOTES
Hospitalist Progress Note    NAME: Romana Rav   :  1940   MRN:  [id number]       Interim Hospital Summary: 66 y.o. male whom presented on 2018 with      Assessment / Plan:  Hx of Smoldering Myeloma/MGUS  Multiple myeloma  - heme/onc following; bone marrow bx from 2018 revealed 60% involvement by plasma cells with atypical , almost plasmablastic appearance, indicating an aggressive process. No evidence of plasma cell leukemia   - continue with plasma exchange in conjunction with chemo; Cytoxan with 20% dose reduction and full dose of Velcade with 40 mg Dex after HD and plamspharesis. This will be a weekly regimen;next dose due on Mar 2. steroid will be on hold when pt is schedule to receive Dex. - Beta-2 microglobulin , SPEP, S FLC assay pending. 24 hour urine for UPEP/Bence Reece proteins result pending      DIANA on CKD  Hx renal cell cancer s/p nephrectomy  - solitary kidney (left); Permacath placement , HD started on , Plasmapheresis stared on   - rapidly worsening renal function secondary to full progression of MM  - creat 5.0 today vs 2.49 upon admission  - nephrology following; HD every other day with plasmapheresis in alternative day       Acute hypoxemic respiratory failure secondary to influenza A with pneumonia acute on chronic COPD exacerbation  Sepsis developing on admission  - difficult to wean off O2. Requires supplemental O2 between 3-4L to maintain O2 Sat 93%   - staph coag (-) from ; perceived as contaminant at this time. Vanc has been d/c'd.  - conmpleted tamiflu.  blood cx no growth  - steroid taper, will hold when he is schedule to receive Dex. Completed zithromax. Complete rocephin for 10 days (last dose should be )  - continue with breo and duoneb  - send sputum cx if avilable  - repeat CXR: New small left pleural effusion.   - CT chest Prominent emphysema.  Bibasilar infiltrates  - pulmonary toilet; incentive spirometry every 1 hour while awake      HTN  - continue cardizem       Diabetes type 2 uncontrolled  - Lantus BID; dose will be adjusted as we monitor her blood glucose. Decreased Lantus dose due to fasting blood glucose being 70-90's. Will continue to closely monitor  - Hgb A1C 9.8      Hx CVA  - continue plavix and statin      GERD  ? Esophageal candidiasis  ? Dysphagia or indigestion  - posterior pharynx mildly erythematous without white spots or coating. However, pt has been c/o burning sensation when swallowing and feels like food/liquid not going down freely. We will initiated with nystatin swish and swallow. Reports feeling a little better but still has the sensation.    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         Recommended Disposition: Home health   Pt will be transfer to heme/onc floor, continue with telemetry monitoring  Discussed with pt and his wife about current medical therapy and code status on 2/24/2018. Mrs. Miguel Ángel Goodman would like to think about this and then discuss with the pt. Pt would like to talk over with his wife. Palliative consult         Subjective:     Chief Complaint / Reason for Physician Visit  \"the other liquid medication seemed help some, but I still have the funny sensation after swallow\". Discussed with RN events overnight.      Review of Systems:  Symptom Y/N Comments  Symptom Y/N Comments   Fever/Chills n   Chest Pain n    Poor Appetite    Edema     Cough y   Abdominal Pain     Sputum n   Joint Pain     SOB/HUERTA y   Pruritis/Rash     Nausea/vomit n   Tolerating PT/OT     Diarrhea n   Tolerating Diet     Constipation n   Other       Could NOT obtain due to:      Objective: VITALS:   Last 24hrs VS reviewed since prior progress note. Most recent are:  Patient Vitals for the past 24 hrs:   Temp Pulse Resp BP SpO2   02/26/18 0810 - - - - 96 %   02/26/18 0804 98.1 °F (36.7 °C) 72 18 132/64 92 %   02/26/18 0303 97.6 °F (36.4 °C) 75 18 127/54 94 %   02/25/18 2247 97.6 °F (36.4 °C) 79 18 138/62 96 %   02/25/18 1925 97.5 °F (36.4 °C) 84 18 125/70 99 %   02/25/18 1915 - - - - 92 %   02/25/18 1605 97.6 °F (36.4 °C) 83 16 121/62 -   02/25/18 1550 97.7 °F (36.5 °C) 80 18 130/62 -   02/25/18 1535 97.8 °F (36.6 °C) 76 16 124/58 -   02/25/18 1520 97.7 °F (36.5 °C) 80 18 121/69 -   02/25/18 1517 97.7 °F (36.5 °C) 76 18 123/67 96 %   02/25/18 1149 97.5 °F (36.4 °C) 92 20 125/71 96 %   02/25/18 1119 97.7 °F (36.5 °C) 76 20 126/56 97 %   02/25/18 1101 - - - - 94 %       Intake/Output Summary (Last 24 hours) at 02/26/18 0913  Last data filed at 02/25/18 1634   Gross per 24 hour   Intake              353 ml   Output             4654 ml   Net            -4301 ml        PHYSICAL EXAM:  General: Ill appearing, Alert, cooperative, no acute distress    EENT:  EOMI. Anicteric sclerae. MMM  Resp:  A few rhonchi, no wheezing or rales. No accessory muscle use  CV:  Regular  rhythm,  No edema  GI:  Soft, Non distended, Non tender.  +Bowel sounds  Neurologic:  Alert and oriented X 3, normal speech,   Psych:   Good insight. Not anxious nor agitated  Skin:  No rashes.   No jaundice    Reviewed most current lab test results and cultures  YES  Reviewed most current radiology test results   YES  Review and summation of old records today    NO  Reviewed patient's current orders and MAR    YES  PMH/ reviewed - no change compared to H&P  ________________________________________________________________________  Care Plan discussed with:    Comments   Patient y    Family      RN y    Care Manager y    Consultant                       y Multidiciplinary team rounds were held today with , nursing, pharmacist and clinical coordinator. Patient's plan of care was discussed; medications were reviewed and discharge planning was addressed. ________________________________________________________________________  Total NON critical care TIME: 30  Minutes    Total CRITICAL CARE TIME Spent:   Minutes non procedure based      Comments   >50% of visit spent in counseling and coordination of care     ________________________________________________________________________  Annice Bloch, NP     Procedures: see electronic medical records for all procedures/Xrays and details which were not copied into this note but were reviewed prior to creation of Plan. LABS:  I reviewed today's most current labs and imaging studies.   Pertinent labs include:  Recent Labs      02/26/18   0519  02/25/18   0346  02/24/18   0325   WBC  11.7*  12.2*  9.4   HGB  9.8*  10.1*  9.6*   HCT  29.2*  30.1*  28.2*   PLT  154  185  166     Recent Labs      02/26/18   0519  02/25/18   0346  02/24/18   0325   NA  141  138  137   K  4.9  4.7  5.0   CL  111*  106  109*   CO2  19*  22  17*   GLU  80  144*  307*   BUN  99*  78*  99*   CREA  5.09*  4.61*  5.39*   CA  9.0  9.1  9.0   MG  2.3  2.3  2.3   PHOS  5.6*  4.9*   --    ALB   --   2.9*   --    TBILI   --   0.5   --    SGOT   --   19   --    ALT   --   23   --    INR   --   1.2*   --        Signed: )David Mena, NP

## 2018-02-26 NOTE — PROGRESS NOTES
Bedside shift change report given to TOI Orantes (oncoming nurse) by Tanya Parr (offgoing nurse). Report included the following information SBAR.

## 2018-02-26 NOTE — PROGRESS NOTES
NAME: Xiomy Sarah        :  1940        MRN:  [id number]        Assessment :    Plan:  --DIANA  Solitary kidney (left)    Smoldering Myeloma --> progression to full myeloma     Hypercalcemia    Mild anemia    DM2    HTN    Influenza A    COPD --Initiated HD on ; HD Saturday and today, alternating with PLEX    I suspect that his DIANA is r/t to plasma cell d/o     Free light chains - lambda 1780 on ; 24 hour urine light chains pending--?? Don't see where this has been done. Intiated chemo for MM; initiated plasma exchange in conjunction with chemo on  (monitor response in light chains - look for a 50-60 % drop after 5 PLEX treatments); every other day PLEX x 5; 1 plasma volume; replace with albumin; PLEX # 3 tomorrow      spot urine protein:creatinine 3.6 grams    Oncology following closely       Subjective:     Chief Complaint:  Feeling ok today--- C/o heartburn. Review of Systems:    Symptom Y/N Comments  Symptom Y/N Comments   Fever/Chills    Chest Pain n    Poor Appetite n   Edema n    Cough y   Abdominal Pain     Sputum    Joint Pain     SOB/HUERTA n better  Pruritis/Rash     Nausea/vomit n   Tolerating PT/OT     Diarrhea    Tolerating Diet     Constipation    Other       Could not obtain due to:      Objective:     VITALS:   Last 24hrs VS reviewed since prior progress note. Most recent are:  Visit Vitals    /63 (BP 1 Location: Left arm, BP Patient Position: At rest)    Pulse 75    Temp 98.2 °F (36.8 °C)    Resp 18    Ht 6' (1.829 m)    Wt 78.7 kg (173 lb 8 oz)    SpO2 93%    BMI 23.53 kg/m2       Intake/Output Summary (Last 24 hours) at 18 1258  Last data filed at 18 1634   Gross per 24 hour   Intake              353 ml   Output             4304 ml   Net            -3951 ml      Telemetry Reviewed:     PHYSICAL EXAM:  General: WD, WN.  Alert, cooperative, no acute distress  Resp:  Clear anteriorly  CV:  Regular  rhythm,  No murmur race edema  GI:  Soft, Non distended, Non tender.        Lab Data Reviewed: (see below)    Medications Reviewed: (see below)    PMH/SH reviewed - no change compared to H&P  ________________________________________________________________________  Care Plan discussed with:  Patient y    Family      RN y    Care Manager                    Consultant:          Comments   >50% of visit spent in counseling and coordination of care       ________________________________________________________________________  Kvng Lamas MD     Procedures: see electronic medical records for all procedures/Xrays and details which  were not copied into this note but were reviewed prior to creation of Plan. LABS:  Recent Labs      02/26/18 0519 02/25/18 0346   WBC  11.7*  12.2*   HGB  9.8*  10.1*   HCT  29.2*  30.1*   PLT  154  185     Recent Labs      02/26/18 0519 02/25/18 0346 02/24/18   0325   NA  141  138  137   K  4.9  4.7  5.0   CL  111*  106  109*   CO2  19*  22  17*   BUN  99*  78*  99*   CREA  5.09*  4.61*  5.39*   GLU  80  144*  307*   CA  9.0  9.1  9.0   MG  2.3  2.3  2.3   PHOS  5.6*  4.9*   --      Recent Labs      02/26/18 0519 02/25/18 0346   SGOT   --   19   AP   --   32*   TP   --   5.5*   ALB   --   2.9*   GLOB   --   2.6   LPSE  159   --      Recent Labs      02/25/18 0346   INR  1.2*   PTP  12.2*      No results for input(s): FE, TIBC, PSAT, FERR in the last 72 hours. No results found for: FOL, RBCF   No results for input(s): PH, PCO2, PO2 in the last 72 hours. No results for input(s): CPK, CKMB in the last 72 hours.     No lab exists for component: TROPONINI  No components found for: [place]  Lab Results   Component Value Date/Time    Color YELLOW/STRAW 02/19/2018 03:07 PM    Appearance CLOUDY (A) 02/19/2018 03:07 PM    Specific gravity 1.020 02/19/2018 03:07 PM    pH (UA) 5.5 02/19/2018 03:07 PM    Protein 100 (A) 02/19/2018 03:07 PM    Glucose NEGATIVE  02/19/2018 03:07 PM    Ketone NEGATIVE  02/19/2018 03:07 PM    Bilirubin NEGATIVE  02/19/2018 03:07 PM    Urobilinogen 0.2 02/19/2018 03:07 PM    Nitrites NEGATIVE  02/19/2018 03:07 PM    Leukocyte Esterase NEGATIVE  02/19/2018 03:07 PM    Epithelial cells FEW 02/19/2018 03:07 PM    Bacteria 1+ (A) 02/19/2018 03:07 PM    WBC 0-4 02/19/2018 03:07 PM    RBC 0-5 02/19/2018 03:07 PM       MEDICATIONS:  Current Facility-Administered Medications   Medication Dose Route Frequency    insulin glargine (LANTUS) injection 30 Units  30 Units SubCUTAneous QHS    [START ON 2/27/2018] insulin glargine (LANTUS) injection 15 Units  15 Units SubCUTAneous DAILY    nystatin (MYCOSTATIN) 100,000 unit/mL oral suspension 500,000 Units  500,000 Units Oral QID    [START ON 2/27/2018] predniSONE (DELTASONE) tablet 20 mg  20 mg Oral Once per day on Tue Wed Thu    albuterol-ipratropium (DUO-NEB) 2.5 MG-0.5 MG/3 ML  3 mL Nebulization BID RT    calcium gluconate 1 g in 0.9% sodium chloride 100 mL IVPB  1 g IntraVENous Q48H PRN    albumin human 5% (BUMINATE) solution 175 g  175 g IntraVENous Q48H    hydrALAZINE (APRESOLINE) 20 mg/mL injection 20 mg  20 mg IntraVENous Q6H PRN    midazolam (VERSED) injection 5 mg  5 mg IntraVENous Multiple    pantoprazole (PROTONIX) tablet 40 mg  40 mg Oral ACB&D    sucralfate (CARAFATE) 100 mg/mL oral suspension 1 g  1 g Oral AC&HS    insulin lispro (HUMALOG) injection 5 Units  5 Units SubCUTAneous TIDAC    melatonin tablet 3 mg  3 mg Oral QHS PRN    insulin lispro (HUMALOG) injection   SubCUTAneous AC&HS    sodium chloride (NS) flush 5-10 mL  5-10 mL IntraVENous PRN    guaiFENesin ER (MUCINEX) tablet 600 mg  600 mg Oral BID    albuterol-ipratropium (DUO-NEB) 2.5 MG-0.5 MG/3 ML  3 mL Nebulization Q4H PRN    cefTRIAXone (ROCEPHIN) 1 g/50 mL NS IVPB  1 g IntraVENous Q24H    acetaminophen (TYLENOL) tablet 650 mg  650 mg Oral Q4H PRN    ondansetron (ZOFRAN) injection 4 mg  4 mg IntraVENous Q4H PRN    glucose chewable tablet 16 g  4 Tab Oral PRN    dextrose (D50W) injection syrg 12.5-25 g  12.5-25 g IntraVENous PRN    glucagon (GLUCAGEN) injection 1 mg  1 mg IntraMUSCular PRN    aspirin chewable tablet 81 mg  81 mg Oral DAILY    clopidogrel (PLAVIX) tablet 75 mg  75 mg Oral DAILY    dilTIAZem CD (CARDIZEM CD) capsule 240 mg  240 mg Oral DAILY    montelukast (SINGULAIR) tablet 10 mg  10 mg Oral DAILY    umeclidinium (INCRUSE ELLIPTA) 62.5 mcg/actuation  1 Puff Inhalation DAILY    fluticasone-vilanterol (BREO ELLIPTA) 100mcg-25mcg/puff  1 Puff Inhalation DAILY

## 2018-02-26 NOTE — PROGRESS NOTES
Initial Nutrition Assessment:    INTERVENTIONS/RECOMMENDATIONS:   · Continue current diet, consider phos binder if PO intake increasea  · Nepro shakes TID    ASSESSMENT:   Chart reviewed, medically noted for multiple myeloma, DIANA w/ dialysis, influenza, DM, dysphagia and PMH shown below. Assessing pt due to LOS. Pt reports dysphagia has caused decreased PO intake. GI recommending MBS. Pt agreed to try Nepro shakes for added kcal and protein. Choosing nepro shakes due to pt having elevated phos and it is the most kcal dense supplement on formulary. Past Medical History:   Diagnosis Date    [facility] [facility]     Asthma     Cancer ([facility] [facility]     kidney ca    COPD     Diabetes ([facility] [facility]     Gastrointestinal disorder     ruptured esphogus    Hypertension     Other ill-defined conditions(799.89)        Diet Order: Consistent carb  % Eaten:  No data found.     Pertinent Medications: [x]Reviewed: humalog, PPI  Pertinent Labs: [x]Reviewed: Phos 5.6  Food Allergies: [x]NKFA  []Other   Last BM: 2/25  Edema:        []RUE   []LUE   []RLE   []LLE      Pressure Injury:      [] Stage I   [] Stage II   [] Stage III   [] Stage IV      Wt Readings from Last 30 Encounters:   02/26/18 78.7 kg (173 lb 8 oz)   08/10/17 81.5 kg (179 lb 11.2 oz)   09/13/16 79.9 kg (176 lb 3 oz)   09/09/16 78.8 kg (173 lb 11.6 oz)   06/28/16 83 kg (183 lb)   06/27/16 83 kg (183 lb)   06/14/16 83 kg (183 lb)   11/14/15 78.2 kg (172 lb 6.4 oz)   10/26/15 81.9 kg (180 lb 9.6 oz)   05/11/12 81.6 kg (179 lb 14.3 oz)   04/25/11 88.1 kg (194 lb 3.2 oz)   06/03/10 90.9 kg (200 lb 6.4 oz)       Anthropometrics:   Height: 6' (182.9 cm) Weight: 78.7 kg (173 lb 8 oz)   IBW (%IBW):   ( ) UBW (%UBW):   (  %)   Last Weight Metrics:  Weight Loss Metrics 2/26/2018 8/10/2017 9/13/2016 9/9/2016 6/28/2016 6/27/2016 6/14/2016   Today's Wt 173 lb 8 oz 179 lb 11.2 oz 176 lb 3 oz 173 lb 11.6 oz 183 lb 183 lb 183 lb   BMI 23.53 kg/m2 24.37 kg/m2 23.9 kg/m2 23.56 kg/m2 24.81 kg/m2 24.81 kg/m2 24.81 kg/m2       BMI: Body mass index is 23.53 kg/(m^2). This BMI is indicative of:   []Underweight    [x]Normal    []Overweight    [] Obesity   [] Extreme Obesity (BMI>40)     Estimated Nutrition Needs (Based on):   2000 Kcals/day (BMR: 1550 x 1.3) , 80 g (1 g/kg) Protein  Carbohydrate: At Least 130 g/day  Fluids: 2000 mL/day (1ml/kcal) or per primary team    NUTRITION DIAGNOSES:   Problem:  Inadequate protein-energy intake      Etiology: related to dysphagia     Signs/Symptoms: as evidenced by pt reports that swallowing discomfort has lead to decreased PO intake      NUTRITION INTERVENTIONS:  Meals/Snacks: General/healthful diet   Supplements: Commercial supplement              GOAL:   consume >50% of meals and ONS in 2-4 days    LEARNING NEEDS (Diet, Food/Nutrient-Drug Interaction):    [x] None Identified   [] Identified and Education Provided/Documented   [] Identified and Pt declined/was not appropriate     Cultureal, Yazidi, OR Ethnic Dietary Needs:    [x] None Identified   [] Identified and Addressed     [x] Interdisciplinary Care Plan Reviewed/Documented    [x] Discharge Planning:   TBD, likely consistent carb diet    MONITORING /EVALUATION:      Food/Nutrient Intake Outcomes:  Total energy intake  Physical Signs/Symptoms Outcomes: Weight/weight change, Electrolyte and renal profile, GI, Glucose profile    NUTRITION RISK:    [x] High              [] Moderate           []  Low  []  Minimal/Uncompromised    PT SEEN FOR:    []  MD Consult: []Calorie Count      []Diabetic Diet Education        []Diet Education     []Electrolyte Management     []General Nutrition Management and Supplements     []Management of Tube Feeding     []TPN Recommendations    []  RN Referral:  []MST score >=2     []Enteral/Parenteral Nutrition PTA     []Pregnant: Gestational DM or Multigestation     []Pressure Ulcer/Wound Care needs        []  Low BMI  [x]  LOS Referral       James Johnson RDN  Pager [phone]  Weekend Pager [phone]

## 2018-02-26 NOTE — PROGRESS NOTES
[facility] Hematology-Oncology Progress Note      Andrea Farris  1940  [id number]      2/26/2018  12:24 PM    Follow-up for: progressive multiple myeloma, DIANA     [x] Chart notes since last visit reviewed     [] Medications reviewed for allergies and interactions    Patient complains of the following: Worried about possibility that kidney function may not recover and actually starting to negotiate about discharge plans. Friend at bedside inquired about dietary restrictions. Subjective:     Very frustrated bydifficulty swallowing and heartburn. Medication before meals has proved somewhat helpful. Wonders when hair is going to come out. Objective:     Patient Vitals for the past 24 hrs:   BP Temp Pulse Resp SpO2 Weight   02/26/18 1050 142/63 98.2 °F (36.8 °C) 75 18 93 % -   02/26/18 0810 - - - - 96 % -   02/26/18 0804 132/64 98.1 °F (36.7 °C) 72 18 92 % -   02/26/18 0308 - - - - - 78.7 kg (173 lb 8 oz)   02/26/18 0303 127/54 97.6 °F (36.4 °C) 75 18 94 % -   02/25/18 2247 138/62 97.6 °F (36.4 °C) 79 18 96 % -   02/25/18 1925 125/70 97.5 °F (36.4 °C) 84 18 99 % -   02/25/18 1915 - - - - 92 % -   02/25/18 1605 121/62 97.6 °F (36.4 °C) 83 16 - -   02/25/18 1550 130/62 97.7 °F (36.5 °C) 80 18 - -   02/25/18 1535 124/58 97.8 °F (36.6 °C) 76 16 - -   02/25/18 1520 121/69 97.7 °F (36.5 °C) 80 18 - -   02/25/18 1517 123/67 97.7 °F (36.5 °C) 76 18 96 % -   02/25/18 1149 125/71 97.5 °F (36.4 °C) 92 20 96 % -   02/25/18 1119 126/56 97.7 °F (36.5 °C) 76 20 97 % -       Physical Exam:  General -Alert, sitting up in chair by bedside.  Appears weaker than his usual  HEENT: NC, AT, pupils round  Neck: supple, no adenopathy appreciated  CVS-S1,S2 normal/ regular rate and rhythm  RS-Scattered b/l exp wheezes  Abdomen- Soft, NT,ND, BS+  Extre- No c/c/e  Neuro- No focal sensory or motor deficits noted  Psych - alert appropriate verbalizes understanding of conversation     Available labs reviewed:  Labs:    Recent Results (from the past 24 hour(s))   GLUCOSE, POC    Collection Time: 02/25/18 11:35 AM   Result Value Ref Range    Glucose (POC) 293 (H) 65 - 100 mg/dL    Performed by Ty Ybarra    GLUCOSE, POC    Collection Time: 02/25/18  5:11 PM   Result Value Ref Range    Glucose (POC) 185 (H) 65 - 100 mg/dL    Performed by [address], POC    Collection Time: 02/25/18  8:40 PM   Result Value Ref Range    Glucose (POC) 208 (H) 65 - 100 mg/dL    Performed by [address], BASIC    Collection Time: 02/26/18  5:19 AM   Result Value Ref Range    Sodium 141 136 - 145 mmol/L    Potassium 4.9 3.5 - 5.1 mmol/L    Chloride 111 (H) 97 - 108 mmol/L    CO2 19 (L) 21 - 32 mmol/L    Anion gap 11 5 - 15 mmol/L    Glucose 80 65 - 100 mg/dL    BUN 99 (H) 6 - 20 MG/DL    Creatinine 5.09 (H) 0.70 - 1.30 MG/DL    BUN/Creatinine ratio 19 12 - 20      GFR est AA 13 (L) >60 ml/min/1.73m2    GFR est non-AA 11 (L) >60 ml/min/1.73m2    Calcium 9.0 8.5 - 10.1 MG/DL   CBC W/O DIFF    Collection Time: 02/26/18  5:19 AM   Result Value Ref Range    WBC 11.7 (H) 4.1 - 11.1 K/uL    RBC 3.60 (L) 4.10 - 5.70 M/uL    HGB 9.8 (L) 12.1 - 17.0 g/dL    HCT 29.2 (L) 36.6 - 50.3 %    MCV 81.1 80.0 - 99.0 FL    MCH 27.2 26.0 - 34.0 PG    MCHC 33.6 30.0 - 36.5 g/dL    RDW 17.3 (H) 11.5 - 14.5 %    PLATELET 052 322 - 244 K/uL    MPV 10.5 8.9 - 12.9 FL    NRBC 0.0 0  WBC    ABSOLUTE NRBC 0.00 0.00 - 0.01 K/uL   MAGNESIUM    Collection Time: 02/26/18  5:19 AM   Result Value Ref Range    Magnesium 2.3 1.6 - 2.4 mg/dL   PHOSPHORUS    Collection Time: 02/26/18  5:19 AM   Result Value Ref Range    Phosphorus 5.6 (H) 2.6 - 4.7 MG/DL   LIPASE    Collection Time: 02/26/18  5:19 AM   Result Value Ref Range    Lipase 159 73 - 393 U/L   GLUCOSE, POC    Collection Time: 02/26/18  8:17 AM   Result Value Ref Range    Glucose (POC) 74 65 - 100 mg/dL    Performed by [address] [place] [address], POC    Collection Time: 02/26/18  8:39 AM   Result Value Ref Range Glucose (POC) 70 65 - 100 mg/dL    Performed by [email or web address] [facility], POC    Collection Time: 02/26/18  9:04 AM   Result Value Ref Range    Glucose (POC) 69 65 - 100 mg/dL    Performed by [employer] (PCT)    GLUCOSE, POC    Collection Time: 02/26/18  9:48 AM   Result Value Ref Range    Glucose (POC) 178 (H) 65 - 100 mg/dL    Performed by [employer] (PCT)        Imaging:  CXR Results  (Last 48 hours)    None        CT Results  (Last 48 hours)    None            Assessment:   Influenza  History of smoldering myeloma  Acute on chronic renal failure attributed to myeloma  Hypercalcemia  DM- worsened by steroids    Plan:     Multiple myeloma- IgG lambda on JAIMIE, total IgG 2293. SFLC: lambda 1780.  ---- Skeletal survey - 2/21/2018 - with multiple small lytic lesions in skull, humeri  ---- renal failure  ---- hypercalcemia  ---- BMbx 2/22/18: 50% involvement by monoclonal plasma cells with plasmablastic morphology (previously in 2012 had 10% plasma cells and was followed for years with close observation)  ---- started CyBoRD regimen - C1D1- 2/23/2018 - Cytoxan with 20% dose reduction and full dose of Velcade with 40 mg Dex after HD and plamspharesis. Continue weekly chemo (doses on Saturdays, will try to move to Fridays ultimately so can do in office.)    DIANA  secondary to underlying Multiple Myeloma. Worsened rapidly in hospital.  Hemodialysis and plasmapheresis per nephrology  ---- deferring antiresorptive tx for now given  Acute on CRF although with new approval of xgeva for myeloma, will not be issue as outpatient.      Stable, continue plan as above

## 2018-02-26 NOTE — CONSULTS
.     Gastroenterology Consult     Referring Physician: Dr. Coby Alvarado Date: 2/26/2018     Subjective:     Chief Complaint: indigestion     History of Present Illness: Christina Jane is a 66 y.o. male who is seen in consultation for dysphagia and dyspepsia. He was admitted on 2/19 for respiratory failure due to influenza and pneumonia and COPD exacerbation. His hospitalization has been complicated by worsening of his underlying multiple myeloma and ARF requiring dialysis. He will also be starting chemotherapy and plasma phoresis. He has a PMH significant for a ruptured esophagus following violent retching that required an 8 hr surgery to repair. This was years ago at an out of state hospital.  His last EGD was reportedly 10+ years ago at [facility] and was normal. Old records not available at this time. He reports that for several months he's had worsening indigestion, belching and reflux. He was taking ranitidine BID that worked [employer]. \"  For the past week, however, since he's been hospitalized, he's been virtually unable to swallow anything. He states that he can only get about 2 bites down per meal because no matter what he swallows, solids or liquids, it gets hung in his mid chest and then won't go down. He was able to eat some soggy rice krispie cereal yesterday but this AM could not get down eggs (omelet). Even water gets hung. When this occurs, he gets a pain in his chest and tries to make himself burp to get relief and get the food to move. He's never had dysphagia before. He's had some weight loss but not sure how much. Medications including BID Protonix, sucralfate, and nystatin have not helped. He is a lifelong smoker. No significant ETOH use. No FH of esophageal cancer. His father had pancreatic cancer and his sisters had breast cancer.       Past Medical History:   Diagnosis Date    AAA (abdominal aortic aneurysm) (HCC)     Asthma     Cancer ([facility] [address]     kidney ca    COPD  Diabetes ([facility] [address]     Gastrointestinal disorder     ruptured esphogus    Hypertension     Other ill-defined conditions(799.89)      Past Surgical History:   Procedure Laterality Date    ABDOMEN SURGERY PROC UNLISTED      hernia    ABDOMEN SURGERY PROC UNLISTED      colon resection    HX GI      part of colon removed, ruptured esophagus    HX OTHER SURGICAL      kidney removed    REMV KIDNEY,COMPLICATED        Family History   Problem Relation Age of Onset    Hypertension Mother      Social History   Substance Use Topics    Smoking status: Current Every Day Smoker     Packs/day: 0.50     Years: 60.00    Smokeless tobacco: Never Used    Alcohol use Yes      Comment: 2 drinks a month      Allergies   Allergen Reactions    Niacin Rash    Niacin Unknown (comments)     Hot flashes     Current Facility-Administered Medications   Medication Dose Route Frequency    insulin glargine (LANTUS) injection 30 Units  30 Units SubCUTAneous QHS    [START ON 2/27/2018] insulin glargine (LANTUS) injection 15 Units  15 Units SubCUTAneous DAILY    nystatin (MYCOSTATIN) 100,000 unit/mL oral suspension 500,000 Units  500,000 Units Oral QID    predniSONE (DELTASONE) tablet 40 mg  40 mg Oral DAILY    [START ON 2/27/2018] predniSONE (DELTASONE) tablet 20 mg  20 mg Oral Once per day on Tue Wed Thu    albuterol-ipratropium (DUO-NEB) 2.5 MG-0.5 MG/3 ML  3 mL Nebulization BID RT    calcium gluconate 1 g in 0.9% sodium chloride 100 mL IVPB  1 g IntraVENous Q48H PRN    albumin human 5% (BUMINATE) solution 175 g  175 g IntraVENous Q48H    hydrALAZINE (APRESOLINE) 20 mg/mL injection 20 mg  20 mg IntraVENous Q6H PRN    midazolam (VERSED) injection 5 mg  5 mg IntraVENous Multiple    pantoprazole (PROTONIX) tablet 40 mg  40 mg Oral ACB&D    sucralfate (CARAFATE) 100 mg/mL oral suspension 1 g  1 g Oral AC&HS    insulin lispro (HUMALOG) injection 5 Units  5 Units SubCUTAneous TIDAC    melatonin tablet 3 mg  3 mg Oral QHS PRN  insulin lispro (HUMALOG) injection   SubCUTAneous AC&HS    sodium chloride (NS) flush 5-10 mL  5-10 mL IntraVENous PRN    guaiFENesin ER (MUCINEX) tablet 600 mg  600 mg Oral BID    albuterol-ipratropium (DUO-NEB) 2.5 MG-0.5 MG/3 ML  3 mL Nebulization Q4H PRN    cefTRIAXone (ROCEPHIN) 1 g/50 mL NS IVPB  1 g IntraVENous Q24H    acetaminophen (TYLENOL) tablet 650 mg  650 mg Oral Q4H PRN    ondansetron (ZOFRAN) injection 4 mg  4 mg IntraVENous Q4H PRN    glucose chewable tablet 16 g  4 Tab Oral PRN    dextrose (D50W) injection syrg 12.5-25 g  12.5-25 g IntraVENous PRN    glucagon (GLUCAGEN) injection 1 mg  1 mg IntraMUSCular PRN    aspirin chewable tablet 81 mg  81 mg Oral DAILY    clopidogrel (PLAVIX) tablet 75 mg  75 mg Oral DAILY    dilTIAZem CD (CARDIZEM CD) capsule 240 mg  240 mg Oral DAILY    montelukast (SINGULAIR) tablet 10 mg  10 mg Oral DAILY    umeclidinium (INCRUSE ELLIPTA) 62.5 mcg/actuation  1 Puff Inhalation DAILY    fluticasone-vilanterol (BREO ELLIPTA) 100mcg-25mcg/puff  1 Puff Inhalation DAILY        Review of Systems:  A detailed 10 organ review of systems is obtained with pertinent positives as listed in the History of Present Illness and Past Medical History. A detailed review of systems was performed as follows:  Constitutional:  +weak, +weight loss  Eyes:  No ocular sensitivity to the sun, blurred vision or double vision. ENMT:  No nose or sinus problems. Respiratory: +SOB  Cardiac:  No chest pain, exertional chest pain or palpitations  Gastrointestinal:  See history of the present illness  :   No pain with urination or hematuria  Musculoskeletal:  No arthritis or hot swollen joints. Endocrine:  No thyroid disease or diabetes  Psychiatric: No depression or feeling blue  Integumentary:  No skin rash or sensitivity to the sun. Neurologic:  No stroke or seizure; no numbness or tingling of the extremities.   Heme-Lymphatic:  No history of anemia, no unexplained lumps or bumps    Objective:     Physical Exam:  Visit Vitals    /63 (BP 1 Location: Left arm, BP Patient Position: At rest)    Pulse 75    Temp 98.2 °F (36.8 °C)    Resp 18    Ht 6' (1.829 m)    Wt 78.7 kg (173 lb 8 oz)    SpO2 93%    BMI 23.53 kg/m2        Gen: Wearing O2, unable to speak in complete sentences, increased respiratory effort  Skin:  Extremities and face reveal no rashes. HEENT: Sclerae anicteric. No abnormal pigmentation of the lips. The neck is supple. Enlarged submandibular glands. Cardiovascular: Regular rate and rhythm. No murmurs, gallops, or rubs. Respiratory:  Increased respiratory effort with markedly decreased breath sounds. No wheezes, +rhonchi. GI:  Abdomen nondistended, soft, and nontender. Normal active bowel sounds. No enlargement of the liver or spleen. No masses palpable. Rectal:  Deferred  Musculoskeletal:  No pitting edema of the lower legs. Neurological:  Gross memory appears intact. Patient is alert and oriented. Psychiatric:  Mood appears appropriate with judgement intact. Lymphatic:  No cervical or supraclavicular adenopathy. Assessment/Plan:     Active Problems:  Dysphagia  Dyspepsia  Hypoxemia   Pneumonia  COPD  Influenza  ARF  Multiple Myeloma  Chronic Antiplatelet therapy     Patient has severe dysphagia to both solids and liquids that was abrupt in onset. I recommend starting with a barium swallow to evaluate for mass and achalasia. Will likely need an endoscopy but as patient is on plavix, this should not be done today. SARAH Goodson  02/26/18  12:52 PM       I have personally reviewed the history and independently examined the patient. I have reviewed the chart and agree with the documentation recorded by the Mid Level Provider, including the assessment, treatment plan, and disposition. The patient was seen and examined independently. Please see above note for details.      Physical exam:  General:AAO x 3,  SOB, ecchymosis diffuse  HEENT:  EOMI,   Chest:  Rhonchi, Heart: S1, S2, RRR  GI: Soft, NT, ND + bowel sounds  Extremities: No edema    Data Review:  reviewed     Impression:   Dysphagia,  Dyspnea,  Flu/PNA,  Long term antiplatelet use    Plan and discussion:  · Agree with above plan. He may need an EGD but needs his Plavix withheld and needs a Barium swallow to see his anatomy non invasively. · Discussed with Dr Shauna Rico in person. Signed By: Raisa Lovelace.  Tanisha Montgomery MD    2/26/2018  1:38 PM

## 2018-02-26 NOTE — PROGRESS NOTES
Problem: Mobility Impaired (Adult and Pediatric)  Goal: *Acute Goals and Plan of Care (Insert Text)  Physical Therapy Goals  Initiated 2/21/2018  1. Patient will move from supine to sit and sit to supine , scoot up and down and roll side to side in bed with independence within 7 day(s). 2.  Patient will transfer from bed to chair and chair to bed with independence using the least restrictive device within 7 day(s). 3.  Patient will perform sit to stand with independence within 7 day(s). 4.  Patient will ambulate with independence for 200 feet with the least restrictive device within 7 day(s). 5.  Patient will ascend/descend 12 stairs with 1 handrail(s) with modified independence within 7 day(s). physical Therapy TREATMENT  Patient: Isidro Payton (30 y.o. male)  Date: 2/26/2018  Diagnosis: Hypoxemia <principal problem not specified>       Precautions:   Falls, oxygen  Chart, physical therapy assessment, plan of care and goals were reviewed. ASSESSMENT:  Patient received in recliner with multiple family/friends present. Patient agreeable to therapy. Patient on 4L of oxygen at rest and during activity. Patient tolerated session fairly well. At rest spO2: 93% and with activity: 91% but required prolonged period of time for reading on pulse oximetry. Patient reported 2/10 at rest on the modified Robert scale of perceived exertion and with activity: 6/10. Patient completed sit<>stand with RW with CGA and verbal cues for proper hand placement. Patient ambulated increased gait distance 12 feet x 2 with RW with CGA. Pt demonstrated wide SHAWN, increased forward flexed trunk with fatigue and noted increased HUERTA. Patient requested to sit on edge of bed at end of gait to recover prior to returning to supine position. Patient educated on pursed lip breathing and benefits of ambulating to and from the bathroom and OOB to chair multiple times per day to improve tolerance to activity. Patient verbalized understanding. Anticipate if patient continues to progress well with acute therapies, pt will be able to return home with HHPT. Progression toward goals:  [x]    Improving appropriately and progressing toward goals  []    Improving slowly and progressing toward goals  []    Not making progress toward goals and plan of care will be adjusted     PLAN:  Patient continues to benefit from skilled intervention to address the above impairments. Continue treatment per established plan of care. Discharge Recommendations:  Home Health  Further Equipment Recommendations for Discharge: TBD     SUBJECTIVE:   Patient stated I'm doing all right. I just wish I could eat something. It feels like even milk gets stuck in my throat.     OBJECTIVE DATA SUMMARY:   Critical Behavior:  Neurologic State: Alert           Functional Mobility Training:  Bed Mobility:                    Transfers:  Sit to Stand: Contact guard assistance  Stand to Sit: Stand-by assistance                             Balance:  Sitting: Intact  Standing: Impaired  Standing - Static: Good;Constant support  Standing - Dynamic : Fair  Ambulation/Gait Training:  Distance (ft): 24 Feet (ft)  Assistive Device: Gait belt;Walker, rolling  Ambulation - Level of Assistance: Contact guard assistance        Gait Abnormalities: Decreased step clearance        Base of Support: Widened     Speed/Veronica: Pace decreased (<100 feet/min)  Step Length: Right shortened;Left shortened        Pain:  Pain Scale 1: Numeric (0 - 10)  Pain Intensity 1: 0              Activity Tolerance:   VSS on 4L of oxygen at rest and with activity. Pt with increased SOB/HUERTA with activity while on supplemental oxygen  Please refer to the flowsheet for vital signs taken during this treatment.   After treatment:   []    Patient left in no apparent distress sitting up in chair  [x]    Patient left in no apparent distress in bed  [x]    Call bell left within reach  [x]    Nursing notified  [x]    Caregiver present  [] Bed alarm activated    COMMUNICATION/COLLABORATION:   The patients plan of care was discussed with: Registered Nurse    Darinel Mary PT, DPT   Time Calculation: 13 mins

## 2018-02-27 ENCOUNTER — PATIENT OUTREACH (OUTPATIENT)
Dept: ONCOLOGY | Age: 78
End: 2018-02-27

## 2018-02-27 LAB
ALBUMIN SERPL-MCNC: 3.2 G/DL (ref 3.5–5)
ALBUMIN/GLOB SERPL: 1.4 {RATIO} (ref 1.1–2.2)
ALP SERPL-CCNC: 26 U/L (ref 45–117)
ALT SERPL-CCNC: 21 U/L (ref 12–78)
ANION GAP SERPL CALC-SCNC: 6 MMOL/L (ref 5–15)
AST SERPL-CCNC: 15 U/L (ref 15–37)
BASOPHILS # BLD: 0 K/UL (ref 0–0.1)
BASOPHILS NFR BLD: 0 % (ref 0–1)
BILIRUB SERPL-MCNC: 0.5 MG/DL (ref 0.2–1)
BUN SERPL-MCNC: 41 MG/DL (ref 6–20)
BUN/CREAT SERPL: 15 (ref 12–20)
CALCIUM SERPL-MCNC: 8.2 MG/DL (ref 8.5–10.1)
CHLORIDE SERPL-SCNC: 105 MMOL/L (ref 97–108)
CO2 SERPL-SCNC: 27 MMOL/L (ref 21–32)
CREAT SERPL-MCNC: 2.67 MG/DL (ref 0.7–1.3)
DIFFERENTIAL METHOD BLD: ABNORMAL
EOSINOPHIL # BLD: 0 K/UL (ref 0–0.4)
EOSINOPHIL NFR BLD: 0 % (ref 0–7)
ERYTHROCYTE [DISTWIDTH] IN BLOOD BY AUTOMATED COUNT: 17.5 % (ref 11.5–14.5)
GLOBULIN SER CALC-MCNC: 2.3 G/DL (ref 2–4)
GLUCOSE BLD STRIP.AUTO-MCNC: 113 MG/DL (ref 65–100)
GLUCOSE BLD STRIP.AUTO-MCNC: 121 MG/DL (ref 65–100)
GLUCOSE BLD STRIP.AUTO-MCNC: 254 MG/DL (ref 65–100)
GLUCOSE BLD STRIP.AUTO-MCNC: 75 MG/DL (ref 65–100)
GLUCOSE BLD STRIP.AUTO-MCNC: 78 MG/DL (ref 65–100)
GLUCOSE BLD STRIP.AUTO-MCNC: 91 MG/DL (ref 65–100)
GLUCOSE SERPL-MCNC: 97 MG/DL (ref 65–100)
HCT VFR BLD AUTO: 30.2 % (ref 36.6–50.3)
HGB BLD-MCNC: 10.2 G/DL (ref 12.1–17)
IMM GRANULOCYTES # BLD: 0.1 K/UL (ref 0–0.04)
IMM GRANULOCYTES NFR BLD AUTO: 1 % (ref 0–0.5)
LYMPHOCYTES # BLD: 1 K/UL (ref 0.8–3.5)
LYMPHOCYTES NFR BLD: 9 % (ref 12–49)
MAGNESIUM SERPL-MCNC: 1.9 MG/DL (ref 1.6–2.4)
MCH RBC QN AUTO: 27.1 PG (ref 26–34)
MCHC RBC AUTO-ENTMCNC: 33.8 G/DL (ref 30–36.5)
MCV RBC AUTO: 80.1 FL (ref 80–99)
MONOCYTES # BLD: 0.8 K/UL (ref 0–1)
MONOCYTES NFR BLD: 8 % (ref 5–13)
NEUTS SEG # BLD: 9.1 K/UL (ref 1.8–8)
NEUTS SEG NFR BLD: 82 % (ref 32–75)
NRBC # BLD: 0 K/UL (ref 0–0.01)
NRBC BLD-RTO: 0 PER 100 WBC
PLATELET # BLD AUTO: 168 K/UL (ref 150–400)
PMV BLD AUTO: 10 FL (ref 8.9–12.9)
POTASSIUM SERPL-SCNC: 4.1 MMOL/L (ref 3.5–5.1)
PROT SERPL-MCNC: 5.5 G/DL (ref 6.4–8.2)
RBC # BLD AUTO: 3.77 M/UL (ref 4.1–5.7)
SERVICE CMNT-IMP: ABNORMAL
SERVICE CMNT-IMP: NORMAL
SODIUM SERPL-SCNC: 138 MMOL/L (ref 136–145)
WBC # BLD AUTO: 11.1 K/UL (ref 4.1–11.1)

## 2018-02-27 PROCEDURE — 80053 COMPREHEN METABOLIC PANEL: CPT | Performed by: NURSE PRACTITIONER

## 2018-02-27 PROCEDURE — 74011636637 HC RX REV CODE- 636/637: Performed by: EMERGENCY MEDICINE

## 2018-02-27 PROCEDURE — 97116 GAIT TRAINING THERAPY: CPT

## 2018-02-27 PROCEDURE — 94640 AIRWAY INHALATION TREATMENT: CPT

## 2018-02-27 PROCEDURE — 97110 THERAPEUTIC EXERCISES: CPT

## 2018-02-27 PROCEDURE — 74011250637 HC RX REV CODE- 250/637: Performed by: INTERNAL MEDICINE

## 2018-02-27 PROCEDURE — 82962 GLUCOSE BLOOD TEST: CPT

## 2018-02-27 PROCEDURE — 74011000250 HC RX REV CODE- 250: Performed by: INTERNAL MEDICINE

## 2018-02-27 PROCEDURE — 74011250636 HC RX REV CODE- 250/636: Performed by: NURSE PRACTITIONER

## 2018-02-27 PROCEDURE — 65660000000 HC RM CCU STEPDOWN

## 2018-02-27 PROCEDURE — 74011250637 HC RX REV CODE- 250/637: Performed by: EMERGENCY MEDICINE

## 2018-02-27 PROCEDURE — 83735 ASSAY OF MAGNESIUM: CPT | Performed by: NURSE PRACTITIONER

## 2018-02-27 PROCEDURE — 36415 COLL VENOUS BLD VENIPUNCTURE: CPT | Performed by: NURSE PRACTITIONER

## 2018-02-27 PROCEDURE — 74011250637 HC RX REV CODE- 250/637: Performed by: NURSE PRACTITIONER

## 2018-02-27 PROCEDURE — 90935 HEMODIALYSIS ONE EVALUATION: CPT

## 2018-02-27 PROCEDURE — 74011636637 HC RX REV CODE- 636/637: Performed by: NURSE PRACTITIONER

## 2018-02-27 PROCEDURE — 85025 COMPLETE CBC W/AUTO DIFF WBC: CPT | Performed by: NURSE PRACTITIONER

## 2018-02-27 RX ORDER — INSULIN GLARGINE 100 [IU]/ML
27 INJECTION, SOLUTION SUBCUTANEOUS
Status: DISCONTINUED | OUTPATIENT
Start: 2018-02-27 | End: 2018-02-27

## 2018-02-27 RX ADMIN — NYSTATIN 500000 UNITS: 100000 SUSPENSION ORAL at 09:09

## 2018-02-27 RX ADMIN — UMECLIDINIUM 1 PUFF: 62.5 AEROSOL, POWDER ORAL at 09:13

## 2018-02-27 RX ADMIN — MONTELUKAST SODIUM 10 MG: 10 TABLET, COATED ORAL at 09:09

## 2018-02-27 RX ADMIN — IPRATROPIUM BROMIDE AND ALBUTEROL SULFATE 3 ML: .5; 3 SOLUTION RESPIRATORY (INHALATION) at 20:45

## 2018-02-27 RX ADMIN — ASPIRIN 81 MG 81 MG: 81 TABLET ORAL at 09:09

## 2018-02-27 RX ADMIN — SUCRALFATE 1 G: 1 SUSPENSION ORAL at 17:50

## 2018-02-27 RX ADMIN — INSULIN LISPRO 4 UNITS: 100 INJECTION, SOLUTION INTRAVENOUS; SUBCUTANEOUS at 21:14

## 2018-02-27 RX ADMIN — SUCRALFATE 1 G: 1 SUSPENSION ORAL at 11:52

## 2018-02-27 RX ADMIN — DILTIAZEM HYDROCHLORIDE 240 MG: 240 CAPSULE, COATED, EXTENDED RELEASE ORAL at 09:09

## 2018-02-27 RX ADMIN — PANTOPRAZOLE SODIUM 40 MG: 40 TABLET, DELAYED RELEASE ORAL at 09:09

## 2018-02-27 RX ADMIN — PREDNISONE 20 MG: 20 TABLET ORAL at 11:52

## 2018-02-27 RX ADMIN — FLUTICASONE FUROATE AND VILANTEROL TRIFENATATE 1 PUFF: 100; 25 POWDER RESPIRATORY (INHALATION) at 09:13

## 2018-02-27 RX ADMIN — CEFTRIAXONE SODIUM 1 G: 1 INJECTION, POWDER, FOR SOLUTION INTRAMUSCULAR; INTRAVENOUS at 23:23

## 2018-02-27 RX ADMIN — GUAIFENESIN 600 MG: 600 TABLET, EXTENDED RELEASE ORAL at 09:09

## 2018-02-27 RX ADMIN — IPRATROPIUM BROMIDE AND ALBUTEROL SULFATE 3 ML: .5; 3 SOLUTION RESPIRATORY (INHALATION) at 08:28

## 2018-02-27 RX ADMIN — INSULIN GLARGINE 15 UNITS: 100 INJECTION, SOLUTION SUBCUTANEOUS at 09:08

## 2018-02-27 RX ADMIN — PANTOPRAZOLE SODIUM 40 MG: 40 TABLET, DELAYED RELEASE ORAL at 17:50

## 2018-02-27 RX ADMIN — GUAIFENESIN 600 MG: 600 TABLET, EXTENDED RELEASE ORAL at 17:50

## 2018-02-27 RX ADMIN — SUCRALFATE 1 G: 1 SUSPENSION ORAL at 09:09

## 2018-02-27 RX ADMIN — SUCRALFATE 1 G: 1 SUSPENSION ORAL at 21:13

## 2018-02-27 NOTE — PROGRESS NOTES
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 --for egd  Review of Systems:    Symptom Y/N Comments  Symptom Y/N Comments   Fever/Chills    Chest Pain n    Poor Appetite n   Edema n    Cough y   Abdominal Pain     Sputum    Joint Pain     SOB/HUERTA n better  Pruritis/Rash     Nausea/vomit n   Tolerating PT/OT     Diarrhea    Tolerating Diet     Constipation    Other       Could not obtain due to:      Objective:     VITALS:   Last 24hrs VS reviewed since prior progress note. Most recent are:  Visit Vitals    BP 97/71 (BP 1 Location: Right arm, BP Patient Position: At rest)    Pulse 69    Temp 97.8 °F (36.6 °C)    Resp 20    Ht 6' (1.829 m)    Wt 79.8 kg (176 lb)    SpO2 98%    BMI 23.87 kg/m2       Intake/Output Summary (Last 24 hours) at 18 1040  Last data filed at 18 6651   Gross per 24 hour   Intake              240 ml   Output             2500 ml   Net            -2260 ml      Telemetry Reviewed:     PHYSICAL EXAM:  General: WD, WN.  Alert, cooperative, no acute distress  Resp:  Clear anteriorly  CV:  Regular  rhythm,  No murmur race edema  GI:  Soft, Non distended, Non tender.        Lab Data Reviewed: (see below)    Medications Reviewed: (see below)    PMH/SH reviewed - no change compared to H&P  ________________________________________________________________________  Care Plan discussed with:  Patient y    Family      RN     Care Manager                    Consultant:          Comments   >50% of visit spent in counseling and coordination of care       ________________________________________________________________________  Caitlin Del Castillo MD     Procedures: see electronic medical records for all procedures/Xrays and details which  were not copied into this note but were reviewed prior to creation of Plan. LABS:  Recent Labs      02/27/18 0452 02/26/18 0519   WBC  11.1  11.7*   HGB  10.2*  9.8*   HCT  30.2*  29.2*   PLT  168  154     Recent Labs      02/27/18 0452 02/26/18 0519 02/25/18 0346   NA  138  141  138   K  4.1  4.9  4.7   CL  105  111*  106   CO2  27  19*  22   BUN  41*  99*  78*   CREA  2.67*  5.09*  4.61*   GLU  97  80  144*   CA  8.2*  9.0  9.1   MG  1.9  2.3  2.3   PHOS   --   5.6*  4.9*     Recent Labs      02/27/18 0452 02/26/18 0519 02/25/18 0346   SGOT  15   --   19   AP  26*   --   32*   TP  5.5*   --   5.5*   ALB  3.2*   --   2.9*   GLOB  2.3   --   2.6   LPSE   --   159   --      Recent Labs      02/25/18 0346   INR  1.2*   PTP  12.2*      No results for input(s): FE, TIBC, PSAT, FERR in the last 72 hours. No results found for: FOL, RBCF   No results for input(s): PH, PCO2, PO2 in the last 72 hours. No results for input(s): CPK, CKMB in the last 72 hours.     No lab exists for component: TROPONINI  No components found for: [place]  Lab Results   Component Value Date/Time    Color YELLOW/STRAW 02/19/2018 03:07 PM    Appearance CLOUDY (A) 02/19/2018 03:07 PM    Specific gravity 1.020 02/19/2018 03:07 PM    pH (UA) 5.5 02/19/2018 03:07 PM    Protein 100 (A) 02/19/2018 03:07 PM    Glucose NEGATIVE  02/19/2018 03:07 PM    Ketone NEGATIVE  02/19/2018 03:07 PM    Bilirubin NEGATIVE  02/19/2018 03:07 PM    Urobilinogen 0.2 02/19/2018 03:07 PM    Nitrites NEGATIVE  02/19/2018 03:07 PM    Leukocyte Esterase NEGATIVE  02/19/2018 03:07 PM    Epithelial cells FEW 02/19/2018 03:07 PM    Bacteria 1+ (A) 02/19/2018 03:07 PM    WBC 0-4 02/19/2018 03:07 PM    RBC 0-5 02/19/2018 03:07 PM       MEDICATIONS:  Current Facility-Administered Medications   Medication Dose Route Frequency    insulin glargine (LANTUS) injection 27 Units  27 Units SubCUTAneous QHS    insulin glargine (LANTUS) injection 15 Units  15 Units SubCUTAneous DAILY    predniSONE (DELTASONE) tablet 20 mg  20 mg Oral Once per day on Tue Wed Thu    albuterol-ipratropium (DUO-NEB) 2.5 MG-0.5 MG/3 ML  3 mL Nebulization BID RT    calcium gluconate 1 g in 0.9% sodium chloride 100 mL IVPB  1 g IntraVENous Q48H PRN    albumin human 5% (BUMINATE) solution 175 g  175 g IntraVENous Q48H    hydrALAZINE (APRESOLINE) 20 mg/mL injection 20 mg  20 mg IntraVENous Q6H PRN    midazolam (VERSED) injection 5 mg  5 mg IntraVENous Multiple    pantoprazole (PROTONIX) tablet 40 mg  40 mg Oral ACB&D    sucralfate (CARAFATE) 100 mg/mL oral suspension 1 g  1 g Oral AC&HS    insulin lispro (HUMALOG) injection 5 Units  5 Units SubCUTAneous TIDAC    melatonin tablet 3 mg  3 mg Oral QHS PRN    insulin lispro (HUMALOG) injection   SubCUTAneous AC&HS    sodium chloride (NS) flush 5-10 mL  5-10 mL IntraVENous PRN    guaiFENesin ER (MUCINEX) tablet 600 mg  600 mg Oral BID    albuterol-ipratropium (DUO-NEB) 2.5 MG-0.5 MG/3 ML  3 mL Nebulization Q4H PRN    cefTRIAXone (ROCEPHIN) 1 g/50 mL NS IVPB  1 g IntraVENous Q24H    acetaminophen (TYLENOL) tablet 650 mg  650 mg Oral Q4H PRN    ondansetron (ZOFRAN) injection 4 mg  4 mg IntraVENous Q4H PRN    glucose chewable tablet 16 g  4 Tab Oral PRN    dextrose (D50W) injection syrg 12.5-25 g  12.5-25 g IntraVENous PRN    glucagon (GLUCAGEN) injection 1 mg  1 mg IntraMUSCular PRN    aspirin chewable tablet 81 mg  81 mg Oral DAILY    dilTIAZem CD (CARDIZEM CD) capsule 240 mg  240 mg Oral DAILY    montelukast (SINGULAIR) tablet 10 mg  10 mg Oral DAILY    umeclidinium (INCRUSE ELLIPTA) 62.5 mcg/actuation  1 Puff Inhalation DAILY    fluticasone-vilanterol (BREO ELLIPTA) 100mcg-25mcg/puff  1 Puff Inhalation DAILY

## 2018-02-27 NOTE — PROGRESS NOTES
Problem: Mobility Impaired (Adult and Pediatric)  Goal: *Acute Goals and Plan of Care (Insert Text)  Physical Therapy Goals  Initiated 2/21/2018  1. Patient will move from supine to sit and sit to supine , scoot up and down and roll side to side in bed with independence within 7 day(s). 2.  Patient will transfer from bed to chair and chair to bed with independence using the least restrictive device within 7 day(s). 3.  Patient will perform sit to stand with independence within 7 day(s). 4.  Patient will ambulate with independence for 200 feet with the least restrictive device within 7 day(s). 5.  Patient will ascend/descend 12 stairs with 1 handrail(s) with modified independence within 7 day(s). physical Therapy TREATMENT  Patient: Luis Esquivel (88 y.o. male)  Date: 2/27/2018  Diagnosis: Hypoxemia, dysphagia     Procedure(s) (LRB):  ESOPHAGOGASTRODUODENOSCOPY (EGD) (N/A)       Precautions:   falls  Chart, physical therapy assessment, plan of care and goals were reviewed. ASSESSMENT: pt tolerated tx well, no LOB or SOB, did well with bed mob and transfers, good motivation, not happy with his stay on this admit, did well with ther-ex, vc's for safety and proper RW use. Progression toward goals:  []      Improving appropriately and progressing toward goals  [x]      Improving slowly and progressing toward goals  []      Not making progress toward goals and plan of care will be adjusted     PLAN:  Patient continues to benefit from skilled intervention to address the above impairments. Continue treatment per established plan of care.   Discharge Recommendations:  Home Health  Further Equipment Recommendations for Discharge:  rolling walker     OBJECTIVE DATA SUMMARY:   Critical Behavior:  Neurologic State: Alert    Functional Mobility Training:  Bed Mobility:  Rolling: Supervision  Supine to Sit: Supervision  Scooting: Supervision  Level of Assistance: Supervision   Interventions: Verbal cues Transfers:  Sit to Stand: Contact guard assistance  Stand to Sit: Contact guard assistance  Bed to Chair: Contact guard assistance  Interventions: Verbal cues  Level of Assistance: Contact guard assistance     Balance:  Sitting: Intact; Without support  Standing: Intact; With support  Standing - Static: Good;Constant support  Standing - Dynamic : Good     Ambulation/Gait Training:  Distance (ft): 120 Feet (ft)  Assistive Device: Gait belt;Walker, rolling  Ambulation - Level of Assistance: Contact guard assistance  Gait Abnormalities: Decreased step clearance  Right Side Weight Bearing: Full  Left Side Weight Bearing: Full  Base of Support: Widened  Speed/Veronica: Pace decreased (<100 feet/min)  Step Length: Left shortened;Right shortened    Therapeutic Exercises:   sitting  EXERCISE   Sets   Reps   Active Active Assist   Passive   Comments   Ankle pumps 1 10 [x] [] [] bilat   Heel raises 1 10 [x] [] [] \"   Toe tap 1 10 [x] [] [] \"   Knee ext 1 10 [x] [] [] \"   Hip flex 1 10 [x] [] [] \"     Pain:  Pain Scale 1: Numeric (0 - 10)  Pain Intensity 1: 0    Activity Tolerance: fair    After treatment:   [x] Patient left in no apparent distress sitting up in chair  [] Patient left in no apparent distress in bed  [x] Call bell left within reach  [x] Nursing notified  [] Caregiver present  [x] Bed alarm activated    COMMUNICATION/COLLABORATION:   The patients plan of care was discussed with: Registered Nurse    Warren Da Silva PTA   Time Calculation: 25 mins

## 2018-02-27 NOTE — PROGRESS NOTES
Cardiopulmonary Care Interdisciplinary rounds were held today to discuss patient plan of care and outcomes. The following members were present: NP/Physician, PT, Pharmacy, Nursing, Nutritionist and Case Management. Expected Length of Stay:  4d 21h  Geometric Length of Stay: DRG GLOS: 4.9    Plan of Care: Continue current treatment plan, EGD Wed.

## 2018-02-27 NOTE — PROGRESS NOTES
[facility] Hematology-Oncology Progress Note      Susan Art  1940  [id number]      2/27/2018  12:24 PM    Follow-up for: progressive multiple myeloma, DIANA     [x] Chart notes since last visit reviewed     [] Medications reviewed for allergies and interactions      Subjective:     Patient complains of the following:  Seems more down today. Disheartened by difficulties with eating. Hopeful that EGD will yield a diagnosis of some sort.      Objective:     Patient Vitals for the past 24 hrs:   BP Temp Temp src Pulse Resp SpO2 Weight   02/27/18 1050 113/51 97.6 °F (36.4 °C) - 66 20 93 % -   02/27/18 0829 - - - - - 98 % -   02/27/18 0713 97/71 97.8 °F (36.6 °C) - 69 20 95 % -   02/27/18 0638 - - - - - - 79.8 kg (176 lb)   02/27/18 0449 121/54 97.6 °F (36.4 °C) - 79 20 93 % -   02/27/18 0338 99/61 - - 74 - - -   02/27/18 0305 97/65 - - 83 - - -   02/27/18 0235 103/62 - - 79 - - -   02/27/18 0205 109/65 - - 84 - - -   02/27/18 0135 101/64 - - 75 - - -   02/27/18 0105 114/70 - - 75 - - -   02/27/18 0035 128/66 - - 79 - 93 % -   02/27/18 0010 145/63 97.7 °F (36.5 °C) Oral 78 21 96 % -   02/26/18 2304 139/56 97.5 °F (36.4 °C) - 77 20 98 % -   02/26/18 2153 - - - - - 94 % -   02/26/18 1926 145/58 97.5 °F (36.4 °C) - 76 20 94 % -   02/26/18 1555 137/55 98.4 °F (36.9 °C) - 71 20 97 % -       Physical Exam:  General -Alert, sitting up in bed Appears weak  HEENT: NC, AT, pupils round  Neck: supple, no adenopathy appreciated  CVS-S1,S2 normal/ regular rate and rhythm  RS-Scattered b/l exp wheezes and rhonchi today (slightly noisier than yesterday)  Abdomen- Soft, NT,ND, BS+  Extre- No c/c/e  Neuro- No focal sensory or motor deficits noted  Psych - alert appropriate verbalizes understanding of conversation     Available labs reviewed:  Labs:    Recent Results (from the past 24 hour(s))   GLUCOSE, POC    Collection Time: 02/26/18 11:25 AM   Result Value Ref Range    Glucose (POC) 164 (H) 65 - 100 mg/dL    Performed by Cee Maxwell (PCT)    GLUCOSE, POC    Collection Time: 02/26/18  4:12 PM   Result Value Ref Range    Glucose (POC) 57 (L) 65 - 100 mg/dL    Performed by Linda Lopez (PCT)    GLUCOSE, POC    Collection Time: 02/26/18  4:48 PM   Result Value Ref Range    Glucose (POC) 135 (H) 65 - 100 mg/dL    Performed by Miriam Tobar    GLUCOSE, POC    Collection Time: 02/26/18  9:14 PM   Result Value Ref Range    Glucose (POC) 279 (H) 65 - 100 mg/dL    Performed by [address] 28, COMPREHENSIVE    Collection Time: 02/27/18  4:52 AM   Result Value Ref Range    Sodium 138 136 - 145 mmol/L    Potassium 4.1 3.5 - 5.1 mmol/L    Chloride 105 97 - 108 mmol/L    CO2 27 21 - 32 mmol/L    Anion gap 6 5 - 15 mmol/L    Glucose 97 65 - 100 mg/dL    BUN 41 (H) 6 - 20 MG/DL    Creatinine 2.67 (H) 0.70 - 1.30 MG/DL    BUN/Creatinine ratio 15 12 - 20      GFR est AA 28 (L) >60 ml/min/1.73m2    GFR est non-AA 23 (L) >60 ml/min/1.73m2    Calcium 8.2 (L) 8.5 - 10.1 MG/DL    Bilirubin, total 0.5 0.2 - 1.0 MG/DL    ALT (SGPT) 21 12 - 78 U/L    AST (SGOT) 15 15 - 37 U/L    Alk. phosphatase 26 (L) 45 - 117 U/L    Protein, total 5.5 (L) 6.4 - 8.2 g/dL    Albumin 3.2 (L) 3.5 - 5.0 g/dL    Globulin 2.3 2.0 - 4.0 g/dL    A-G Ratio 1.4 1.1 - 2.2     CBC WITH AUTOMATED DIFF    Collection Time: 02/27/18  4:52 AM   Result Value Ref Range    WBC 11.1 4.1 - 11.1 K/uL    RBC 3.77 (L) 4.10 - 5.70 M/uL    HGB 10.2 (L) 12.1 - 17.0 g/dL    HCT 30.2 (L) 36.6 - 50.3 %    MCV 80.1 80.0 - 99.0 FL    MCH 27.1 26.0 - 34.0 PG    MCHC 33.8 30.0 - 36.5 g/dL    RDW 17.5 (H) 11.5 - 14.5 %    PLATELET 638 579 - 721 K/uL    MPV 10.0 8.9 - 12.9 FL    NRBC 0.0 0  WBC    ABSOLUTE NRBC 0.00 0.00 - 0.01 K/uL    NEUTROPHILS 82 (H) 32 - 75 %    LYMPHOCYTES 9 (L) 12 - 49 %    MONOCYTES 8 5 - 13 %    EOSINOPHILS 0 0 - 7 %    BASOPHILS 0 0 - 1 %    IMMATURE GRANULOCYTES 1 (H) 0.0 - 0.5 %    ABS. NEUTROPHILS 9.1 (H) 1.8 - 8.0 K/UL    ABS. LYMPHOCYTES 1.0 0.8 - 3.5 K/UL    ABS. MONOCYTES 0.8 0.0 - 1.0 K/UL    ABS. EOSINOPHILS 0.0 0.0 - 0.4 K/UL    ABS. BASOPHILS 0.0 0.0 - 0.1 K/UL    ABS. IMM. GRANS. 0.1 (H) 0.00 - 0.04 K/UL    DF AUTOMATED     MAGNESIUM    Collection Time: 02/27/18  4:52 AM   Result Value Ref Range    Magnesium 1.9 1.6 - 2.4 mg/dL   GLUCOSE, POC    Collection Time: 02/27/18  7:19 AM   Result Value Ref Range    Glucose (POC) 75 65 - 100 mg/dL    Performed by PowerSmarte Bottom (PCT)    GLUCOSE, POC    Collection Time: 02/27/18  7:39 AM   Result Value Ref Range    Glucose (POC) 78 65 - 100 mg/dL    Performed by PowerSmarte Bottom (PCT)    GLUCOSE, POC    Collection Time: 02/27/18  7:59 AM   Result Value Ref Range    Glucose (POC) 113 (H) 65 - 100 mg/dL    Performed by PowerSmarte Bottom (PCT)    GLUCOSE, POC    Collection Time: 02/27/18 10:54 AM   Result Value Ref Range    Glucose (POC) 91 65 - 100 mg/dL    Performed by Verlene Bottom (PCT)        Imaging:  CXR Results  (Last 48 hours)    None        CT Results  (Last 48 hours)    None            Assessment:   Influenza  History of smoldering myeloma  Acute on chronic renal failure attributed to myeloma  Hypercalcemia  DM- worsened by steroids    Plan:     Multiple myeloma- IgG lambda on JAIMEI, total IgG 2293. SFLC: lambda 1780.  ---- Skeletal survey - 2/21/2018 - with multiple small lytic lesions in skull, humeri  ---- renal failure  ---- hypercalcemia  ---- BMbx 2/22/18: 50% involvement by monoclonal plasma cells with plasmablastic morphology (previously in 2012 had 10% plasma cells and was followed for years with close observation)  ---- started CyBoRD regimen - C1D1- 2/23/2018 - Cytoxan with 20% dose reduction and full dose of Velcade with 40 mg Dex after HD and plamspharesis. Continue weekly chemo (doses on Saturdays, will try to move to Fridays ultimately so can do in office.)  --- deferring antiresorptive tx for now given  Acute on CRF although with new approval of xgeva for myeloma, will not be issue as outpatient.  This hospital, however, stocks zoledronic acid which could have detrimental effect on kidney function    Normochromic normocytic anemia likely secondary to myeloma    DIANA  secondary to underlying Multiple Myeloma. Worsened rapidly in hospital.  Hemodialysis and plasmapheresis per nephrology. Creatinine took an encouraging turn. Dysphagia - workup underway. EGD tomorrow. Stable, continue current treatment.

## 2018-02-27 NOTE — PROGRESS NOTES
Hospitalist Progress Note    NAME: Loyd Steinberg   :  1940   MRN:  [id number]       Interim Hospital Summary: 66 y.o. male whom presented on 2018 with      Assessment / Plan:  Hx of Smoldering Myeloma/MGUS  Multiple myeloma  - heme/onc following; bone marrow bx from 2018 revealed 60% involvement by plasma cells with atypical , almost plasmablastic appearance, indicating an aggressive process. No evidence of plasma cell leukemia   - continue with plasma exchange in conjunction with chemo; Cytoxan with 20% dose reduction and full dose of Velcade with 40 mg Dex after HD and plamspharesis. This will be a weekly regimen;next dose due on Mar 2. steroid will be on hold when pt is schedule to receive Dex. - Beta-2 microglobulin , SPEP, S FLC assay pending. 24 hour urine for UPEP/Bence Reece proteins result pending      DIANA on CKD  Hx renal cell cancer s/p nephrectomy  - solitary kidney (left); Permacath placement , HD started on , Plasmapheresis stared on ; alternate with HD and Plasmapheresis  - rapidly worsening renal function secondary to full progression of MM  - creat trended daily to 2.67 today  - nephrology following; HD every other day with plasmapheresis in alternative day       Acute hypoxemic respiratory failure secondary to influenza A with pneumonia acute on chronic COPD exacerbation  Sepsis developing on admission  - difficult to wean off O2. Requires supplemental O2 between 3-4L to maintain O2 Sat 93%   - staph coag (-) from ; perceived as contaminant at this time. Vanc has been d/c'd.  - conmpleted tamiflu.  blood cx no growth  - steroid taper, will hold when he is schedule to receive Dex. Completed zithromax. Complete rocephin for 10 days (last dose should be )  - continue with breo and duoneb  - send sputum cx if avilable  - repeat CXR: New small left pleural effusion.   - CT chest Prominent emphysema.  Bibasilar infiltrates  - pulmonary toilet; incentive spirometry every 1 hour while awake      HTN  - continue cardizem       Diabetes type 2 uncontrolled  - Lantus BID; dose will be adjusted as we monitor her blood glucose. Decreased Lantus dose due to fasting blood glucose being 70-90's. Will continue to closely monitor  - Hgb A1C 9.8      Hx CVA  - continue plavix and statin      GERD  Indigestion/dysphagia  - BAS: normal  - GI following; NPO after MN, EGD tomorrow. - continue with PPI & sucralfate     S/p colectomy for diverticulitis  Hx esophageal rupture from violent vomiting  Abdominal aortic aneurysm   -hx of aortic sleeve years ago      Code Status:  Full  Surrogate Decision Maker: wife      DVT Prophylaxis: heparin sq, mobilize as tolerated  GI Prophylaxis: not indicated      Body mass index is 23.23 kg/(m^2).         Recommended Disposition: Home health   Pt will be transfer to heme/onc floor, continue with telemetry monitoring  Discussed with pt and his wife about current medical therapy and code status on 2/24/2018. Mrs. Vadim Alcala would like to think about this and then discuss with the pt. Pt would like to talk over with his wife. Patient was a bit anxious during visit today. He was frustrated with delay of imaging earlier (he was referring BAS testing). Explained to the patient that unfortunately we are not able to provide with exact time when any procedure is to be done. He understands about getting EGD tomorrow. Palliative consult       Subjective:     Chief Complaint / Reason for Physician Visit  \"I still feel like something stuck in my food pipe when I swallow\". Discussed with RN events overnight.      Review of Systems:  Symptom Y/N Comments  Symptom Y/N Comments   Fever/Chills n   Chest Pain n    Poor Appetite    Edema     Cough    Abdominal Pain     Sputum    Joint Pain     SOB/HUERTA y   Pruritis/Rash     Nausea/vomit n   Tolerating PT/OT     Diarrhea n   Tolerating Diet     Constipation n   Other       Could NOT obtain due to:      Objective: VITALS:   Last 24hrs VS reviewed since prior progress note. Most recent are:  Patient Vitals for the past 24 hrs:   Temp Pulse Resp BP SpO2   02/27/18 1437 97.7 °F (36.5 °C) 67 20 106/70 95 %   02/27/18 1050 97.6 °F (36.4 °C) 66 20 113/51 93 %   02/27/18 0829 - - - - 98 %   02/27/18 0713 97.8 °F (36.6 °C) 69 20 97/71 95 %   02/27/18 0449 97.6 °F (36.4 °C) 79 20 121/54 93 %   02/27/18 0338 - 74 - 99/61 -   02/27/18 0305 - 83 - 97/65 -   02/27/18 0235 - 79 - 103/62 -   02/27/18 0205 - 84 - 109/65 -   02/27/18 0135 - 75 - 101/64 -   02/27/18 0105 - 75 - 114/70 -   02/27/18 0035 - 79 - 128/66 93 %   02/27/18 0010 97.7 °F (36.5 °C) 78 21 145/63 96 %   02/26/18 2304 97.5 °F (36.4 °C) 77 20 139/56 98 %   02/26/18 2153 - - - - 94 %   02/26/18 1926 97.5 °F (36.4 °C) 76 20 145/58 94 %       Intake/Output Summary (Last 24 hours) at 02/27/18 1638  Last data filed at 02/27/18 1303   Gross per 24 hour   Intake                0 ml   Output             2125 ml   Net            -2125 ml        PHYSICAL EXAM:  General: Ill appearing. Alert, cooperative, no acute distress    EENT:  EOMI. Anicteric sclerae. MMM  Resp:  Bilateral rhonchi, no wheezing or rales. No accessory muscle use  CV:  Regular  rhythm,  No edema  GI:  Soft, Non distended, Non tender.  +Bowel sounds  Neurologic:  Alert and oriented X 3, normal speech,   Psych:   Good insight. Not anxious nor agitated  Skin:  No rashes.   No jaundice    Reviewed most current lab test results and cultures  YES  Reviewed most current radiology test results   YES  Review and summation of old records today    NO  Reviewed patient's current orders and MAR    YES  PMH/SH reviewed - no change compared to H&P  ________________________________________________________________________  Care Plan discussed with:    Comments   Patient y    Family      RN y    Care Manager y    Consultant  y Dr. Yael Brown (DAVID)                    y Multidiciplinary team rounds were held today with , nursing, pharmacist and clinical coordinator. Patient's plan of care was discussed; medications were reviewed and discharge planning was addressed. ________________________________________________________________________  Total NON critical care TIME:  30   Minutes    Total CRITICAL CARE TIME Spent:   Minutes non procedure based      Comments   >50% of visit spent in counseling and coordination of care     ________________________________________________________________________  Harriet Ramirez NP     Procedures: see electronic medical records for all procedures/Xrays and details which were not copied into this note but were reviewed prior to creation of Plan. LABS:  I reviewed today's most current labs and imaging studies.   Pertinent labs include:  Recent Labs      02/27/18   0452  02/26/18   0519  02/25/18   0346   WBC  11.1  11.7*  12.2*   HGB  10.2*  9.8*  10.1*   HCT  30.2*  29.2*  30.1*   PLT  168  154  185     Recent Labs      02/27/18   0452  02/26/18   0519  02/25/18   0346   NA  138  141  138   K  4.1  4.9  4.7   CL  105  111*  106   CO2  27  19*  22   GLU  97  80  144*   BUN  41*  99*  78*   CREA  2.67*  5.09*  4.61*   CA  8.2*  9.0  9.1   MG  1.9  2.3  2.3   PHOS   --   5.6*  4.9*   ALB  3.2*   --   2.9*   TBILI  0.5   --   0.5   SGOT  15   --   19   ALT  21   --   23   INR   --    --   1.2*       Signed: )David Gil, NP

## 2018-02-27 NOTE — PROGRESS NOTES
Gastroenterology Progress Note    2/27/2018    Admit Date: 2/19/2018    Subjective: Follow up for: dysphagia      Still c/o food getting stuck in lower esophagus. BAS was read as being normal.  Patient was seen in rounds by me today.      Current Facility-Administered Medications   Medication Dose Route Frequency    insulin glargine (LANTUS) injection 27 Units  27 Units SubCUTAneous QHS    insulin glargine (LANTUS) injection 15 Units  15 Units SubCUTAneous DAILY    predniSONE (DELTASONE) tablet 20 mg  20 mg Oral Once per day on Tue Wed Thu    albuterol-ipratropium (DUO-NEB) 2.5 MG-0.5 MG/3 ML  3 mL Nebulization BID RT    calcium gluconate 1 g in 0.9% sodium chloride 100 mL IVPB  1 g IntraVENous Q48H PRN    albumin human 5% (BUMINATE) solution 175 g  175 g IntraVENous Q48H    hydrALAZINE (APRESOLINE) 20 mg/mL injection 20 mg  20 mg IntraVENous Q6H PRN    midazolam (VERSED) injection 5 mg  5 mg IntraVENous Multiple    pantoprazole (PROTONIX) tablet 40 mg  40 mg Oral ACB&D    sucralfate (CARAFATE) 100 mg/mL oral suspension 1 g  1 g Oral AC&HS    insulin lispro (HUMALOG) injection 5 Units  5 Units SubCUTAneous TIDAC    melatonin tablet 3 mg  3 mg Oral QHS PRN    insulin lispro (HUMALOG) injection   SubCUTAneous AC&HS    sodium chloride (NS) flush 5-10 mL  5-10 mL IntraVENous PRN    guaiFENesin ER (MUCINEX) tablet 600 mg  600 mg Oral BID    albuterol-ipratropium (DUO-NEB) 2.5 MG-0.5 MG/3 ML  3 mL Nebulization Q4H PRN    cefTRIAXone (ROCEPHIN) 1 g/50 mL NS IVPB  1 g IntraVENous Q24H    acetaminophen (TYLENOL) tablet 650 mg  650 mg Oral Q4H PRN    ondansetron (ZOFRAN) injection 4 mg  4 mg IntraVENous Q4H PRN    glucose chewable tablet 16 g  4 Tab Oral PRN    dextrose (D50W) injection syrg 12.5-25 g  12.5-25 g IntraVENous PRN    glucagon (GLUCAGEN) injection 1 mg  1 mg IntraMUSCular PRN    aspirin chewable tablet 81 mg  81 mg Oral DAILY    dilTIAZem CD (CARDIZEM CD) capsule 240 mg  240 mg Oral DAILY    montelukast (SINGULAIR) tablet 10 mg  10 mg Oral DAILY    umeclidinium (INCRUSE ELLIPTA) 62.5 mcg/actuation  1 Puff Inhalation DAILY    fluticasone-vilanterol (BREO ELLIPTA) 100mcg-25mcg/puff  1 Puff Inhalation DAILY        Objective:     Blood pressure 97/71, pulse 69, temperature 97.8 °F (36.6 °C), resp. rate 20, height 6' (1.829 m), weight 79.8 kg (176 lb), SpO2 98 %.          02/25 1901 - 02/27 0700  In: 240 [P.O.:240]  Out: 2750 [Urine:750]        Physical Examination:       General:AAO x 3, dyspneic  HEENT:  EOMI,  Chest:   rhonchi,  Heart: S1, S2, RRR  GI: Soft, NT, ND + bowel sounds      Data Review    Recent Results (from the past 24 hour(s))   GLUCOSE, POC    Collection Time: 02/26/18  9:48 AM   Result Value Ref Range    Glucose (POC) 178 (H) 65 - 100 mg/dL    Performed by Nimisha Haile (PCT)    GLUCOSE, POC    Collection Time: 02/26/18 11:25 AM   Result Value Ref Range    Glucose (POC) 164 (H) 65 - 100 mg/dL    Performed by Jessa Davis (PCT)    GLUCOSE, POC    Collection Time: 02/26/18  4:12 PM   Result Value Ref Range    Glucose (POC) 57 (L) 65 - 100 mg/dL    Performed by Jessa Davis (PCT)    GLUCOSE, POC    Collection Time: 02/26/18  4:48 PM   Result Value Ref Range    Glucose (POC) 135 (H) 65 - 100 mg/dL    Performed by Sheila Obrien    GLUCOSE, POC    Collection Time: 02/26/18  9:14 PM   Result Value Ref Range    Glucose (POC) 279 (H) 65 - 100 mg/dL    Performed by OLAFμαλίας 28, COMPREHENSIVE    Collection Time: 02/27/18  4:52 AM   Result Value Ref Range    Sodium 138 136 - 145 mmol/L    Potassium 4.1 3.5 - 5.1 mmol/L    Chloride 105 97 - 108 mmol/L    CO2 27 21 - 32 mmol/L    Anion gap 6 5 - 15 mmol/L    Glucose 97 65 - 100 mg/dL    BUN 41 (H) 6 - 20 MG/DL    Creatinine 2.67 (H) 0.70 - 1.30 MG/DL    BUN/Creatinine ratio 15 12 - 20      GFR est AA 28 (L) >60 ml/min/1.73m2    GFR est non-AA 23 (L) >60 ml/min/1.73m2    Calcium 8.2 (L) 8.5 - 10.1 MG/DL Bilirubin, total 0.5 0.2 - 1.0 MG/DL    ALT (SGPT) 21 12 - 78 U/L    AST (SGOT) 15 15 - 37 U/L    Alk. phosphatase 26 (L) 45 - 117 U/L    Protein, total 5.5 (L) 6.4 - 8.2 g/dL    Albumin 3.2 (L) 3.5 - 5.0 g/dL    Globulin 2.3 2.0 - 4.0 g/dL    A-G Ratio 1.4 1.1 - 2.2     CBC WITH AUTOMATED DIFF    Collection Time: 02/27/18  4:52 AM   Result Value Ref Range    WBC 11.1 4.1 - 11.1 K/uL    RBC 3.77 (L) 4.10 - 5.70 M/uL    HGB 10.2 (L) 12.1 - 17.0 g/dL    HCT 30.2 (L) 36.6 - 50.3 %    MCV 80.1 80.0 - 99.0 FL    MCH 27.1 26.0 - 34.0 PG    MCHC 33.8 30.0 - 36.5 g/dL    RDW 17.5 (H) 11.5 - 14.5 %    PLATELET 858 911 - 555 K/uL    MPV 10.0 8.9 - 12.9 FL    NRBC 0.0 0  WBC    ABSOLUTE NRBC 0.00 0.00 - 0.01 K/uL    NEUTROPHILS 82 (H) 32 - 75 %    LYMPHOCYTES 9 (L) 12 - 49 %    MONOCYTES 8 5 - 13 %    EOSINOPHILS 0 0 - 7 %    BASOPHILS 0 0 - 1 %    IMMATURE GRANULOCYTES 1 (H) 0.0 - 0.5 %    ABS. NEUTROPHILS 9.1 (H) 1.8 - 8.0 K/UL    ABS. LYMPHOCYTES 1.0 0.8 - 3.5 K/UL    ABS. MONOCYTES 0.8 0.0 - 1.0 K/UL    ABS. EOSINOPHILS 0.0 0.0 - 0.4 K/UL    ABS. BASOPHILS 0.0 0.0 - 0.1 K/UL    ABS. IMM.  GRANS. 0.1 (H) 0.00 - 0.04 K/UL    DF AUTOMATED     MAGNESIUM    Collection Time: 02/27/18  4:52 AM   Result Value Ref Range    Magnesium 1.9 1.6 - 2.4 mg/dL   GLUCOSE, POC    Collection Time: 02/27/18  7:19 AM   Result Value Ref Range    Glucose (POC) 75 65 - 100 mg/dL    Performed by Scarlett Morton (PCT)    GLUCOSE, POC    Collection Time: 02/27/18  7:39 AM   Result Value Ref Range    Glucose (POC) 78 65 - 100 mg/dL    Performed by Scarlett Morton (PCT)    GLUCOSE, POC    Collection Time: 02/27/18  7:59 AM   Result Value Ref Range    Glucose (POC) 113 (H) 65 - 100 mg/dL    Performed by Scarlett Morton (PCT)      Recent Labs      02/27/18 0452 02/26/18   0519   WBC  11.1  11.7*   HGB  10.2*  9.8*   HCT  30.2*  29.2*   PLT  168  154     Recent Labs      02/27/18 0452 02/26/18   0519  02/25/18   0346   NA  138  141  138   K 4.1  4.9  4.7   CL  105  111*  106   CO2  27  19*  22   BUN  41*  99*  78*   CREA  2.67*  5.09*  4.61*   GLU  97  80  144*   CA  8.2*  9.0  9.1   MG  1.9  2.3  2.3   PHOS   --   5.6*  4.9*     Recent Labs      02/27/18   0452  02/26/18   0519  02/25/18   0346   SGOT  15   --   19   AP  26*   --   32*   TP  5.5*   --   5.5*   ALB  3.2*   --   2.9*   GLOB  2.3   --   2.6   LPSE   --   159   --      Recent Labs      02/25/18   0346   INR  1.2*   PTP  12.2*      No results for input(s): FE, TIBC, PSAT, FERR in the last 72 hours. No results found for: FOL, RBCF   No results for input(s): PH, PCO2, PO2 in the last 72 hours. No results for input(s): CPK, CKNDX, TROIQ in the last 72 hours. No lab exists for component: CPKMB  Lab Results   Component Value Date/Time    Cholesterol, total 133 09/10/2016 02:07 AM    HDL Cholesterol 44 09/10/2016 02:07 AM    LDL, calculated 59.2 09/10/2016 02:07 AM    Triglyceride 149 09/10/2016 02:07 AM    CHOL/HDL Ratio 3.0 09/10/2016 02:07 AM     No components found for: [place]  Lab Results   Component Value Date/Time    Color YELLOW/STRAW 02/19/2018 03:07 PM    Appearance CLOUDY (A) 02/19/2018 03:07 PM    Specific gravity 1.020 02/19/2018 03:07 PM    pH (UA) 5.5 02/19/2018 03:07 PM    Protein 100 (A) 02/19/2018 03:07 PM    Glucose NEGATIVE  02/19/2018 03:07 PM    Ketone NEGATIVE  02/19/2018 03:07 PM    Bilirubin NEGATIVE  02/19/2018 03:07 PM    Urobilinogen 0.2 02/19/2018 03:07 PM    Nitrites NEGATIVE  02/19/2018 03:07 PM    Leukocyte Esterase NEGATIVE  02/19/2018 03:07 PM    Epithelial cells FEW 02/19/2018 03:07 PM    Bacteria 1+ (A) 02/19/2018 03:07 PM    WBC 0-4 02/19/2018 03:07 PM    RBC 0-5 02/19/2018 03:07 PM        ROS: -CP, SOB, Dysuria, palpitations, cough. Assessment:  Dysphagia  Long term Plavix use    Active Problems:    Hypoxemia (2/19/2018)             Plan/Discussion:     · BAS  shows that hewas unable to tolerate much contrast, and unable to rapidly drink.  Esophagus is normal in course and caliber. Motility is grossly normal. No gastroesophageal reflux or hiatal hernia. · As he still has prominent dysphagia, I will plan a tentative EGD. Aggressive dilation will not be attempted as he is on Plavix and Aspirin. Risk of bleeding is substantial.  · NPO after MN. · We will plan an EGD tomorrow. ·        Signed By: Krzysztof Phillips.  Anay Washington MD    2/27/2018  9:37 AM

## 2018-02-27 NOTE — PROGRESS NOTES
Bedside shift change report given to Adrien Bravo (oncoming nurse) by Sarah Mo (offgoing nurse). Report included the following information SBAR. SHIFT SUMMARY:            [address] NURSING NOTE   Admission Date 2/19/2018   Admission Diagnosis Hypoxemia   Consults IP CONSULT TO NEPHROLOGY  IP CONSULT TO ONCOLOGY  IP CONSULT TO PALLIATIVE CARE - PROVIDER  IP CONSULT TO GASTROENTEROLOGY      Cardiac Monitoring [x] Yes [] No      Purposeful Hourly Rounding [x] Yes    Conrad Score Total Score: 3   Conrad score 3 or > [x] Bed Alarm [] Avasys [] 1:1 sitter [] Patient refused (Place signed refusal form in chart)   Sidney Score Sidney Score: 17   Sidney score 14 or < [] PMT consult [] Wound Care consult    []  Specialty bed  [] Nutrition consult      Influenza Vaccine Received Flu Vaccine for Current Season (usually Sept-March): Yes    Patient/Guardian Refused (Notify MD): No      Oxygen needs?  [] Room air Oxygen @  []1L    []2L    []3L   [x]4L    []5L   []6L     Use home O2? [] Yes [x] No  Perform O2 challenge test using  smartphrase (.Homeoxygen)      Last bowel movement Last Bowel Movement Date: 02/25/18      Urinary Catheter             LDAs               Peripheral IV 02/20/18 Left Antecubital (Active)   Site Assessment Clean, dry, & intact 2/26/2018  4:21 PM   Phlebitis Assessment 0 2/26/2018  4:21 PM   Infiltration Assessment 0 2/26/2018  4:21 PM   Dressing Status Clean, dry, & intact 2/26/2018  4:21 PM   Dressing Type Tape;Transparent 2/26/2018  4:21 PM   Hub Color/Line Status Pink 2/26/2018  4:21 PM       Peripheral IV 02/22/18 Right Antecubital (Active)   Site Assessment Clean, dry, & intact 2/26/2018  4:21 PM   Phlebitis Assessment 0 2/26/2018  4:21 PM   Infiltration Assessment 0 2/26/2018  4:21 PM   Dressing Status Clean, dry, & intact 2/26/2018  4:21 PM   Dressing Type Tape;Transparent 2/26/2018  4:21 PM   Hub Color/Line Status Pink 2/26/2018  4:21 PM        Hemodialysis Access 02/22/18 (Active)   [facility] Utilized Yes 2/26/2018  4:21 PM   Criteria for Appropriate Use Dialysis/apheresis 2/26/2018  4:21 PM   Date Accessed  02/25/18 2/25/2018  3:53 PM   Site Assessment Clean, dry, & intact 2/26/2018  4:21 PM   Date of Last Dressing Change 02/25/18 2/26/2018  4:21 PM   Dressing Status Clean, dry, & intact 2/26/2018  4:21 PM   Dressing Type Disk with Chlorhexadine gluconate (CHG); Transparent 2/26/2018  4:21 PM   Proximal Hub Color/Line Status Red;Blue 2/25/2018  8:00 PM                     Readmission Risk Assessment Tool Score High Risk            32       Total Score        2 . Living with Significant Other. Assisted Living. LTAC. SNF. or   Rehab    3 Patient Length of Stay (>5 days = 3)    5 Pt.  Coverage (Medicare=5 , Medicaid, or Self-Pay=4)    22 Charlson Comorbidity Score (Age + Comorbid Conditions)        Criteria that do not apply:    Has Seen PCP in Last 6 Months (Yes=3, No=0)    IP Visits Last 12 Months (1-3=4, 4=9, >4=11)       Expected Length of Stay 4d 21h   Actual Length of Stay 7

## 2018-02-27 NOTE — PROGRESS NOTES
Bedside shift change report given to Rosalba Miller RN (oncoming nurse) by Theodore Major RN (offgoing nurse). Report included the following information SBAR.

## 2018-02-27 NOTE — DIALYSIS
[facility] Team [facility]  [phone]    Vitals   Pre   Post   Assessment   Pre   Post     Temp  97.7  97.7 LOC  AXOX3 AXOX3   HR   76 69 Lungs   Moist congested productive cough, O2 2L/nc  Unchanged   B/P   150/63 120/63 Cardiac   SR/PVCs  Unchanged   Resp   20 20 Skin   W/D, ecchymosis BUE  Unchanged   Pain level  0 0 Edema  None observed   None   Orders:    Duration:   Start:   0007 End:   0037 Total:   3.5 hours   Dialyzer:   Dialyzer/Set Up Inspection: Revaclear (02/24/18 1411)   K Bath:   Dialysate K (mEq/L): 3.5 (02/27/18 0010)   Ca Bath:   Dialysate CA (mEq/L): 2.5 (02/27/18 0010)   Na/Bicarb:   Dialysate NA (mEq/L): 140 (02/27/18 0010)   Target Fluid Removal:   Goal/Amount of Fluid to Remove (mL): 2000 mL (02/27/18 0010)   Access     Type & Location:   R IJ CVC. Harvinder/permcath disinfected with Alcohol per policy. Each lumen aspirated for blood return and flushed with Normal Saline per policy. Dsg C/D/I. Dialysis initiated. Labs     Obtained/Reviewed   Critical Results Called   Date when labs were drawn-  Hgb-    HGB   Date Value Ref Range Status   02/26/2018 9.8 (L) 12.1 - 17.0 g/dL Final     K-    Potassium   Date Value Ref Range Status   02/26/2018 4.9 3.5 - 5.1 mmol/L Final     Ca-   Calcium   Date Value Ref Range Status   02/26/2018 9.0 8.5 - 10.1 MG/DL Final     Bun-   BUN   Date Value Ref Range Status   02/26/2018 99 (H) 6 - 20 MG/DL Final     Creat-   Creatinine   Date Value Ref Range Status   02/26/2018 5.09 (H) 0.70 - 1.30 MG/DL Final        Medications/ Blood Products Given     Name   Dose   Route and Time                     Blood Volume Processed (BVP):    72.7 Net Fluid   Removed:  2000 ml   Comments   Time Out Done:Yes  Primary Nurse Rpt Romina Damian RN  Primary Nurse Rpt Post:SYED Puri RN  Pt [address] Plan:Continue with HD as ordered by nephrologists  Tx Summary:Uncomplicated tx although few hypotensive readings.  Pt asymptomatic and stable, rested most tx with eyes closed. AET,central line catheter flushed with normal saline per policy. Ports disinfected with Alcohol per policy and lines disconnected and capped using aseptic technique. Admiting Diagnosis:  Pt's previous clinic-  Consent signed - Informed Consent Verified: Yes (02/27/18 0010)  Vilmaita Consent -Yes  Hepatitis Status- HBsAg neg on 02/22/18  Machine #- Machine Number: [id number] (02/27/18 0010)  Telemetry status-Unit monitoring/remote  Pre-dialysis wt. - Pre-Dialysis Weight: 81.3 kg (179 lb 3.7 oz) (02/27/18 0010)

## 2018-02-27 NOTE — PROGRESS NOTES
Brief Progress Note    Chart reviewed. Was going to visit w/ pt. Discussed w/ Ivy Montemayor, pt's bedside RN. Discussed w/ Dr. Harjit Ortiz, primary medical team attending. I was told it might be better for our team to visit w/ him tomorrow. It was decided to defer our visiting w/ pt until Wed, 02/28/2018. Full, initial consult note to follow. Should you have questions/concerns or the need for a bedside visit by our team, please do not hesitate to contact us at [phone] Thank you for providing us w/ the opportunity to be involved in this patient's care.       Asher Brown MD  Palliative Care Team

## 2018-02-28 ENCOUNTER — ANESTHESIA EVENT (OUTPATIENT)
Dept: ENDOSCOPY | Age: 78
DRG: 871 | End: 2018-02-28
Payer: MEDICARE

## 2018-02-28 ENCOUNTER — ANESTHESIA (OUTPATIENT)
Dept: ENDOSCOPY | Age: 78
DRG: 871 | End: 2018-02-28
Payer: MEDICARE

## 2018-02-28 ENCOUNTER — APPOINTMENT (OUTPATIENT)
Dept: GENERAL RADIOLOGY | Age: 78
DRG: 871 | End: 2018-02-28
Attending: NURSE PRACTITIONER
Payer: MEDICARE

## 2018-02-28 LAB
ALBUMIN SERPL-MCNC: 2.7 G/DL (ref 3.5–5)
ALBUMIN/GLOB SERPL: 1.2 {RATIO} (ref 1.1–2.2)
ALP SERPL-CCNC: 30 U/L (ref 45–117)
ALT SERPL-CCNC: 21 U/L (ref 12–78)
ANION GAP SERPL CALC-SCNC: 8 MMOL/L (ref 5–15)
AST SERPL-CCNC: 13 U/L (ref 15–37)
BILIRUB SERPL-MCNC: 0.5 MG/DL (ref 0.2–1)
BUN SERPL-MCNC: 67 MG/DL (ref 6–20)
BUN/CREAT SERPL: 16 (ref 12–20)
CALCIUM SERPL-MCNC: 8.4 MG/DL (ref 8.5–10.1)
CHLORIDE SERPL-SCNC: 106 MMOL/L (ref 97–108)
CO2 SERPL-SCNC: 25 MMOL/L (ref 21–32)
CREAT SERPL-MCNC: 4.3 MG/DL (ref 0.7–1.3)
ERYTHROCYTE [DISTWIDTH] IN BLOOD BY AUTOMATED COUNT: 17.7 % (ref 11.5–14.5)
GLOBULIN SER CALC-MCNC: 2.2 G/DL (ref 2–4)
GLUCOSE BLD STRIP.AUTO-MCNC: 159 MG/DL (ref 65–100)
GLUCOSE BLD STRIP.AUTO-MCNC: 166 MG/DL (ref 65–100)
GLUCOSE BLD STRIP.AUTO-MCNC: 95 MG/DL (ref 65–100)
GLUCOSE SERPL-MCNC: 117 MG/DL (ref 65–100)
HCT VFR BLD AUTO: 27.3 % (ref 36.6–50.3)
HGB BLD-MCNC: 8.9 G/DL (ref 12.1–17)
MAGNESIUM SERPL-MCNC: 2 MG/DL (ref 1.6–2.4)
MCH RBC QN AUTO: 26.8 PG (ref 26–34)
MCHC RBC AUTO-ENTMCNC: 32.6 G/DL (ref 30–36.5)
MCV RBC AUTO: 82.2 FL (ref 80–99)
NRBC # BLD: 0 K/UL (ref 0–0.01)
NRBC BLD-RTO: 0 PER 100 WBC
PHOSPHATE SERPL-MCNC: 5.3 MG/DL (ref 2.6–4.7)
PLATELET # BLD AUTO: 192 K/UL (ref 150–400)
PMV BLD AUTO: 11.4 FL (ref 8.9–12.9)
POTASSIUM SERPL-SCNC: 4.9 MMOL/L (ref 3.5–5.1)
PROT SERPL-MCNC: 4.9 G/DL (ref 6.4–8.2)
RBC # BLD AUTO: 3.32 M/UL (ref 4.1–5.7)
SERVICE CMNT-IMP: ABNORMAL
SERVICE CMNT-IMP: ABNORMAL
SERVICE CMNT-IMP: NORMAL
SODIUM SERPL-SCNC: 139 MMOL/L (ref 136–145)
WBC # BLD AUTO: 11.4 K/UL (ref 4.1–11.1)

## 2018-02-28 PROCEDURE — 74011636637 HC RX REV CODE- 636/637: Performed by: EMERGENCY MEDICINE

## 2018-02-28 PROCEDURE — 82962 GLUCOSE BLOOD TEST: CPT

## 2018-02-28 PROCEDURE — 74011000250 HC RX REV CODE- 250: Performed by: INTERNAL MEDICINE

## 2018-02-28 PROCEDURE — 74011000258 HC RX REV CODE- 258: Performed by: INTERNAL MEDICINE

## 2018-02-28 PROCEDURE — 74011250637 HC RX REV CODE- 250/637: Performed by: EMERGENCY MEDICINE

## 2018-02-28 PROCEDURE — 84100 ASSAY OF PHOSPHORUS: CPT | Performed by: INTERNAL MEDICINE

## 2018-02-28 PROCEDURE — 0DJ08ZZ INSPECTION OF UPPER INTESTINAL TRACT, VIA NATURAL OR ARTIFICIAL OPENING ENDOSCOPIC: ICD-10-PCS | Performed by: INTERNAL MEDICINE

## 2018-02-28 PROCEDURE — 36514 APHERESIS PLASMA: CPT

## 2018-02-28 PROCEDURE — 74011000250 HC RX REV CODE- 250

## 2018-02-28 PROCEDURE — 71045 X-RAY EXAM CHEST 1 VIEW: CPT

## 2018-02-28 PROCEDURE — 83735 ASSAY OF MAGNESIUM: CPT | Performed by: INTERNAL MEDICINE

## 2018-02-28 PROCEDURE — 65660000000 HC RM CCU STEPDOWN

## 2018-02-28 PROCEDURE — P9045 ALBUMIN (HUMAN), 5%, 250 ML: HCPCS | Performed by: INTERNAL MEDICINE

## 2018-02-28 PROCEDURE — 76040000019: Performed by: INTERNAL MEDICINE

## 2018-02-28 PROCEDURE — 94640 AIRWAY INHALATION TREATMENT: CPT

## 2018-02-28 PROCEDURE — 80053 COMPREHEN METABOLIC PANEL: CPT | Performed by: INTERNAL MEDICINE

## 2018-02-28 PROCEDURE — 77010033678 HC OXYGEN DAILY

## 2018-02-28 PROCEDURE — 74011250636 HC RX REV CODE- 250/636: Performed by: INTERNAL MEDICINE

## 2018-02-28 PROCEDURE — 74011250637 HC RX REV CODE- 250/637: Performed by: INTERNAL MEDICINE

## 2018-02-28 PROCEDURE — 76060000031 HC ANESTHESIA FIRST 0.5 HR: Performed by: INTERNAL MEDICINE

## 2018-02-28 PROCEDURE — 74011250636 HC RX REV CODE- 250/636: Performed by: NURSE PRACTITIONER

## 2018-02-28 PROCEDURE — 36415 COLL VENOUS BLD VENIPUNCTURE: CPT | Performed by: INTERNAL MEDICINE

## 2018-02-28 PROCEDURE — 74011250636 HC RX REV CODE- 250/636

## 2018-02-28 PROCEDURE — 74011636637 HC RX REV CODE- 636/637: Performed by: NURSE PRACTITIONER

## 2018-02-28 PROCEDURE — 85027 COMPLETE CBC AUTOMATED: CPT | Performed by: INTERNAL MEDICINE

## 2018-02-28 RX ORDER — FLUMAZENIL 0.1 MG/ML
0.2 INJECTION INTRAVENOUS
Status: DISCONTINUED | OUTPATIENT
Start: 2018-02-28 | End: 2018-02-28 | Stop reason: HOSPADM

## 2018-02-28 RX ORDER — KETAMINE HYDROCHLORIDE 10 MG/ML
INJECTION, SOLUTION INTRAMUSCULAR; INTRAVENOUS AS NEEDED
Status: DISCONTINUED | OUTPATIENT
Start: 2018-02-28 | End: 2018-02-28 | Stop reason: HOSPADM

## 2018-02-28 RX ORDER — MIDAZOLAM HYDROCHLORIDE 1 MG/ML
.25-5 INJECTION, SOLUTION INTRAMUSCULAR; INTRAVENOUS
Status: DISCONTINUED | OUTPATIENT
Start: 2018-02-28 | End: 2018-02-28 | Stop reason: HOSPADM

## 2018-02-28 RX ORDER — EPINEPHRINE 0.1 MG/ML
1 INJECTION INTRACARDIAC; INTRAVENOUS
Status: DISCONTINUED | OUTPATIENT
Start: 2018-02-28 | End: 2018-02-28 | Stop reason: HOSPADM

## 2018-02-28 RX ORDER — SODIUM CHLORIDE 0.9 % (FLUSH) 0.9 %
5-10 SYRINGE (ML) INJECTION AS NEEDED
Status: ACTIVE | OUTPATIENT
Start: 2018-02-28 | End: 2018-02-28

## 2018-02-28 RX ORDER — SODIUM CHLORIDE 0.9 % (FLUSH) 0.9 %
5-10 SYRINGE (ML) INJECTION EVERY 8 HOURS
Status: COMPLETED | OUTPATIENT
Start: 2018-02-28 | End: 2018-02-28

## 2018-02-28 RX ORDER — INSULIN GLARGINE 100 [IU]/ML
10 INJECTION, SOLUTION SUBCUTANEOUS
Status: DISCONTINUED | OUTPATIENT
Start: 2018-02-28 | End: 2018-03-02

## 2018-02-28 RX ORDER — MIDAZOLAM HYDROCHLORIDE 1 MG/ML
INJECTION, SOLUTION INTRAMUSCULAR; INTRAVENOUS
Status: COMPLETED
Start: 2018-02-28 | End: 2018-02-28

## 2018-02-28 RX ORDER — MIDAZOLAM HYDROCHLORIDE 1 MG/ML
INJECTION, SOLUTION INTRAMUSCULAR; INTRAVENOUS AS NEEDED
Status: DISCONTINUED | OUTPATIENT
Start: 2018-02-28 | End: 2018-02-28 | Stop reason: HOSPADM

## 2018-02-28 RX ORDER — LIDOCAINE HYDROCHLORIDE 20 MG/ML
15 SOLUTION OROPHARYNGEAL AS NEEDED
Status: DISCONTINUED | OUTPATIENT
Start: 2018-02-28 | End: 2018-03-09 | Stop reason: HOSPADM

## 2018-02-28 RX ORDER — SODIUM CHLORIDE 9 MG/ML
75 INJECTION, SOLUTION INTRAVENOUS CONTINUOUS
Status: DISPENSED | OUTPATIENT
Start: 2018-02-28 | End: 2018-02-28

## 2018-02-28 RX ORDER — NALOXONE HYDROCHLORIDE 0.4 MG/ML
0.4 INJECTION, SOLUTION INTRAMUSCULAR; INTRAVENOUS; SUBCUTANEOUS
Status: DISCONTINUED | OUTPATIENT
Start: 2018-02-28 | End: 2018-02-28 | Stop reason: HOSPADM

## 2018-02-28 RX ORDER — ANTICOAGULANT CITRATE DEXTROSE SOLUTION FORMULA A 12.25; 11; 3.65 G/500ML; G/500ML; G/500ML
1500 SOLUTION INTRAVENOUS DAILY PRN
Status: DISCONTINUED | OUTPATIENT
Start: 2018-02-28 | End: 2018-03-09 | Stop reason: HOSPADM

## 2018-02-28 RX ORDER — ATROPINE SULFATE 0.1 MG/ML
0.5 INJECTION INTRAVENOUS
Status: DISCONTINUED | OUTPATIENT
Start: 2018-02-28 | End: 2018-02-28 | Stop reason: HOSPADM

## 2018-02-28 RX ORDER — DEXTROMETHORPHAN/PSEUDOEPHED 2.5-7.5/.8
1.2 DROPS ORAL
Status: DISCONTINUED | OUTPATIENT
Start: 2018-02-28 | End: 2018-02-28 | Stop reason: HOSPADM

## 2018-02-28 RX ORDER — KETAMINE HYDROCHLORIDE 100 MG/ML
INJECTION, SOLUTION INTRAMUSCULAR; INTRAVENOUS
Status: DISPENSED
Start: 2018-02-28 | End: 2018-02-28

## 2018-02-28 RX ADMIN — ANTICOAGULANT CITRATE DEXTROSE SOLUTION FORMULA A 1500 ML: 12.25; 11; 3.65 SOLUTION INTRAVENOUS at 13:54

## 2018-02-28 RX ADMIN — MIDAZOLAM HYDROCHLORIDE 0.5 MG: 1 INJECTION, SOLUTION INTRAMUSCULAR; INTRAVENOUS at 12:00

## 2018-02-28 RX ADMIN — Medication 10 ML: at 15:16

## 2018-02-28 RX ADMIN — CEFTRIAXONE SODIUM 1 G: 1 INJECTION, POWDER, FOR SOLUTION INTRAMUSCULAR; INTRAVENOUS at 23:26

## 2018-02-28 RX ADMIN — SODIUM CHLORIDE 75 ML/HR: 900 INJECTION, SOLUTION INTRAVENOUS at 11:17

## 2018-02-28 RX ADMIN — IPRATROPIUM BROMIDE AND ALBUTEROL SULFATE 3 ML: .5; 3 SOLUTION RESPIRATORY (INHALATION) at 21:19

## 2018-02-28 RX ADMIN — GUAIFENESIN 600 MG: 600 TABLET, EXTENDED RELEASE ORAL at 14:28

## 2018-02-28 RX ADMIN — Medication 10 ML: at 20:52

## 2018-02-28 RX ADMIN — GUAIFENESIN 600 MG: 600 TABLET, EXTENDED RELEASE ORAL at 17:37

## 2018-02-28 RX ADMIN — PREDNISONE 20 MG: 20 TABLET ORAL at 14:35

## 2018-02-28 RX ADMIN — ALBUMIN (HUMAN) 175 G: 12.5 INJECTION, SOLUTION INTRAVENOUS at 12:41

## 2018-02-28 RX ADMIN — KETAMINE HYDROCHLORIDE 10 MG: 10 INJECTION, SOLUTION INTRAMUSCULAR; INTRAVENOUS at 12:01

## 2018-02-28 RX ADMIN — UMECLIDINIUM 1 PUFF: 62.5 AEROSOL, POWDER ORAL at 08:15

## 2018-02-28 RX ADMIN — ASPIRIN 81 MG 81 MG: 81 TABLET ORAL at 14:28

## 2018-02-28 RX ADMIN — PANTOPRAZOLE SODIUM 40 MG: 40 TABLET, DELAYED RELEASE ORAL at 14:28

## 2018-02-28 RX ADMIN — SUCRALFATE 1 G: 1 SUSPENSION ORAL at 20:52

## 2018-02-28 RX ADMIN — IPRATROPIUM BROMIDE AND ALBUTEROL SULFATE 3 ML: .5; 3 SOLUTION RESPIRATORY (INHALATION) at 07:58

## 2018-02-28 RX ADMIN — FLUTICASONE FUROATE AND VILANTEROL TRIFENATATE 1 PUFF: 100; 25 POWDER RESPIRATORY (INHALATION) at 08:15

## 2018-02-28 RX ADMIN — INSULIN GLARGINE 10 UNITS: 100 INJECTION, SOLUTION SUBCUTANEOUS at 20:53

## 2018-02-28 RX ADMIN — CALCIUM GLUCONATE 1 G: 94 INJECTION, SOLUTION INTRAVENOUS at 12:42

## 2018-02-28 RX ADMIN — DILTIAZEM HYDROCHLORIDE 240 MG: 240 CAPSULE, COATED, EXTENDED RELEASE ORAL at 14:28

## 2018-02-28 RX ADMIN — SUCRALFATE 1 G: 1 SUSPENSION ORAL at 17:39

## 2018-02-28 RX ADMIN — SUCRALFATE 1 G: 1 SUSPENSION ORAL at 14:28

## 2018-02-28 RX ADMIN — INSULIN LISPRO 5 UNITS: 100 INJECTION, SOLUTION INTRAVENOUS; SUBCUTANEOUS at 17:37

## 2018-02-28 RX ADMIN — MONTELUKAST SODIUM 10 MG: 10 TABLET, COATED ORAL at 14:28

## 2018-02-28 RX ADMIN — PANTOPRAZOLE SODIUM 40 MG: 40 TABLET, DELAYED RELEASE ORAL at 17:39

## 2018-02-28 NOTE — PERIOP NOTES
Anesthesia reports 0.5 mg versed, 10 mg ketamine and 50mL NS given during procedure. Received report from anesthesia staff on vital signs and status of patient.

## 2018-02-28 NOTE — ANESTHESIA PREPROCEDURE EVALUATION
Anesthetic History   No history of anesthetic complications            Review of Systems / Medical History  Patient summary reviewed, nursing notes reviewed and pertinent labs reviewed    Pulmonary    COPD: severe      Smoker (30 pk yrs)  Asthma        Neuro/Psych       CVA  TIA     Cardiovascular    Hypertension              Exercise tolerance: <4 METS  Comments: 2016 echo showed EF 55%   GI/Hepatic/Renal         Renal disease: ARF and dialysis       Endo/Other    Diabetes: using insulin    Cancer (renal , multiple myeloma)     Other Findings   Comments: AAA 5x5 cm . ..stent graft in past    H/o ruptured esophagus         Physical Exam    Airway  Mallampati: III  TM Distance: 4 - 6 cm  Neck ROM: normal range of motion   Mouth opening: Normal     Cardiovascular  Regular rate and rhythm,  S1 and S2 normal,  no murmur, click, rub, or gallop             Dental  No notable dental hx       Pulmonary      Decreased breath sounds: bilateral    Rales:bibasilar  Prolonged expiration     Abdominal  GI exam deferred       Other Findings            Anesthetic Plan    ASA: 4  Anesthesia type: MAC            Anesthetic plan and risks discussed with: Patient      Very mild sedation due to poor respirations (spo2 low 90's on 5 liters), however he states his breathing at his baseline and he can't eat and needs a diagnosis or further calorie restriction will not be in his best interest.

## 2018-02-28 NOTE — PROGRESS NOTES
ONCOLOGY NURSE NAVIGATOR    Garcia Florez 65 yo    2 sons, 1 dtr, [employer]     Dx: Progressive Multiple Myeloma hx Smoldering Myeloma    Referred to see pt by Oncology Nursing Manager, as pt receiving inpt chemotherapy, CyBorD Cytoxan, Velcade, Dexamethasone weekly, first cycle 2/24. Pt has questions in regards to side effects of chemotherapy and what to expect w/ treatment. Provided w/ chemocare teaching sheets, reviewed medications and side effects. Allowed time to verbalize frustration w/ hospital stay, treatments, and fears of progressive disease. Dialysis scheduled for 5pm yesterday, arrived at 11:50pm. States he really didn't want chemo, however when he was told he wouldn't leave the hospital without, he didn't have a choice. Having EGD tomorrow, reports food gets stuck when he eats, had an egg this morning. Chicken broth w/ crackers seems to work well, however expresses some difficulty in obtaining, received chicken noodle soup at lunch. Shares he would like to enjoy a prime rib dinner at [facility] upon discharge. States his  is coming in today to review his finances and put his affairs in order. Has supportive family, all 3 children live next door to him. Needs Advanced Care Planning, Palliative Care referral placed yesterday.

## 2018-02-28 NOTE — PROGRESS NOTES
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 Have discussed with Jd Werner. Intiated chemo for MM; initiated plasma exchange in conjunction with chemo on  (monitor response in light chains - look for a 50-60 % drop after 5 PLEX treatments); every other day PLEX x 5; 1 plasma volume; replace with albumin; PLEX # 3 today due to above scheduling issues? ?      spot urine protein:creatinine 3.6 grams    Oncology following closely       Subjective:     Chief Complaint:Frustrated bc he didn't get his plex yesterday-\"If they aren't going to do it like it's ordered then why do it at all? \"  Review of Systems:    Symptom Y/N Comments  Symptom Y/N Comments   Fever/Chills    Chest Pain n    Poor Appetite n   Edema n    Cough y   Abdominal Pain     Sputum    Joint Pain     SOB/HUERTA n better  Pruritis/Rash     Nausea/vomit n   Tolerating PT/OT     Diarrhea    Tolerating Diet     Constipation    Other       Could not obtain due to:      Objective:     VITALS:   Last 24hrs VS reviewed since prior progress note.  Most recent are:  Visit Vitals    /54 (BP 1 Location: Right arm)    Pulse 69    Temp 97.4 °F (36.3 °C)    Resp 20    Ht 6' (1.829 m)    Wt 79.4 kg (175 lb)    SpO2 94%    BMI 23.73 kg/m2       Intake/Output Summary (Last 24 hours) at 18 0813  Last data filed at 18 0847   Gross per 24 hour   Intake                0 ml Output              600 ml   Net             -600 ml      Telemetry Reviewed:     PHYSICAL EXAM:  General: WD, WN. Alert, cooperative, no acute distress  Resp:  Clear anteriorly  CV:  Regular  rhythm,  No murmur race edema  GI:  Soft, Non distended, Non tender.        Lab Data Reviewed: (see below)    Medications Reviewed: (see below)    PMH/SH reviewed - no change compared to H&P  ________________________________________________________________________  Care Plan discussed with:  Patient y    Family      RN y    Care Manager                    Consultant:   Reilly Nelson       Comments   >50% of visit spent in counseling and coordination of care       ________________________________________________________________________  Odin Perea MD     Procedures: see electronic medical records for all procedures/Xrays and details which  were not copied into this note but were reviewed prior to creation of Plan. LABS:  Recent Labs      02/28/18 0614 02/27/18 0452   WBC  11.4*  11.1   HGB  8.9*  10.2*   HCT  27.3*  30.2*   PLT  192  168     Recent Labs      02/28/18 0614 02/27/18 0452 02/26/18   0519   NA  139  138  141   K  4.9  4.1  4.9   CL  106  105  111*   CO2  25  27  19*   BUN  67*  41*  99*   CREA  4.30*  2.67*  5.09*   GLU  117*  97  80   CA  8.4*  8.2*  9.0   MG  2.0  1.9  2.3   PHOS  5.3*   --   5.6*     Recent Labs      02/28/18 0614 02/27/18 0452 02/26/18   0519   SGOT  13*  15   --    AP  30*  26*   --    TP  4.9*  5.5*   --    ALB  2.7*  3.2*   --    GLOB  2.2  2.3   --    LPSE   --    --   159     No results for input(s): INR, PTP, APTT in the last 72 hours. No lab exists for component: INREXT, INREXT   No results for input(s): FE, TIBC, PSAT, FERR in the last 72 hours. No results found for: FOL, RBCF   No results for input(s): PH, PCO2, PO2 in the last 72 hours. No results for input(s): CPK, CKMB in the last 72 hours.     No lab exists for component: TROPONINI  No components found for:  [place]  Lab Results   Component Value Date/Time    Color YELLOW/STRAW 02/19/2018 03:07 PM    Appearance CLOUDY (A) 02/19/2018 03:07 PM    Specific gravity 1.020 02/19/2018 03:07 PM    pH (UA) 5.5 02/19/2018 03:07 PM    Protein 100 (A) 02/19/2018 03:07 PM    Glucose NEGATIVE  02/19/2018 03:07 PM    Ketone NEGATIVE  02/19/2018 03:07 PM    Bilirubin NEGATIVE  02/19/2018 03:07 PM    Urobilinogen 0.2 02/19/2018 03:07 PM    Nitrites NEGATIVE  02/19/2018 03:07 PM    Leukocyte Esterase NEGATIVE  02/19/2018 03:07 PM    Epithelial cells FEW 02/19/2018 03:07 PM    Bacteria 1+ (A) 02/19/2018 03:07 PM    WBC 0-4 02/19/2018 03:07 PM    RBC 0-5 02/19/2018 03:07 PM       MEDICATIONS:  Current Facility-Administered Medications   Medication Dose Route Frequency    insulin glargine (LANTUS) injection 15 Units  15 Units SubCUTAneous DAILY    predniSONE (DELTASONE) tablet 20 mg  20 mg Oral Once per day on Tue Wed Thu    albuterol-ipratropium (DUO-NEB) 2.5 MG-0.5 MG/3 ML  3 mL Nebulization BID RT    calcium gluconate 1 g in 0.9% sodium chloride 100 mL IVPB  1 g IntraVENous Q48H PRN    albumin human 5% (BUMINATE) solution 175 g  175 g IntraVENous Q48H    hydrALAZINE (APRESOLINE) 20 mg/mL injection 20 mg  20 mg IntraVENous Q6H PRN    midazolam (VERSED) injection 5 mg  5 mg IntraVENous Multiple    pantoprazole (PROTONIX) tablet 40 mg  40 mg Oral ACB&D    sucralfate (CARAFATE) 100 mg/mL oral suspension 1 g  1 g Oral AC&HS    insulin lispro (HUMALOG) injection 5 Units  5 Units SubCUTAneous TIDAC    melatonin tablet 3 mg  3 mg Oral QHS PRN    insulin lispro (HUMALOG) injection   SubCUTAneous AC&HS    sodium chloride (NS) flush 5-10 mL  5-10 mL IntraVENous PRN    guaiFENesin ER (MUCINEX) tablet 600 mg  600 mg Oral BID    albuterol-ipratropium (DUO-NEB) 2.5 MG-0.5 MG/3 ML  3 mL Nebulization Q4H PRN    cefTRIAXone (ROCEPHIN) 1 g/50 mL NS IVPB  1 g IntraVENous Q24H    acetaminophen (TYLENOL) tablet 650 mg  650 mg Oral Q4H PRN  ondansetron (ZOFRAN) injection 4 mg  4 mg IntraVENous Q4H PRN    glucose chewable tablet 16 g  4 Tab Oral PRN    dextrose (D50W) injection syrg 12.5-25 g  12.5-25 g IntraVENous PRN    glucagon (GLUCAGEN) injection 1 mg  1 mg IntraMUSCular PRN    aspirin chewable tablet 81 mg  81 mg Oral DAILY    dilTIAZem CD (CARDIZEM CD) capsule 240 mg  240 mg Oral DAILY    montelukast (SINGULAIR) tablet 10 mg  10 mg Oral DAILY    umeclidinium (INCRUSE ELLIPTA) 62.5 mcg/actuation  1 Puff Inhalation DAILY    fluticasone-vilanterol (BREO ELLIPTA) 100mcg-25mcg/puff  1 Puff Inhalation DAILY

## 2018-02-28 NOTE — PROGRESS NOTES
Hospitalist Progress Note    NAME: Ana oLmbardi   :  1940   MRN:  [id number]       Interim Hospital Summary: 66 y.o. male whom presented on 2018 with      Assessment / Plan:  Hx of Smoldering Myeloma/MGUS  Multiple myeloma  - heme/onc following; bone marrow bx from 2018 revealed 60% involvement by plasma cells with atypical, almost plasmablastic appearance, indicating an aggressive process. No evidence of plasma cell leukemia   - continue with plasma exchange in conjunction with chemo; Cytoxan with 20% dose reduction and full dose of Velcade with 40 mg Dex after HD and plamspharesis. This will be a weekly regimen;next dose due on Mar 2. steroid will be on hold when pt is schedule to receive Dex. - Beta-2 microglobulin 12.1  - SPEP, S FLC assay pending. 24 hour urine for UPEP/Bence Reece proteins result pending      DIANA on CKD  Hx renal cell cancer s/p nephrectomy  - solitary kidney (left); Permacath placement , HD started on , Plasmapheresis stared on ; alternate with HD and Plasmapheresis. - rapidly worsening renal function secondary to full progression of MM  - nephrology following; HD every other day with plasmapheresis in alternative day. PLEX to be done today and HD tomorrow. Appreciate Dr. Earline Messina input      Acute hypoxemic respiratory failure secondary to influenza A with pneumonia acute on chronic COPD exacerbation  Sepsis developing on admission  - difficult to wean off O2. Difficulty with weaning off O2 with worsening of SOB. Repeat CXR today. Last CXR was done :  New small left pleural effusion. Small bibasilar airspace opacities seen on prior cross-sectional imaging are difficult to appreciate radiographically. - conmpleted tamiflu.  blood cx no growth  - steroid taper, will hold when he is schedule to receive Dex. Completed zithromax.  Complete rocephin for 10 days (last dose should be )  - continue with breo, mucinex, and duoneb  - send sputum cx if avilable  - repeat CXR: New small left pleural effusion.   - CT chest Prominent emphysema. Bibasilar infiltrates  - pulmonary toilet; incentive spirometry every 1 hour while awake      HTN  - continue cardizem       Diabetes type 2 uncontrolled  - Lantus changed to qhs for now. Following closely. Aware that he may need an additional coverage on 2nd of March after receiving Dex.  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         Recommended Disposition: Home health   Pt will be transfer to heme/onc floor, continue with telemetry monitoring  Discussed with pt and his wife about current medical therapy and code status on 2/24/2018. Mrs. Akhil Giron would like to think about this and then discuss with the pt. Pt would like to talk over with his wife.     Patient was very upset overall care; delay with imaging process with BAS, schedule of PLEX and HD not done as we discussed in the beginning, cleanliness of hospital, delay of nursing response, and delay of food when he was ready to eat. I spoke with bedside nurse, nurse manager, and left a message with patient advocate.     Palliative consult       Subjective:     Chief Complaint / Reason for Physician Visit  \"I am very upset\". Spent about 30 minutes to discussed about various topics that made him very upset. Discussed with RN events overnight.      Review of Systems:  Symptom Y/N Comments  Symptom Y/N Comments   Fever/Chills n   Chest Pain n    Poor Appetite    Edema     Cough y   Abdominal Pain     Sputum y Occasional thick yellow mucus  Joint Pain     SOB/HUERTA y   Pruritis/Rash     Nausea/vomit n   Tolerating PT/OT     Diarrhea n   Tolerating Diet     Constipation n   Other       Could NOT obtain due to:      Objective:     VITALS:   Last 24hrs VS reviewed since prior progress note. Most recent are:  Patient Vitals for the past 24 hrs:   Temp Pulse Resp BP SpO2   02/28/18 0758 - - - - 94 %   02/28/18 0745 97.4 °F (36.3 °C) 69 20 122/54 96 %   02/28/18 0345 97.8 °F (36.6 °C) 66 20 123/50 96 %   02/27/18 2305 97.9 °F (36.6 °C) 87 20 120/53 94 %   02/27/18 2045 - - - - 93 %   02/27/18 1950 98 °F (36.7 °C) 83 20 130/55 90 %   02/27/18 1437 97.7 °F (36.5 °C) 67 20 106/70 95 %   02/27/18 1050 97.6 °F (36.4 °C) 66 20 113/51 93 %       Intake/Output Summary (Last 24 hours) at 02/28/18 0949  Last data filed at 02/28/18 7613   Gross per 24 hour   Intake                0 ml   Output              600 ml   Net             -600 ml        PHYSICAL EXAM:  General: Ill appearing. Alert, cooperative, no acute distress    EENT:  EOMI. Anicteric sclerae. MMM  Resp:  Bilateral rhonchi with exp wheezing. No accessory muscle use  CV:  Regular  rhythm,  No edema  GI:  Soft, Non distended, Non tender.  +Bowel sounds  Neurologic:  Alert and oriented X 3, normal speech,   Psych:   Good insight. was anxious and agitated with overall care  Skin:  No rashes.   No jaundice    Reviewed most current lab test results and cultures  YES  Reviewed most current radiology test results   YES  Review and summation of old records today    NO  Reviewed patient's current orders and MAR    YES  PMH/SH reviewed - no change compared to H&P  ________________________________________________________________________  Care Plan discussed with:    Comments   Patient y    Family  y    RN y    Care Manager y    Consultant  y Dr. Caroline Zurita                    y Multidiciplinary team rounds were held today with , nursing, pharmacist and clinical coordinator. Patient's plan of care was discussed; medications were reviewed and discharge planning was addressed. ________________________________________________________________________  Total NON critical care TIME:  35  Minutes    Total CRITICAL CARE TIME Spent:   Minutes non procedure based      Comments   >50% of visit spent in counseling and coordination of care     ________________________________________________________________________  Annemarie Pineda NP     Procedures: see electronic medical records for all procedures/Xrays and details which were not copied into this note but were reviewed prior to creation of Plan. LABS:  I reviewed today's most current labs and imaging studies.   Pertinent labs include:  Recent Labs      02/28/18   0614  02/27/18   0452  02/26/18   0519   WBC  11.4*  11.1  11.7*   HGB  8.9*  10.2*  9.8*   HCT  27.3*  30.2*  29.2*   PLT  192  168  154     Recent Labs      02/28/18   0614  02/27/18   0452  02/26/18   0519   NA  139  138  141   K  4.9  4.1  4.9   CL  106  105  111*   CO2  25  27  19*   GLU  117*  97  80   BUN  67*  41*  99*   CREA  4.30*  2.67*  5.09*   CA  8.4*  8.2*  9.0   MG  2.0  1.9  2.3   PHOS  5.3*   --   5.6*   ALB  2.7*  3.2*   --    TBILI  0.5  0.5   --    SGOT  13*  15   --    ALT  21  21   --        Signed: )David Gil, NP

## 2018-02-28 NOTE — PROGRESS NOTES
Pt is off the floor at present time. We will try to visit later today. Should you have questions/concerns or the need for a bedside visit by our team, please do not hesitate to contact us at [phone] Thank you for providing us w/ the opportunity to be involved in this patient's care.       Sorin Burton MD  Palliative Care Team

## 2018-02-28 NOTE — CDMP QUERY
Account Number: [de-identified]  MRN: [id number]  Patient: Hannah Traylor  Created: [id number]  Clinician Name: Fanny Thorne RN, CCDS     Dr. Nasima Masterson:  Patient was admitted w/ Acute hypoxemic respiratory failure secondary to influenza A with pneumonia and acute on chronic COPD exacerbation  DIANA  Sepsis developing on admission. (WBC 11.4, Bands 14%, tachycardia, tachypnea)     Further studies noted revealed on   2/22 PN:  Sepsis developing on admission  - wean off O2 as tolerate; O2 Sat 93% @ 3L via n/c  - staph coag (-); perceived as contaminant at this time. Vanc has been d/c'd.  - continue with tamiflu    Please clarify if this condition was:    => Sepsis POA d/t influenza w/ Pneumonia  => Sepsis r/o w/ + Blood cx likely contaminant   => Other explanation of clinical findings  => Unable to determine (no explanation for clinical findings)    Please clarify and document your clinical opinion in the progress notes and discharge summary including the definitive and/or presumptive diagnosis, (suspected or probable), related to the above clinical findings. Please include clinical findings supporting your diagnosis.   Thank Primitivo Darby RN, CCDS

## 2018-02-28 NOTE — PROCEDURES
[facility]   [id number]   Vitals Pre Post Assessment Pre Post   /81 125/56 LOC A&Ox4 A&Ox4   HR 83 86 Lungs Scattered fine crackles in bases bilat, even&unlaboured. Clear but diminishedx5, even&unlabour  ed. Temp 97.7 97.7 Cardiac Reg rate&NSR Reg rate&NSR   Resp 20 18 Skin Warm & dry Warm & dry   Weight 175  lbs 175  lbs Edema None noted None noted   Height 6' 0\" 6' 0\" Pain denies denies     Plasmapheresis Orders   Product: 5% Albumin IV                                            Volume: 3.5L                                               Duration: min Start: 1307 End: 1405 Total: >80min     Fluids    Volume Processed: 6682ml   Plasma Removed: 3718ml   Replacement Fluid: 3500ml   Citrate: 89ml   NS: 110ml (Calcium Gluconate)     Access   Type & Location: RIJ catheter   Comments: Exhibits good flows upon aspiration; dressing remains dry and  intact and s drainage nor inflammation noted around biopatch. Post-tx all possible blood returned via full rinceback. Both catheter ports flushed and indwelled c 0.9% NS and capped and taped. Meds Given   Name Dose Route        Calcium Gluconate 1gram administered slow IVPB over tx course s difficulties               Labs   Obtained/Reviewed  Critical Results Called CBC, plts, retic count, kappa light chains  MD aware     Comments:  Pre-tx consents verified/time-out performed. Tx progressed well and without incident; writer left pts room c pt in no new acute distress and denying complaints c stable vss WNL, bed in lowest position c side rails upx3, wheels lockedx4, and call bell within reach. Reported off to Vuze. Renato Murdock RN.

## 2018-02-28 NOTE — PROGRESS NOTES
ADULT PROTOCOL: JET AEROSOL  REASSESSMENT    Patient  Dioni Davis     66 y.o.   male     2/27/2018  10:56 PM    Breath Sounds Pre Procedure: Right Breath Sounds: Coarse                               Left Breath Sounds: Coarse    Breath Sounds Post Procedure: Right Breath Sounds: Coarse                                 Left Breath Sounds: Coarse    Breathing pattern: Pre procedure Breathing Pattern: Regular          Post procedure Breathing Pattern: Regular    Heart Rate: Pre procedure Pulse: 82           Post procedure Pulse: 87    Resp Rate: Pre procedure Respirations: 18           Post procedure Respirations: 18            Cough: Pre procedure Cough: Congested               Post procedure Cough: Congested      Oxygen: O2 Device: Nasal cannula   Flow rate (L/min) 4     Changed: NO    SpO2: Pre procedure SpO2: 93 %   with oxygen              Post procedure SpO2: 92 %  with oxygen    Nebulizer Therapy: Current medications Aerosolized Medications: DuoNeb      Changed: NO    Smoking History:   Smoking status: Current Every Day Smoker        Packs/day: 0.50       Years: 60.00         Problem List:   Patient Active Problem List   Diagnosis Code    Acute respiratory failure ([facility]) J96.00    COPD with acute exacerbation ([facility] J44.1    HTN (hypertension) I10    HTN (hypertension) I10    COPD (chronic obstructive pulmonary disease) ([facility]) J44.9    Cancer of kidney ([facility] C64.9    Hypoglycemia, unspecified E16.2    Acute renal failure ([facility] N17.9    Hyperkalemia E87.5    S/P partial colectomy Z90.49    Diarrhea R19.7    DM (diabetes mellitus), type 2, uncontrolled ([facility] E11.65    S/p nephrectomy Z90.5    AAA (abdominal aortic aneurysm) ([facility]) I71.4    Gastrointestinal disorder K92.9    Cancer ([facility] C80.1    Anemia D64.9    Multiple myeloma ([facility] C90.00    Stroke (cerebrum) ([facility]) I63.9    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 E11.42    Hypoxemia R09.02       Respiratory Therapist: Jey Hannon RT

## 2018-02-28 NOTE — PROGRESS NOTES
Patient retrieved from [facility]. Transported with O2 3L NC and tele box. Immediately placed on monitors VS stable.

## 2018-02-28 NOTE — PERIOP NOTES
TRANSFER - OUT REPORT:    Verbal report given to Kimi(name) on Braxton Del Rosario  being transferred to 2290(unit) for routine progression of care       Report consisted of patients Situation, Background, Assessment and   Recommendations(SBAR). Information from the following report(s) Procedure Summary was reviewed with the receiving nurse. Lines:   Peripheral IV 02/20/18 Left Antecubital (Active)   Site Assessment Clean, dry, & intact 2/28/2018  8:01 AM   Phlebitis Assessment 0 2/28/2018  8:01 AM   Infiltration Assessment 0 2/28/2018  8:01 AM   Dressing Status Clean, dry, & intact 2/28/2018  8:01 AM   Dressing Type Tape;Transparent 2/28/2018  8:01 AM   Hub Color/Line Status Pink;Flushed 2/28/2018  8:01 AM       Peripheral IV 02/22/18 Right Antecubital (Active)   Site Assessment Clean, dry, & intact 2/28/2018  8:00 AM   Phlebitis Assessment 0 2/28/2018  8:00 AM   Infiltration Assessment 0 2/28/2018  8:00 AM   Dressing Status Clean, dry, & intact 2/28/2018  8:00 AM   Dressing Type Tape;Transparent 2/28/2018  8:00 AM   Hub Color/Line Status Blue;Flushed 2/28/2018  8:00 AM        Opportunity for questions and clarification was provided.       Patient transported with:   O2 @ 5 liters

## 2018-02-28 NOTE — ANESTHESIA POSTPROCEDURE EVALUATION
Post-Anesthesia Evaluation and Assessment    Patient: Romana Rav MRN: [id number]  SSN: [id number]    YOB: 1940  Age: 66 y.o. Sex: male       Cardiovascular Function/Vital Signs  Visit Vitals    /57    Pulse 79    Temp 36.3 °C (97.4 °F)    Resp 12    Ht 6' (1.829 m)    Wt 79.4 kg (175 lb)    SpO2 94%    BMI 23.73 kg/m2       Patient is status post MAC anesthesia for Procedure(s):  ESOPHAGOGASTRODUODENOSCOPY (EGD). Nausea/Vomiting: None    Postoperative hydration reviewed and adequate. Pain:  Pain Scale 1: Numeric (0 - 10) (02/28/18 1218)  Pain Intensity 1: 0 (02/28/18 1218)   Managed    Neurological Status:   Neuro  Neurologic State: Alert (02/28/18 0801)   At baseline    Mental Status and Level of Consciousness: Arousable    Pulmonary Status:   O2 Device: CO2 nasal cannula (02/28/18 1218)   Adequate oxygenation and airway patent    Complications related to anesthesia: None    Post-anesthesia assessment completed.  No concerns    Signed By: Douglas Ingram MD     February 28, 2018

## 2018-02-28 NOTE — PROGRESS NOTES
TRANSFER - IN REPORT:    Verbal report received from MICHAEL Turcios RN(name) on Jose D Aguirre  being received from  [address] (unit) for ordered procedure      Report consisted of patients Situation, Background, Assessment and   Recommendations(SBAR). Information from the following report(s) SBAR, Kardex, Intake/Output, MAR and Recent Results was reviewed with the receiving nurse. Opportunity for questions and clarification was provided.

## 2018-02-28 NOTE — PROCEDURES
[place] Office: [phone]      Esophagogastroduodenoscopy Procedure Note      Suzette Bello  1940  [id number]    Indication:  Dysphagia/odynophagia     : Heriberto Irizarry MD    Referring Provider:  Roopa Davis MD    Sedation:  MAC anesthesia(Versed/Ketamine)    Procedure Details:  After detailed informed consent was obtained for the procedure, with all risks and benefits of procedure explained the patient was taken to the endoscopy suite and placed in the left lateral decubitus position. Following sequential administration of sedation as per above, the endoscope was inserted into the mouth and advanced under direct vision to second portion of the duodenum. A careful inspection was made as the gastroscope was withdrawn, including a retroflexed view of the proximal stomach; findings and interventions are described below. Findings:     Esophagus: The esophageal mucosa in the proximal esophagus is normal.  From mid to distal esophagus the mucosa is desquamating and appears moderately inflamed. There is a large (6-7 cm) hiatal hernia. I did not take any biopsies(on ASA/Plavix). 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 Stomach: The gastric mucosa has erythema in the body. The fundus was found to be normal with no lesions noted on retroflexion. The angularis is normal as well. Duodenum:   The immediate post bulbar mucosa is very abnormal suggesting severe duodenitis vs a large laterally spreading villous adenoma. It bleeds on contact. No biopsies were taken   The duodenal folds are normal in D2. Therapies:  none    Specimen: none           Complications:   None were encountered during the procedure. EBL:  None. Recommendations:     -Continue acid suppression. ,   -Continue carafate,  -Trial Liquid lidocaine.  -Soft foods diet only. -Repeat EGD with biopsies when he can safely be taken off ASA and Plavix for 5 days and when the breathing is better.       Rodrigo Nearing Raz Vazquez MD  2/28/2018  12:07 PM

## 2018-02-28 NOTE — PROGRESS NOTES
Physical Therapy  Patient is currently off the floor for EGD. Will defer therapy at this time and continue to follow.   Thank you,  Sandra Yip, PT

## 2018-02-28 NOTE — ROUTINE PROCESS
Garcia Florez  1940  [id number]    Situation:  Verbal report received from: Fantasma Lyman RN  Procedure: Procedure(s):  ESOPHAGOGASTRODUODENOSCOPY (EGD)    Background:    Preoperative diagnosis: dysphagia  Postoperative diagnosis: esophagitis, mild gastritis, hiatal hernia, duodenal ulceration, lance's erosions    :  Dr. Heidi Cerna  Assistant(s): Endoscopy Technician-1: Valdemar Vale  Endoscopy RN-1: Douglas Newton RN    Specimens: * No specimens in log *  H. Pylori  no        Anesthesia gave intra-procedure sedation and medications, see anesthesia flow sheet yes    Intravenous fluids: NS@ KVO     Vital signs stable      Abdominal assessment: round and soft      Recommendation:     Return to floor      Permission to share finding with family or friend yes

## 2018-02-28 NOTE — PROGRESS NOTES
Informed Dr Gray Jarrett patient Spo2 88% on O2 3L NC. Oxygen increased to 5LNC Spo2 increased to 94%. MD at bedside to evaluate.

## 2018-02-28 NOTE — PROGRESS NOTES
[facility] Hematology-Oncology Progress Note      Wilbur Arteaga  1940  [id number]      2/28/2018  12:24 PM    Follow-up for: progressive multiple myeloma, DIANA     [x] Chart notes since last visit reviewed     [] Medications reviewed for allergies and interactions      Subjective:     Patient complains of the following:  Extremely disgruntled today by things that are not ship-shape. 30 years in the [employer] and there are a number of \"little things\" that have him worried about his safety - correct date not on board, 5 days to find the correct height walker, incorrect diet orders and hours to get it, etc. He vented a bit. .....      Objective:     Patient Vitals for the past 24 hrs:   BP Temp Pulse Resp SpO2 Weight   02/28/18 1405 125/56 97.9 °F (36.6 °C) 86 18 95 % -   02/28/18 1353 119/68 97.8 °F (36.6 °C) 93 18 94 % -   02/28/18 1340 122/67 97.8 °F (36.6 °C) 93 18 - -   02/28/18 1325 131/59 97.8 °F (36.6 °C) 79 18 - -   02/28/18 1310 140/67 97.7 °F (36.5 °C) 83 20 - -   02/28/18 1307 136/81 97.7 °F (36.5 °C) 83 20 96 % -   02/28/18 1250 109/60 - 85 19 95 % -   02/28/18 1245 131/59 - 88 19 91 % -   02/28/18 1244 115/57 - 82 14 94 % -   02/28/18 1241 107/68 - 82 13 94 % -   02/28/18 1236 118/67 - 82 19 94 % -   02/28/18 1232 129/47 97.3 °F (36.3 °C) 80 14 95 % -   02/28/18 1231 129/47 - 79 19 95 % -   02/28/18 1218 130/57 - 79 12 94 % -   02/28/18 1210 139/53 - 85 19 93 % -   02/28/18 1208 147/61 - 95 14 90 % -   02/28/18 1127 142/60 - 87 22 94 % -   02/28/18 1059 - - - - 94 % -   02/28/18 1055 111/56 - 90 18 (!) 89 % -   02/28/18 0758 - - - - 94 % -   02/28/18 0745 122/54 97.4 °F (36.3 °C) 69 20 96 % -   02/28/18 0345 123/50 97.8 °F (36.6 °C) 66 20 96 % 79.4 kg (175 lb)   02/27/18 2305 120/53 97.9 °F (36.6 °C) 87 20 94 % -   02/27/18 2045 - - - - 93 % -   02/27/18 1950 130/55 98 °F (36.7 °C) 83 20 90 % -       Physical Exam:  General -Alert, sitting up in bed Appears weak  HEENT: NC, AT, pupils round  Neck: supple, no adenopathy appreciated  CVS-S1,S2 normal/ regular rate and rhythm  RS-Scattered b/l exp wheezes and rhonchi today (slightly noisier than yesterday)  Abdomen- Soft, NT,ND, BS+  Extre- No c/c/e  Neuro- No focal sensory or motor deficits noted  Psych - alert appropriate verbalizes understanding of conversation     Available labs reviewed:  Labs:    Recent Results (from the past 24 hour(s))   GLUCOSE, POC    Collection Time: 02/27/18  4:45 PM   Result Value Ref Range    Glucose (POC) 121 (H) 65 - 100 mg/dL    Performed by Gladis Wolff, POC    Collection Time: 02/27/18  8:43 PM   Result Value Ref Range    Glucose (POC) 254 (H) 65 - 100 mg/dL    Performed by Xin Watts    MAGNESIUM    Collection Time: 02/28/18  6:14 AM   Result Value Ref Range    Magnesium 2.0 1.6 - 2.4 mg/dL   PHOSPHORUS    Collection Time: 02/28/18  6:14 AM   Result Value Ref Range    Phosphorus 5.3 (H) 2.6 - 4.7 MG/DL   CBC W/O DIFF    Collection Time: 02/28/18  6:14 AM   Result Value Ref Range    WBC 11.4 (H) 4.1 - 11.1 K/uL    RBC 3.32 (L) 4.10 - 5.70 M/uL    HGB 8.9 (L) 12.1 - 17.0 g/dL    HCT 27.3 (L) 36.6 - 50.3 %    MCV 82.2 80.0 - 99.0 FL    MCH 26.8 26.0 - 34.0 PG    MCHC 32.6 30.0 - 36.5 g/dL    RDW 17.7 (H) 11.5 - 14.5 %    PLATELET 178 221 - 997 K/uL    MPV 11.4 8.9 - 12.9 FL    NRBC 0.0 0  WBC    ABSOLUTE NRBC 0.00 0.00 - 2.12 K/uL   METABOLIC PANEL, COMPREHENSIVE    Collection Time: 02/28/18  6:14 AM   Result Value Ref Range    Sodium 139 136 - 145 mmol/L    Potassium 4.9 3.5 - 5.1 mmol/L    Chloride 106 97 - 108 mmol/L    CO2 25 21 - 32 mmol/L    Anion gap 8 5 - 15 mmol/L    Glucose 117 (H) 65 - 100 mg/dL    BUN 67 (H) 6 - 20 MG/DL    Creatinine 4.30 (H) 0.70 - 1.30 MG/DL    BUN/Creatinine ratio 16 12 - 20      GFR est AA 16 (L) >60 ml/min/1.73m2    GFR est non-AA 13 (L) >60 ml/min/1.73m2    Calcium 8.4 (L) 8.5 - 10.1 MG/DL    Bilirubin, total 0.5 0.2 - 1.0 MG/DL    ALT (SGPT) 21 12 - 78 U/L    AST (SGOT) 13 (L) 15 - 37 U/L    Alk. phosphatase 30 (L) 45 - 117 U/L    Protein, total 4.9 (L) 6.4 - 8.2 g/dL    Albumin 2.7 (L) 3.5 - 5.0 g/dL    Globulin 2.2 2.0 - 4.0 g/dL    A-G Ratio 1.2 1.1 - 2.2     GLUCOSE, POC    Collection Time: 02/28/18  7:41 AM   Result Value Ref Range    Glucose (POC) 95 65 - 100 mg/dL    Performed by Susan Wilburn        Imaging:  CXR Results  (Last 48 hours)               02/28/18 1422  XR CHEST PORT Final result    Impression:  IMPRESSION: No acute abnormality and no change. Narrative:  EXAM:  XR CHEST PORT. INDICATION: Increasing oxygen need and worsening shortness of breath. COMPARISON: 2/26/2018. FINDINGS:    A portable AP radiograph of the chest was obtained at 1420 hours. There is a   right jugular dialysis catheter with tip over the superior vena cava. Lines and tubes: The patient is on a cardiac monitor and nasal oxygen. Lungs: The lungs are clear except for some atelectasis at the lung bases. Pleura: There are bilateral pleural effusions. Mediastinum: The cardiac and mediastinal contours and pulmonary vascularity are   normal. The aorta is atherosclerotic. Bones and soft tissues: The bones and soft tissues are grossly within normal   limits. CT Results  (Last 48 hours)    None            Assessment:   Influenza  History of smoldering myeloma  Acute on chronic renal failure attributed to myeloma  Hypercalcemia  DM- worsened by steroids    Plan:     Multiple myeloma- IgG lambda on JAIMIE, total IgG 2293.  SFLC: lambda 1780.  ---- Skeletal survey - 2/21/2018 - with multiple small lytic lesions in skull, humeri  ---- renal failure  ---- hypercalcemia  ---- BMbx 2/22/18: 50% involvement by monoclonal plasma cells with plasmablastic morphology (previously in 2012 had 10% plasma cells and was followed for years with close observation)  ---- started CyBoRD regimen - C1D1- 2/23/2018 - Cytoxan with 20% dose reduction and full dose of Velcade with 40 mg Dex after HD and plamspharesis. Continue weekly chemo (doses on Saturdays, will try to move to Fridays ultimately so can do in office.)  --- deferring antiresorptive tx for now given  Acute on CRF although with new approval of xgeva for myeloma, will not be issue as outpatient. This hospital, however, stocks zoledronic acid which could have detrimental effect on kidney function    Normochromic normocytic anemia likely secondary to myeloma    DIANA  secondary to underlying Multiple Myeloma. Worsened rapidly in hospital.  Hemodialysis and plasmapheresis per nephrology. Will see how does. Dysphagia - workup underway. EGD showed hiatal hernia, inflamed mid to distal esophagus, and either severe duodenitis or large laterall spreading villous adenoma. Plan is to continue acid suppression and carafate. Will see how does with lidocaine. Stable, continue current treatment. Emotional support given and definitely allowed him to express his concerns.

## 2018-02-28 NOTE — H&P
Pre-endoscopy H and P    The patient was seen and examined in the room/pre-op holding area. The airway was assessed and documented. The problem list, past medical history, and medications were reviewed. Patient Active Problem List   Diagnosis Code    Acute respiratory failure ([facility]) J96.00    COPD with acute exacerbation ([facility]) J44.1    HTN (hypertension) I10    HTN (hypertension) I10    COPD (chronic obstructive pulmonary disease) ([facility]) J44.9    Cancer of kidney ([facility] C64.9    Hypoglycemia, unspecified E16.2    [facility] N17.9    Hyperkalemia E87.5    S/P partial colectomy Z90.49    Diarrhea R19.7    DM (diabetes mellitus), type 2, uncontrolled ([facility] E11.65    S/p nephrectomy Z90.5    AAA (abdominal aortic aneurysm) ([facility] I71.4    Gastrointestinal disorder K92.9    Cancer ([facility] C80.1    Anemia D64.9    Multiple myeloma ([facility] C90.00    Stroke (cerebrum) ([facility]) I63.9    Thrombotic stroke involving left cerebellar artery ([facility]) L40.645    Stenosis of both internal carotid arteries I65.23    Diabetic peripheral neuropathy associated with type 2 diabetes mellitus ([facility]) E11.42    Hypoxemia R09.02     Social History     Social History    Marital status:      Spouse name: N/A    Number of children: N/A    Years of education: N/A     Occupational History    Not on file.      Social History Main Topics    Smoking status: Current Every Day Smoker     Packs/day: 0.50     Years: 60.00    Smokeless tobacco: Never Used    Alcohol use Yes      Comment: 2 drinks a month    Drug use: No    Sexual activity: Not on file     Other Topics Concern    Not on file     Social History Narrative    ** Merged History Encounter **          Past Medical History:   Diagnosis Date    AAA (abdominal aortic aneurysm) ([facility])     Asthma     Cancer ([facility]     kidney ca    COPD     Diabetes ([facility]     Gastrointestinal disorder     ruptured esphogus    Hypertension     Other ill-defined conditions(799.89)          Prior to Admission Medications   Prescriptions Last Dose Informant Patient Reported? Taking?   acetaminophen (TYLENOL) 325 mg tablet   No Yes   Sig: Take 2 Tabs by mouth every four (4) hours as needed for Fever (For temp greater than or equal to 38.5 C or 101.3 F (Unless hepatic failure or contrindicated). Give first line for fever. ). albuterol (PROVENTIL HFA, VENTOLIN HFA) 90 mcg/Actuation inhaler   No Yes   Sig: Take 2 Puffs by inhalation every six (6) hours as needed for Wheezing and Shortness of Breath. albuterol (PROVENTIL VENTOLIN) 2.5 mg /3 mL (0.083 %) nebulizer solution   Yes Yes   Si.25 mg by Nebulization route once. aspirin 81 mg chewable tablet   No Yes   Sig: Take 1 Tab by mouth daily. budesonide-formoterol (SYMBICORT) 160-4.5 mcg/actuation HFA inhaler   Yes Yes   Sig: Take 2 Puffs by inhalation daily. Indications: COPD ASSOCIATED WITH CHRONIC BRONCHITIS   cholecalciferol (VITAMIN D3) 1,000 unit cap   Yes Yes   Sig: Take 1,000 Units by mouth daily. clopidogrel (PLAVIX) 75 mg tablet   No Yes   Sig: Take 1 Tab by mouth daily. diltiazem CD (CARDIZEM CD) 240 mg ER capsule Not Taking at Unknown time  No No   Sig: Take 1 Cap by mouth daily. glipiZIDE (GLUCOTROL) 10 mg tablet   Yes Yes   Sig: Take 5 mg by mouth daily. glipiZIDE (GLUCOTROL) 10 mg tablet   Yes Yes   Sig: Take 2.5 mg by mouth every evening. insulin NPH (NOVOLIN N, HUMULIN N) 100 unit/mL injection   No Yes   Si Units by SubCUTAneous route nightly. insulin regular (NOVOLIN R) 100 unit/mL injection   Yes Yes   Sig: by SubCUTAneous route.   loratadine 10 mg cap Not Taking at Unknown time  Yes No   Sig: Take 10 mg by mouth daily. montelukast (SINGULAIR) 10 mg tablet Not Taking at Unknown time  Yes No   Sig: Take 10 mg by mouth daily.    predniSONE (STERAPRED) 5 mg dose pack   No Yes   Sig: See administration instruction per 5mg dose pack   ranitidine (ZANTAC) 150 mg tablet   Yes Yes   Sig: Take 150 mg by mouth daily. rosuvastatin (CRESTOR) 40 mg tablet   Yes Yes   Sig: Take 20 mg by mouth nightly. tiotropium (SPIRIVA WITH HANDIHALER) 18 mcg inhalation capsule   Yes Yes   Sig: Take 1 Cap by inhalation daily. Facility-Administered Medications: None           The review of systems is:  Negative  for shortness of breath or chest pain      The heart, lungs, and mental status were satisfactory for the administration of deep sedation and for the procedure. I discussed with the patient the objectives, risks, consequences and alternatives to the procedure.       Nina Campbell MD  2/28/2018  11:57 AM

## 2018-02-28 NOTE — PROGRESS NOTES
Bedside shift change report given to [address] [place] [address] (oncoming nurse) by Concetta Yost (offgoing nurse). Report included the following information SBAR.

## 2018-02-28 NOTE — PROGRESS NOTES
Nutrition Assessment:    INTERVENTIONS/RECOMMENDATIONS:   Continue current diet  Mighty shake and carnation instant breakfast    ASSESSMENT:   Chart reviewed, medically noted for MBS normal and EGD today showing hiatal hernia, esophagitis and gastritis. Pt ate well for lunch today although it was not very kcal dense (2 bowls of broth). He does not care for nepro shakes, will discontinue. Will try mighty shake and reorder carnation instant breakfast.     Diet Order: Other (comment) (Dental soft)  % Eaten:  Patient Vitals for the past 72 hrs:   % Diet Eaten   02/28/18 1405 90 %   02/26/18 1621 25 %     Pertinent Medications: [x]Reviewed: NS, humalog, PPI, prednisone  Pertinent Labs: [x]Reviewed:   Food Allergies: [x]NKFA  []Other   Last BM:  2/25  Edema:      []RUE   []LUE   []RLE   []LLE      Pressure Ulcer:      [] Stage I   [] Stage II   [] Stage III   [] Stage IV      Anthropometrics: Height: 6' (182.9 cm) Weight: 79.4 kg (175 lb)    IBW (%IBW):   ( ) UBW (%UBW):   (  %)    BMI: Body mass index is 23.73 kg/(m^2). This BMI is indicative of:  []Underweight   [x]Normal   []Overweight   [] Obesity   [] Extreme Obesity (BMI>40)  Last Weight Metrics:  Weight Loss Metrics 2/28/2018 8/10/2017 9/13/2016 9/9/2016 6/28/2016 6/27/2016 6/14/2016   Today's Wt 175 lb 179 lb 11.2 oz 176 lb 3 oz 173 lb 11.6 oz 183 lb 183 lb 183 lb   BMI 23.73 kg/m2 24.37 kg/m2 23.9 kg/m2 23.56 kg/m2 24.81 kg/m2 24.81 kg/m2 24.81 kg/m2       Estimated Nutrition Needs (Based on): 2000 Kcals/day (BMR: 1550 x 1.3) , 80 g (1 g/kg) Protein  Carbohydrate:  At Least 130 g/day  Fluids: 2000 mL/day or per primary team    NUTRITION DIAGNOSES:   Problem:  Inadequate protein-energy intake      Etiology: related to dysphagia     Signs/Symptoms: as evidenced by pt reports that swallowing discomfort has lead to decreased PO intake      NUTRITION INTERVENTIONS:  Meals/Snacks: General/healthful diet   Supplements: Commercial supplement              GOAL: consume >50% of meals and ONS in 3-5 days    NUTRITION MONITORING AND EVALUATION      Food/Nutrient Intake Outcomes:  Total energy intake  Physical Signs/Symptoms Outcomes: Weight/weight change, Electrolyte and renal profile, GI, Glucose profile    Previous Goal Met:   [] Met              [x] Progressing Towards Goal              [] Not Progressing Towards Goal   Previous Recommendations:   [x] Implemented          [] Not Implemented          [] Not Applicable    LEARNING NEEDS (Diet, Food/Nutrient-Drug Interaction):    [x] None Identified   [] Identified and Education Provided/Documented   [] Identified and Pt declined/was not appropriate     Cultural, Confucianist, OR Ethnic Dietary Needs:    [x] None Identified   [] Identified and Addressed     [x] Interdisciplinary Care Plan Reviewed/Documented    [x] Discharge Planning: TBD   [] Participated in Interdisciplinary Rounds    NUTRITION RISK:    [] High              [x] Moderate           []  Low  []  Minimal/Uncompromised      Shawn Raza RDN  Pager [phone]  Weekend Pager [phone]

## 2018-03-01 LAB
ANION GAP SERPL CALC-SCNC: 8 MMOL/L (ref 5–15)
BASOPHILS # BLD: 0 K/UL (ref 0–0.1)
BASOPHILS NFR BLD: 0 % (ref 0–1)
BUN SERPL-MCNC: 83 MG/DL (ref 6–20)
BUN/CREAT SERPL: 17 (ref 12–20)
CALCIUM SERPL-MCNC: 8.3 MG/DL (ref 8.5–10.1)
CHLORIDE SERPL-SCNC: 108 MMOL/L (ref 97–108)
CO2 SERPL-SCNC: 23 MMOL/L (ref 21–32)
CREAT SERPL-MCNC: 4.88 MG/DL (ref 0.7–1.3)
DIFFERENTIAL METHOD BLD: ABNORMAL
EOSINOPHIL # BLD: 0 K/UL (ref 0–0.4)
EOSINOPHIL NFR BLD: 0 % (ref 0–7)
ERYTHROCYTE [DISTWIDTH] IN BLOOD BY AUTOMATED COUNT: 17.6 % (ref 11.5–14.5)
GLUCOSE BLD STRIP.AUTO-MCNC: 106 MG/DL (ref 65–100)
GLUCOSE BLD STRIP.AUTO-MCNC: 151 MG/DL (ref 65–100)
GLUCOSE BLD STRIP.AUTO-MCNC: 227 MG/DL (ref 65–100)
GLUCOSE SERPL-MCNC: 234 MG/DL (ref 65–100)
HCT VFR BLD AUTO: 25.5 % (ref 36.6–50.3)
HGB BLD-MCNC: 8.4 G/DL (ref 12.1–17)
IMM GRANULOCYTES # BLD: 0 K/UL (ref 0–0.04)
IMM GRANULOCYTES NFR BLD AUTO: 0 % (ref 0–0.5)
LYMPHOCYTES # BLD: 0.5 K/UL (ref 0.8–3.5)
LYMPHOCYTES NFR BLD: 4 % (ref 12–49)
MCH RBC QN AUTO: 27.6 PG (ref 26–34)
MCHC RBC AUTO-ENTMCNC: 32.9 G/DL (ref 30–36.5)
MCV RBC AUTO: 83.9 FL (ref 80–99)
MONOCYTES # BLD: 0.2 K/UL (ref 0–1)
MONOCYTES NFR BLD: 2 % (ref 5–13)
NEUTS SEG # BLD: 10.9 K/UL (ref 1.8–8)
NEUTS SEG NFR BLD: 94 % (ref 32–75)
NRBC # BLD: 0 K/UL (ref 0–0.01)
NRBC BLD-RTO: 0 PER 100 WBC
PLATELET # BLD AUTO: 173 K/UL (ref 150–400)
PMV BLD AUTO: 11.4 FL (ref 8.9–12.9)
POTASSIUM SERPL-SCNC: 5.7 MMOL/L (ref 3.5–5.1)
RBC # BLD AUTO: 3.04 M/UL (ref 4.1–5.7)
RBC MORPH BLD: ABNORMAL
SERVICE CMNT-IMP: ABNORMAL
SODIUM SERPL-SCNC: 139 MMOL/L (ref 136–145)
WBC # BLD AUTO: 11.6 K/UL (ref 4.1–11.1)

## 2018-03-01 PROCEDURE — 82962 GLUCOSE BLOOD TEST: CPT

## 2018-03-01 PROCEDURE — 80048 BASIC METABOLIC PNL TOTAL CA: CPT | Performed by: NURSE PRACTITIONER

## 2018-03-01 PROCEDURE — 36415 COLL VENOUS BLD VENIPUNCTURE: CPT | Performed by: NURSE PRACTITIONER

## 2018-03-01 PROCEDURE — 94640 AIRWAY INHALATION TREATMENT: CPT

## 2018-03-01 PROCEDURE — 65660000000 HC RM CCU STEPDOWN

## 2018-03-01 PROCEDURE — 94761 N-INVAS EAR/PLS OXIMETRY MLT: CPT

## 2018-03-01 PROCEDURE — 74011000250 HC RX REV CODE- 250: Performed by: NURSE PRACTITIONER

## 2018-03-01 PROCEDURE — 74011636637 HC RX REV CODE- 636/637: Performed by: NURSE PRACTITIONER

## 2018-03-01 PROCEDURE — 74011250636 HC RX REV CODE- 250/636: Performed by: INTERNAL MEDICINE

## 2018-03-01 PROCEDURE — 85025 COMPLETE CBC W/AUTO DIFF WBC: CPT | Performed by: NURSE PRACTITIONER

## 2018-03-01 PROCEDURE — 77010033678 HC OXYGEN DAILY

## 2018-03-01 PROCEDURE — 74011000250 HC RX REV CODE- 250: Performed by: INTERNAL MEDICINE

## 2018-03-01 PROCEDURE — 90935 HEMODIALYSIS ONE EVALUATION: CPT

## 2018-03-01 PROCEDURE — 74011250637 HC RX REV CODE- 250/637: Performed by: INTERNAL MEDICINE

## 2018-03-01 PROCEDURE — 74011250637 HC RX REV CODE- 250/637: Performed by: EMERGENCY MEDICINE

## 2018-03-01 PROCEDURE — 74011636637 HC RX REV CODE- 636/637: Performed by: EMERGENCY MEDICINE

## 2018-03-01 RX ORDER — GRANISETRON HYDROCHLORIDE 1 MG/ML
1 INJECTION, SOLUTION INTRAVENOUS ONCE
Status: DISCONTINUED | OUTPATIENT
Start: 2018-03-02 | End: 2018-03-02

## 2018-03-01 RX ORDER — IPRATROPIUM BROMIDE AND ALBUTEROL SULFATE 2.5; .5 MG/3ML; MG/3ML
3 SOLUTION RESPIRATORY (INHALATION)
Status: DISCONTINUED | OUTPATIENT
Start: 2018-03-01 | End: 2018-03-09

## 2018-03-01 RX ADMIN — MONTELUKAST SODIUM 10 MG: 10 TABLET, COATED ORAL at 09:19

## 2018-03-01 RX ADMIN — SUCRALFATE 1 G: 1 SUSPENSION ORAL at 18:42

## 2018-03-01 RX ADMIN — SUCRALFATE 1 G: 1 SUSPENSION ORAL at 09:18

## 2018-03-01 RX ADMIN — FLUTICASONE FUROATE AND VILANTEROL TRIFENATATE 1 PUFF: 100; 25 POWDER RESPIRATORY (INHALATION) at 09:28

## 2018-03-01 RX ADMIN — INSULIN GLARGINE 10 UNITS: 100 INJECTION, SOLUTION SUBCUTANEOUS at 22:37

## 2018-03-01 RX ADMIN — ASPIRIN 81 MG 81 MG: 81 TABLET ORAL at 09:18

## 2018-03-01 RX ADMIN — IPRATROPIUM BROMIDE AND ALBUTEROL SULFATE 3 ML: .5; 3 SOLUTION RESPIRATORY (INHALATION) at 08:25

## 2018-03-01 RX ADMIN — IPRATROPIUM BROMIDE AND ALBUTEROL SULFATE 3 ML: .5; 3 SOLUTION RESPIRATORY (INHALATION) at 20:57

## 2018-03-01 RX ADMIN — INSULIN LISPRO 5 UNITS: 100 INJECTION, SOLUTION INTRAVENOUS; SUBCUTANEOUS at 09:19

## 2018-03-01 RX ADMIN — SUCRALFATE 1 G: 1 SUSPENSION ORAL at 22:33

## 2018-03-01 RX ADMIN — DILTIAZEM HYDROCHLORIDE 240 MG: 240 CAPSULE, COATED, EXTENDED RELEASE ORAL at 09:18

## 2018-03-01 RX ADMIN — GUAIFENESIN 600 MG: 600 TABLET, EXTENDED RELEASE ORAL at 09:19

## 2018-03-01 RX ADMIN — GUAIFENESIN 600 MG: 600 TABLET, EXTENDED RELEASE ORAL at 18:42

## 2018-03-01 RX ADMIN — ERYTHROPOIETIN 10000 UNITS: 10000 INJECTION, SOLUTION INTRAVENOUS; SUBCUTANEOUS at 18:46

## 2018-03-01 RX ADMIN — INSULIN LISPRO 2 UNITS: 100 INJECTION, SOLUTION INTRAVENOUS; SUBCUTANEOUS at 07:30

## 2018-03-01 RX ADMIN — PANTOPRAZOLE SODIUM 40 MG: 40 TABLET, DELAYED RELEASE ORAL at 18:42

## 2018-03-01 RX ADMIN — INSULIN LISPRO 2 UNITS: 100 INJECTION, SOLUTION INTRAVENOUS; SUBCUTANEOUS at 22:35

## 2018-03-01 RX ADMIN — UMECLIDINIUM 1 PUFF: 62.5 AEROSOL, POWDER ORAL at 09:28

## 2018-03-01 RX ADMIN — PANTOPRAZOLE SODIUM 40 MG: 40 TABLET, DELAYED RELEASE ORAL at 09:19

## 2018-03-01 NOTE — PROCEDURES
[facility]  [phone]    Vitals   Pre   Post   Assessment   Pre   Post     Temp  Temp: 98.7 °F (37.1 °C) (03/01/18 1015)  97.4 LOC  Alert, pleasant cooperative Alert and cooperative    HR   Pulse (Heart Rate): 61 (03/01/18 1015) 62 Lungs   Congested, oxygen via nc at 4 liters, pulse ox 100%  expectorating thick yellow sputum   B/P   BP: 122/43 (03/01/18 1015) 106/41 Cardiac   RRR rate in 60s  RRR   Resp   Resp Rate: 17 (03/01/18 1015) 23 pulse oximetry on 4 liters NC 98% Skin   Some bruising noted Warm and dry    Pain level  Pain Intensity 1: 0 (03/01/18 0800) 0 Edema  Left arm      Left arm    Orders:    Duration:   Start:   10:15 End:   1345 Total:   3.5   Dialyzer:   Dialyzer/Set Up Inspection: Michelle Rose (03/01/18 1015)   K Bath:   Dialysate K (mEq/L): 2 (03/01/18 1015)   Ca Bath:   Dialysate CA (mEq/L): 2.5 (03/01/18 1015)   Na/Bicarb:   Dialysate NA (mEq/L): 138 (03/01/18 1015)   Target Fluid Removal:   Goal/Amount of Fluid to Remove (mL): 2500 mL (03/01/18 1015)   Access     Type & Location:   RIJ ports cleansed with alcohol, aspirated and flushed,  with acceptable pressures.     Labs     Obtained/Reviewed   Critical Results Called   Date when labs were drawn-  Hgb-    HGB   Date Value Ref Range Status   03/01/2018 8.4 (L) 12.1 - 17.0 g/dL Final     K-    Potassium   Date Value Ref Range Status   03/01/2018 5.7 (H) 3.5 - 5.1 mmol/L Final     Ca-   Calcium   Date Value Ref Range Status   03/01/2018 8.3 (L) 8.5 - 10.1 MG/DL Final     Bun-   BUN   Date Value Ref Range Status   03/01/2018 83 (H) 6 - 20 MG/DL Final     Creat-   Creatinine   Date Value Ref Range Status   03/01/2018 4.88 (H) 0.70 - 1.30 MG/DL Final        Medications/ Blood Products Given     Name   Dose   Route and Time        None              Blood Volume Processed (BVP):    84 Net Fluid   Removed:  2300   Comments   Time Out Done: 10:15  Primary Nurse Rpt Pre: Aleksandr Veliz RN   Primary Nurse Rpt Post: Saskia Meza, RN   Pt Education: CVC infection control measures wearing mask when initiating and discontinuing tx, keep dressing dry   Care Plan: continue current [facility] dialysis plan of care   Tx Summary: 10:15 -13:45 HD started via RIJ without incident. Dr. Zamora Apt in to see patient, order changed to 2K in light of 5.7 potassium this am, and attempt to increase target loss by 0.5 due to congestion. Dr. Zmaora Apt in to see patient on dialysis. Patient dropped blood pressure with 5 minutes to go 83/42 stated his vision felt blurry, patient rinsed back and repeat blood pressure 106/41. Dressing changed with CVC biopatch kit, no redness or drainage, ports cleansed with alcohol, flushed with saline and capped. Report called to Saskia Meza and patient transported back to his room. Admiting Diagnosis: Acute respiratory failure   Pt's previous clinic- [facility]  Consent signed - Informed Consent Verified: Yes (03/01/18 1015)  [facility] Consent - yes   Hepatitis Status- Negative antigen   Machine #- Machine Number: [id number] (03/01/18 1015)  Telemetry status- regular rate 60s  Pre-dialysis wt. - Pre-Dialysis Weight: 81.3 kg (179 lb 3.7 oz) (02/27/18 0010)

## 2018-03-01 NOTE — PROGRESS NOTES
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     Hypercalcemia     anemia  EGD--showed lance erosions  DM2    HTN    Influenza A    COPD --Initiated HD on ; HD alternating with PLEX-Seen on HD today, PLEX #4 tomorrow, PLEX #5     Intiated chemo for MM; initiated plasma exchange in conjunction with chemo on  (monitor response in light chains - look for a 50-60 % drop after 5 PLEX treatments); every other day PLEX x 5; 1 plasma volume; replace with albumin    spot urine protein:creatinine 3.6 grams    Oncology following closely    Epo       Subjective:     Chief Complaint:in better spirits today-phersis was well tolerated yesterday  Review of Systems:    Symptom Y/N Comments  Symptom Y/N Comments   Fever/Chills    Chest Pain n    Poor Appetite n   Edema n    Cough y   Abdominal Pain     Sputum    Joint Pain     SOB/HUERTA n better  Pruritis/Rash     Nausea/vomit n   Tolerating PT/OT     Diarrhea    Tolerating Diet     Constipation    Other       Could not obtain due to:      Objective:     VITALS:   Last 24hrs VS reviewed since prior progress note. Most recent are:  Visit Vitals    /45    Pulse 61    Temp 98.7 °F (37.1 °C) (Oral)    Resp 13    Ht 6' (1.829 m)    Wt 80.1 kg (176 lb 9.6 oz)    SpO2 100%    BMI 23.95 kg/m2       Intake/Output Summary (Last 24 hours) at 18 1125  Last data filed at 18 0855   Gross per 24 hour   Intake              866 ml   Output             4588 ml   Net            -3722 ml      Telemetry Reviewed:     PHYSICAL EXAM:  General: WD, WN.  Alert, cooperative, no acute distress  Resp:  Clear anteriorly  CV:  Regular  rhythm,  No murmur  trace edema  GI:  Soft, Non distended, Non tender.        Lab Data Reviewed: (see below)    Medications Reviewed: (see below)    PMH/SH reviewed - no change compared to H&P  ________________________________________________________________________  Care Plan discussed with:  Patient y    Family      RN y    Care Manager                    Consultant:   Max Bergman       Comments   >50% of visit spent in counseling and coordination of care       ________________________________________________________________________  Arminda Aguayo MD     Procedures: see electronic medical records for all procedures/Xrays and details which  were not copied into this note but were reviewed prior to creation of Plan. LABS:  Recent Labs      03/01/18 0315 02/28/18 0614   WBC  11.6*  11.4*   HGB  8.4*  8.9*   HCT  25.5*  27.3*   PLT  173  192     Recent Labs      03/01/18 0315 02/28/18 0614 02/27/18 0452   NA  139  139  138   K  5.7*  4.9  4.1   CL  108  106  105   CO2  23  25  27   BUN  83*  67*  41*   CREA  4.88*  4.30*  2.67*   GLU  234*  117*  97   CA  8.3*  8.4*  8.2*   MG   --   2.0  1.9   PHOS   --   5.3*   --      Recent Labs      02/28/18 0614 02/27/18 0452   SGOT  13*  15   AP  30*  26*   TP  4.9*  5.5*   ALB  2.7*  3.2*   GLOB  2.2  2.3     No results for input(s): INR, PTP, APTT in the last 72 hours. No lab exists for component: INREXT, INREXT   No results for input(s): FE, TIBC, PSAT, FERR in the last 72 hours. No results found for: FOL, RBCF   No results for input(s): PH, PCO2, PO2 in the last 72 hours. No results for input(s): CPK, CKMB in the last 72 hours.     No lab exists for component: TROPONINI  No components found for: [place]  Lab Results   Component Value Date/Time    Color YELLOW/STRAW 02/19/2018 03:07 PM    Appearance CLOUDY (A) 02/19/2018 03:07 PM    Specific gravity 1.020 02/19/2018 03:07 PM    pH (UA) 5.5 02/19/2018 03:07 PM    Protein 100 (A) 02/19/2018 03:07 PM    Glucose NEGATIVE  02/19/2018 03:07 PM    Ketone NEGATIVE  02/19/2018 03:07 PM    Bilirubin NEGATIVE  02/19/2018 03:07 PM Urobilinogen 0.2 02/19/2018 03:07 PM    Nitrites NEGATIVE  02/19/2018 03:07 PM    Leukocyte Esterase NEGATIVE  02/19/2018 03:07 PM    Epithelial cells FEW 02/19/2018 03:07 PM    Bacteria 1+ (A) 02/19/2018 03:07 PM    WBC 0-4 02/19/2018 03:07 PM    RBC 0-5 02/19/2018 03:07 PM       MEDICATIONS:  Current Facility-Administered Medications   Medication Dose Route Frequency    insulin glargine (LANTUS) injection 10 Units  10 Units SubCUTAneous QHS    lidocaine (XYLOCAINE) 2 % viscous solution 15 mL  15 mL Mouth/Throat PRN    anticoagulant citrate dextrose (ACD-A) solution  1,500 mL In Vitro DAILY PRN    albuterol-ipratropium (DUO-NEB) 2.5 MG-0.5 MG/3 ML  3 mL Nebulization BID RT    calcium gluconate 1 g in 0.9% sodium chloride 100 mL IVPB  1 g IntraVENous Q48H PRN    albumin human 5% (BUMINATE) solution 175 g  175 g IntraVENous Q48H    hydrALAZINE (APRESOLINE) 20 mg/mL injection 20 mg  20 mg IntraVENous Q6H PRN    midazolam (VERSED) injection 5 mg  5 mg IntraVENous Multiple    pantoprazole (PROTONIX) tablet 40 mg  40 mg Oral ACB&D    sucralfate (CARAFATE) 100 mg/mL oral suspension 1 g  1 g Oral AC&HS    insulin lispro (HUMALOG) injection 5 Units  5 Units SubCUTAneous TIDAC    melatonin tablet 3 mg  3 mg Oral QHS PRN    insulin lispro (HUMALOG) injection   SubCUTAneous AC&HS    sodium chloride (NS) flush 5-10 mL  5-10 mL IntraVENous PRN    guaiFENesin ER (MUCINEX) tablet 600 mg  600 mg Oral BID    albuterol-ipratropium (DUO-NEB) 2.5 MG-0.5 MG/3 ML  3 mL Nebulization Q4H PRN    acetaminophen (TYLENOL) tablet 650 mg  650 mg Oral Q4H PRN    ondansetron (ZOFRAN) injection 4 mg  4 mg IntraVENous Q4H PRN    glucose chewable tablet 16 g  4 Tab Oral PRN    dextrose (D50W) injection syrg 12.5-25 g  12.5-25 g IntraVENous PRN    glucagon (GLUCAGEN) injection 1 mg  1 mg IntraMUSCular PRN    aspirin chewable tablet 81 mg  81 mg Oral DAILY    dilTIAZem CD (CARDIZEM CD) capsule 240 mg  240 mg Oral DAILY    montelukast (SINGULAIR) tablet 10 mg  10 mg Oral DAILY    umeclidinium (INCRUSE ELLIPTA) 62.5 mcg/actuation  1 Puff Inhalation DAILY    fluticasone-vilanterol (BREO ELLIPTA) 100mcg-25mcg/puff  1 Puff Inhalation DAILY

## 2018-03-01 NOTE — PROGRESS NOTES
ADULT PROTOCOL: JET AEROSOL  REASSESSMENT    Patient  Irina Vance     66 y.o.   male     3/1/2018  1:18 AM    Breath Sounds Pre Procedure: Right Breath Sounds: Scattered wheezing                               Left Breath Sounds: Scattered wheezing    Breath Sounds Post Procedure: Right Breath Sounds: Coarse                                 Left Breath Sounds: Coarse    Breathing pattern: Pre procedure Breathing Pattern: Regular          Post procedure Breathing Pattern: Regular    Heart Rate: Pre procedure Pulse: 72           Post procedure Pulse: 75    Resp Rate: Pre procedure Respirations: 20           Post procedure Respirations: 20            Cough: Pre procedure Cough: Congested               Post procedure Cough: Congested      Oxygen: O2 Device: Nasal cannula   Flow rate (L/min) 4     Changed: NO    SpO2: Pre procedure SpO2: 94 %   with oxygen              Post procedure SpO2: 94 %  with oxygen    Nebulizer Therapy: Current medications Aerosolized Medications: DuoNeb      Changed: NO    Smoking History:    Smoking status: Current Every Day Smoker       Packs/day: 0.50       Years: 60.00         Problem List:   Patient Active Problem List   Diagnosis Code    Acute respiratory failure ([facility]) J96.00    COPD with acute exacerbation ([facility] J44.1    HTN (hypertension) I10    HTN (hypertension) I10    COPD (chronic obstructive pulmonary disease) ([facility]) J44.9    Cancer of kidney ([facility] C64.9    Hypoglycemia, unspecified E16.2    Acute renal failure ([facility] N17.9    Hyperkalemia E87.5    S/P partial colectomy Z90.49    Diarrhea R19.7    DM (diabetes mellitus), type 2, uncontrolled ([facility] E11.65    S/p nephrectomy Z90.5    AAA (abdominal aortic aneurysm) ([facility]) I71.4    Gastrointestinal disorder K92.9    Cancer ([facility] C80.1    Anemia D64.9    Multiple myeloma ([facility] C90.00    Stroke (cerebrum) ([facility]) I63.9    Thrombotic stroke involving left cerebellar artery ([facility] 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

## 2018-03-01 NOTE — PROGRESS NOTES
Hospitalist Progress Note    NAME: Donavon Lagos   :  1940   MRN:  [id number]       Interim Hospital Summary: 66 y.o. male whom presented on 2018 with      Assessment / Plan:  Hx of Smoldering Myeloma/MGUS  Multiple myeloma  - heme/onc following; bone marrow bx from 2018 revealed 60% involvement by plasma cells with atypical, almost plasmablastic appearance, indicating an aggressive process. No evidence of plasma cell leukemia   - continue with plasma exchange in conjunction with chemo; Cytoxan with 20% dose reduction and full dose of Velcade with 40 mg Dex after HD and plamspharesis. This will be a weekly regimen;next dose due on Mar 2.   - Beta-2 microglobulin 12.1  - SPEP, S FLC assay pending. 24 hour urine for UPEP/Bence Reece proteins result pending      [facility] on CKD  Hx renal cell cancer s/p nephrectomy  - solitary kidney (left); Permacath placement , HD started on , Plasmapheresis stared on ; alternate with HD and Plasmapheresis. - rapidly worsening renal function secondary to full progression of MM  - nephrology following; HD every other day with plasmapheresis in alternative day. PLEX to be done today and HD tomorrow. Appreciate Dr. Kameron Sands input      Acute hypoxemic respiratory failure secondary to influenza A with pneumonia acute on chronic COPD exacerbation  Sepsis developing on admission  - difficult to wean off O2. Difficulty with weaning off O2 with worsening of SOB. Repeat CXR today. Last CXR was done :  New small left pleural effusion. Small bibasilar airspace opacities seen on prior cross-sectional imaging are difficult to appreciate radiographically. - conmpleted tamiflu.  blood cx no growth  - prednisone d/c'd today; completed taper dose. Completed zithromax.  Complete rocephin for 10 days (last dose should be )  - continue with breo, mucinex, and duoneb  - send sputum cx if avilable  - repeat CXR: New small left pleural effusion.   - CT chest Prominent emphysema. Bibasilar infiltrates  - pulmonary toilet; incentive spirometry every 1 hour while awake      HTN  - continue cardizem       Diabetes type 2 uncontrolled  - Lantus changed to qhs for now. Following closely. Aware that he may need an additional coverage on 2nd of March after receiving Dex.  - Hgb A1C 9.8      Hx CVA  - continue statin (plavix on hold for EGD)      GERD  Indigestion/dysphagia  - BAS: normal  - GI following; continue with soft diet. Will need a repeat EGD in 2-3 weeks when pt is stable. 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         Recommended Disposition: Home health   Pt will be transfer to heme/onc floor, continue with telemetry monitoring  Discussed with pt and his wife about current medical therapy and code status on 2/24/2018. Mrs. Raffy Elliott would like to think about this and then discuss with the pt. Pt would like to talk over with his wife.     Palliative consult         Subjective:     Chief Complaint / Reason for Physician Visit  \"today is a better day\". Discussed with RN events overnight. Review of Systems:  Symptom Y/N Comments  Symptom Y/N Comments   Fever/Chills n   Chest Pain n    Poor Appetite    Edema     Cough    Abdominal Pain     Sputum    Joint Pain     SOB/HUERTA y   Pruritis/Rash     Nausea/vomit n   Tolerating PT/OT     Diarrhea    Tolerating Diet     Constipation    Other       Could NOT obtain due to:      Objective:     VITALS:   Last 24hrs VS reviewed since prior progress note.  Most recent are:  Patient Vitals for the past 24 hrs:   Temp Pulse Resp BP SpO2   03/01/18 1115 - 61 13 105/45 100 %   03/01/18 1045 - 65 19 112/44 100 %   03/01/18 1015 98.7 °F (37.1 °C) 61 17 122/43 100 %   03/01/18 0903 97.6 °F (36.4 °C) 80 20 161/52 96 %   03/01/18 0824 - - - - 96 %   03/01/18 0301 98 °F (36.7 °C) 76 20 117/53 100 %   02/28/18 2319 98.2 °F (36.8 °C) 78 20 121/46 93 %   02/28/18 2120 - - - - 94 %   02/28/18 2022 98.3 °F (36.8 °C) 71 20 146/45 92 %   02/28/18 2019 - - - - 92 %   02/28/18 1545 98.2 °F (36.8 °C) 83 20 116/45 94 %   02/28/18 1405 97.9 °F (36.6 °C) 86 18 125/56 95 %   02/28/18 1353 97.8 °F (36.6 °C) 93 18 119/68 94 %   02/28/18 1340 97.8 °F (36.6 °C) 93 18 122/67 -   02/28/18 1325 97.8 °F (36.6 °C) 79 18 131/59 -   02/28/18 1310 97.7 °F (36.5 °C) 83 20 140/67 -   02/28/18 1307 97.7 °F (36.5 °C) 83 20 136/81 96 %   02/28/18 1250 - 85 19 109/60 95 %   02/28/18 1245 - 88 19 131/59 91 %   02/28/18 1244 - 82 14 115/57 94 %   02/28/18 1241 - 82 13 107/68 94 %   02/28/18 1236 - 82 19 118/67 94 %   02/28/18 1232 97.3 °F (36.3 °C) 80 14 129/47 95 %   02/28/18 1231 - 79 19 129/47 95 %   02/28/18 1218 - 79 12 130/57 94 %   02/28/18 1210 - 85 19 139/53 93 %   02/28/18 1208 - 95 14 147/61 90 %       Intake/Output Summary (Last 24 hours) at 03/01/18 1153  Last data filed at 03/01/18 0855   Gross per 24 hour   Intake              866 ml   Output             4588 ml   Net            -3722 ml        PHYSICAL EXAM:  General: Ill appearing. Alert, cooperative, no acute distress    EENT:  EOMI. Anicteric sclerae. MMM  Resp:  Bilateral rhonchi. no wheezing or rales. No accessory muscle use  CV:  Regular  rhythm,  No edema  GI:  Soft, Non distended, Non tender.  +Bowel sounds  Neurologic:  Alert and oriented X 3, normal speech,   Psych:   Good insight. Not anxious nor agitated  Skin:  No rashes.   No jaundice    Reviewed most current lab test results and cultures  YES  Reviewed most current radiology test results   YES  Review and summation of old records today    NO  Reviewed patient's current orders and MAR    YES  PMH/SH reviewed - no change compared to H&P  ________________________________________________________________________  Care Plan discussed with:    Comments   Patient y    Family      RN y    Care Manager y    Consultant                       y Multidiciplinary team rounds were held today with , nursing, pharmacist and clinical coordinator. Patient's plan of care was discussed; medications were reviewed and discharge planning was addressed. ________________________________________________________________________  Total NON critical care TIME:  30  Minutes    Total CRITICAL CARE TIME Spent:   Minutes non procedure based      Comments   >50% of visit spent in counseling and coordination of care     ________________________________________________________________________  Liz Benavides NP     Procedures: see electronic medical records for all procedures/Xrays and details which were not copied into this note but were reviewed prior to creation of Plan. LABS:  I reviewed today's most current labs and imaging studies.   Pertinent labs include:  Recent Labs      03/01/18 0315 02/28/18 0614 02/27/18   0452   WBC  11.6*  11.4*  11.1   HGB  8.4*  8.9*  10.2*   HCT  25.5*  27.3*  30.2*   PLT  173  192  168     Recent Labs      03/01/18 0315 02/28/18 0614  02/27/18   0452   NA  139  139  138   K  5.7*  4.9  4.1   CL  108  106  105   CO2  23  25  27   GLU  234*  117*  97   BUN  83*  67*  41*   CREA  4.88*  4.30*  2.67*   CA  8.3*  8.4*  8.2*   MG   --   2.0  1.9   PHOS   --   5.3*   --    ALB   --   2.7*  3.2*   TBILI   --   0.5  0.5   SGOT   --   13*  15   ALT   --   21  21       Signed: )David Gil, NP

## 2018-03-01 NOTE — PROGRESS NOTES
[facility] Hematology-Oncology Progress Note      Olvin Mahoney  1940  [id number]      3/1/2018  12:24 PM    Follow-up for: progressive multiple myeloma, DIANA     [x] Chart notes since last visit reviewed     [] Medications reviewed for allergies and interactions      Subjective:     Patient complains of the following:  Blue today. Very subdued. Trying to remain hopeful that kidney function would improve but had hoped for discharge by weekend and that does not look to be happening. Objective:     Patient Vitals for the past 24 hrs:   BP Temp Temp src Pulse Resp SpO2 Weight   03/01/18 1245 103/87 - - 78 14 96 % -   03/01/18 1215 109/46 - - 73 18 96 % -   03/01/18 1145 106/43 - - 66 15 99 % -   03/01/18 1115 105/45 - - 61 13 100 % -   03/01/18 1045 112/44 - - 65 19 100 % -   03/01/18 1015 122/43 98.7 °F (37.1 °C) Oral 61 17 100 % -   03/01/18 0903 161/52 97.6 °F (36.4 °C) - 80 20 96 % -   03/01/18 0824 - - - - - 96 % -   03/01/18 0301 117/53 98 °F (36.7 °C) - 76 20 100 % 80.1 kg (176 lb 9.6 oz)   02/28/18 2319 121/46 98.2 °F (36.8 °C) - 78 20 93 % -   02/28/18 2120 - - - - - 94 % -   02/28/18 2022 146/45 98.3 °F (36.8 °C) - 71 20 92 % -   02/28/18 2019 - - - - - 92 % -   02/28/18 1545 116/45 98.2 °F (36.8 °C) - 83 20 94 % -   02/28/18 1405 125/56 97.9 °F (36.6 °C) - 86 18 95 % -   02/28/18 1353 119/68 97.8 °F (36.6 °C) - 93 18 94 % -   02/28/18 1340 122/67 97.8 °F (36.6 °C) - 93 18 - -   02/28/18 1325 131/59 97.8 °F (36.6 °C) - 79 18 - -       Physical Exam:  General -Alert,  in bed at dialysis in unit. Appears weak  HEENT: NC, AT, pupils round  Neck: supple, no adenopathy appreciated  CVS-S1,S2 normal/ regular rate and rhythm  RS-Scattered b/l exp wheezes and rhonchi today (quieter than yesterday)  Abdomen- Soft, NT,ND, BS+  Extre- No c/c/e  Neuro- No focal sensory or motor deficits noted  Psych - alert, looks depressed today.   verbalizes understanding of conversation     Available labs reviewed:  Labs:    Recent Results (from the past 24 hour(s))   GLUCOSE, POC    Collection Time: 02/28/18  4:43 PM   Result Value Ref Range    Glucose (POC) 166 (H) 65 - 100 mg/dL    Performed by Shannan Gifford (PCT)    GLUCOSE, POC    Collection Time: 02/28/18  8:20 PM   Result Value Ref Range    Glucose (POC) 159 (H) 65 - 100 mg/dL    Performed by [address] [place] [address], [facility]    Collection Time: 03/01/18  3:15 AM   Result Value Ref Range    Sodium 139 136 - 145 mmol/L    Potassium 5.7 (H) 3.5 - 5.1 mmol/L    Chloride 108 97 - 108 mmol/L    CO2 23 21 - 32 mmol/L    Anion gap 8 5 - 15 mmol/L    Glucose 234 (H) 65 - 100 mg/dL    BUN 83 (H) 6 - 20 MG/DL    Creatinine 4.88 (H) 0.70 - 1.30 MG/DL    BUN/Creatinine ratio 17 12 - 20      GFR est AA 14 (L) >60 ml/min/1.73m2    GFR est non-AA 12 (L) >60 ml/min/1.73m2    Calcium 8.3 (L) 8.5 - 10.1 MG/DL   CBC WITH AUTOMATED DIFF    Collection Time: 03/01/18  3:15 AM   Result Value Ref Range    WBC 11.6 (H) 4.1 - 11.1 K/uL    RBC 3.04 (L) 4.10 - 5.70 M/uL    HGB 8.4 (L) 12.1 - 17.0 g/dL    HCT 25.5 (L) 36.6 - 50.3 %    MCV 83.9 80.0 - 99.0 FL    MCH 27.6 26.0 - 34.0 PG    MCHC 32.9 30.0 - 36.5 g/dL    RDW 17.6 (H) 11.5 - 14.5 %    PLATELET 589 480 - 202 K/uL    MPV 11.4 8.9 - 12.9 FL    NRBC 0.0 0  WBC    ABSOLUTE NRBC 0.00 0.00 - 0.01 K/uL    NEUTROPHILS 94 (H) 32 - 75 %    LYMPHOCYTES 4 (L) 12 - 49 %    MONOCYTES 2 (L) 5 - 13 %    EOSINOPHILS 0 0 - 7 %    BASOPHILS 0 0 - 1 %    IMMATURE GRANULOCYTES 0 0.0 - 0.5 %    ABS. NEUTROPHILS 10.9 (H) 1.8 - 8.0 K/UL    ABS. LYMPHOCYTES 0.5 (L) 0.8 - 3.5 K/UL    ABS. MONOCYTES 0.2 0.0 - 1.0 K/UL    ABS. EOSINOPHILS 0.0 0.0 - 0.4 K/UL    ABS. BASOPHILS 0.0 0.0 - 0.1 K/UL    ABS. IMM.  GRANS. 0.0 0.00 - 0.04 K/UL    DF AUTOMATED      RBC COMMENTS ANISOCYTOSIS  1+       GLUCOSE, POC    Collection Time: 03/01/18  8:26 AM   Result Value Ref Range    Glucose (POC) 151 (H) 65 - 100 mg/dL    Performed by Gus Mullen        Imaging:  CXR Results  (Last 48 hours)               02/28/18 1422  XR CHEST PORT Final result    Impression:  IMPRESSION: No acute abnormality and no change. Narrative:  EXAM:  XR CHEST PORT. INDICATION: Increasing oxygen need and worsening shortness of breath. COMPARISON: 2/26/2018. FINDINGS:    A portable AP radiograph of the chest was obtained at 1420 hours. There is a   right jugular dialysis catheter with tip over the superior vena cava. Lines and tubes: The patient is on a cardiac monitor and nasal oxygen. Lungs: The lungs are clear except for some atelectasis at the lung bases. Pleura: There are bilateral pleural effusions. Mediastinum: The cardiac and mediastinal contours and pulmonary vascularity are   normal. The aorta is atherosclerotic. Bones and soft tissues: The bones and soft tissues are grossly within normal   limits. CT Results  (Last 48 hours)    None            Assessment:   Influenza  History of smoldering myeloma  Acute on chronic renal failure attributed to myeloma  Hypercalcemia  DM- worsened by steroids    Plan:     Multiple myeloma- IgG lambda on JAIMIE, total IgG 2293. SFLC: lambda 1780.  ---- Skeletal survey - 2/21/2018 - with multiple small lytic lesions in skull, humeri  ---- renal failure  ---- hypercalcemia  ---- BMbx 2/22/18: 50% involvement by monoclonal plasma cells with plasmablastic morphology (previously in 2012 had 10% plasma cells and was followed for years with close observation)  ---- started CyBoRD regimen - C1D1- 2/23/2018 - Cytoxan with 20% dose reduction and full dose of Velcade with 40 mg Dex after HD and plamspharesis. Continue weekly chemo (dosed on Saturdays will to Fridays so can do in office.)  --- deferring antiresorptive tx for now given  Acute on CRF although with new approval of xgeva for myeloma, will not be issue as outpatient.  This hospital, however, stocks zoledronic acid which could have detrimental effect on kidney function    Normochromic normocytic anemia likely secondary to myeloma    DIANA  secondary to underlying Multiple Myeloma. Worsened rapidly in hospital.  Hemodialysis and plasmapheresis per nephrology. Will see how does. Dysphagia -  EGD showed hiatal hernia, inflamed mid to distal esophagus, and either severe duodenitis or large laterall spreading villous adenoma. Plan is to continue acid suppression and carafate. Will see how does with lidocaine. Stable, continue current treatment. Emotional support given.

## 2018-03-01 NOTE — PROGRESS NOTES
Chart reviewed. Pt is off the floor at present time for dialysis. We will try to visit later today. D/w Dr. Max Haro, primary medical team attending. Time and input appreciated. Should you have questions/concerns or the need for a bedside visit by our team, please do not hesitate to contact us at [phone] Thank you for providing us w/ the opportunity to be involved in this patient's care.       Pina Jama MD  Palliative Care Team

## 2018-03-01 NOTE — PROGRESS NOTES
Bedside shift change report given to Mychal Schrader, TOI (oncoming nurse) by Cassi Alcocer RN (offgoing nurse). Report included the following information SBAR.       [id number] Paged Dr. Pasha Henriquez to clarify diet. Pt is currently on dental soft and is stating that per Dr should be clear liquid. [id number] Paged Dr. Pasha Henriquez to clarify diet again. Pt is hungry and will not eat what is brought to him because he is under the impression he should be on a clear liquid diet. Waiting for clarification. 1627 GI office called back and they do want Pt on a dental soft diet. Will inform Pt. Bedside shift change report given to Jed Durand RN (oncoming nurse) by Mychal Schrader RN (offgoing nurse). Report included the following information SBAR. SHIFT SUMMARY:            [address] NURSING [address]   Admission Date 2/19/2018   Admission Diagnosis Hypoxemia  dysphagia   Consults IP CONSULT TO NEPHROLOGY  IP CONSULT TO ONCOLOGY  IP CONSULT TO PALLIATIVE CARE - PROVIDER  IP CONSULT TO GASTROENTEROLOGY      Cardiac Monitoring [x] Yes [] No      Purposeful Hourly Rounding [x] Yes    Conrad Score Total Score: 3   Conrad score 3 or > [x] Bed Alarm [] Avasys [] 1:1 sitter [] Patient refused (Place signed refusal form in chart)   Sidney Score Sidney Score: 18   Sidney score 14 or < [] PMT consult [] Wound Care consult    []  Specialty bed  [] Nutrition consult      Influenza Vaccine Received Flu Vaccine for Current Season (usually Sept-March): Yes    Patient/Guardian Refused (Notify MD): No      Oxygen needs?  [] Room air Oxygen @  []1L    []2L    []3L   [x]4L    []5L   []6L     Use home O2? [] Yes [x] No  Perform O2 challenge test using  smartphrase (.Homeoxygen)      Last bowel movement Last Bowel Movement Date: 02/28/18      Urinary Catheter             LDAs               Peripheral IV 02/20/18 Left Antecubital (Active)   Site Assessment Clean, dry, & intact 3/1/2018  8:00 AM   Phlebitis Assessment 0 3/1/2018  8:00 AM   Infiltration Assessment 0 3/1/2018  8:00 AM Dressing Status Clean, dry, & intact 3/1/2018  8:00 AM   Dressing Type Tape 3/1/2018  8:00 AM   Hub Color/Line Status Pink 3/1/2018  8:00 AM       Peripheral IV 02/22/18 Right Antecubital (Active)   Site Assessment Clean, dry, & intact 3/1/2018  8:00 AM   Phlebitis Assessment 0 3/1/2018  8:00 AM   Infiltration Assessment 0 3/1/2018  8:00 AM   Dressing Status Clean, dry, & intact 3/1/2018  8:00 AM   Dressing Type Tape 3/1/2018  8:00 AM   Hub Color/Line Status Blue 3/1/2018  8:00 AM        Hemodialysis Access 02/22/18 (Active)   Central Line Being Utilized No 3/1/2018  8:00 AM   Criteria for Appropriate Use Dialysis/apheresis 3/1/2018  8:00 AM   Date Accessed  02/27/18 3/1/2018  3:49 AM   Access Time  0801 2/28/2018  8:01 AM   Site Assessment Clean, dry, & intact 3/1/2018  8:00 AM   Date of Last Dressing Change 02/25/18 3/1/2018  8:00 AM   Dressing Status Clean;Dry; Old drainage 3/1/2018  8:00 AM   Dressing Type Transparent 3/1/2018  8:00 AM   Proximal Hub Color/Line Status Other (comment) 2/27/2018  7:13 AM   Distal Hub Color/Line Status Other (comment) 2/27/2018  7:13 AM                     Readmission Risk Assessment Tool Score High Risk            32       Total Score        2 . Living with Significant Other. Assisted Living. LTAC. SNF. or   Rehab    3 Patient Length of Stay (>5 days = 3)    5 Pt.  Coverage (Medicare=5 , Medicaid, or Self-Pay=4)    22 Charlson Comorbidity Score (Age + Comorbid Conditions)        Criteria that do not apply:    Has Seen PCP in Last 6 Months (Yes=3, No=0)    IP Visits Last 12 Months (1-3=4, 4=9, >4=11)       Expected Length of Stay 4d 21h   Actual Length of Stay 10

## 2018-03-01 NOTE — PROGRESS NOTES
Attempted to see pt for PT tx. Cleared by nursing to mobilize. Upon entering the room pt stated he was about to leave for HD and wanted to wait on the walking and getting up to the chair until after. Dicussed with pt that he could get up with therapy now and then with nursing after HD. He continued to politely decline several times. Session aborted and will continue to follow.

## 2018-03-01 NOTE — PROGRESS NOTES
Gastroenterology Progress Note    3/1/2018    Admit Date: 2/19/2018    Subjective: Follow up for: dysphagia      Mild SOB. Feels ok otherwise. Patient was seen in rounds by me today. At this time, the patient is resting comfortably.        Current Facility-Administered Medications   Medication Dose Route Frequency    insulin glargine (LANTUS) injection 10 Units  10 Units SubCUTAneous QHS    lidocaine (XYLOCAINE) 2 % viscous solution 15 mL  15 mL Mouth/Throat PRN    anticoagulant citrate dextrose (ACD-A) solution  1,500 mL In Vitro DAILY PRN    predniSONE (DELTASONE) tablet 20 mg  20 mg Oral Once per day on Tue Wed Thu    albuterol-ipratropium (DUO-NEB) 2.5 MG-0.5 MG/3 ML  3 mL Nebulization BID RT    calcium gluconate 1 g in 0.9% sodium chloride 100 mL IVPB  1 g IntraVENous Q48H PRN    albumin human 5% (BUMINATE) solution 175 g  175 g IntraVENous Q48H    hydrALAZINE (APRESOLINE) 20 mg/mL injection 20 mg  20 mg IntraVENous Q6H PRN    midazolam (VERSED) injection 5 mg  5 mg IntraVENous Multiple    pantoprazole (PROTONIX) tablet 40 mg  40 mg Oral ACB&D    sucralfate (CARAFATE) 100 mg/mL oral suspension 1 g  1 g Oral AC&HS    insulin lispro (HUMALOG) injection 5 Units  5 Units SubCUTAneous TIDAC    melatonin tablet 3 mg  3 mg Oral QHS PRN    insulin lispro (HUMALOG) injection   SubCUTAneous AC&HS    sodium chloride (NS) flush 5-10 mL  5-10 mL IntraVENous PRN    guaiFENesin ER (MUCINEX) tablet 600 mg  600 mg Oral BID    albuterol-ipratropium (DUO-NEB) 2.5 MG-0.5 MG/3 ML  3 mL Nebulization Q4H PRN    acetaminophen (TYLENOL) tablet 650 mg  650 mg Oral Q4H PRN    ondansetron (ZOFRAN) injection 4 mg  4 mg IntraVENous Q4H PRN    glucose chewable tablet 16 g  4 Tab Oral PRN    dextrose (D50W) injection syrg 12.5-25 g  12.5-25 g IntraVENous PRN    glucagon (GLUCAGEN) injection 1 mg  1 mg IntraMUSCular PRN    aspirin chewable tablet 81 mg  81 mg Oral DAILY    dilTIAZem CD (CARDIZEM CD) capsule 240 mg  240 mg Oral DAILY    montelukast (SINGULAIR) tablet 10 mg  10 mg Oral DAILY    umeclidinium (INCRUSE ELLIPTA) 62.5 mcg/actuation  1 Puff Inhalation DAILY    fluticasone-vilanterol (BREO ELLIPTA) 100mcg-25mcg/puff  1 Puff Inhalation DAILY        Objective:     Blood pressure 117/53, pulse 76, temperature 98 °F (36.7 °C), resp. rate 20, height 6' (1.829 m), weight 80.1 kg (176 lb 9.6 oz), SpO2 100 %. 02/27 1901 - 03/01 0700  In: 866 [P.O.:360; I.V.:506]  Out: 4313 [Urine:595]        Physical Examination:       General:AAO x 3, coughing.   HEENT:  EOMI,   Heart: S1, S2, RRR  GI: Soft, NT, ND + bowel sounds      Data Review    Recent Results (from the past 24 hour(s))   GLUCOSE, POC    Collection Time: 02/28/18  4:43 PM   Result Value Ref Range    Glucose (POC) 166 (H) 65 - 100 mg/dL    Performed by Rosalba Chong (PCT)    GLUCOSE, POC    Collection Time: 02/28/18  8:20 PM   Result Value Ref Range    Glucose (POC) 159 (H) 65 - 100 mg/dL    Performed by [address] [place] [address], [facility]    Collection Time: 03/01/18  3:15 AM   Result Value Ref Range    Sodium 139 136 - 145 mmol/L    Potassium 5.7 (H) 3.5 - 5.1 mmol/L    Chloride 108 97 - 108 mmol/L    CO2 23 21 - 32 mmol/L    Anion gap 8 5 - 15 mmol/L    Glucose 234 (H) 65 - 100 mg/dL    BUN 83 (H) 6 - 20 MG/DL    Creatinine 4.88 (H) 0.70 - 1.30 MG/DL    BUN/Creatinine ratio 17 12 - 20      GFR est AA 14 (L) >60 ml/min/1.73m2    GFR est non-AA 12 (L) >60 ml/min/1.73m2    Calcium 8.3 (L) 8.5 - 10.1 MG/DL   CBC WITH AUTOMATED DIFF    Collection Time: 03/01/18  3:15 AM   Result Value Ref Range    WBC 11.6 (H) 4.1 - 11.1 K/uL    RBC 3.04 (L) 4.10 - 5.70 M/uL    HGB 8.4 (L) 12.1 - 17.0 g/dL    HCT 25.5 (L) 36.6 - 50.3 %    MCV 83.9 80.0 - 99.0 FL    MCH 27.6 26.0 - 34.0 PG    MCHC 32.9 30.0 - 36.5 g/dL    RDW 17.6 (H) 11.5 - 14.5 %    PLATELET 603 077 - 443 K/uL    MPV 11.4 8.9 - 12.9 FL    NRBC 0.0 0  WBC    ABSOLUTE NRBC 0.00 0.00 - 0.01 K/uL    NEUTROPHILS 94 (H) 32 - 75 %    LYMPHOCYTES 4 (L) 12 - 49 %    MONOCYTES 2 (L) 5 - 13 %    EOSINOPHILS 0 0 - 7 %    BASOPHILS 0 0 - 1 %    IMMATURE GRANULOCYTES 0 0.0 - 0.5 %    ABS. NEUTROPHILS 10.9 (H) 1.8 - 8.0 K/UL    ABS. LYMPHOCYTES 0.5 (L) 0.8 - 3.5 K/UL    ABS. MONOCYTES 0.2 0.0 - 1.0 K/UL    ABS. EOSINOPHILS 0.0 0.0 - 0.4 K/UL    ABS. BASOPHILS 0.0 0.0 - 0.1 K/UL    ABS. IMM. GRANS. 0.0 0.00 - 0.04 K/UL    DF AUTOMATED      RBC COMMENTS ANISOCYTOSIS  1+         Recent Labs      03/01/18 0315 02/28/18 0614   WBC  11.6*  11.4*   HGB  8.4*  8.9*   HCT  25.5*  27.3*   PLT  173  192     Recent Labs      03/01/18 0315 02/28/18 0614  02/27/18   0452   NA  139  139  138   K  5.7*  4.9  4.1   CL  108  106  105   CO2  23  25  27   BUN  83*  67*  41*   CREA  4.88*  4.30*  2.67*   GLU  234*  117*  97   CA  8.3*  8.4*  8.2*   MG   --   2.0  1.9   PHOS   --   5.3*   --      Recent Labs      02/28/18 0614 02/27/18 0452   SGOT  13*  15   AP  30*  26*   TP  4.9*  5.5*   ALB  2.7*  3.2*   GLOB  2.2  2.3     No results for input(s): INR, PTP, APTT in the last 72 hours. No lab exists for component: INREXT   No results for input(s): FE, TIBC, PSAT, FERR in the last 72 hours. No results found for: FOL, RBCF   No results for input(s): PH, PCO2, PO2 in the last 72 hours. No results for input(s): CPK, CKNDX, TROIQ in the last 72 hours.     No lab exists for component: CPKMB  Lab Results   Component Value Date/Time    Cholesterol, total 133 09/10/2016 02:07 AM    HDL Cholesterol 44 09/10/2016 02:07 AM    LDL, calculated 59.2 09/10/2016 02:07 AM    Triglyceride 149 09/10/2016 02:07 AM    CHOL/HDL Ratio 3.0 09/10/2016 02:07 AM     No components found for: [place]  Lab Results   Component Value Date/Time    Color YELLOW/STRAW 02/19/2018 03:07 PM    Appearance CLOUDY (A) 02/19/2018 03:07 PM    Specific gravity 1.020 02/19/2018 03:07 PM    pH (UA) 5.5 02/19/2018 03:07 PM    Protein 100 (A) 02/19/2018 03:07 PM    Glucose NEGATIVE  02/19/2018 03:07 PM    Ketone NEGATIVE  02/19/2018 03:07 PM    Bilirubin NEGATIVE  02/19/2018 03:07 PM    Urobilinogen 0.2 02/19/2018 03:07 PM    Nitrites NEGATIVE  02/19/2018 03:07 PM    Leukocyte Esterase NEGATIVE  02/19/2018 03:07 PM    Epithelial cells FEW 02/19/2018 03:07 PM    Bacteria 1+ (A) 02/19/2018 03:07 PM    WBC 0-4 02/19/2018 03:07 PM    RBC 0-5 02/19/2018 03:07 PM        ROS: -CP, SOB, Dysuria, palpitations, cough. Assessment:  Dysphagia    Active Problems:    Hypoxemia (2/19/2018)           EGD: The esophageal mucosa in the proximal esophagus is normal.  From mid to distal esophagus the mucosa is desquamating and appears moderately inflamed. There is a large (6-7 cm) hiatal hernia. I did not take any biopsies(on ASA/Plavix). 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     Plan/Discussion:     1. He was able to take broth and noddle consistency foods. Will keep him on it. 2. Medical treatment as suggested. 3. He will need a repeat EGD in 2-3 weeks' time after D/C off of ASA/Plavix x 5 days and when he is breathing better. Signed By: Linda Alvarez.  Ashley Nolan MD    3/1/2018  7:54 AM

## 2018-03-02 LAB
ALBUMIN SERPL-MCNC: 3.1 G/DL (ref 3.5–5)
ALBUMIN/GLOB SERPL: 1.7 {RATIO} (ref 1.1–2.2)
ALP SERPL-CCNC: 27 U/L (ref 45–117)
ALT SERPL-CCNC: 18 U/L (ref 12–78)
ANION GAP SERPL CALC-SCNC: 4 MMOL/L (ref 5–15)
AST SERPL-CCNC: 12 U/L (ref 15–37)
BILIRUB SERPL-MCNC: 0.8 MG/DL (ref 0.2–1)
BUN SERPL-MCNC: 39 MG/DL (ref 6–20)
BUN/CREAT SERPL: 12 (ref 12–20)
CALCIUM SERPL-MCNC: 8.2 MG/DL (ref 8.5–10.1)
CHLORIDE SERPL-SCNC: 104 MMOL/L (ref 97–108)
CO2 SERPL-SCNC: 30 MMOL/L (ref 21–32)
CREAT SERPL-MCNC: 3.21 MG/DL (ref 0.7–1.3)
ERYTHROCYTE [DISTWIDTH] IN BLOOD BY AUTOMATED COUNT: 17.2 % (ref 11.5–14.5)
GLOBULIN SER CALC-MCNC: 1.8 G/DL (ref 2–4)
GLUCOSE BLD STRIP.AUTO-MCNC: 115 MG/DL (ref 65–100)
GLUCOSE BLD STRIP.AUTO-MCNC: 134 MG/DL (ref 65–100)
GLUCOSE BLD STRIP.AUTO-MCNC: 174 MG/DL (ref 65–100)
GLUCOSE BLD STRIP.AUTO-MCNC: 254 MG/DL (ref 65–100)
GLUCOSE BLD STRIP.AUTO-MCNC: 55 MG/DL (ref 65–100)
GLUCOSE BLD STRIP.AUTO-MCNC: 75 MG/DL (ref 65–100)
GLUCOSE BLD STRIP.AUTO-MCNC: 75 MG/DL (ref 65–100)
GLUCOSE SERPL-MCNC: 60 MG/DL (ref 65–100)
HCT VFR BLD AUTO: 26.2 % (ref 36.6–50.3)
HGB BLD-MCNC: 8.6 G/DL (ref 12.1–17)
MAGNESIUM SERPL-MCNC: 1.9 MG/DL (ref 1.6–2.4)
MCH RBC QN AUTO: 27 PG (ref 26–34)
MCHC RBC AUTO-ENTMCNC: 32.8 G/DL (ref 30–36.5)
MCV RBC AUTO: 82.4 FL (ref 80–99)
NRBC # BLD: 0 K/UL (ref 0–0.01)
NRBC BLD-RTO: 0 PER 100 WBC
PHOSPHATE SERPL-MCNC: 4.2 MG/DL (ref 2.6–4.7)
PLATELET # BLD AUTO: 196 K/UL (ref 150–400)
PMV BLD AUTO: 11.4 FL (ref 8.9–12.9)
POTASSIUM SERPL-SCNC: 4.5 MMOL/L (ref 3.5–5.1)
PROT SERPL-MCNC: 4.9 G/DL (ref 6.4–8.2)
RBC # BLD AUTO: 3.18 M/UL (ref 4.1–5.7)
SERVICE CMNT-IMP: ABNORMAL
SERVICE CMNT-IMP: NORMAL
SERVICE CMNT-IMP: NORMAL
SODIUM SERPL-SCNC: 138 MMOL/L (ref 136–145)
WBC # BLD AUTO: 13.9 K/UL (ref 4.1–11.1)

## 2018-03-02 PROCEDURE — 36415 COLL VENOUS BLD VENIPUNCTURE: CPT | Performed by: INTERNAL MEDICINE

## 2018-03-02 PROCEDURE — 94640 AIRWAY INHALATION TREATMENT: CPT

## 2018-03-02 PROCEDURE — 84100 ASSAY OF PHOSPHORUS: CPT | Performed by: INTERNAL MEDICINE

## 2018-03-02 PROCEDURE — 85027 COMPLETE CBC AUTOMATED: CPT | Performed by: INTERNAL MEDICINE

## 2018-03-02 PROCEDURE — 36514 APHERESIS PLASMA: CPT

## 2018-03-02 PROCEDURE — 74011250637 HC RX REV CODE- 250/637: Performed by: EMERGENCY MEDICINE

## 2018-03-02 PROCEDURE — 74011000258 HC RX REV CODE- 258: Performed by: INTERNAL MEDICINE

## 2018-03-02 PROCEDURE — 80053 COMPREHEN METABOLIC PANEL: CPT | Performed by: INTERNAL MEDICINE

## 2018-03-02 PROCEDURE — 74011250637 HC RX REV CODE- 250/637: Performed by: INTERNAL MEDICINE

## 2018-03-02 PROCEDURE — 82962 GLUCOSE BLOOD TEST: CPT

## 2018-03-02 PROCEDURE — 65660000000 HC RM CCU STEPDOWN

## 2018-03-02 PROCEDURE — 74011000250 HC RX REV CODE- 250: Performed by: INTERNAL MEDICINE

## 2018-03-02 PROCEDURE — 74011636637 HC RX REV CODE- 636/637: Performed by: EMERGENCY MEDICINE

## 2018-03-02 PROCEDURE — 74011250636 HC RX REV CODE- 250/636: Performed by: INTERNAL MEDICINE

## 2018-03-02 PROCEDURE — 74011250637 HC RX REV CODE- 250/637: Performed by: PHYSICAL MEDICINE & REHABILITATION

## 2018-03-02 PROCEDURE — 83735 ASSAY OF MAGNESIUM: CPT | Performed by: INTERNAL MEDICINE

## 2018-03-02 PROCEDURE — 77030027138 HC INCENT SPIROMETER -A

## 2018-03-02 PROCEDURE — 77010033678 HC OXYGEN DAILY

## 2018-03-02 PROCEDURE — P9045 ALBUMIN (HUMAN), 5%, 250 ML: HCPCS | Performed by: INTERNAL MEDICINE

## 2018-03-02 PROCEDURE — 74011000250 HC RX REV CODE- 250: Performed by: NURSE PRACTITIONER

## 2018-03-02 RX ORDER — GRANISETRON HYDROCHLORIDE 1 MG/ML
1 INJECTION, SOLUTION INTRAVENOUS ONCE
Status: COMPLETED | OUTPATIENT
Start: 2018-03-02 | End: 2018-03-02

## 2018-03-02 RX ORDER — CLOPIDOGREL BISULFATE 75 MG/1
75 TABLET ORAL DAILY
Status: DISCONTINUED | OUTPATIENT
Start: 2018-03-02 | End: 2018-03-09 | Stop reason: HOSPADM

## 2018-03-02 RX ORDER — DOCUSATE SODIUM 100 MG/1
100 CAPSULE, LIQUID FILLED ORAL 2 TIMES DAILY
Status: DISCONTINUED | OUTPATIENT
Start: 2018-03-02 | End: 2018-03-09 | Stop reason: HOSPADM

## 2018-03-02 RX ADMIN — GUAIFENESIN 600 MG: 600 TABLET, EXTENDED RELEASE ORAL at 08:16

## 2018-03-02 RX ADMIN — FLUTICASONE FUROATE AND VILANTEROL TRIFENATATE 1 PUFF: 100; 25 POWDER RESPIRATORY (INHALATION) at 08:18

## 2018-03-02 RX ADMIN — CYCLOPHOSPHAMIDE 500 MG: 500 INJECTION, POWDER, FOR SOLUTION INTRAVENOUS; ORAL at 19:15

## 2018-03-02 RX ADMIN — Medication 10 ML: at 06:30

## 2018-03-02 RX ADMIN — SUCRALFATE 1 G: 1 SUSPENSION ORAL at 10:41

## 2018-03-02 RX ADMIN — IPRATROPIUM BROMIDE AND ALBUTEROL SULFATE 3 ML: .5; 3 SOLUTION RESPIRATORY (INHALATION) at 21:26

## 2018-03-02 RX ADMIN — INSULIN LISPRO 4 UNITS: 100 INJECTION, SOLUTION INTRAVENOUS; SUBCUTANEOUS at 22:40

## 2018-03-02 RX ADMIN — ALBUMIN (HUMAN) 175 G: 12.5 INJECTION, SOLUTION INTRAVENOUS at 15:59

## 2018-03-02 RX ADMIN — MONTELUKAST SODIUM 10 MG: 10 TABLET, COATED ORAL at 08:17

## 2018-03-02 RX ADMIN — IPRATROPIUM BROMIDE AND ALBUTEROL SULFATE 3 ML: .5; 3 SOLUTION RESPIRATORY (INHALATION) at 14:16

## 2018-03-02 RX ADMIN — ASPIRIN 81 MG 81 MG: 81 TABLET ORAL at 08:17

## 2018-03-02 RX ADMIN — SUCRALFATE 1 G: 1 SUSPENSION ORAL at 08:17

## 2018-03-02 RX ADMIN — BORTEZOMIB 3.1 MG: 3.5 INJECTION, POWDER, LYOPHILIZED, FOR SOLUTION INTRAVENOUS; SUBCUTANEOUS at 19:14

## 2018-03-02 RX ADMIN — SUCRALFATE 1 G: 1 SUSPENSION ORAL at 18:28

## 2018-03-02 RX ADMIN — GRANISETRON HYDROCHLORIDE 1 MG: 1 INJECTION, SOLUTION INTRAVENOUS at 18:30

## 2018-03-02 RX ADMIN — PANTOPRAZOLE SODIUM 40 MG: 40 TABLET, DELAYED RELEASE ORAL at 18:28

## 2018-03-02 RX ADMIN — INSULIN LISPRO 3 UNITS: 100 INJECTION, SOLUTION INTRAVENOUS; SUBCUTANEOUS at 12:42

## 2018-03-02 RX ADMIN — GUAIFENESIN 600 MG: 600 TABLET, EXTENDED RELEASE ORAL at 18:28

## 2018-03-02 RX ADMIN — IPRATROPIUM BROMIDE AND ALBUTEROL SULFATE 3 ML: .5; 3 SOLUTION RESPIRATORY (INHALATION) at 07:40

## 2018-03-02 RX ADMIN — UMECLIDINIUM 1 PUFF: 62.5 AEROSOL, POWDER ORAL at 08:18

## 2018-03-02 RX ADMIN — DILTIAZEM HYDROCHLORIDE 240 MG: 240 CAPSULE, COATED, EXTENDED RELEASE ORAL at 08:16

## 2018-03-02 RX ADMIN — CALCIUM GLUCONATE 1 G: 94 INJECTION, SOLUTION INTRAVENOUS at 16:00

## 2018-03-02 RX ADMIN — IPRATROPIUM BROMIDE AND ALBUTEROL SULFATE 3 ML: .5; 3 SOLUTION RESPIRATORY (INHALATION) at 02:26

## 2018-03-02 RX ADMIN — DEXAMETHASONE SODIUM PHOSPHATE 40 MG: 4 INJECTION, SOLUTION INTRA-ARTICULAR; INTRALESIONAL; INTRAMUSCULAR; INTRAVENOUS; SOFT TISSUE at 18:32

## 2018-03-02 RX ADMIN — DOCUSATE SODIUM 100 MG: 100 CAPSULE, LIQUID FILLED ORAL at 18:28

## 2018-03-02 RX ADMIN — PANTOPRAZOLE SODIUM 40 MG: 40 TABLET, DELAYED RELEASE ORAL at 08:17

## 2018-03-02 RX ADMIN — DEXTROSE MONOHYDRATE 25 G: 25 INJECTION, SOLUTION INTRAVENOUS at 08:13

## 2018-03-02 RX ADMIN — CLOPIDOGREL BISULFATE 75 MG: 75 TABLET ORAL at 10:41

## 2018-03-02 RX ADMIN — ANTICOAGULANT CITRATE DEXTROSE SOLUTION FORMULA A 1500 ML: 12.25; 11; 3.65 SOLUTION INTRAVENOUS at 18:17

## 2018-03-02 RX ADMIN — SUCRALFATE 1 G: 1 SUSPENSION ORAL at 21:20

## 2018-03-02 NOTE — PROGRESS NOTES
ADULT PROTOCOL: JET AEROSOL  REASSESSMENT    Patient  Latoya Parish     66 y.o.   male     3/2/2018  7:55 AM    Breath Sounds Pre Procedure: Right Breath Sounds: Coarse                               Left Breath Sounds: Coarse    Breath Sounds Post Procedure: Right Breath Sounds: Coarse                                 Left Breath Sounds: Coarse    Breathing pattern: Pre procedure Breathing Pattern: Regular          Post procedure Breathing Pattern: Regular    Heart Rate: Pre procedure Pulse: 72           Post procedure Pulse: 74    Resp Rate: Pre procedure Respirations: 16           Post procedure Respirations: 16            Cough: Pre procedure Cough: Congested, Non-productive               Post procedure Cough: Congested, Non-productive    Sputum: Pre procedure                   Post procedure  productive of white sputum    Oxygen: O2 Device: Nasal cannula   Flow rate (L/min) 3 lpm     Changed: NO    SpO2: Pre procedure SpO2: 91 %   with oxygen              Post procedure SpO2: 98 %  with oxygen    Nebulizer Therapy: Current medications Aerosolized Medications: DuoNeb      Changed: NO/ treatment started with MetaNeb    Smoking History: current smoker    Problem List:   Patient Active Problem List   Diagnosis Code    Acute respiratory failure ([facility]) J96.00    COPD with acute exacerbation ([facility] J44.1    HTN (hypertension) I10    HTN (hypertension) I10    COPD (chronic obstructive pulmonary disease) ([facility]) J44.9    Cancer of kidney ([facility] C64.9    Hypoglycemia, unspecified E16.2    Acute renal failure ([facility] N17.9    Hyperkalemia E87.5    S/P partial colectomy Z90.49    Diarrhea R19.7    DM (diabetes mellitus), type 2, uncontrolled ([facility] E11.65    S/p nephrectomy Z90.5    AAA (abdominal aortic aneurysm) ([facility]) I71.4    Gastrointestinal disorder K92.9    Cancer ([facility] C80.1    Anemia D64.9    Multiple myeloma ([facility] C90.00    Stroke (cerebrum) ([facility]) I63.9    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

## 2018-03-02 NOTE — PROGRESS NOTES
Hospitalist Progress Note    NAME: Stacey Barkley   :  1940   MRN:  [id number]       Interim Hospital Summary: 66 y.o. male whom presented on 2018 with      Assessment / Plan:  Hx of Smoldering Myeloma/MGUS  Multiple myeloma  - heme/onc following; bone marrow bx from 2018 revealed 60% involvement by plasma cells with atypical, almost plasmablastic appearance, indicating an aggressive process. No evidence of plasma cell leukemia   - continue with plasma exchange in conjunction with chemo; Cytoxan with 20% dose reduction and full dose of Velcade with 40 mg Dex after HD and plamspharesis. This will be a weekly regimen;next dose due today. - Beta-2 microglobulin 12.1  - IgG lambda on JAIMIE, total IgG 2293. SFLC: lambda 1780      DIANA on CKD secondary to myeloma  Hx renal cell cancer s/p nephrectomy  - solitary kidney (left); Permacath placement , HD started on , Plasmapheresis stared on ; alternate with HD and Plasmapheresis. - rapidly worsening renal function secondary to full progression of MM  - nephrology following; HD every other day with plasmapheresis in alternative day. PLEX to be done today (has one more PLEX to complete 5 treatments which will be done on ) and HD tomorrow. Appreciate Dr. Yoselin Veliz input  - continuation of HD will be determined by renal      Acute hypoxemic respiratory failure secondary to influenza A with pneumonia acute on chronic COPD exacerbation  Sepsis developing on admission  - difficult to wean off O2. Difficulty with weaning off O2 with worsening of SOB. Repeat CXR today.  Last CXR was done :  New small left pleural effusion. Small bibasilar airspace opacities seen on prior cross-sectional imaging are difficult to appreciate radiographically. - conmpleted tamiflu.  blood cx no growth  - prednisone d/c'd today; completed taper dose. Completed zithromax.  Complete rocephin for 10 days (last dose should be )  - continue with breo, mucinex, and duoneb via metaneb  - send sputum cx if avilable  - repeat CXR: New small left pleural effusion.   - CT chest Prominent emphysema. Bibasilar infiltrates  - pulmonary toilet; incentive spirometry every 1 hour while awake      HTN  - continue cardizem       Diabetes type 2 uncontrolled  - qhs lantus d/c'd due to persistent hypoglycemia in am. Adjusted SSI for today's dex with chem. - Hgb A1C 9.8      Hx CVA  - continue statin (plavix on hold for EGD)      GERD  Indigestion/dysphagia  - BAS: normal  - GI following; continue with soft diet. Will need a repeat EGD in 2-3 weeks when pt is stable. Unable to do the bx due to recent intake of ASA and plavix. 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         Recommended Disposition: Home health   Pt will be transfer to heme/onc floor, continue with telemetry monitoring  Discussed with pt and his wife about current medical therapy and code status on 2/24/2018. Mrs. Wilman Stringer would like to think about this and then discuss with the pt. Pt would like to talk over with his wife. Both  & Mrs. Wilman Stringer have been reluctant to talk about goals of care. However, they shared many concerns of overall hospital care.      Palliative consult       Subjective:     Chief Complaint / Reason for Physician Visit  \"I am ok, still coughing, but nothing comes up. Definitely, I breath better after dialysis\". Discussed with RN events overnight.      Review of Systems:  Symptom Y/N Comments  Symptom Y/N Comments   Fever/Chills n   Chest Pain n    Poor Appetite    Edema     Cough y   Abdominal Pain     Sputum n   Joint Pain SOB/HUERTA y   Pruritis/Rash     Nausea/vomit n   Tolerating PT/OT     Diarrhea n   Tolerating Diet     Constipation n   Other       Could NOT obtain due to:      Objective:     VITALS:   Last 24hrs VS reviewed since prior progress note. Most recent are:  Patient Vitals for the past 24 hrs:   Temp Pulse Resp BP SpO2   03/02/18 1525 98.1 °F (36.7 °C) 98 18 117/51 92 %   03/02/18 1416 - - - - 90 %   03/02/18 1058 97.7 °F (36.5 °C) 91 20 101/66 96 %   03/02/18 0940 - - - - 91 %   03/02/18 0935 - - - - (!) 72 %   03/02/18 0932 - - - - 93 %   03/02/18 0930 - - - - 91 %   03/02/18 0803 97.5 °F (36.4 °C) 70 20 118/52 97 %   03/02/18 0740 - - - - 91 %   03/02/18 0306 97.6 °F (36.4 °C) 63 18 100/49 94 %   03/01/18 2256 98 °F (36.7 °C) 60 18 100/43 96 %   03/01/18 2058 - - - - 97 %   03/01/18 1911 98.1 °F (36.7 °C) 66 18 106/43 98 %       Intake/Output Summary (Last 24 hours) at 03/02/18 1548  Last data filed at 03/02/18 0740   Gross per 24 hour   Intake                0 ml   Output              375 ml   Net             -375 ml        PHYSICAL EXAM:  General: WD, WN. Alert, cooperative, no acute distress    EENT:  EOMI. Anicteric sclerae. MMM  Resp:  Coarse breath sound through out with a few rhonchi. no wheezing or rales. No accessory muscle use  CV:  Regular  rhythm,  No edema  GI:  Soft, Non distended, Non tender.  +Bowel sounds  Neurologic:  Alert and oriented X 3, normal speech,   Psych:   Good insight. Not anxious nor agitated  Skin:  No rashes.   No jaundice    Reviewed most current lab test results and cultures  YES  Reviewed most current radiology test results   YES  Review and summation of old records today    NO  Reviewed patient's current orders and MAR    YES  PMH/SH reviewed - no change compared to H&P  ________________________________________________________________________  Care Plan discussed with:    Comments   Patient y    Family      RN y    Care Manager y    Consultant                       y Multidiciplinary team rounds were held today with , nursing, pharmacist and clinical coordinator. Patient's plan of care was discussed; medications were reviewed and discharge planning was addressed. ________________________________________________________________________  Total NON critical care TIME:  30  Minutes    Total CRITICAL CARE TIME Spent:   Minutes non procedure based      Comments   >50% of visit spent in counseling and coordination of care     ________________________________________________________________________  Rohan Seek, NP     Procedures: see electronic medical records for all procedures/Xrays and details which were not copied into this note but were reviewed prior to creation of Plan. LABS:  I reviewed today's most current labs and imaging studies.   Pertinent labs include:  Recent Labs      03/02/18   0331  03/01/18   0315  02/28/18   0614   WBC  13.9*  11.6*  11.4*   HGB  8.6*  8.4*  8.9*   HCT  26.2*  25.5*  27.3*   PLT  196  173  192     Recent Labs      03/02/18   0331  03/01/18   0315  02/28/18   0614   NA  138  139  139   K  4.5  5.7*  4.9   CL  104  108  106   CO2  30  23  25   GLU  60*  234*  117*   BUN  39*  83*  67*   CREA  3.21*  4.88*  4.30*   CA  8.2*  8.3*  8.4*   MG  1.9   --   2.0   PHOS  4.2   --   5.3*   ALB  3.1*   --   2.7*   TBILI  0.8   --   0.5   SGOT  12*   --   13*   ALT  18   --   21       Signed: )David Gil, NP

## 2018-03-02 NOTE — PROGRESS NOTES
[facility] Hematology-Oncology Progress Note      Lonoke Begin  1940  [id number]      3/2/2018  11:43 PM    Follow-up for: progressive multiple myeloma, DIANA     [x] Chart notes since last visit reviewed     [] Medications reviewed for allergies and interactions      Subjective:     Patient complains of the following:. Trying to remain hopeful that kidney function will resume. Acknowledges that he is likely to be here a couple of weeks while trying to get his kidney function sorted out. Objective:     Patient Vitals for the past 24 hrs:   BP Temp Temp src Pulse Resp SpO2   03/02/18 1058 101/66 97.7 °F (36.5 °C) - 91 20 96 %   03/02/18 0940 - - - - - 91 %   03/02/18 0935 - - - - - (!) 72 %   03/02/18 0932 - - - - - 93 %   03/02/18 0930 - - - - - 91 %   03/02/18 0803 118/52 97.5 °F (36.4 °C) - 70 20 97 %   03/02/18 0740 - - - - - 91 %   03/02/18 0306 100/49 97.6 °F (36.4 °C) - 63 18 94 %   03/01/18 2256 100/43 98 °F (36.7 °C) - 60 18 96 %   03/01/18 2058 - - - - - 97 %   03/01/18 1911 106/43 98.1 °F (36.7 °C) - 66 18 98 %   03/01/18 1541 101/67 97.6 °F (36.4 °C) - 87 18 92 %   03/01/18 1400 106/41 97.4 °F (36.3 °C) Oral 62 23 98 %   03/01/18 1345 (!) 83/42 98 °F (36.7 °C) Oral 67 15 100 %   03/01/18 1315 95/49 - - 71 18 96 %   03/01/18 1245 103/87 - - 78 14 96 %   03/01/18 1215 109/46 - - 73 18 96 %   03/01/18 1145 - - - 66 15 99 %       Physical Exam:  General -Alert,  in bed at dialysis in unit. Appears weak  HEENT: NC, AT, pupils round  Neck: supple, no adenopathy appreciated  CVS-S1,S2 normal/ regular rate and rhythm  RS-Scattered b/l exp wheezes and rhonchi today (quieter than yesterday)  Abdomen- Soft, NT,ND, BS+  Extre- No c/c/e  Neuro- No focal sensory or motor deficits noted  Psych - alert, looks depressed today.   verbalizes understanding of conversation     Available labs reviewed:  Labs:    Recent Results (from the past 24 hour(s))   GLUCOSE, POC    Collection Time: 03/01/18  4:13 PM   Result Value Ref Range    Glucose (POC) 106 (H) 65 - 100 mg/dL    Performed by Carlos Antonio    GLUCOSE, POC    Collection Time: 03/01/18  8:46 PM   Result Value Ref Range    Glucose (POC) 227 (H) 65 - 100 mg/dL    Performed by Karuna Morrow (PCT)    METABOLIC PANEL, COMPREHENSIVE    Collection Time: 03/02/18  3:31 AM   Result Value Ref Range    Sodium 138 136 - 145 mmol/L    Potassium 4.5 3.5 - 5.1 mmol/L    Chloride 104 97 - 108 mmol/L    CO2 30 21 - 32 mmol/L    Anion gap 4 (L) 5 - 15 mmol/L    Glucose 60 (L) 65 - 100 mg/dL    BUN 39 (H) 6 - 20 MG/DL    Creatinine 3.21 (H) 0.70 - 1.30 MG/DL    BUN/Creatinine ratio 12 12 - 20      GFR est AA 23 (L) >60 ml/min/1.73m2    GFR est non-AA 19 (L) >60 ml/min/1.73m2    Calcium 8.2 (L) 8.5 - 10.1 MG/DL    Bilirubin, total 0.8 0.2 - 1.0 MG/DL    ALT (SGPT) 18 12 - 78 U/L    AST (SGOT) 12 (L) 15 - 37 U/L    Alk.  phosphatase 27 (L) 45 - 117 U/L    Protein, total 4.9 (L) 6.4 - 8.2 g/dL    Albumin 3.1 (L) 3.5 - 5.0 g/dL    Globulin 1.8 (L) 2.0 - 4.0 g/dL    A-G Ratio 1.7 1.1 - 2.2     MAGNESIUM    Collection Time: 03/02/18  3:31 AM   Result Value Ref Range    Magnesium 1.9 1.6 - 2.4 mg/dL   PHOSPHORUS    Collection Time: 03/02/18  3:31 AM   Result Value Ref Range    Phosphorus 4.2 2.6 - 4.7 MG/DL   CBC W/O DIFF    Collection Time: 03/02/18  3:31 AM   Result Value Ref Range    WBC 13.9 (H) 4.1 - 11.1 K/uL    RBC 3.18 (L) 4.10 - 5.70 M/uL    HGB 8.6 (L) 12.1 - 17.0 g/dL    HCT 26.2 (L) 36.6 - 50.3 %    MCV 82.4 80.0 - 99.0 FL    MCH 27.0 26.0 - 34.0 PG    MCHC 32.8 30.0 - 36.5 g/dL    RDW 17.2 (H) 11.5 - 14.5 %    PLATELET 733 911 - 262 K/uL    MPV 11.4 8.9 - 12.9 FL    NRBC 0.0 0  WBC    ABSOLUTE NRBC 0.00 0.00 - 0.01 K/uL   GLUCOSE, POC    Collection Time: 03/02/18  7:23 AM   Result Value Ref Range    Glucose (POC) 55 (L) 65 - 100 mg/dL    Performed by VIRGINIA GOLDSMITH)    GLUCOSE, POC    Collection Time: 03/02/18  7:44 AM   Result Value Ref Range    Glucose (POC) 75 65 - 100 mg/dL Performed by VIRGINIA MONACO(CON)    GLUCOSE, POC    Collection Time: 03/02/18  8:05 AM   Result Value Ref Range    Glucose (POC) 75 65 - 100 mg/dL    Performed by VIRGINIA MONACO(CON)    GLUCOSE, POC    Collection Time: 03/02/18  8:31 AM   Result Value Ref Range    Glucose (POC) 115 (H) 65 - 100 mg/dL    Performed by VIRGINIA MONACO(CON)    GLUCOSE, POC    Collection Time: 03/02/18 11:16 AM   Result Value Ref Range    Glucose (POC) 174 (H) 65 - 100 mg/dL    Performed by VIRGINIA MONACO(CON)        Imaging:  CXR Results  (Last 48 hours)               02/28/18 1422  XR CHEST PORT Final result    Impression:  IMPRESSION: No acute abnormality and no change. Narrative:  EXAM:  XR CHEST PORT. INDICATION: Increasing oxygen need and worsening shortness of breath. COMPARISON: 2/26/2018. FINDINGS:    A portable AP radiograph of the chest was obtained at 1420 hours. There is a   right jugular dialysis catheter with tip over the superior vena cava. Lines and tubes: The patient is on a cardiac monitor and nasal oxygen. Lungs: The lungs are clear except for some atelectasis at the lung bases. Pleura: There are bilateral pleural effusions. Mediastinum: The cardiac and mediastinal contours and pulmonary vascularity are   normal. The aorta is atherosclerotic. Bones and soft tissues: The bones and soft tissues are grossly within normal   limits. CT Results  (Last 48 hours)    None            Assessment:   Influenza  History of smoldering myeloma  Acute on chronic renal failure attributed to myeloma  Hypercalcemia  DM- worsened by steroids    Plan:     Multiple myeloma- IgG lambda on JAIMIE, total IgG 2293.  SFLC: lambda 1780.  ---- Skeletal survey - 2/21/2018 - with multiple small lytic lesions in skull, humeri  ---- renal failure  ---- hypercalcemia  ---- BMbx 2/22/18: 50% involvement by monoclonal plasma cells with plasmablastic morphology (previously in 2012 had 10% plasma cells and was followed for years with close observation)  ---- started CyBoRD regimen - C1D1- 2/23/2018 - Cytoxan with 20% dose reduction and full dose of Velcade with 40 mg Dex after HD and plamspharesis. Continue weekly chemo and will be due later today after plasma exchange. --- deferring antiresorptive tx for now given acute on CRF although with new approval of xgeva for myeloma, will not be issue as outpatient. This hospital, however, stocks zoledronic acid which could have detrimental effect on kidney function    Normochromic normocytic anemia likely secondary to myeloma    DIANA  secondary to underlying Multiple Myeloma. Worsened rapidly in hospital. Hemodialysis and plasmapheresis per nephrology. Will see how does. Keeping fingers crossed. Dysphagia -  EGD showed hiatal hernia, inflamed mid to distal esophagus, and either severe duodenitis or large laterall spreading villous adenoma. Plan is to continue acid suppression and carafate. PO intake slightly improved. Stable, continue current treatment. Emotional support given.

## 2018-03-02 NOTE — DIABETES MGMT
DTC Progress Note    Recommendations/ Comments: Please consider changing correctional insulin to Lispro Correctional insulin with high sensitivity. Patient currently on resistant scale. Patient may also benefit from discontinuing prandial insulin given hypoglycemia      Chart reviewed on Delfina Stovall. Patient is a 66 y.o. male with known diabetes on NPH and Glipizide at home. A1c:   Lab Results   Component Value Date/Time    Hemoglobin A1c 9.8 (H) 02/20/2018 05:14 AM    Hemoglobin A1c 8.7 (H) 09/10/2016 02:07 AM       Recent Glucose Results:   Lab Results   Component Value Date/Time    GLU 60 (L) 03/02/2018 03:31 AM    GLUCPOC 174 (H) 03/02/2018 11:16 AM    GLUCPOC 115 (H) 03/02/2018 08:31 AM    GLUCPOC 75 03/02/2018 08:05 AM        Lab Results   Component Value Date/Time    Creatinine 3.21 (H) 03/02/2018 03:31 AM     Estimated Creatinine Clearance: 20.8 mL/min (based on Cr of 3.21). Active Orders   Diet    DIET DENTAL SOFT (SOFT SOLID)        PO intake:   Patient Vitals for the past 72 hrs:   % Diet Eaten   02/28/18 1405 90 %       Current hospital DM medication: Lispro Correctional insulin with  \"high\" sensitivity    Will continue to follow as needed. Thank you.   RA Dejesus, RN, [address]

## 2018-03-02 NOTE — PROGRESS NOTES
[facility] Acutes   [phone]   Vitals Pre Post Assessment Pre Post   /57 135/75 LOC A&O X3 A&OX3   HR 97 78 Lungs RHONCHI RHONCHI   Temp 98.1 98.0 Cardiac RR RR   Resp 22 18 Skin W/D/I W/D/I   Weight 176lbs 178lbs Edema NONE NONE   Height 6' 6' Pain DENIES DENIES     Plasmapheresis Orders   Product: 5% ALBUMIN                                                  Volume: 3.5L                                                      Duration: Start: 1550 End: 1638 Total: 48MIN     Fluids    Volume Processed: 6090mls   Plasma Removed: 4056mls   Replacement Fluid: 3398mls   Total Citrate: 511mls ACDA to pt:67mls   NS: 275mls   Fluid Balance: 100%     Access   Type & Location: RT IJ non tunneled catheter. DSG D/I   Comments:    GOOD FLOW Blocked with NS post tx                             Meds Given   Name Dose Route   cagluconate 1gm IV   5% albumin 175gms IV               Labs   Obtained/Reviewed  Critical Results Called hepb neg 2/22/18  reviewed     Comments Timeout completed      [place] tx well. All poss blood returned with rinse back. Catheter flushed and capped. Report given to staff RN. Pt in bed rails up x2, bed low and locked. Call bell in hand.

## 2018-03-02 NOTE — CONSULTS
Palliative Medicine Consult  Александр: [phone] ([phone])    Patient Name: Joey Steele  YOB: 1940    Date of Initial Consult: 03/02/2018  Reason for Consult: care decisions  Requesting Provider: Charlie Tompkins NP   Primary Care Physician: Roopa Davis MD     SUMMARY:   Joey Steele is a 66 y.o. with PMH most significant for MM, COPD, h/o CVA, DM, HTN who was admitted on 2/19/2018 for SOB and hypoxemia. Current medical issues leading to Palliative Medicine involvement include: care decisions in setting of current condition and chronic medical problems. PALLIATIVE DIAGNOSES:   1. SOB  2. Fatigue  3. Constipation   4. Weakness  5. Debility  6. Counseling regarding goals of care and advance care planning     PLAN:   Brief Summary of Plan  -visited w/ pt in his rm. No family at bedside. Undergoing PLEX. RN at bedside.   -pt shared a bit about himself as well as spoke about his condition  We began discussion regarding goals of care  Advance care planning was not discussed  -plan to f/u on Monday, 03/05/2018, to continue discussion regarding goals of care. -we will continue to establish therapeutic alliance as well as provide psychosocial support    1. Goals of Care/Goals of Medical Treatment- based on conversation w/ pt, assumed to be for full, restorative treatment and all measures that support this. 2. Advance Care Planning- pt's code status is FULL CODE per active EMR order. Code status was not discussed w/ pt today. No AMD on file. In absence of AMD, wife would be surrogate decision maker. 3. Information Sharing-   Pt began speaking about his frustration and anger at some aspects of his care received. He later said that it is only 10%; 90% of the folks here at this hospital is doing a wonderful job. He shares that this hospital is no better than \"third world hospital\". He then adds, \"[place]\" as an example of country of origin.   He says he has been going to this hospital for many yrs; the level of care has markedly declined. His frustration and anger were recognized. We then spoke about his condition. He says that he may have \"months left\" but no one knows. He hopes for his overall condition and level of fxn to improve. He wishes to get out of the hospital.      He says that things are progressing. He mentions that his 1 kidney's function needs to improve. He spoke about being in [employer] for 33 yrs. 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 He said that if flying those planes was done like this hospital is run, then they would all be in the [facility]. He points to the pulse ox probe on his finger. He says it is unacceptable for a hospital to use rubber bands to make it work when you can get one from a store for a reasonable cost.     At a later point during my visit, I provided info about our team and its role in his care. He was unfamiliar with palliative care. He verbalized understanding of all the points made including the following:  -we are an interdisciplinary team serving as a bridge of communication and advocate on behalf of the patient and family when needed  -we are a support care service that, when needed, assists w/ sx mgmt  -though hospice is \"under\" palliative medicine, our service is not hospice nor does a consult visit by our service mean that hospice is being or should be considered  -we are not here to make medical decisions, and our being consulted does not equate to a change in a patient's overall condition  -our involvement is independent of clinical trajectory and/or medical decisions made    I relayed to him that we can continue discussions on Monday. He was receptive to this. 4. Psychosocial Support- We will continue to support patient and family during this difficult hospitalization. See above. 5. Spiritual- at this time, there are no apparent spiritual concerns.      6. Symptom Management- Pt had BM earlier today. Reports 2nd in about 2 wks. Amenable to starting stool softener.   -start Colace 100 mg PO bid    I have discussed with patients bedside RN, Ani Brooks. Time and input appreciated. I have discussed with our palliative care IDT. Thank you for this consult that has provided us with the opportunity to be involved in this patient's care. We will continue to follow along with you. Should you have any questions or concerns prior to our next visit at the bedside, please do not hesitate to contact us at [phone] Uziel Barillas MD  Palliative Care Team     GOALS OF CARE / TREATMENT PREFERENCES:     GOALS OF CARE:         TREATMENT PREFERENCES:   Code Status: Full Code    Advance Care Planning:  Advance Care Planning 2/19/2018   Patient's Healthcare Decision Maker is: Verbal statement (Legal Next of Kin remains as decision maker)   Primary Decision Maker Name Kayley Silveira   Primary Decision Maker Relationship to Patient -   Confirm Advance Directive Yes, not on file   Patient Would Like to Complete Advance Directive -           Other Instructions: none at this time        Other:    As far as possible, the palliative care team has discussed with patient / health care proxy about goals of care / treatment preferences for patient. HISTORY:     History obtained from: pt, chart    CHIEF COMPLAINT: none at this time  \"I feel pretty good today\"    HPI/SUBJECTIVE:    The patient is:   [x] Verbal and participatory  [] Non-participatory due to:   Pt is a 65 y/o WM w/ PMH noted above who presented to our ED w/ SOB and low O2 sats. Pt reported having chills a couple of days prior to day of ED presentation. He also had cough productive of yellow sputum, SOB, and difficulty w/ sleeping. Pt had some prednisone at home and took 2 tabs. Sx's worsened. Pt went to a walk-in clinic and was found to have O2 sats in 76s. He was referred to our ED.   Pt reports that he drove himself. CT chest demonstrates bibasilar infiltrates. He was flu +. Pt was admitted for further w/u and mgmt. Clinical Pain Assessment (nonverbal scale for severity on nonverbal patients):   Clinical Pain Assessment  Severity: 0          Duration: for how long has pt been experiencing pain (e.g., 2 days, 1 month, years)  Frequency: how often pain is an issue (e.g., several times per day, once every few days, constant)     FUNCTIONAL ASSESSMENT:     Palliative Performance Scale (PPS):  PPS: 40       PSYCHOSOCIAL/SPIRITUAL SCREENING:     Palliative IDT has assessed this patient for cultural preferences / practices and a referral made as appropriate to needs (Cultural Services, Patient Advocacy, Ethics, etc.)    Advance Care Planning:  Advance Care Planning 2/19/2018   Patient's Healthcare Decision Maker is: Verbal statement (Legal Next of Kin remains as decision maker)   Primary Decision Maker Name Mahad Thompson   Primary Decision Maker Relationship to Patient -   Confirm Advance Directive Yes, not on file   Patient Would Like to Complete Advance Directive -       Any spiritual / Quaker concerns:  [] Yes /  [x] No    Caregiver Burnout:  [] Yes /  [] No /  [x] No Caregiver Present      Anticipatory grief assessment:   [] Normal  / [] Maladaptive       ESAS Anxiety: Anxiety: 0    ESAS Depression:          REVIEW OF SYSTEMS:     Positive and pertinent negative findings in ROS are noted above in HPI. The following systems were [x] reviewed / [] unable to be reviewed as noted in HPI  Other findings are noted below. Systems: constitutional, ears/nose/mouth/throat, respiratory, gastrointestinal, genitourinary, musculoskeletal, integumentary, neurologic, psychiatric, endocrine. Positive findings noted below.   Modified ESAS Completed by: provider   Fatigue: 2 Drowsiness: 0     Pain: 0   Anxiety: 0 Nausea: 0   Anorexia: 0 Dyspnea: 2     Constipation: Yes              PHYSICAL EXAM:     From RN flowsheet:  Wt Readings from Last 3 Encounters:   03/01/18 176 lb 9.6 oz (80.1 kg)   08/10/17 179 lb 11.2 oz (81.5 kg)   09/13/16 176 lb 3 oz (79.9 kg)     Blood pressure 135/75, pulse 75, temperature 98 °F (36.7 °C), resp. rate 20, height 6' (1.829 m), weight 176 lb 9.6 oz (80.1 kg), SpO2 93 %. Pain Scale 1: Numeric (0 - 10)  Pain Intensity 1: 0                 Last bowel movement, if known:     Gen: chronically sick, in NAD  HEENT: NC/AT, MMM, NC securely in place  Respiratory: breathing not labored, symmetric  When he speaks more than a few words, he becomes mildly SOB  Skin: warm, dry  Neurologic: awake, alert, following commands, moving all extremities  Psychiatric: full affect, no hallucinations  No delusions  No obvious si/sx's of agitation or anxiety  Per Appelbaum criteria, pt demonstrates capacity regarding current medical treatment. More specifically,   -pt demonstrates understanding of the decision/choice communicated and is able to indicate a decision/choice  -pt demonstrates understanding of relevant information. Pt is able to paraphrase shared information.    -pt appreciates situation and its consequences.   -pt demonstrates the ability to reason about options including comparing options and consequences and provide reasons for selection of option         HISTORY:     Active Problems:    Hypoxemia (2/19/2018)      Past Medical History:   Diagnosis Date    AAA (abdominal aortic aneurysm) (HCC)     Asthma     Cancer ([facility] [address]     kidney ca    COPD     Diabetes ([facility] [address]     Gastrointestinal disorder     ruptured esphogus    Hypertension     Other ill-defined conditions(799.89)       Past Surgical History:   Procedure Laterality Date    ABDOMEN SURGERY PROC UNLISTED      hernia    ABDOMEN SURGERY PROC UNLISTED      colon resection    HX GI      part of colon removed, ruptured esophagus    HX OTHER SURGICAL      kidney removed    [address]        Family History   Problem Relation Age of Onset    Hypertension Mother       History reviewed, no pertinent family history.   Social History   Substance Use Topics    Smoking status: Current Every Day Smoker     Packs/day: 0.50     Years: 60.00    Smokeless tobacco: Never Used    Alcohol use Yes      Comment: 2 drinks a month     Allergies   Allergen Reactions    Niacin Rash    Niacin Unknown (comments)     Hot flashes      Current Facility-Administered Medications   Medication Dose Route Frequency    clopidogrel (PLAVIX) tablet 75 mg  75 mg Oral DAILY    dexamethasone (DECADRON) 40 mg in 0.9% sodium chloride 50 mL IVPB  40 mg IntraVENous ONCE    granisetron (PF) (KYTRIL) injection 1 mg  1 mg IntraVENous ONCE    docusate sodium (COLACE) capsule 100 mg  100 mg Oral BID    epoetin gisele (EPOGEN;PROCRIT) injection 10,000 Units  10,000 Units SubCUTAneous Q TUE, THU & SAT    cyclophosphamide (CYTOXAN) 500 mg in 0.9% sodium chloride 250 mL, overfill volume 25 mL chemo infusion  500 mg IntraVENous ONCE    bortezomib (VELCADE) 3.1 mg in sodium chloride SQ chemo syringe  3.1 mg SubCUTAneous ONCE    albuterol-ipratropium (DUO-NEB) 2.5 MG-0.5 MG/3 ML  3 mL Nebulization Q6H RT    lidocaine (XYLOCAINE) 2 % viscous solution 15 mL  15 mL Mouth/Throat PRN    anticoagulant citrate dextrose (ACD-A) solution  1,500 mL In Vitro DAILY PRN    calcium gluconate 1 g in 0.9% sodium chloride 100 mL IVPB  1 g IntraVENous Q48H PRN    albumin human 5% (BUMINATE) solution 175 g  175 g IntraVENous Q48H    hydrALAZINE (APRESOLINE) 20 mg/mL injection 20 mg  20 mg IntraVENous Q6H PRN    midazolam (VERSED) injection 5 mg  5 mg IntraVENous Multiple    pantoprazole (PROTONIX) tablet 40 mg  40 mg Oral ACB&D    sucralfate (CARAFATE) 100 mg/mL oral suspension 1 g  1 g Oral AC&HS    insulin lispro (HUMALOG) injection 5 Units  5 Units SubCUTAneous TIDAC    melatonin tablet 3 mg  3 mg Oral QHS PRN    insulin lispro (HUMALOG) injection   SubCUTAneous AC&HS    sodium chloride (NS) flush 5-10 mL  5-10 mL IntraVENous PRN    guaiFENesin ER (MUCINEX) tablet 600 mg  600 mg Oral BID    albuterol-ipratropium (DUO-NEB) 2.5 MG-0.5 MG/3 ML  3 mL Nebulization Q4H PRN    acetaminophen (TYLENOL) tablet 650 mg  650 mg Oral Q4H PRN    ondansetron (ZOFRAN) injection 4 mg  4 mg IntraVENous Q4H PRN    glucose chewable tablet 16 g  4 Tab Oral PRN    dextrose (D50W) injection syrg 12.5-25 g  12.5-25 g IntraVENous PRN    glucagon (GLUCAGEN) injection 1 mg  1 mg IntraMUSCular PRN    aspirin chewable tablet 81 mg  81 mg Oral DAILY    dilTIAZem CD (CARDIZEM CD) capsule 240 mg  240 mg Oral DAILY    montelukast (SINGULAIR) tablet 10 mg  10 mg Oral DAILY    umeclidinium (INCRUSE ELLIPTA) 62.5 mcg/actuation  1 Puff Inhalation DAILY    fluticasone-vilanterol (BREO ELLIPTA) 100mcg-25mcg/puff  1 Puff Inhalation DAILY          LAB AND IMAGING FINDINGS:     Lab Results   Component Value Date/Time    WBC 13.9 (H) 03/02/2018 03:31 AM    HGB 8.6 (L) 03/02/2018 03:31 AM    PLATELET 754 71/23/7049 03:31 AM     Lab Results   Component Value Date/Time    Sodium 138 03/02/2018 03:31 AM    Potassium 4.5 03/02/2018 03:31 AM    Chloride 104 03/02/2018 03:31 AM    CO2 30 03/02/2018 03:31 AM    BUN 39 (H) 03/02/2018 03:31 AM    Creatinine 3.21 (H) 03/02/2018 03:31 AM    Calcium 8.2 (L) 03/02/2018 03:31 AM    Magnesium 1.9 03/02/2018 03:31 AM    Phosphorus 4.2 03/02/2018 03:31 AM      Lab Results   Component Value Date/Time    AST (SGOT) 12 (L) 03/02/2018 03:31 AM    Alk.  phosphatase 27 (L) 03/02/2018 03:31 AM    Protein, total 4.9 (L) 03/02/2018 03:31 AM    Albumin 3.1 (L) 03/02/2018 03:31 AM    Globulin 1.8 (L) 03/02/2018 03:31 AM     Lab Results   Component Value Date/Time    INR 1.2 (H) 02/25/2018 03:46 AM    Prothrombin time 12.2 (H) 02/25/2018 03:46 AM    aPTT 28.2 09/10/2016 02:07 AM      Lab Results   Component Value Date/Time    Iron 72 05/14/2012 04:07 AM    TIBC 155 (L) 05/14/2012 04:07 AM    Iron % saturation 46 05/14/2012 04:07 AM    Ferritin 585 (H) 05/14/2012 04:07 AM      Lab Results   Component Value Date/Time    pH 7.37 02/19/2018 03:50 PM    PCO2 34 (L) 02/19/2018 03:50 PM    PO2 70 (L) 02/19/2018 03:50 PM     No components found for: [place]   Lab Results   Component Value Date/Time    CK 98 02/21/2018 02:23 PM    CK - MB 1.5 05/13/2012 10:35 AM                Total time: 60 min  Counseling / coordination time, spent as noted above: 40 min  > 50% counseling / coordination?: yes    Prolonged service was provided for  []30 min   []75 min in face to face time in the presence of the patient, spent as noted above. Time Start:   Time End:   Note: this can only be billed with 64315 (initial) or 24910 (follow up). If multiple start / stop times, list each separately.

## 2018-03-02 NOTE — PROGRESS NOTES
Patient is resting in NAD, denies needs or c/o    03/02/2018 0000 eyes closed appears to be sleeping,NAD noted.     03/02/2018 0200 awake denies needs or c/o,will continue to monitor

## 2018-03-02 NOTE — PROGRESS NOTES
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     Hypercalcemia     anemia  EGD--showed lance erosions  DM2    HTN    Influenza A    COPD --Initiated HD on ; HD alternating with PLEX--PLEX #4 today, PLEX #5 --HD sat and Monday    Intiated chemo for MM; initiated plasma exchange in conjunction with chemo on  (monitor response in light chains - look for a 50-60 % drop after 5 PLEX treatments); every other day PLEX x 5; 1 plasma volume; replace with albumin    spot urine protein:creatinine 3.6 grams    Oncology following closely-getting velcade, cytoxan, steroids today    Epo    Repeat EGD once off plavix x 5 day       Subjective:     Chief Complaint:  Waited for 3 hours yesterday after HD to be transported back to room, finally HD nurse took him back. Pt very frustrated with delay in treatments, travel, etc. Pt states he has talked to patient advocate. Pt requests that any future hospitalizations be at the [facility]  Review of Systems:    Symptom Y/N Comments  Symptom Y/N Comments   Fever/Chills    Chest Pain n    Poor Appetite    Edema n    Cough y   Abdominal Pain     Sputum    Joint Pain     SOB/HUERTA n better  Pruritis/Rash     Nausea/vomit n   Tolerating PT/OT     Diarrhea    Tolerating Diet     Constipation    Other       Could not obtain due to:      Objective:     VITALS:   Last 24hrs VS reviewed since prior progress note.  Most recent are:  Visit Vitals    /66 (BP 1 Location: Left arm, BP Patient Position: At rest;Sitting)    Pulse 91    Temp 97.7 °F (36.5 °C)    Resp 20    Ht 6' (1.829 m)    Wt 80.1 kg (176 lb 9.6 oz)    SpO2 96%    BMI 23.95 kg/m2       Intake/Output Summary (Last 24 hours) at 18 1105  Last data filed at 18 1652   Gross per 24 hour   Intake 0 ml   Output             2500 ml   Net            -2500 ml      Telemetry Reviewed:     PHYSICAL EXAM:  General: WD, WN. Alert, cooperative, no acute distress  Resp:  Clear anteriorly  CV:  Regular  rhythm,  No murmur  trace edema  GI:  Soft, Non distended, Non tender.        Lab Data Reviewed: (see below)    Medications Reviewed: (see below)    PMH/SH reviewed - no change compared to H&P  ________________________________________________________________________  Care Plan discussed with:  Patient y    [facility]:          Comments   >50% of visit spent in counseling and coordination of care       ________________________________________________________________________  Odin Perea MD     Procedures: see electronic medical records for all procedures/Xrays and details which  were not copied into this note but were reviewed prior to creation of Plan. LABS:  Recent Labs      03/02/18 0331 03/01/18 0315   WBC  13.9*  11.6*   HGB  8.6*  8.4*   HCT  26.2*  25.5*   PLT  196  173     Recent Labs      03/02/18 0331 03/01/18 0315 02/28/18 0614   NA  138  139  139   K  4.5  5.7*  4.9   CL  104  108  106   CO2  30  23  25   BUN  39*  83*  67*   CREA  3.21*  4.88*  4.30*   GLU  60*  234*  117*   CA  8.2*  8.3*  8.4*   MG  1.9   --   2.0   PHOS  4.2   --   5.3*     Recent Labs      03/02/18 0331 02/28/18 0614   SGOT  12*  13*   AP  27*  30*   TP  4.9*  4.9*   ALB  3.1*  2.7*   GLOB  1.8*  2.2     No results for input(s): INR, PTP, APTT in the last 72 hours. No lab exists for component: INREXT, INREXT   No results for input(s): FE, TIBC, PSAT, FERR in the last 72 hours. No results found for: FOL, RBCF   No results for input(s): PH, PCO2, PO2 in the last 72 hours. No results for input(s): CPK, CKMB in the last 72 hours.     No lab exists for component: TROPONINI  No components found for: [place]  Lab Results   Component Value Date/Time    Color YELLOW/STRAW 02/19/2018 03:07 PM    Appearance CLOUDY (A) 02/19/2018 03:07 PM    Specific gravity 1.020 02/19/2018 03:07 PM    pH (UA) 5.5 02/19/2018 03:07 PM    Protein 100 (A) 02/19/2018 03:07 PM    Glucose NEGATIVE  02/19/2018 03:07 PM    Ketone NEGATIVE  02/19/2018 03:07 PM    Bilirubin NEGATIVE  02/19/2018 03:07 PM    Urobilinogen 0.2 02/19/2018 03:07 PM    Nitrites NEGATIVE  02/19/2018 03:07 PM    Leukocyte Esterase NEGATIVE  02/19/2018 03:07 PM    Epithelial cells FEW 02/19/2018 03:07 PM    Bacteria 1+ (A) 02/19/2018 03:07 PM    WBC 0-4 02/19/2018 03:07 PM    RBC 0-5 02/19/2018 03:07 PM       MEDICATIONS:  Current Facility-Administered Medications   Medication Dose Route Frequency    clopidogrel (PLAVIX) tablet 75 mg  75 mg Oral DAILY    epoetin gisele (EPOGEN;PROCRIT) injection 10,000 Units  10,000 Units SubCUTAneous Q TUE, THU & SAT    cyclophosphamide (CYTOXAN) 500 mg in 0.9% sodium chloride 250 mL, overfill volume 25 mL chemo infusion  500 mg IntraVENous ONCE    bortezomib (VELCADE) 3.1 mg in sodium chloride SQ chemo syringe  3.1 mg SubCUTAneous ONCE    dexamethasone (DECADRON) 40 mg in 0.9% sodium chloride 50 mL IVPB  40 mg IntraVENous ONCE    granisetron (PF) (KYTRIL) injection 1 mg  1 mg IntraVENous ONCE    albuterol-ipratropium (DUO-NEB) 2.5 MG-0.5 MG/3 ML  3 mL Nebulization Q6H RT    insulin glargine (LANTUS) injection 10 Units  10 Units SubCUTAneous QHS    lidocaine (XYLOCAINE) 2 % viscous solution 15 mL  15 mL Mouth/Throat PRN    anticoagulant citrate dextrose (ACD-A) solution  1,500 mL In Vitro DAILY PRN    calcium gluconate 1 g in 0.9% sodium chloride 100 mL IVPB  1 g IntraVENous Q48H PRN    albumin human 5% (BUMINATE) solution 175 g  175 g IntraVENous Q48H    hydrALAZINE (APRESOLINE) 20 mg/mL injection 20 mg  20 mg IntraVENous Q6H PRN    midazolam (VERSED) injection 5 mg  5 mg IntraVENous Multiple    pantoprazole (PROTONIX) tablet 40 mg  40 mg Oral ACB&D    sucralfate (CARAFATE) 100 mg/mL oral suspension 1 g  1 g Oral AC&HS    insulin lispro (HUMALOG) injection 5 Units  5 Units SubCUTAneous TIDAC    melatonin tablet 3 mg  3 mg Oral QHS PRN    insulin lispro (HUMALOG) injection   SubCUTAneous AC&HS    sodium chloride (NS) flush 5-10 mL  5-10 mL IntraVENous PRN    guaiFENesin ER (MUCINEX) tablet 600 mg  600 mg Oral BID    albuterol-ipratropium (DUO-NEB) 2.5 MG-0.5 MG/3 ML  3 mL Nebulization Q4H PRN    acetaminophen (TYLENOL) tablet 650 mg  650 mg Oral Q4H PRN    ondansetron (ZOFRAN) injection 4 mg  4 mg IntraVENous Q4H PRN    glucose chewable tablet 16 g  4 Tab Oral PRN    dextrose (D50W) injection syrg 12.5-25 g  12.5-25 g IntraVENous PRN    glucagon (GLUCAGEN) injection 1 mg  1 mg IntraMUSCular PRN    aspirin chewable tablet 81 mg  81 mg Oral DAILY    dilTIAZem CD (CARDIZEM CD) capsule 240 mg  240 mg Oral DAILY    montelukast (SINGULAIR) tablet 10 mg  10 mg Oral DAILY    umeclidinium (INCRUSE ELLIPTA) 62.5 mcg/actuation  1 Puff Inhalation DAILY    fluticasone-vilanterol (BREO ELLIPTA) 100mcg-25mcg/puff  1 Puff Inhalation DAILY

## 2018-03-02 NOTE — PROGRESS NOTES
Gastroenterology Progress Note    3/2/2018    Admit Date: 2/19/2018    Subjective: Follow up for: dysphagia      Tolerated dental soft diet. Some hang up in lower chest.  Still with a congested cough. Patient was seen in rounds by me today.          Current Facility-Administered Medications   Medication Dose Route Frequency    epoetin gisele (EPOGEN;PROCRIT) injection 10,000 Units  10,000 Units SubCUTAneous Q TUE, THU & SAT    cyclophosphamide (CYTOXAN) 500 mg in 0.9% sodium chloride 250 mL, overfill volume 25 mL chemo infusion  500 mg IntraVENous ONCE    bortezomib (VELCADE) 3.1 mg in sodium chloride SQ chemo syringe  3.1 mg SubCUTAneous ONCE    dexamethasone (DECADRON) 40 mg in 0.9% sodium chloride 50 mL IVPB  40 mg IntraVENous ONCE    granisetron (PF) (KYTRIL) injection 1 mg  1 mg IntraVENous ONCE    albuterol-ipratropium (DUO-NEB) 2.5 MG-0.5 MG/3 ML  3 mL Nebulization Q6H RT    insulin glargine (LANTUS) injection 10 Units  10 Units SubCUTAneous QHS    lidocaine (XYLOCAINE) 2 % viscous solution 15 mL  15 mL Mouth/Throat PRN    anticoagulant citrate dextrose (ACD-A) solution  1,500 mL In Vitro DAILY PRN    calcium gluconate 1 g in 0.9% sodium chloride 100 mL IVPB  1 g IntraVENous Q48H PRN    albumin human 5% (BUMINATE) solution 175 g  175 g IntraVENous Q48H    hydrALAZINE (APRESOLINE) 20 mg/mL injection 20 mg  20 mg IntraVENous Q6H PRN    midazolam (VERSED) injection 5 mg  5 mg IntraVENous Multiple    pantoprazole (PROTONIX) tablet 40 mg  40 mg Oral ACB&D    sucralfate (CARAFATE) 100 mg/mL oral suspension 1 g  1 g Oral AC&HS    insulin lispro (HUMALOG) injection 5 Units  5 Units SubCUTAneous TIDAC    melatonin tablet 3 mg  3 mg Oral QHS PRN    insulin lispro (HUMALOG) injection   SubCUTAneous AC&HS    sodium chloride (NS) flush 5-10 mL  5-10 mL IntraVENous PRN    guaiFENesin ER (MUCINEX) tablet 600 mg  600 mg Oral BID    albuterol-ipratropium (DUO-NEB) 2.5 MG-0.5 MG/3 ML  3 mL Nebulization Q4H PRN    acetaminophen (TYLENOL) tablet 650 mg  650 mg Oral Q4H PRN    ondansetron (ZOFRAN) injection 4 mg  4 mg IntraVENous Q4H PRN    glucose chewable tablet 16 g  4 Tab Oral PRN    dextrose (D50W) injection syrg 12.5-25 g  12.5-25 g IntraVENous PRN    glucagon (GLUCAGEN) injection 1 mg  1 mg IntraMUSCular PRN    aspirin chewable tablet 81 mg  81 mg Oral DAILY    dilTIAZem CD (CARDIZEM CD) capsule 240 mg  240 mg Oral DAILY    montelukast (SINGULAIR) tablet 10 mg  10 mg Oral DAILY    umeclidinium (INCRUSE ELLIPTA) 62.5 mcg/actuation  1 Puff Inhalation DAILY    fluticasone-vilanterol (BREO ELLIPTA) 100mcg-25mcg/puff  1 Puff Inhalation DAILY        Objective:     Blood pressure 118/52, pulse 70, temperature 97.5 °F (36.4 °C), resp. rate 20, height 6' (1.829 m), weight 80.1 kg (176 lb 9.6 oz), SpO2 97 %. 02/28 1901 - 03/02 0700  In: -   Out: 3664 [Urine:1070]        Physical Examination:       General:AAO x 3, coughing.   HEENT:  EOMI,   Heart: S1, S2, RRR  GI: Soft, NT, ND + bowel sounds      Data Review    Recent Results (from the past 24 hour(s))   GLUCOSE, POC    Collection Time: 03/01/18  8:26 AM   Result Value Ref Range    Glucose (POC) 151 (H) 65 - 100 mg/dL    Performed by [address] [place] [address], POC    Collection Time: 03/01/18  4:13 PM   Result Value Ref Range    Glucose (POC) 106 (H) 65 - 100 mg/dL    Performed by Chava Pedro    GLUCOSE, POC    Collection Time: 03/01/18  8:46 PM   Result Value Ref Range    Glucose (POC) 227 (H) 65 - 100 mg/dL    Performed by Karuna Morrow (PCT)    METABOLIC PANEL, COMPREHENSIVE    Collection Time: 03/02/18  3:31 AM   Result Value Ref Range    Sodium 138 136 - 145 mmol/L    Potassium 4.5 3.5 - 5.1 mmol/L    Chloride 104 97 - 108 mmol/L    CO2 30 21 - 32 mmol/L    Anion gap 4 (L) 5 - 15 mmol/L    Glucose 60 (L) 65 - 100 mg/dL    BUN 39 (H) 6 - 20 MG/DL    Creatinine 3.21 (H) 0.70 - 1.30 MG/DL    BUN/Creatinine ratio 12 12 - 20 GFR est AA 23 (L) >60 ml/min/1.73m2    GFR est non-AA 19 (L) >60 ml/min/1.73m2    Calcium 8.2 (L) 8.5 - 10.1 MG/DL    Bilirubin, total 0.8 0.2 - 1.0 MG/DL    ALT (SGPT) 18 12 - 78 U/L    AST (SGOT) 12 (L) 15 - 37 U/L    Alk.  phosphatase 27 (L) 45 - 117 U/L    Protein, total 4.9 (L) 6.4 - 8.2 g/dL    Albumin 3.1 (L) 3.5 - 5.0 g/dL    Globulin 1.8 (L) 2.0 - 4.0 g/dL    A-G Ratio 1.7 1.1 - 2.2     MAGNESIUM    Collection Time: 03/02/18  3:31 AM   Result Value Ref Range    Magnesium 1.9 1.6 - 2.4 mg/dL   PHOSPHORUS    Collection Time: 03/02/18  3:31 AM   Result Value Ref Range    Phosphorus 4.2 2.6 - 4.7 MG/DL   CBC W/O DIFF    Collection Time: 03/02/18  3:31 AM   Result Value Ref Range    WBC 13.9 (H) 4.1 - 11.1 K/uL    RBC 3.18 (L) 4.10 - 5.70 M/uL    HGB 8.6 (L) 12.1 - 17.0 g/dL    HCT 26.2 (L) 36.6 - 50.3 %    MCV 82.4 80.0 - 99.0 FL    MCH 27.0 26.0 - 34.0 PG    MCHC 32.8 30.0 - 36.5 g/dL    RDW 17.2 (H) 11.5 - 14.5 %    PLATELET 002 279 - 532 K/uL    MPV 11.4 8.9 - 12.9 FL    NRBC 0.0 0  WBC    ABSOLUTE NRBC 0.00 0.00 - 0.01 K/uL   GLUCOSE, POC    Collection Time: 03/02/18  7:23 AM   Result Value Ref Range    Glucose (POC) 55 (L) 65 - 100 mg/dL    Performed by VIRGINIA MONACO(CON)    GLUCOSE, POC    Collection Time: 03/02/18  7:44 AM   Result Value Ref Range    Glucose (POC) 75 65 - 100 mg/dL    Performed by VIRGINIA MONACO(CON)    GLUCOSE, POC    Collection Time: 03/02/18  8:05 AM   Result Value Ref Range    Glucose (POC) 75 65 - 100 mg/dL    Performed by VIRGINIA MONACO(ANGEL)      Recent Labs      03/02/18 0331 03/01/18 0315   WBC  13.9*  11.6*   HGB  8.6*  8.4*   HCT  26.2*  25.5*   PLT  196  173     Recent Labs      03/02/18 0331 03/01/18 0315 02/28/18   0614   NA  138  139  139   K  4.5  5.7*  4.9   CL  104  108  106   CO2  30  23  25   BUN  39*  83*  67*   CREA  3.21*  4.88*  4.30*   GLU  60*  234*  117*   CA  8.2*  8.3*  8.4*   MG  1.9   --   2.0   PHOS  4.2   --   5.3*     Recent Labs 03/02/18   0331  02/28/18   0614   SGOT  12*  13*   AP  27*  30*   TP  4.9*  4.9*   ALB  3.1*  2.7*   GLOB  1.8*  2.2     No results for input(s): INR, PTP, APTT in the last 72 hours. No lab exists for component: INREXT, INREXT   No results for input(s): FE, TIBC, PSAT, FERR in the last 72 hours. No results found for: FOL, RBCF   No results for input(s): PH, PCO2, PO2 in the last 72 hours. No results for input(s): CPK, CKNDX, TROIQ in the last 72 hours. No lab exists for component: CPKMB  Lab Results   Component Value Date/Time    Cholesterol, total 133 09/10/2016 02:07 AM    HDL Cholesterol 44 09/10/2016 02:07 AM    LDL, calculated 59.2 09/10/2016 02:07 AM    Triglyceride 149 09/10/2016 02:07 AM    CHOL/HDL Ratio 3.0 09/10/2016 02:07 AM     No components found for: [place]  Lab Results   Component Value Date/Time    Color YELLOW/STRAW 02/19/2018 03:07 PM    Appearance CLOUDY (A) 02/19/2018 03:07 PM    Specific gravity 1.020 02/19/2018 03:07 PM    pH (UA) 5.5 02/19/2018 03:07 PM    Protein 100 (A) 02/19/2018 03:07 PM    Glucose NEGATIVE  02/19/2018 03:07 PM    Ketone NEGATIVE  02/19/2018 03:07 PM    Bilirubin NEGATIVE  02/19/2018 03:07 PM    Urobilinogen 0.2 02/19/2018 03:07 PM    Nitrites NEGATIVE  02/19/2018 03:07 PM    Leukocyte Esterase NEGATIVE  02/19/2018 03:07 PM    Epithelial cells FEW 02/19/2018 03:07 PM    Bacteria 1+ (A) 02/19/2018 03:07 PM    WBC 0-4 02/19/2018 03:07 PM    RBC 0-5 02/19/2018 03:07 PM        ROS: -CP, SOB, Dysuria, palpitations, cough. Assessment:  Dysphagia    Active Problems:    Hypoxemia (2/19/2018)           EGD: The esophageal mucosa in the proximal esophagus is normal.  From mid to distal esophagus the mucosa is desquamating and appears moderately inflamed. There is a large (6-7 cm) hiatal hernia. I did not take any biopsies(on ASA/Plavix). 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     Plan/Discussion:     1. Dental soft diet. 2. Medical treatment as suggested.   3. Repeat EGD in 2-3 weeks' time after D/C off of ASA/Plavix x 5 days and when he is breathing better. They are agreeable. 4. D/w pt, family(daughter) and RN at bedside. 5. Call partners to see prn this weekend. Signed By: Jim Cooper.  Sanam Butler MD    3/2/2018 8:25 AM

## 2018-03-02 NOTE — PROGRESS NOTES
Care Conference held today and the Nephrology will run dialysis Saturday and Monday and decision will be made the first of the week if patient will need OP dialysis.

## 2018-03-03 LAB
ALBUMIN SERPL-MCNC: 3.8 G/DL (ref 3.5–5)
ALBUMIN/GLOB SERPL: 2.7 {RATIO} (ref 1.1–2.2)
ALP SERPL-CCNC: 23 U/L (ref 45–117)
ALT SERPL-CCNC: 11 U/L (ref 12–78)
ANION GAP SERPL CALC-SCNC: 9 MMOL/L (ref 5–15)
AST SERPL-CCNC: 8 U/L (ref 15–37)
BILIRUB SERPL-MCNC: 0.8 MG/DL (ref 0.2–1)
BUN SERPL-MCNC: 55 MG/DL (ref 6–20)
BUN/CREAT SERPL: 13 (ref 12–20)
CALCIUM SERPL-MCNC: 8.1 MG/DL (ref 8.5–10.1)
CHLORIDE SERPL-SCNC: 106 MMOL/L (ref 97–108)
CO2 SERPL-SCNC: 22 MMOL/L (ref 21–32)
CREAT SERPL-MCNC: 4.11 MG/DL (ref 0.7–1.3)
ERYTHROCYTE [DISTWIDTH] IN BLOOD BY AUTOMATED COUNT: 17.3 % (ref 11.5–14.5)
GLOBULIN SER CALC-MCNC: 1.4 G/DL (ref 2–4)
GLUCOSE BLD STRIP.AUTO-MCNC: 196 MG/DL (ref 65–100)
GLUCOSE BLD STRIP.AUTO-MCNC: 215 MG/DL (ref 65–100)
GLUCOSE BLD STRIP.AUTO-MCNC: 283 MG/DL (ref 65–100)
GLUCOSE BLD STRIP.AUTO-MCNC: 367 MG/DL (ref 65–100)
GLUCOSE SERPL-MCNC: 306 MG/DL (ref 65–100)
HCT VFR BLD AUTO: 26.1 % (ref 36.6–50.3)
HGB BLD-MCNC: 8.5 G/DL (ref 12.1–17)
MAGNESIUM SERPL-MCNC: 2 MG/DL (ref 1.6–2.4)
MCH RBC QN AUTO: 27.3 PG (ref 26–34)
MCHC RBC AUTO-ENTMCNC: 32.6 G/DL (ref 30–36.5)
MCV RBC AUTO: 83.9 FL (ref 80–99)
NRBC # BLD: 0 K/UL (ref 0–0.01)
NRBC BLD-RTO: 0 PER 100 WBC
PHOSPHATE SERPL-MCNC: 4.9 MG/DL (ref 2.6–4.7)
PLATELET # BLD AUTO: 208 K/UL (ref 150–400)
PMV BLD AUTO: 11.6 FL (ref 8.9–12.9)
POTASSIUM SERPL-SCNC: 5.4 MMOL/L (ref 3.5–5.1)
PROT SERPL-MCNC: 5.2 G/DL (ref 6.4–8.2)
RBC # BLD AUTO: 3.11 M/UL (ref 4.1–5.7)
SERVICE CMNT-IMP: ABNORMAL
SODIUM SERPL-SCNC: 137 MMOL/L (ref 136–145)
WBC # BLD AUTO: 10.6 K/UL (ref 4.1–11.1)

## 2018-03-03 PROCEDURE — 74011000250 HC RX REV CODE- 250: Performed by: NURSE PRACTITIONER

## 2018-03-03 PROCEDURE — 84100 ASSAY OF PHOSPHORUS: CPT | Performed by: INTERNAL MEDICINE

## 2018-03-03 PROCEDURE — 80053 COMPREHEN METABOLIC PANEL: CPT | Performed by: INTERNAL MEDICINE

## 2018-03-03 PROCEDURE — 65660000000 HC RM CCU STEPDOWN

## 2018-03-03 PROCEDURE — 74011250637 HC RX REV CODE- 250/637: Performed by: EMERGENCY MEDICINE

## 2018-03-03 PROCEDURE — 74011250637 HC RX REV CODE- 250/637: Performed by: PHYSICAL MEDICINE & REHABILITATION

## 2018-03-03 PROCEDURE — 74011250637 HC RX REV CODE- 250/637: Performed by: INTERNAL MEDICINE

## 2018-03-03 PROCEDURE — 36415 COLL VENOUS BLD VENIPUNCTURE: CPT | Performed by: INTERNAL MEDICINE

## 2018-03-03 PROCEDURE — 90935 HEMODIALYSIS ONE EVALUATION: CPT

## 2018-03-03 PROCEDURE — 77010033678 HC OXYGEN DAILY

## 2018-03-03 PROCEDURE — 74011250636 HC RX REV CODE- 250/636: Performed by: INTERNAL MEDICINE

## 2018-03-03 PROCEDURE — 82962 GLUCOSE BLOOD TEST: CPT

## 2018-03-03 PROCEDURE — 94640 AIRWAY INHALATION TREATMENT: CPT

## 2018-03-03 PROCEDURE — 85027 COMPLETE CBC AUTOMATED: CPT | Performed by: INTERNAL MEDICINE

## 2018-03-03 PROCEDURE — 83735 ASSAY OF MAGNESIUM: CPT | Performed by: INTERNAL MEDICINE

## 2018-03-03 PROCEDURE — 74011636637 HC RX REV CODE- 636/637: Performed by: EMERGENCY MEDICINE

## 2018-03-03 RX ADMIN — INSULIN LISPRO 3 UNITS: 100 INJECTION, SOLUTION INTRAVENOUS; SUBCUTANEOUS at 16:30

## 2018-03-03 RX ADMIN — IPRATROPIUM BROMIDE AND ALBUTEROL SULFATE 3 ML: .5; 3 SOLUTION RESPIRATORY (INHALATION) at 20:51

## 2018-03-03 RX ADMIN — GUAIFENESIN 600 MG: 600 TABLET, EXTENDED RELEASE ORAL at 14:28

## 2018-03-03 RX ADMIN — SUCRALFATE 1 G: 1 SUSPENSION ORAL at 14:27

## 2018-03-03 RX ADMIN — INSULIN LISPRO 5 UNITS: 100 INJECTION, SOLUTION INTRAVENOUS; SUBCUTANEOUS at 14:30

## 2018-03-03 RX ADMIN — PANTOPRAZOLE SODIUM 40 MG: 40 TABLET, DELAYED RELEASE ORAL at 18:44

## 2018-03-03 RX ADMIN — IPRATROPIUM BROMIDE AND ALBUTEROL SULFATE 3 ML: .5; 3 SOLUTION RESPIRATORY (INHALATION) at 14:10

## 2018-03-03 RX ADMIN — MONTELUKAST SODIUM 10 MG: 10 TABLET, COATED ORAL at 14:28

## 2018-03-03 RX ADMIN — FLUTICASONE FUROATE AND VILANTEROL TRIFENATATE 1 PUFF: 100; 25 POWDER RESPIRATORY (INHALATION) at 14:31

## 2018-03-03 RX ADMIN — IPRATROPIUM BROMIDE AND ALBUTEROL SULFATE 3 ML: .5; 3 SOLUTION RESPIRATORY (INHALATION) at 08:01

## 2018-03-03 RX ADMIN — CLOPIDOGREL BISULFATE 75 MG: 75 TABLET ORAL at 14:27

## 2018-03-03 RX ADMIN — INSULIN LISPRO 4 UNITS: 100 INJECTION, SOLUTION INTRAVENOUS; SUBCUTANEOUS at 14:28

## 2018-03-03 RX ADMIN — IPRATROPIUM BROMIDE AND ALBUTEROL SULFATE 3 ML: .5; 3 SOLUTION RESPIRATORY (INHALATION) at 02:36

## 2018-03-03 RX ADMIN — SUCRALFATE 1 G: 1 SUSPENSION ORAL at 22:34

## 2018-03-03 RX ADMIN — ERYTHROPOIETIN 10000 UNITS: 10000 INJECTION, SOLUTION INTRAVENOUS; SUBCUTANEOUS at 18:44

## 2018-03-03 RX ADMIN — PANTOPRAZOLE SODIUM 40 MG: 40 TABLET, DELAYED RELEASE ORAL at 14:28

## 2018-03-03 RX ADMIN — Medication 10 ML: at 22:35

## 2018-03-03 RX ADMIN — GUAIFENESIN 600 MG: 600 TABLET, EXTENDED RELEASE ORAL at 18:40

## 2018-03-03 RX ADMIN — INSULIN LISPRO 5 UNITS: 100 INJECTION, SOLUTION INTRAVENOUS; SUBCUTANEOUS at 18:40

## 2018-03-03 RX ADMIN — INSULIN LISPRO 8 UNITS: 100 INJECTION, SOLUTION INTRAVENOUS; SUBCUTANEOUS at 22:34

## 2018-03-03 RX ADMIN — DOCUSATE SODIUM 100 MG: 100 CAPSULE, LIQUID FILLED ORAL at 18:40

## 2018-03-03 RX ADMIN — SUCRALFATE 1 G: 1 SUSPENSION ORAL at 18:39

## 2018-03-03 RX ADMIN — DILTIAZEM HYDROCHLORIDE 240 MG: 240 CAPSULE, COATED, EXTENDED RELEASE ORAL at 14:27

## 2018-03-03 RX ADMIN — ASPIRIN 81 MG 81 MG: 81 TABLET ORAL at 14:28

## 2018-03-03 RX ADMIN — UMECLIDINIUM 1 PUFF: 62.5 AEROSOL, POWDER ORAL at 14:31

## 2018-03-03 NOTE — PROGRESS NOTES
Home Oxygen Test  Date of test: 3/2/2018  Time of test: 930    Sa02 91 % on room air AT REST. Sa02 72 % on room air DURING AMBULATION. Sa02 91 % on 3 Liters DURING AMBULATION.

## 2018-03-03 NOTE — PROGRESS NOTES
Bedside shift change report given to Veronica Orr (oncoming nurse) by Evelyn Reed (offgoing nurse). Report included the following information SBAR. SHIFT SUMMARY:            [address] NURSING NOTE   Admission Date 2/19/2018   Admission Diagnosis Hypoxemia  dysphagia   Consults IP CONSULT TO NEPHROLOGY  IP CONSULT TO ONCOLOGY  IP CONSULT TO PALLIATIVE CARE - PROVIDER  IP CONSULT TO GASTROENTEROLOGY      Cardiac Monitoring [x] Yes [] No      Purposeful Hourly Rounding [x] Yes    Conrad Score Total Score: 3   Conrad score 3 or > [x] Bed Alarm [] Avasys [] 1:1 sitter [] Patient refused (Place signed refusal form in chart)   Sidney Score Sidney Score: 18   Sidney score 14 or < [] PMT consult [] Wound Care consult    []  Specialty bed  [] Nutrition consult      Influenza Vaccine Received Flu Vaccine for Current Season (usually Sept-March): Yes    Patient/Guardian Refused (Notify MD): No      Oxygen needs?  [] Room air Oxygen @  []1L    []2L    []3L   [x]4L    []5L   []6L     Use home O2? [] Yes [x] No  Perform O2 challenge test using  smartphrase (.Homeoxygen)      Last bowel movement Last Bowel Movement Date: 02/28/18      Urinary Catheter             LDAs               Peripheral IV 02/22/18 Right Antecubital (Active)   Site Assessment Clean, dry, & intact 3/2/2018  4:57 PM   Phlebitis Assessment 0 3/2/2018  4:57 PM   Infiltration Assessment 0 3/2/2018  4:57 PM   Dressing Status Clean, dry, & intact 3/2/2018  4:57 PM   Dressing Type Tape;Transparent 3/2/2018  4:57 PM   Hub Color/Line Status Blue 3/2/2018  4:57 PM        Hemodialysis Access 02/22/18 (Active)   Central Line Being Utilized Yes 3/2/2018  4:57 PM   Criteria for Appropriate Use Dialysis/apheresis 3/2/2018  4:57 PM   Date Accessed  02/22/18 3/2/2018  5:21 AM   Access Time  0801 2/28/2018  8:01 AM   Site Assessment Clean, dry, & intact 3/2/2018  4:57 PM   Date of Last Dressing Change 03/02/18 3/2/2018  4:57 PM   Dressing Status Clean, dry, & intact 3/2/2018  4:57 PM Dressing Type Disk with Chlorhexadine gluconate (CHG); Tape;Transparent 3/2/2018  4:57 PM   Proximal Hub Color/Line Status Blue 3/2/2018  7:40 AM   Distal Hub Color/Line Status Red 3/2/2018  7:40 AM                     Readmission Risk Assessment Tool Score High Risk            32       Total Score        2 . Living with Significant Other. Assisted Living. LTAC. SNF. or   Rehab    3 Patient Length of Stay (>5 days = 3)    5 Pt.  Coverage (Medicare=5 , Medicaid, or Self-Pay=4)    22 Charlson Comorbidity Score (Age + Comorbid Conditions)        Criteria that do not apply:    Has Seen PCP in Last 6 Months (Yes=3, No=0)    IP Visits Last 12 Months (1-3=4, 4=9, >4=11)       Expected Length of Stay 4d 21h   Actual Length of Stay 11

## 2018-03-03 NOTE — PROGRESS NOTES
Hospitalist Progress Note    NAME: Olvin Mahoney   :  1940   MRN:  [id number]       Interim Hospital Summary: 66 y.o. male whom presented on 2018 with      Assessment / Plan:  Pt was seen at the HD unit. Appeared very sleepy, but denies any discomfort. Hx of Smoldering Myeloma/MGUS  Multiple myeloma  - heme/onc following; bone marrow bx from 2018 revealed 60% involvement by plasma cells with atypical, almost plasmablastic appearance, indicating an aggressive process. No evidence of plasma cell leukemia   - continue with plasma exchange in conjunction with chemo; Cytoxan with 20% dose reduction and full dose of Velcade with 40 mg Dex after HD and plamspharesis. This will be a weekly regimen (most recent therapy 3/2/2018)  - Beta-2 microglobulin 12.1  - IgG lambda on JAIMIE, total IgG 2293. SFLC: lambda 1780      DIANA on CKD secondary to myeloma  Hx renal cell cancer s/p nephrectomy  - solitary kidney (left); Permacath placement , HD started on , Plasmapheresis stared on ; alternate with HD and Plasmapheresis. - rapidly worsening renal function secondary to full progression of MM  - nephrology following; HD every other day with plasmapheresis in alternative day. PLEX to be done today (has one more PLEX to complete 5 treatments which will be done on ) and HD today. Appreciate Dr. Anthony Woodson input  - continuation of HD will be determined by renal  - hyperkalemia noted; but not treated since today was his HD day      Acute hypoxemic respiratory failure secondary to influenza A with pneumonia acute on chronic COPD exacerbation  Sepsis developing on admission (Sepsis POA d/t influenza w/ Pneumonia)  - difficult to wean off O2. Difficulty with weaning off O2 with worsening of SOB. Repeat CXR today.  Last CXR was done :  New small left pleural effusion. Small bibasilar airspace opacities seen on prior cross-sectional imaging are difficult to appreciate radiographically.   - conmpleted tamiflu. 2/20 blood cx no growth  - prednisone d/c'd today; completed taper dose. Completed zithromax. Complete rocephin for 10 days (last dose should be 2/28)  - continue with breo, mucinex, and duoneb via metaneb  - send sputum cx if avilable  - repeat CXR: New small left pleural effusion.   - CT chest Prominent emphysema. Bibasilar infiltrates  - pulmonary toilet; incentive spirometry every 1 hour while awake      HTN  - continue cardizem       Diabetes type 2 uncontrolled  - qhs lantus d/c'd due to persistent hypoglycemia in am. Adjusted SSI for today's dex with chem. - Hgb A1C 9.8      Hx CVA  - continue statin (plavix on hold for EGD)      GERD  Indigestion/dysphagia  - BAS: normal  - GI following; continue with soft diet. Will need a repeat EGD in 2-3 weeks when pt is stable. Unable to do the bx due to recent intake of ASA and plavix. 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         Recommended Disposition: Home health   Pt will be transfer to heme/onc floor, continue with telemetry monitoring  Discussed with pt and his wife about current medical therapy and code status on 2/24/2018. Mrs. Ruddy Hernandez would like to think about this and then discuss with the pt. Pt would like to talk over with his wife. Both Mr. & Mrs. Ruddy Hernandez have been reluctant to talk about goals of care. However, they shared many concerns of overall hospital care.      Palliative consult           Subjective:     Chief Complaint / Reason for Physician Visit  \"I am ok\". Discussed with RN events overnight.      Review of Systems:  Symptom Y/N Comments  Symptom Y/N Comments Fever/Chills n   Chest Pain n    Poor Appetite    Edema     Cough y   Abdominal Pain     Sputum n   Joint Pain     SOB/HUERTA    Pruritis/Rash     Nausea/vomit n   Tolerating PT/OT     Diarrhea    Tolerating Diet     Constipation    Other       Could NOT obtain due to:      Objective:     VITALS:   Last 24hrs VS reviewed since prior progress note. Most recent are:  Patient Vitals for the past 24 hrs:   Temp Pulse Resp BP SpO2   03/03/18 1130 - 84 18 94/44 -   03/03/18 1100 - 89 18 99/44 -   03/03/18 1030 - 86 10 108/48 -   03/03/18 1000 - 88 19 101/44 -   03/03/18 0930 97.8 °F (36.6 °C) 78 19 122/45 -   03/03/18 0849 97.7 °F (36.5 °C) 87 22 119/53 92 %   03/03/18 0802 - - - - 92 %   03/03/18 0255 98.3 °F (36.8 °C) 81 22 134/53 94 %   03/03/18 0236 - - - - 92 %   03/02/18 2314 97.7 °F (36.5 °C) 80 20 116/56 91 %   03/02/18 2126 - - - - 98 %   03/02/18 1935 97.9 °F (36.6 °C) 71 20 110/60 98 %   03/02/18 1645 98 °F (36.7 °C) 75 20 135/75 93 %   03/02/18 1630 98.2 °F (36.8 °C) 72 20 121/69 93 %   03/02/18 1620 98.2 °F (36.8 °C) 68 16 125/60 94 %   03/02/18 1610 98 °F (36.7 °C) 70 18 117/60 93 %   03/02/18 1550 98.1 °F (36.7 °C) 97 22 107/54 93 %   03/02/18 1525 98.1 °F (36.7 °C) 98 18 117/51 92 %   03/02/18 1416 - - - - 90 %       Intake/Output Summary (Last 24 hours) at 03/03/18 1142  Last data filed at 03/03/18 0930   Gross per 24 hour   Intake              333 ml   Output             3941 ml   Net            -3608 ml        PHYSICAL EXAM:  General: Ill appearing. Alert, cooperative, no acute distress    EENT:  EOMI. Anicteric sclerae. MMM  Resp:  Bilateral rhonchi, no wheezing or rales. No accessory muscle use  CV:  Regular  rhythm,  No edema  GI:  Soft, Non distended, Non tender.  +Bowel sounds  Neurologic:  Alert and oriented X 3, normal speech,   Psych:   Good insight. Not anxious nor agitated  Skin:  No rashes.   No jaundice    Reviewed most current lab test results and cultures  YES  Reviewed most current radiology test results   YES  Review and summation of old records today    NO  Reviewed patient's current orders and MAR    YES  PMH/SH reviewed - no change compared to H&P  ________________________________________________________________________  Care Plan discussed with:    Comments   Patient y    Family      RN y    Care Manager     Consultant                        Multidiciplinary team rounds were held today with , nursing, pharmacist and clinical coordinator. Patient's plan of care was discussed; medications were reviewed and discharge planning was addressed. ________________________________________________________________________  Total NON critical care TIME:  20   Minutes    Total CRITICAL CARE TIME Spent:   Minutes non procedure based      Comments   >50% of visit spent in counseling and coordination of care     ________________________________________________________________________  Rosio Chang NP     Procedures: see electronic medical records for all procedures/Xrays and details which were not copied into this note but were reviewed prior to creation of Plan. LABS:  I reviewed today's most current labs and imaging studies.   Pertinent labs include:  Recent Labs      03/03/18 0317 03/02/18   0331  03/01/18   0315   WBC  10.6  13.9*  11.6*   HGB  8.5*  8.6*  8.4*   HCT  26.1*  26.2*  25.5*   PLT  208  196  173     Recent Labs      03/03/18 0317 03/02/18   0331  03/01/18   0315   NA  137  138  139   K  5.4*  4.5  5.7*   CL  106  104  108   CO2  22  30  23   GLU  306*  60*  234*   BUN  55*  39*  83*   CREA  4.11*  3.21*  4.88*   CA  8.1*  8.2*  8.3*   MG  2.0  1.9   --    PHOS  4.9*  4.2   --    ALB  3.8  3.1*   --    TBILI  0.8  0.8   --    SGOT  8*  12*   --    ALT  11*  18   --        Signed: )David Gil, NP

## 2018-03-03 NOTE — PROCEDURES
[facility] Dialysis Team [facility]  [phone]    Vitals   Pre   Post   Assessment   Pre   Post     Temp  Temp: 97.8 °F (36.6 °C) (03/03/18 0930)  98.1 LOC  A&OX4 A&OX4   HR   Pulse (Heart Rate): 78 (03/03/18 0930) 92 Lungs   coarse coarse   B/P   BP: 122/45 (03/03/18 0930) 104/37 Cardiac   NSR  NSR   Resp   Resp Rate: 19 (03/03/18 0930) 18 Skin   intact  intact   Pain level  Pain Intensity 1: 0 (03/03/18 0308) 0 Edema    none   none   Orders:    Duration:   Start:   Procedure Start Time: 0930 End:   1300 Total:   3.5 ghours   Dialyzer:   Dialyzer/Set Up Inspection: Revaclear (03/03/18 0930)   K Bath:   Dialysate K (mEq/L): 2 (03/03/18 0930)   Ca Bath:   Dialysate CA (mEq/L): 2.5 (03/03/18 0930)   Na/Bicarb:   Dialysate NA (mEq/L): 140 (03/03/18 0930)   Target Fluid Removal:   Goal/Amount of Fluid to Remove (mL): 2000 mL (03/03/18 0930)   Access     Type & Location:   SHERRELL coker. Ports of Harvinder/permcath disinfected with Alcohol per policy. Each lumen aspirated for blood return and flushed with Normal Saline per policy. Dialysis initiated. Central line catheter flushed with normal saline per policy. Ports disinfected with Alcohol per policy and lines disconnected and capped using aseptic technique. See LDA docflowsheet for dressing information.      Labs     Obtained/Reviewed   Critical Results Called   Date when labs were drawn-  Hgb-    HGB   Date Value Ref Range Status   03/03/2018 8.5 (L) 12.1 - 17.0 g/dL Final     K-    Potassium   Date Value Ref Range Status   03/03/2018 5.4 (H) 3.5 - 5.1 mmol/L Final     Ca-   Calcium   Date Value Ref Range Status   03/03/2018 8.1 (L) 8.5 - 10.1 MG/DL Final     Bun-   BUN   Date Value Ref Range Status   03/03/2018 55 (H) 6 - 20 MG/DL Final     Creat-   Creatinine   Date Value Ref Range Status   03/03/2018 4.11 (H) 0.70 - 1.30 MG/DL Final     Comment:     INVESTIGATED PER DELTA CHECK PROTOCOL        Medications/ Blood Products Given     Name   Dose   Route and Time     none                Blood Volume Processed (BVP):    77 liters Net Fluid   Removed:  2000 ml   Comments Patient tolerated tx well, BPs in the high 80s and low 90s at times, but came up without intervention. No other problems. Report called to [facility] nurse, Rolanda RN, using SBAR  Time Out Done: yes, 0930  Primary Nurse Rpt Pre: Iwona Lee RN  Primary Nurse Rpt Post: Iwona Lee RN  Pt Education: ARF  Care Plan: HD per orders  Tx Summary: 3.5 hours on 2 K 2.5 Ca removed 2000 ml  Admiting Diagnosis:  Pt's previous clinic-none  Consent signed - Informed Consent Verified: Yes (03/03/18 0930)  Gio Consent - yes  Hepatitis Status- antigen negative 1-22-18  Machine #- Machine Number: [id number] (03/03/18 0930)  Telemetry status-yes  Pre-dialysis wt. - Pre-Dialysis Weight: 81.3 kg (179 lb 3.7 oz) (02/27/18 0010)

## 2018-03-03 NOTE — PROGRESS NOTES
ADULT PROTOCOL: JET AEROSOL  REASSESSMENT    Patient  Romana Rav     66 y.o.   male     3/3/2018  8:11 AM    Breath Sounds Pre Procedure: Right Breath Sounds: Diminished, Coarse                               Left Breath Sounds: Diminished, Coarse    Breath Sounds Post Procedure: Right Breath Sounds: Diminished, Coarse                                 Left Breath Sounds: Diminished, Coarse    Breathing pattern: Pre procedure Breathing Pattern: Regular          Post procedure Breathing Pattern: Regular    Heart Rate: Pre procedure Pulse: 70           Post procedure Pulse: 77    Resp Rate: Pre procedure Respirations: 16           Post procedure Respirations: 20      Incentive Spirometry:  Actual Volume (ml): 1500 ml          Cough: Pre procedure Cough: Congested, Non-productive               Post procedure Cough: Congested, Non-productive      Sputum: Pre procedure Sputum amount: Small  Sputum color/odor: White                 Post procedure      Oxygen: O2 Device: Nasal cannula   Flow rate (L/min) 3 lpm     Changed: NO    SpO2: Pre procedure SpO2: 99 %   with oxygen              Post procedure SpO2: 98 %  with oxygen    Nebulizer Therapy: Current medications Aerosolized Medications: DuoNeb      Changed: NO    Smoking History: current smoker    Problem List:   Patient Active Problem List   Diagnosis Code    Acute respiratory failure ([facility] J96.00    COPD with acute exacerbation ([facility] J44.1    HTN (hypertension) I10    HTN (hypertension) I10    COPD (chronic obstructive pulmonary disease) (HCC) J44.9    Cancer of kidney ([facility] C64.9    Hypoglycemia, unspecified E16.2    Acute renal failure ([facility] N17.9    Hyperkalemia E87.5    S/P partial colectomy Z90.49    Diarrhea R19.7    DM (diabetes mellitus), type 2, uncontrolled ([facility] E11.65    S/p nephrectomy Z90.5    AAA (abdominal aortic aneurysm) (HCC) I71.4    Gastrointestinal disorder K92.9    Cancer (HCC) C80.1    Anemia D64.9    Multiple myeloma ([facility] C90.00    Stroke (cerebrum) ([facility]) I63.9    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

## 2018-03-03 NOTE — PROGRESS NOTES
[facility] Hematology-Oncology Progress Note      Stacey Barkley  1940  [id number]      3/3/2018  11:43 PM    Follow-up for: progressive multiple myeloma, DIANA     [x] Chart notes since last visit reviewed     [] Medications reviewed for allergies and interactions      Subjective:     Patient complains of the following:. Fatigue, weakness, no bleeding  Objective:     Patient Vitals for the past 24 hrs:   BP Temp Pulse Resp SpO2 Weight   03/03/18 1200 100/42 - 92 18 - -   03/03/18 1130 94/44 - 84 18 - -   03/03/18 1100 99/44 - 89 18 - -   03/03/18 1030 108/48 - 86 10 - -   03/03/18 1000 101/44 - 88 19 - -   03/03/18 0930 122/45 97.8 °F (36.6 °C) 78 19 - -   03/03/18 0849 119/53 97.7 °F (36.5 °C) 87 22 92 % -   03/03/18 0802 - - - - 92 % -   03/03/18 0304 - - - - - 77.1 kg (170 lb)   03/03/18 0255 134/53 98.3 °F (36.8 °C) 81 22 94 % -   03/03/18 0236 - - - - 92 % -   03/02/18 2314 116/56 97.7 °F (36.5 °C) 80 20 91 % -   03/02/18 2126 - - - - 98 % -   03/02/18 1935 110/60 97.9 °F (36.6 °C) 71 20 98 % -   03/02/18 1645 135/75 98 °F (36.7 °C) 75 20 93 % -   03/02/18 1630 121/69 98.2 °F (36.8 °C) 72 20 93 % -   03/02/18 1620 125/60 98.2 °F (36.8 °C) 68 16 94 % -   03/02/18 1610 117/60 98 °F (36.7 °C) 70 18 93 % -   03/02/18 1550 107/54 98.1 °F (36.7 °C) 97 22 93 % -   03/02/18 1525 117/51 98.1 °F (36.7 °C) 98 18 92 % -   03/02/18 1416 - - - - 90 % -       Physical Exam:  General -Alert,  in bed at dialysis in unit.   Appears weak  HEENT: NC, AT, pupils round  Neck: supple, no adenopathy appreciated    Lungs No distress  Abdomen- Soft, NT,ND, BS+  Extre- No c/c/e  Neuro- No focal sensory or motor deficits noted  Psych - alert, l.  verbalizes understanding of conversation     Available labs reviewed:  Labs:    Recent Results (from the past 24 hour(s))   GLUCOSE, POC    Collection Time: 03/02/18  4:44 PM   Result Value Ref Range    Glucose (POC) 134 (H) 65 - 100 mg/dL    Performed by VIRGINIA GOLDSMITH)    GLUCOSE, POC Collection Time: 03/02/18  9:15 PM   Result Value Ref Range    Glucose (POC) 254 (H) 65 - 100 mg/dL    Performed by Eva Jack    METABOLIC PANEL, COMPREHENSIVE    Collection Time: 03/03/18  3:17 AM   Result Value Ref Range    Sodium 137 136 - 145 mmol/L    Potassium 5.4 (H) 3.5 - 5.1 mmol/L    Chloride 106 97 - 108 mmol/L    CO2 22 21 - 32 mmol/L    Anion gap 9 5 - 15 mmol/L    Glucose 306 (H) 65 - 100 mg/dL    BUN 55 (H) 6 - 20 MG/DL    Creatinine 4.11 (H) 0.70 - 1.30 MG/DL    BUN/Creatinine ratio 13 12 - 20      GFR est AA 17 (L) >60 ml/min/1.73m2    GFR est non-AA 14 (L) >60 ml/min/1.73m2    Calcium 8.1 (L) 8.5 - 10.1 MG/DL    Bilirubin, total 0.8 0.2 - 1.0 MG/DL    ALT (SGPT) 11 (L) 12 - 78 U/L    AST (SGOT) 8 (L) 15 - 37 U/L    Alk.  phosphatase 23 (L) 45 - 117 U/L    Protein, total 5.2 (L) 6.4 - 8.2 g/dL    Albumin 3.8 3.5 - 5.0 g/dL    Globulin 1.4 (L) 2.0 - 4.0 g/dL    A-G Ratio 2.7 (H) 1.1 - 2.2     MAGNESIUM    Collection Time: 03/03/18  3:17 AM   Result Value Ref Range    Magnesium 2.0 1.6 - 2.4 mg/dL   PHOSPHORUS    Collection Time: 03/03/18  3:17 AM   Result Value Ref Range    Phosphorus 4.9 (H) 2.6 - 4.7 MG/DL   CBC W/O DIFF    Collection Time: 03/03/18  3:17 AM   Result Value Ref Range    WBC 10.6 4.1 - 11.1 K/uL    RBC 3.11 (L) 4.10 - 5.70 M/uL    HGB 8.5 (L) 12.1 - 17.0 g/dL    HCT 26.1 (L) 36.6 - 50.3 %    MCV 83.9 80.0 - 99.0 FL    MCH 27.3 26.0 - 34.0 PG    MCHC 32.6 30.0 - 36.5 g/dL    RDW 17.3 (H) 11.5 - 14.5 %    PLATELET 237 630 - 292 K/uL    MPV 11.6 8.9 - 12.9 FL    NRBC 0.0 0  WBC    ABSOLUTE NRBC 0.00 0.00 - 0.01 K/uL   GLUCOSE, POC    Collection Time: 03/03/18  7:44 AM   Result Value Ref Range    Glucose (POC) 283 (H) 65 - 100 mg/dL    Performed by Miriam Tobar        Imaging:  CXR Results  (Last 48 hours)    None        CT Results  (Last 48 hours)    None            Assessment:   Influenza  History of smoldering myeloma  Acute on chronic renal failure attributed to myeloma  Hypercalcemia  DM- worsened by steroids    Plan:     Multiple myeloma- IgG lambda on JAIMIE, total IgG 2293. SFLC: lambda 1780.    ----- BMbx 2/22/18: 50% involvement by monoclonal plasma cells with plasmablastic morphology (previously in 2012 had 10% plasma cells and was followed for years with close observation)  ---- started CyBoRD regimen - C1D1- 2/23/2018 - Cytoxan with 20% dose reduction and full dose of Velcade with 40 mg Dex after HD and plamspharesis. Continue weekly chemo and will be due later today after plasma exchange. --- Normochromic normocytic anemia likely secondary to myeloma   Monitor CBC    DIANA  secondary to underlying Multiple Myeloma. Worsened rapidly in hospital. Hemodialysis and plasmapheresis per nephrology. Dysphagia -  EGD showed hiatal hernia, inflamed mid to distal esophagus, and either severe duodenitis or large laterall spreading villous adenoma. Plan is to continue acid suppression and carafate. PO intake slightly improved.

## 2018-03-03 NOTE — PROGRESS NOTES
NAME: Christina Jane        :  1940        MRN:  [id number]        Assessment :    Plan:  --DIANA  Solitary kidney (left)    Smoldering Myeloma --> progression to full myeloma     Hypercalcemia     anemia  EGD--showed lance erosions  DM2    HTN    Influenza A    COPD --Initiated HD on ; HD alternating with PLEX--PLEX #4 today, PLEX #5 --HD sat and Monday    Intiated chemo for MM; initiated plasma exchange in conjunction with chemo on  (monitor response in light chains - look for a 50-60 % drop after 5 PLEX treatments); every other day PLEX x 5; 1 plasma volume; replace with albumin    spot urine protein:creatinine 3.6 grams    Oncology following closely-getting velcade, cytoxan, steroids today    Epo    Repeat EGD once off plavix x 5 day       Subjective:     Chief Complaint:  RESTING    Objective:     VITALS:   Last 24hrs VS reviewed since prior progress note. Most recent are:  Visit Vitals    /53 (BP 1 Location: Left arm, BP Patient Position: At rest)    Pulse 81    Temp 98.3 °F (36.8 °C)    Resp 22    Ht 6' (1.829 m)    Wt 77.1 kg (170 lb)    SpO2 92%    BMI 23.06 kg/m2       Intake/Output Summary (Last 24 hours) at 18 0834  Last data filed at 18 2240   Gross per 24 hour   Intake              333 ml   Output             3941 ml   Net            -3608 ml      Telemetry Reviewed:     PHYSICAL EXAM:  General: RESTING       ________________________________________________________________________  Nasreen Ron MD     Procedures: see electronic medical records for all procedures/Xrays and details which  were not copied into this note but were reviewed prior to creation of Plan.       LABS:  Recent Labs      18   033   WBC  10.6  13.9*   HGB  8.5*  8.6*   HCT  26.1*  26.2*   PLT  208  196     Recent Labs      18   0331  185   NA  137 138  139   K  5.4*  4.5  5.7*   CL  106  104  108   CO2  22  30  23   BUN  55*  39*  83*   CREA  4.11*  3.21*  4.88*   GLU  306*  60*  234*   CA  8.1*  8.2*  8.3*   MG  2.0  1.9   --    PHOS  4.9*  4.2   --      Recent Labs      03/03/18   0317  03/02/18   0331   SGOT  8*  12*   AP  23*  27*   TP  5.2*  4.9*   ALB  3.8  3.1*   GLOB  1.4*  1.8*     No results for input(s): INR, PTP, APTT in the last 72 hours. No lab exists for component: INREXT, INREXT   No results for input(s): FE, TIBC, PSAT, FERR in the last 72 hours. No results found for: FOL, RBCF   No results for input(s): PH, PCO2, PO2 in the last 72 hours. No results for input(s): CPK, CKMB in the last 72 hours.     No lab exists for component: TROPONINI  No components found for: [place]  Lab Results   Component Value Date/Time    Color YELLOW/STRAW 02/19/2018 03:07 PM    Appearance CLOUDY (A) 02/19/2018 03:07 PM    Specific gravity 1.020 02/19/2018 03:07 PM    pH (UA) 5.5 02/19/2018 03:07 PM    Protein 100 (A) 02/19/2018 03:07 PM    Glucose NEGATIVE  02/19/2018 03:07 PM    Ketone NEGATIVE  02/19/2018 03:07 PM    Bilirubin NEGATIVE  02/19/2018 03:07 PM    Urobilinogen 0.2 02/19/2018 03:07 PM    Nitrites NEGATIVE  02/19/2018 03:07 PM    Leukocyte Esterase NEGATIVE  02/19/2018 03:07 PM    Epithelial cells FEW 02/19/2018 03:07 PM    Bacteria 1+ (A) 02/19/2018 03:07 PM    WBC 0-4 02/19/2018 03:07 PM    RBC 0-5 02/19/2018 03:07 PM       MEDICATIONS:  Current Facility-Administered Medications   Medication Dose Route Frequency    clopidogrel (PLAVIX) tablet 75 mg  75 mg Oral DAILY    docusate sodium (COLACE) capsule 100 mg  100 mg Oral BID    epoetin gisele (EPOGEN;PROCRIT) injection 10,000 Units  10,000 Units SubCUTAneous Q TUE, THU & SAT    albuterol-ipratropium (DUO-NEB) 2.5 MG-0.5 MG/3 ML  3 mL Nebulization Q6H RT    lidocaine (XYLOCAINE) 2 % viscous solution 15 mL  15 mL Mouth/Throat PRN    anticoagulant citrate dextrose (ACD-A) solution  1,500 mL In Vitro DAILY PRN    calcium gluconate 1 g in 0.9% sodium chloride 100 mL IVPB  1 g IntraVENous Q48H PRN    albumin human 5% (BUMINATE) solution 175 g  175 g IntraVENous Q48H    hydrALAZINE (APRESOLINE) 20 mg/mL injection 20 mg  20 mg IntraVENous Q6H PRN    midazolam (VERSED) injection 5 mg  5 mg IntraVENous Multiple    pantoprazole (PROTONIX) tablet 40 mg  40 mg Oral ACB&D    sucralfate (CARAFATE) 100 mg/mL oral suspension 1 g  1 g Oral AC&HS    insulin lispro (HUMALOG) injection 5 Units  5 Units SubCUTAneous TIDAC    melatonin tablet 3 mg  3 mg Oral QHS PRN    insulin lispro (HUMALOG) injection   SubCUTAneous AC&HS    sodium chloride (NS) flush 5-10 mL  5-10 mL IntraVENous PRN    guaiFENesin ER (MUCINEX) tablet 600 mg  600 mg Oral BID    albuterol-ipratropium (DUO-NEB) 2.5 MG-0.5 MG/3 ML  3 mL Nebulization Q4H PRN    acetaminophen (TYLENOL) tablet 650 mg  650 mg Oral Q4H PRN    ondansetron (ZOFRAN) injection 4 mg  4 mg IntraVENous Q4H PRN    glucose chewable tablet 16 g  4 Tab Oral PRN    dextrose (D50W) injection syrg 12.5-25 g  12.5-25 g IntraVENous PRN    glucagon (GLUCAGEN) injection 1 mg  1 mg IntraMUSCular PRN    aspirin chewable tablet 81 mg  81 mg Oral DAILY    dilTIAZem CD (CARDIZEM CD) capsule 240 mg  240 mg Oral DAILY    montelukast (SINGULAIR) tablet 10 mg  10 mg Oral DAILY    umeclidinium (INCRUSE ELLIPTA) 62.5 mcg/actuation  1 Puff Inhalation DAILY    fluticasone-vilanterol (BREO ELLIPTA) 100mcg-25mcg/puff  1 Puff Inhalation DAILY

## 2018-03-04 LAB
ALBUMIN SERPL-MCNC: 3.6 G/DL (ref 3.5–5)
ALBUMIN/GLOB SERPL: 1.9 {RATIO} (ref 1.1–2.2)
ALP SERPL-CCNC: 40 U/L (ref 45–117)
ALT SERPL-CCNC: 18 U/L (ref 12–78)
ANION GAP SERPL CALC-SCNC: 9 MMOL/L (ref 5–15)
AST SERPL-CCNC: 9 U/L (ref 15–37)
BILIRUB SERPL-MCNC: 0.4 MG/DL (ref 0.2–1)
BUN SERPL-MCNC: 56 MG/DL (ref 6–20)
BUN/CREAT SERPL: 15 (ref 12–20)
CALCIUM SERPL-MCNC: 8.6 MG/DL (ref 8.5–10.1)
CHLORIDE SERPL-SCNC: 99 MMOL/L (ref 97–108)
CO2 SERPL-SCNC: 28 MMOL/L (ref 21–32)
CREAT SERPL-MCNC: 3.73 MG/DL (ref 0.7–1.3)
ERYTHROCYTE [DISTWIDTH] IN BLOOD BY AUTOMATED COUNT: 17.5 % (ref 11.5–14.5)
GLOBULIN SER CALC-MCNC: 1.9 G/DL (ref 2–4)
GLUCOSE BLD STRIP.AUTO-MCNC: 138 MG/DL (ref 65–100)
GLUCOSE BLD STRIP.AUTO-MCNC: 366 MG/DL (ref 65–100)
GLUCOSE BLD STRIP.AUTO-MCNC: 405 MG/DL (ref 65–100)
GLUCOSE BLD STRIP.AUTO-MCNC: 436 MG/DL (ref 65–100)
GLUCOSE SERPL-MCNC: 434 MG/DL (ref 65–100)
HCT VFR BLD AUTO: 27.2 % (ref 36.6–50.3)
HGB BLD-MCNC: 8.8 G/DL (ref 12.1–17)
MCH RBC QN AUTO: 27.2 PG (ref 26–34)
MCHC RBC AUTO-ENTMCNC: 32.4 G/DL (ref 30–36.5)
MCV RBC AUTO: 84.2 FL (ref 80–99)
NRBC # BLD: 0 K/UL (ref 0–0.01)
NRBC BLD-RTO: 0 PER 100 WBC
PLATELET # BLD AUTO: 198 K/UL (ref 150–400)
PMV BLD AUTO: 11.4 FL (ref 8.9–12.9)
POTASSIUM SERPL-SCNC: 4.3 MMOL/L (ref 3.5–5.1)
PROT SERPL-MCNC: 5.5 G/DL (ref 6.4–8.2)
RBC # BLD AUTO: 3.23 M/UL (ref 4.1–5.7)
SERVICE CMNT-IMP: ABNORMAL
SODIUM SERPL-SCNC: 136 MMOL/L (ref 136–145)
WBC # BLD AUTO: 13.3 K/UL (ref 4.1–11.1)

## 2018-03-04 PROCEDURE — 82962 GLUCOSE BLOOD TEST: CPT

## 2018-03-04 PROCEDURE — P9045 ALBUMIN (HUMAN), 5%, 250 ML: HCPCS | Performed by: INTERNAL MEDICINE

## 2018-03-04 PROCEDURE — 74011250637 HC RX REV CODE- 250/637: Performed by: PHYSICAL MEDICINE & REHABILITATION

## 2018-03-04 PROCEDURE — 85027 COMPLETE CBC AUTOMATED: CPT | Performed by: INTERNAL MEDICINE

## 2018-03-04 PROCEDURE — 77030011256 HC DRSG MEPILEX <16IN NO BORD MOLN -A

## 2018-03-04 PROCEDURE — 74011636637 HC RX REV CODE- 636/637: Performed by: EMERGENCY MEDICINE

## 2018-03-04 PROCEDURE — 36415 COLL VENOUS BLD VENIPUNCTURE: CPT | Performed by: INTERNAL MEDICINE

## 2018-03-04 PROCEDURE — 74011250636 HC RX REV CODE- 250/636: Performed by: INTERNAL MEDICINE

## 2018-03-04 PROCEDURE — 74011250637 HC RX REV CODE- 250/637: Performed by: INTERNAL MEDICINE

## 2018-03-04 PROCEDURE — 94668 MNPJ CHEST WALL SBSQ: CPT

## 2018-03-04 PROCEDURE — 36514 APHERESIS PLASMA: CPT

## 2018-03-04 PROCEDURE — 65660000000 HC RM CCU STEPDOWN

## 2018-03-04 PROCEDURE — 94640 AIRWAY INHALATION TREATMENT: CPT

## 2018-03-04 PROCEDURE — 74011000258 HC RX REV CODE- 258: Performed by: INTERNAL MEDICINE

## 2018-03-04 PROCEDURE — 74011250637 HC RX REV CODE- 250/637: Performed by: EMERGENCY MEDICINE

## 2018-03-04 PROCEDURE — 74011000250 HC RX REV CODE- 250: Performed by: NURSE PRACTITIONER

## 2018-03-04 PROCEDURE — 80053 COMPREHEN METABOLIC PANEL: CPT | Performed by: INTERNAL MEDICINE

## 2018-03-04 RX ADMIN — MELATONIN 3 MG ORAL TABLET 3 MG: 3 TABLET ORAL at 20:43

## 2018-03-04 RX ADMIN — ASPIRIN 81 MG 81 MG: 81 TABLET ORAL at 10:13

## 2018-03-04 RX ADMIN — FLUTICASONE FUROATE AND VILANTEROL TRIFENATATE 1 PUFF: 100; 25 POWDER RESPIRATORY (INHALATION) at 10:13

## 2018-03-04 RX ADMIN — INSULIN LISPRO 15 UNITS: 100 INJECTION, SOLUTION INTRAVENOUS; SUBCUTANEOUS at 12:15

## 2018-03-04 RX ADMIN — CLOPIDOGREL BISULFATE 75 MG: 75 TABLET ORAL at 10:13

## 2018-03-04 RX ADMIN — GUAIFENESIN 600 MG: 600 TABLET, EXTENDED RELEASE ORAL at 19:54

## 2018-03-04 RX ADMIN — IPRATROPIUM BROMIDE AND ALBUTEROL SULFATE 3 ML: .5; 3 SOLUTION RESPIRATORY (INHALATION) at 20:48

## 2018-03-04 RX ADMIN — IPRATROPIUM BROMIDE AND ALBUTEROL SULFATE 3 ML: .5; 3 SOLUTION RESPIRATORY (INHALATION) at 15:31

## 2018-03-04 RX ADMIN — MONTELUKAST SODIUM 10 MG: 10 TABLET, COATED ORAL at 10:13

## 2018-03-04 RX ADMIN — DILTIAZEM HYDROCHLORIDE 240 MG: 240 CAPSULE, COATED, EXTENDED RELEASE ORAL at 10:13

## 2018-03-04 RX ADMIN — SUCRALFATE 1 G: 1 SUSPENSION ORAL at 19:55

## 2018-03-04 RX ADMIN — PANTOPRAZOLE SODIUM 40 MG: 40 TABLET, DELAYED RELEASE ORAL at 10:13

## 2018-03-04 RX ADMIN — CALCIUM GLUCONATE 1 G: 94 INJECTION, SOLUTION INTRAVENOUS at 18:10

## 2018-03-04 RX ADMIN — SUCRALFATE 1 G: 1 SUSPENSION ORAL at 12:15

## 2018-03-04 RX ADMIN — PANTOPRAZOLE SODIUM 40 MG: 40 TABLET, DELAYED RELEASE ORAL at 19:54

## 2018-03-04 RX ADMIN — ALBUMIN (HUMAN) 175 G: 12.5 INJECTION, SOLUTION INTRAVENOUS at 18:10

## 2018-03-04 RX ADMIN — INSULIN LISPRO 15 UNITS: 100 INJECTION, SOLUTION INTRAVENOUS; SUBCUTANEOUS at 08:22

## 2018-03-04 RX ADMIN — DOCUSATE SODIUM 100 MG: 100 CAPSULE, LIQUID FILLED ORAL at 10:13

## 2018-03-04 RX ADMIN — UMECLIDINIUM 1 PUFF: 62.5 AEROSOL, POWDER ORAL at 10:13

## 2018-03-04 RX ADMIN — GUAIFENESIN 600 MG: 600 TABLET, EXTENDED RELEASE ORAL at 10:13

## 2018-03-04 RX ADMIN — IPRATROPIUM BROMIDE AND ALBUTEROL SULFATE 3 ML: .5; 3 SOLUTION RESPIRATORY (INHALATION) at 08:34

## 2018-03-04 RX ADMIN — INSULIN LISPRO 5 UNITS: 100 INJECTION, SOLUTION INTRAVENOUS; SUBCUTANEOUS at 12:16

## 2018-03-04 RX ADMIN — IPRATROPIUM BROMIDE AND ALBUTEROL SULFATE 3 ML: .5; 3 SOLUTION RESPIRATORY (INHALATION) at 03:40

## 2018-03-04 RX ADMIN — INSULIN LISPRO 5 UNITS: 100 INJECTION, SOLUTION INTRAVENOUS; SUBCUTANEOUS at 08:23

## 2018-03-04 RX ADMIN — SUCRALFATE 1 G: 1 SUSPENSION ORAL at 10:12

## 2018-03-04 RX ADMIN — INSULIN LISPRO 8 UNITS: 100 INJECTION, SOLUTION INTRAVENOUS; SUBCUTANEOUS at 21:14

## 2018-03-04 NOTE — PROGRESS NOTES
Bedside and Verbal shift change report given to govind (oncoming nurse) by remberto (offgoing nurse). Report included the following information SBAR, Kardex, ED Summary, Procedure Summary, Intake/Output, MAR, Recent Results and Cardiac Rhythm nsr. SHIFT SUMMARY:  246: paged dr Estella Yang  [phone]    304: rn spoke to dr Estella Yang. Dr Estella Yang states pt not plasmapheresis priority and may be seen late tonight or early tomorrow. md states this is ok. Pt is angry with dietary, as he states they've made multiple mistakes today and throughout his stay here. He also lists multiple unsatisfactory experiences during his hospitalization. MDs aware. Supervisors made aware and have talked to pt. Dietary contacted and collaborated with in an effort to problem solve. Supervisor christian brought pt's dinner tray to his room this evening. Dietary also brought tray pt had ordered.

## 2018-03-04 NOTE — PROGRESS NOTES
NAME: Donavon Lagos        :  1940        MRN:  [id number]        Assessment :    Plan:  --DIANA  Solitary kidney (left)    Smoldering Myeloma --> progression to full myeloma     Hypercalcemia     anemia  EGD--showed lance erosions  DM2    HTN    Influenza A    COPD --Initiated HD on ; HD alternating with PLEX--PLEX #4 today, PLEX #5 (last one)--  HD done yesterday;reassess renal function in AM,more UO    Intiated chemo for MM; initiated plasma exchange in conjunction with chemo on  (monitor response in light chains - look for a 50-60 % drop after 5 PLEX treatments); every other day PLEX x 5; 1 plasma volume; replace with albumin(recheck free light chains in AM)    spot urine protein:creatinine 3.6 grams    Oncology following closely-getting velcade, cytoxan, steroids today    Epo            Subjective:   More UO    Objective:     VITALS:   Last 24hrs VS reviewed since prior progress note. Most recent are:  Visit Vitals    /47 (BP 1 Location: Right arm, BP Patient Position: At rest)    Pulse 73    Temp 97.4 °F (36.3 °C)    Resp 20    Ht 6' (1.829 m)    Wt 77 kg (169 lb 12.1 oz)    SpO2 96%    BMI 23.02 kg/m2       Intake/Output Summary (Last 24 hours) at 18 0811  Last data filed at 18 0559   Gross per 24 hour   Intake              390 ml   Output             3025 ml   Net            -2635 ml      Telemetry Reviewed:     PHYSICAL EXAM:  General:   Check john paul  NAD    ________________________________________________________________________  Darcie Monroy MD     Procedures: see electronic medical records for all procedures/Xrays and details which  were not copied into this note but were reviewed prior to creation of Plan.       LABS:  Recent Labs      18   0317  18   0331   WBC  10.6  13.9*   HGB  8.5*  8.6*   HCT  26.1*  26.2*   PLT  208  196     Recent Labs      18 6671  03/02/18 0331   NA  137  138   K  5.4*  4.5   CL  106  104   CO2  22  30   BUN  55*  39*   CREA  4.11*  3.21*   GLU  306*  60*   CA  8.1*  8.2*   MG  2.0  1.9   PHOS  4.9*  4.2     Recent Labs      03/03/18 0317 03/02/18 0331   SGOT  8*  12*   AP  23*  27*   TP  5.2*  4.9*   ALB  3.8  3.1*   GLOB  1.4*  1.8*     No results for input(s): INR, PTP, APTT in the last 72 hours. No lab exists for component: INREXT, INREXT   No results for input(s): FE, TIBC, PSAT, FERR in the last 72 hours. No results found for: FOL, RBCF   No results for input(s): PH, PCO2, PO2 in the last 72 hours. No results for input(s): CPK, CKMB in the last 72 hours.     No lab exists for component: TROPONINI  No components found for: [place]  Lab Results   Component Value Date/Time    Color YELLOW/STRAW 02/19/2018 03:07 PM    Appearance CLOUDY (A) 02/19/2018 03:07 PM    Specific gravity 1.020 02/19/2018 03:07 PM    pH (UA) 5.5 02/19/2018 03:07 PM    Protein 100 (A) 02/19/2018 03:07 PM    Glucose NEGATIVE  02/19/2018 03:07 PM    Ketone NEGATIVE  02/19/2018 03:07 PM    Bilirubin NEGATIVE  02/19/2018 03:07 PM    Urobilinogen 0.2 02/19/2018 03:07 PM    Nitrites NEGATIVE  02/19/2018 03:07 PM    Leukocyte Esterase NEGATIVE  02/19/2018 03:07 PM    Epithelial cells FEW 02/19/2018 03:07 PM    Bacteria 1+ (A) 02/19/2018 03:07 PM    WBC 0-4 02/19/2018 03:07 PM    RBC 0-5 02/19/2018 03:07 PM       MEDICATIONS:

## 2018-03-04 NOTE — PROGRESS NOTES
PCT Saskia Diaz states that pt refused to be assisted with bathing at 530 pm. megan sr discussed this with pt to confirm his wishes. Pt states he does not want a bath this evening as he is \"too tired. He states that he washed up this am with the help of the PCT.  He also states that he would like to be bathed in the am.

## 2018-03-04 NOTE — PROGRESS NOTES
[facility] Hematology-Oncology Progress Note      Boris Oconnor  1940  [id number]      3/4/2018  11:43 PM    Follow-up for: progressive multiple myeloma, DIANA     [x] Chart notes since last visit reviewed     [] Medications reviewed for allergies and interactions      Subjective:     Patient complains of the following: fatigue improved, feels much better  No pain, NO SOB, no CP, no fever, no abd pain, ROS otherwise negative  Objective:     Patient Vitals for the past 24 hrs:   BP Temp Pulse Resp SpO2 Weight   03/04/18 0835 - - - - 93 % -   03/04/18 0832 - - - - 95 % -   03/04/18 0727 114/47 97.4 °F (36.3 °C) 73 20 96 % -   03/04/18 0431 115/41 97.5 °F (36.4 °C) 70 18 96 % -   03/04/18 0335 - - - - 97 % -   03/04/18 0312 - - - - - 77 kg (169 lb 12.1 oz)   03/03/18 2224 118/49 97.8 °F (36.6 °C) 79 20 97 % -   03/03/18 2055 - - - - 91 % -   03/03/18 2050 - - - - 90 % -   03/03/18 1941 136/48 97.7 °F (36.5 °C) 87 18 100 % -   03/03/18 1527 106/50 97.8 °F (36.6 °C) 95 18 94 % -   03/03/18 1349 112/48 98.4 °F (36.9 °C) 95 20 - -   03/03/18 1300 (!) 104/37 98.1 °F (36.7 °C) 92 18 - -   03/03/18 1230 100/50 - (!) 101 18 - -   03/03/18 1200 100/42 - 92 18 - -   03/03/18 1130 94/44 - 84 18 - -   03/03/18 1100 99/44 - 89 18 - -   03/03/18 1030 108/48 - 86 10 - -       Physical Exam:  General -Alert, and oriented    HEENT: NC, AT, pupils round  Neck: supple, no adenopathy appreciated    Lungs No distress  Abdomen- Soft, NT,ND,   Extre- No edema  Skin Echymosis on upper ext  Neuro- No focal sensory or motor deficits noted  Psych - alert, l.  verbalizes understanding of conversation     Available labs reviewed:  Labs:    Recent Results (from the past 24 hour(s))   GLUCOSE, POC    Collection Time: 03/03/18  1:49 PM   Result Value Ref Range    Glucose (POC) 215 (H) 65 - 100 mg/dL    Performed by Vin Mittal    GLUCOSE, POC    Collection Time: 03/03/18  5:30 PM   Result Value Ref Range    Glucose (POC) 196 (H) 65 - 100 mg/dL Performed by [facility]    GLUCOSE, POC    Collection Time: 03/03/18  8:57 PM   Result Value Ref Range    Glucose (POC) 367 (H) 65 - 100 mg/dL    Performed by Kassie De Paz    GLUCOSE, POC    Collection Time: 03/04/18  7:34 AM   Result Value Ref Range    Glucose (POC) 436 (H) 65 - 100 mg/dL    Performed by Juan J Soto        Imaging:  CXR Results  (Last 48 hours)    None        CT Results  (Last 48 hours)    None            Assessment:   Influenza  History of smoldering myeloma  Acute on chronic renal failure attributed to myeloma  Hypercalcemia  DM- worsened by steroids    Plan:     Multiple myeloma- IgG lambda on JAIMIE, total IgG 2293. SFLC: lambda 1780.    ----- BMbx 2/22/18: 50% involvement by monoclonal plasma cells with plasmablastic morphology (previously in 2012 had 10% plasma cells and was followed for years with close observation)  ---- started CyBoRD regimen - C1D1- 2/23/2018 - Cytoxan with 20% dose reduction and full dose of Velcade with 40 mg Dex after HD and plamspharesis. PLEX per Nephrology  --- Normochromic normocytic anemia likely secondary to myeloma   Monitor CBC    DIANA  secondary to underlying Multiple Myeloma. Worsened rapidly in hospital. Hemodialysis and plasmapheresis per nephrology. Dysphagia -  EGD showed hiatal hernia, inflamed mid to distal esophagus, and either severe duodenitis or large laterall spreading villous adenoma. Plan is to continue acid suppression and carafate. PO intake slightly improved.

## 2018-03-04 NOTE — PROGRESS NOTES
ADULT PROTOCOL: JET AEROSOL  REASSESSMENT    Patient  Joey Steele     66 y.o.   male     3/4/2018  3:58 PM    Breath Sounds Pre Procedure: Right Breath Sounds: Coarse                               Left Breath Sounds: Coarse    Breath Sounds Post Procedure: Right Breath Sounds: Diminished                                 Left Breath Sounds: Diminished    Breathing pattern: Pre procedure Breathing Pattern: Regular          Post procedure Breathing Pattern: Regular    Heart Rate: Pre procedure Pulse: 74           Post procedure Pulse: 75    Resp Rate: Pre procedure Respirations: 18           Post procedure Respirations: 18    Oxygen: O2 Device: Room air        Changed: No    SpO2: Pre procedure SpO2: 93 %                 Post procedure SpO2: 94 %      Nebulizer Therapy: Current medications Aerosolized Medications: DuoNeb Q6H w/Metaneb      Changed: No    Smoking History: Current everyday smoker    Problem List:   Patient Active Problem List   Diagnosis Code    Acute respiratory failure (HCC) J96.00    COPD with acute exacerbation ([facility] J44.1    HTN (hypertension) I10    HTN (hypertension) I10    COPD (chronic obstructive pulmonary disease) ([facility] J44.9    Cancer of kidney ([facility] C64.9    Hypoglycemia, unspecified E16.2    Acute renal failure ([facility] N17.9    Hyperkalemia E87.5    S/P partial colectomy Z90.49    Diarrhea R19.7    DM (diabetes mellitus), type 2, uncontrolled ([facility] E11.65    S/p nephrectomy Z90.5    AAA (abdominal aortic aneurysm) ([facility]) I71.4    Gastrointestinal disorder K92.9    Cancer ([facility] C80.1    Anemia D64.9    Multiple myeloma ([facility] C90.00    Stroke (cerebrum) (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 E11.42    Hypoxemia R09.02       Respiratory Therapist: Alejandro Rea RT

## 2018-03-04 NOTE — PROGRESS NOTES
Bedside shift change report given to Mary Madison (oncoming nurse) by Gila Alvarado (offgoing nurse). Report included the following information SBAR. SHIFT SUMMARY:            [address] NURSING NOTE   Admission Date 2/19/2018   Admission Diagnosis Hypoxemia  dysphagia   Consults IP CONSULT TO NEPHROLOGY  IP CONSULT TO ONCOLOGY  IP CONSULT TO PALLIATIVE CARE - PROVIDER  IP CONSULT TO GASTROENTEROLOGY      Cardiac Monitoring [x] Yes [] No      Purposeful Hourly Rounding [x] Yes    Conrad Score Total Score: 3   Conrad score 3 or > [x] Bed Alarm [] Avasys [] 1:1 sitter [] Patient refused (Place signed refusal form in chart)   Sidney Score Sidney Score: 17   Sidney score 14 or < [] PMT consult [] Wound Care consult    []  Specialty bed  [] Nutrition consult      Influenza Vaccine Received Flu Vaccine for Current Season (usually Sept-March): Yes    Patient/Guardian Refused (Notify MD): No      Oxygen needs? [] Room air Oxygen @  []1L    []2L    [x]3L   []4L    []5L   []6L     Use home O2? [] Yes [x] No  Perform O2 challenge test using  smartphrase (.Homeoxygen)      Last bowel movement Last Bowel Movement Date: 03/03/18      Urinary Catheter             LDAs               Peripheral IV 03/02/18 Right Wrist (Active)   Site Assessment Clean, dry, & intact 3/3/2018  7:34 PM   Phlebitis Assessment 0 3/3/2018  7:34 PM   Infiltration Assessment 0 3/3/2018  7:34 PM   Dressing Status Clean, dry, & intact 3/3/2018  7:34 PM   Dressing Type Tape;Transparent 3/3/2018  7:34 PM   Hub Color/Line Status Blue; Infusing;Flushed;Patent 3/3/2018  7:34 PM        Hemodialysis Access 02/22/18 (Active)   Central Line Being Utilized Yes 3/3/2018  7:34 PM   Criteria for Appropriate Use Dialysis/apheresis 3/3/2018  7:34 PM   Date Accessed  03/22/18 3/3/2018  7:34 PM   Access Time  0801 2/28/2018  8:01 AM   Site Assessment Clean, dry, & intact 3/3/2018  2:30 PM   Date of Last Dressing Change 03/02/18 3/3/2018  8:46 AM   Dressing Status Clean, dry, & intact 3/3/2018 2:30 PM   Dressing Type Disk with Chlorhexadine gluconate (CHG); Tape;Transparent 3/3/2018  2:30 PM   Proximal Hub Color/Line Status Capped 3/3/2018  8:46 AM   Distal Hub Color/Line Status Capped 3/3/2018  8:46 AM                     Readmission Risk Assessment Tool Score High Risk            32       Total Score        2 . Living with Significant Other. Assisted Living. LTAC. SNF. or   Rehab    3 Patient Length of Stay (>5 days = 3)    5 Pt.  Coverage (Medicare=5 , Medicaid, or Self-Pay=4)    22 Charlson Comorbidity Score (Age + Comorbid Conditions)        Criteria that do not apply:    Has Seen PCP in Last 6 Months (Yes=3, No=0)    IP Visits Last 12 Months (1-3=4, 4=9, >4=11)       Expected Length of Stay 4d 21h   Actual Length of Stay 12

## 2018-03-04 NOTE — PROGRESS NOTES
[facility] Acutes   [phone]   Vitals Pre Post Assessment Pre Post   /68 108/57 LOC A&O x3 A&Ox3   HR 98 68 Lungs CTA/diminished diminished   Temp 98.6 98.6 Cardiac RR RR   Resp 16 16 Skin W/D/I W/D/   Weight 169lbs 169lbs Edema trace trace   Height 6' 6' Pain denies denies     Plasmapheresis Orders   Product:   5% albumin                                                Volume:   3.5L                                                    Duration: Start: 1820 End: 1909 Total: 48min     Fluids    Volume Processed: 6051mls   Plasma Removed: 4029mls   Replacement Fluid: 3373mls   Total Citrate: 509mls(acda to pt:61mls)   NS: 271mls   Fluid Balance: 100%     Access   Type & Location: RT IJ non-tunneled catheter. DSG D/I   Comments:            Blocked with NS good flows                     Meds Given   Name Dose Route   5% albumin 175gms IV   cagluconate 1gms IV               Labs   Obtained/Reviewed  Critical Results Called Hep B neg 2-22-18  Dr aware     Comments TIMEOUT COMPLETED   [place] tx well. All poss blood returned with rinse back. Catheter flushed and capped post tx. Pt in bed, rails up x2, call bell in hand. Bed low and locked.  Report given to staff RN

## 2018-03-04 NOTE — PROGRESS NOTES
Hospitalist Progress Note    NAME: Lovely Pham   :  1940   MRN:  [id number]       Interim Hospital Summary: 66 y.o. male whom presented on 2018 with Flu     Assessment / Plan:  hx of Smoldering Myeloma/MGUS  Multiple myeloma POA  - heme/onc following; bone marrow bx from 2018 revealed 60% involvement by plasma cells with atypical, almost plasmablastic appearance, indicating an aggressive process. No evidence of plasma cell leukemia   - continue with plasma exchange in conjunction with chemo; Cytoxan with 20% dose reduction and full dose of Velcade with 40 mg Dex after HD and plamspharesis. This will be a weekly regimen (most recent therapy 3/2/2018)  - Beta-2 microglobulin 12.1  - IgG lambda on JAIMIE, total IgG 2293. SFLC: lambda 1780      DIANA on CKD secondary to myeloma POA- on rescue HD for now   Hx renal cell cancer s/p nephrectomy  - solitary kidney (left); Permacath placement , HD started on , Plasmapheresis stared on ; alternate with HD and Plasmapheresis. - rapidly worsening renal function secondary to full progression of MM  - nephrology following; HD every other day with plasmapheresis in alternative day. PLEX to be done today (has one more PLEX to complete 5 treatments which will be done on ) and HD today. Appreciate Dr. Rosario Schwartz input  - continuation of HD will be determined by renal  - hyperkalemia noted; but not treated since today was his HD day      Acute hypoxemic respiratory failure secondary to influenza A with pneumonia acute on chronic COPD exacerbation  Sepsis developing on admission (Sepsis POA d/t influenza w/ Pneumonia)  - difficult to wean off O2. Difficulty with weaning off O2 with worsening of SOB. Repeat CXR today.  Last CXR was done :  New small left pleural effusion. Small bibasilar airspace opacities seen on prior cross-sectional imaging are difficult to appreciate radiographically. - conmpleted tamiflu.   blood cx no growth  - prednisone d/c'd today; completed taper dose. Completed zithromax. Complete rocephin for 10 days (last dose should be 2/28)  - continue with breo, mucinex, and duoneb via metaneb  - send sputum cx if avilable  - repeat CXR: New small left pleural effusion.   - CT chest Prominent emphysema. Bibasilar infiltrates  - pulmonary toilet; incentive spirometry every 1 hour while awake      HTN  - continue cardizem       Diabetes type 2 uncontrolled  - qhs lantus d/c'd due to persistent hypoglycemia in am. Adjusted SSI for today's dex with chem. - Hgb A1C 9.8      Hx CVA  - continue statin (plavix on hold for EGD)      GERD  Indigestion/dysphagia  - BAS: normal  - GI following; continue with soft diet. Will need a repeat EGD in 2-3 weeks when pt is stable. Unable to do the bx due to recent intake of ASA and plavix. 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         Recommended Disposition: Home health   Pt will be transfer to heme/onc floor, continue with telemetry monitoring  Discussed with pt and his wife about current medical therapy and code status on 2/24/2018. Mrs. Shashank Javed would like to think about this and then discuss with the pt. Pt would like to talk over with his wife. Both . & Mrs. Shashank Javed have been reluctant to talk about goals of care. However, they shared many concerns of overall hospital care.      Palliative consult           Subjective:     Chief Complaint / Reason for Physician Visit: F/u MM, Renal failure, Bronchitis/Flu  \"I am ok\". Discussed with RN events overnight.      Review of Systems:  Symptom Y/N Comments  Symptom Y/N Comments Fever/Chills n   Chest Pain n    Poor Appetite    Edema     Cough y   Abdominal Pain     Sputum n   Joint Pain     SOB/HUERTA    Pruritis/Rash     Nausea/vomit n   Tolerating PT/OT y    Diarrhea    Tolerating Diet y    Constipation    Other       Could NOT obtain due to:      Objective:     VITALS:   Last 24hrs VS reviewed since prior progress note. Most recent are:  Patient Vitals for the past 24 hrs:   Temp Pulse Resp BP SpO2   03/04/18 0835 - - - - 93 %   03/04/18 0832 - - - - 95 %   03/04/18 0727 97.4 °F (36.3 °C) 73 20 114/47 96 %   03/04/18 0431 97.5 °F (36.4 °C) 70 18 115/41 96 %   03/04/18 0335 - - - - 97 %   03/03/18 2224 97.8 °F (36.6 °C) 79 20 118/49 97 %   03/03/18 2055 - - - - 91 %   03/03/18 2050 - - - - 90 %   03/03/18 1941 97.7 °F (36.5 °C) 87 18 136/48 100 %   03/03/18 1527 97.8 °F (36.6 °C) 95 18 106/50 94 %   03/03/18 1349 98.4 °F (36.9 °C) 95 20 112/48 -   03/03/18 1300 98.1 °F (36.7 °C) 92 18 (!) 104/37 -   03/03/18 1230 - (!) 101 18 100/50 -   03/03/18 1200 - 92 18 100/42 -   03/03/18 1130 - 84 18 94/44 -   03/03/18 1100 - 89 18 99/44 -   03/03/18 1030 - 86 10 108/48 -       Intake/Output Summary (Last 24 hours) at 03/04/18 1026  Last data filed at 03/04/18 1023   Gross per 24 hour   Intake              390 ml   Output             3475 ml   Net            -3085 ml        PHYSICAL EXAM:  General: Chronically ill appearing. Alert, cooperative, no acute distress    EENT:  EOMI. Anicteric sclerae. MMM, L sided dialysis cath noted +  Resp:  Bilateral rhonchi, no wheezing or rales. No accessory muscle use  CV:  Regular  rhythm,  No edema  GI:  Soft, Non distended, Non tender.  +Bowel sounds  Neurologic:  Alert and oriented X 3, normal speech,   Psych:   Good insight. Not anxious nor agitated  Skin:  No rashes.   No jaundice    Reviewed most current lab test results and cultures  YES  Reviewed most current radiology test results   YES  Review and summation of old records today    NO  Reviewed patient's current orders and MAR    YES  PMH/SH reviewed - no change compared to H&P  ________________________________________________________________________  Care Plan discussed with:    Comments   Patient x    Family      RN x    Care Manager     Consultant  x Dr Katy Turner (renal)                     Multidiciplinary team rounds were held today with , nursing, pharmacist and clinical coordinator. Patient's plan of care was discussed; medications were reviewed and discharge planning was addressed. ________________________________________________________________________  Total NON critical care TIME:  16   Minutes    Total CRITICAL CARE TIME Spent:   Minutes non procedure based      Comments   >50% of visit spent in counseling and coordination of care     ________________________________________________________________________  Zuleyka Marquez MD     Procedures: see electronic medical records for all procedures/Xrays and details which were not copied into this note but were reviewed prior to creation of Plan. LABS:  I reviewed today's most current labs and imaging studies.   Pertinent labs include:  Recent Labs      03/03/18 0317 03/02/18   0331   WBC  10.6  13.9*   HGB  8.5*  8.6*   HCT  26.1*  26.2*   PLT  208  196     Recent Labs      03/03/18 0317 03/02/18 0331   NA  137  138   K  5.4*  4.5   CL  106  104   CO2  22  30   GLU  306*  60*   BUN  55*  39*   CREA  4.11*  3.21*   CA  8.1*  8.2*   MG  2.0  1.9   PHOS  4.9*  4.2   ALB  3.8  3.1*   TBILI  0.8  0.8   SGOT  8*  12*   ALT  11*  18       Signed: )Zuleyka Marquez MD

## 2018-03-05 LAB
ALBUMIN SERPL-MCNC: 3.5 G/DL (ref 3.5–5)
ALBUMIN/GLOB SERPL: 3.9 {RATIO} (ref 1.1–2.2)
ALP SERPL-CCNC: 20 U/L (ref 45–117)
ALT SERPL-CCNC: 10 U/L (ref 12–78)
ANION GAP SERPL CALC-SCNC: 8 MMOL/L (ref 5–15)
AST SERPL-CCNC: <3 U/L (ref 15–37)
BILIRUB SERPL-MCNC: 0.4 MG/DL (ref 0.2–1)
BUN SERPL-MCNC: 60 MG/DL (ref 6–20)
BUN/CREAT SERPL: 17 (ref 12–20)
CALCIUM SERPL-MCNC: 7.7 MG/DL (ref 8.5–10.1)
CHLORIDE SERPL-SCNC: 105 MMOL/L (ref 97–108)
CO2 SERPL-SCNC: 27 MMOL/L (ref 21–32)
CREAT SERPL-MCNC: 3.57 MG/DL (ref 0.7–1.3)
ERYTHROCYTE [DISTWIDTH] IN BLOOD BY AUTOMATED COUNT: 17.5 % (ref 11.5–14.5)
GLOBULIN SER CALC-MCNC: 0.9 G/DL (ref 2–4)
GLUCOSE BLD STRIP.AUTO-MCNC: 149 MG/DL (ref 65–100)
GLUCOSE BLD STRIP.AUTO-MCNC: 163 MG/DL (ref 65–100)
GLUCOSE BLD STRIP.AUTO-MCNC: 287 MG/DL (ref 65–100)
GLUCOSE BLD STRIP.AUTO-MCNC: 358 MG/DL (ref 65–100)
GLUCOSE SERPL-MCNC: 303 MG/DL (ref 65–100)
HCT VFR BLD AUTO: 21.4 % (ref 36.6–50.3)
HGB BLD-MCNC: 7 G/DL (ref 12.1–17)
MAGNESIUM SERPL-MCNC: 1.8 MG/DL (ref 1.6–2.4)
MCH RBC QN AUTO: 27.6 PG (ref 26–34)
MCHC RBC AUTO-ENTMCNC: 32.7 G/DL (ref 30–36.5)
MCV RBC AUTO: 84.3 FL (ref 80–99)
NRBC # BLD: 0 K/UL (ref 0–0.01)
NRBC BLD-RTO: 0 PER 100 WBC
PHOSPHATE SERPL-MCNC: 2.8 MG/DL (ref 2.6–4.7)
PLATELET # BLD AUTO: 151 K/UL (ref 150–400)
PMV BLD AUTO: 11.7 FL (ref 8.9–12.9)
POTASSIUM SERPL-SCNC: 3.9 MMOL/L (ref 3.5–5.1)
PROT SERPL-MCNC: 4.4 G/DL (ref 6.4–8.2)
RBC # BLD AUTO: 2.54 M/UL (ref 4.1–5.7)
SERVICE CMNT-IMP: ABNORMAL
SODIUM SERPL-SCNC: 140 MMOL/L (ref 136–145)
WBC # BLD AUTO: 7 K/UL (ref 4.1–11.1)

## 2018-03-05 PROCEDURE — 65660000000 HC RM CCU STEPDOWN

## 2018-03-05 PROCEDURE — 36415 COLL VENOUS BLD VENIPUNCTURE: CPT | Performed by: INTERNAL MEDICINE

## 2018-03-05 PROCEDURE — 74011250637 HC RX REV CODE- 250/637: Performed by: PHYSICAL MEDICINE & REHABILITATION

## 2018-03-05 PROCEDURE — 94640 AIRWAY INHALATION TREATMENT: CPT

## 2018-03-05 PROCEDURE — 82962 GLUCOSE BLOOD TEST: CPT

## 2018-03-05 PROCEDURE — 85027 COMPLETE CBC AUTOMATED: CPT | Performed by: INTERNAL MEDICINE

## 2018-03-05 PROCEDURE — 84100 ASSAY OF PHOSPHORUS: CPT | Performed by: INTERNAL MEDICINE

## 2018-03-05 PROCEDURE — 74011250637 HC RX REV CODE- 250/637: Performed by: INTERNAL MEDICINE

## 2018-03-05 PROCEDURE — 83883 ASSAY NEPHELOMETRY NOT SPEC: CPT | Performed by: INTERNAL MEDICINE

## 2018-03-05 PROCEDURE — 74011636637 HC RX REV CODE- 636/637: Performed by: EMERGENCY MEDICINE

## 2018-03-05 PROCEDURE — 83735 ASSAY OF MAGNESIUM: CPT | Performed by: INTERNAL MEDICINE

## 2018-03-05 PROCEDURE — 74011636637 HC RX REV CODE- 636/637: Performed by: INTERNAL MEDICINE

## 2018-03-05 PROCEDURE — 80053 COMPREHEN METABOLIC PANEL: CPT | Performed by: INTERNAL MEDICINE

## 2018-03-05 PROCEDURE — 74011000250 HC RX REV CODE- 250: Performed by: NURSE PRACTITIONER

## 2018-03-05 PROCEDURE — 77030011256 HC DRSG MEPILEX <16IN NO BORD MOLN -A

## 2018-03-05 PROCEDURE — 74011250637 HC RX REV CODE- 250/637: Performed by: EMERGENCY MEDICINE

## 2018-03-05 PROCEDURE — 77010033678 HC OXYGEN DAILY

## 2018-03-05 RX ORDER — INSULIN GLARGINE 100 [IU]/ML
10 INJECTION, SOLUTION SUBCUTANEOUS
Status: DISCONTINUED | OUTPATIENT
Start: 2018-03-05 | End: 2018-03-06

## 2018-03-05 RX ADMIN — IPRATROPIUM BROMIDE AND ALBUTEROL SULFATE 3 ML: .5; 3 SOLUTION RESPIRATORY (INHALATION) at 13:14

## 2018-03-05 RX ADMIN — INSULIN GLARGINE 10 UNITS: 100 INJECTION, SOLUTION SUBCUTANEOUS at 22:00

## 2018-03-05 RX ADMIN — CLOPIDOGREL BISULFATE 75 MG: 75 TABLET ORAL at 09:54

## 2018-03-05 RX ADMIN — ASPIRIN 81 MG 81 MG: 81 TABLET ORAL at 09:53

## 2018-03-05 RX ADMIN — INSULIN LISPRO 3 UNITS: 100 INJECTION, SOLUTION INTRAVENOUS; SUBCUTANEOUS at 17:19

## 2018-03-05 RX ADMIN — INSULIN LISPRO 5 UNITS: 100 INJECTION, SOLUTION INTRAVENOUS; SUBCUTANEOUS at 09:55

## 2018-03-05 RX ADMIN — DILTIAZEM HYDROCHLORIDE 240 MG: 240 CAPSULE, COATED, EXTENDED RELEASE ORAL at 09:53

## 2018-03-05 RX ADMIN — MELATONIN 3 MG ORAL TABLET 3 MG: 3 TABLET ORAL at 20:38

## 2018-03-05 RX ADMIN — IPRATROPIUM BROMIDE AND ALBUTEROL SULFATE 3 ML: .5; 3 SOLUTION RESPIRATORY (INHALATION) at 07:51

## 2018-03-05 RX ADMIN — GUAIFENESIN 600 MG: 600 TABLET, EXTENDED RELEASE ORAL at 17:19

## 2018-03-05 RX ADMIN — DOCUSATE SODIUM 100 MG: 100 CAPSULE, LIQUID FILLED ORAL at 09:53

## 2018-03-05 RX ADMIN — SUCRALFATE 1 G: 1 SUSPENSION ORAL at 08:51

## 2018-03-05 RX ADMIN — INSULIN LISPRO 5 UNITS: 100 INJECTION, SOLUTION INTRAVENOUS; SUBCUTANEOUS at 12:24

## 2018-03-05 RX ADMIN — SUCRALFATE 1 G: 1 SUSPENSION ORAL at 17:18

## 2018-03-05 RX ADMIN — IPRATROPIUM BROMIDE AND ALBUTEROL SULFATE 3 ML: .5; 3 SOLUTION RESPIRATORY (INHALATION) at 01:46

## 2018-03-05 RX ADMIN — IPRATROPIUM BROMIDE AND ALBUTEROL SULFATE 3 ML: .5; 3 SOLUTION RESPIRATORY (INHALATION) at 20:28

## 2018-03-05 RX ADMIN — MONTELUKAST SODIUM 10 MG: 10 TABLET, COATED ORAL at 09:54

## 2018-03-05 RX ADMIN — INSULIN LISPRO 7 UNITS: 100 INJECTION, SOLUTION INTRAVENOUS; SUBCUTANEOUS at 09:54

## 2018-03-05 RX ADMIN — GUAIFENESIN 600 MG: 600 TABLET, EXTENDED RELEASE ORAL at 09:53

## 2018-03-05 RX ADMIN — SUCRALFATE 1 G: 1 SUSPENSION ORAL at 22:00

## 2018-03-05 RX ADMIN — PANTOPRAZOLE SODIUM 40 MG: 40 TABLET, DELAYED RELEASE ORAL at 08:51

## 2018-03-05 RX ADMIN — INSULIN LISPRO 12 UNITS: 100 INJECTION, SOLUTION INTRAVENOUS; SUBCUTANEOUS at 12:23

## 2018-03-05 RX ADMIN — UMECLIDINIUM 1 PUFF: 62.5 AEROSOL, POWDER ORAL at 10:05

## 2018-03-05 RX ADMIN — FLUTICASONE FUROATE AND VILANTEROL TRIFENATATE 1 PUFF: 100; 25 POWDER RESPIRATORY (INHALATION) at 10:04

## 2018-03-05 RX ADMIN — SUCRALFATE 1 G: 1 SUSPENSION ORAL at 11:00

## 2018-03-05 RX ADMIN — DOCUSATE SODIUM 100 MG: 100 CAPSULE, LIQUID FILLED ORAL at 17:19

## 2018-03-05 RX ADMIN — INSULIN LISPRO 5 UNITS: 100 INJECTION, SOLUTION INTRAVENOUS; SUBCUTANEOUS at 17:19

## 2018-03-05 RX ADMIN — PANTOPRAZOLE SODIUM 40 MG: 40 TABLET, DELAYED RELEASE ORAL at 17:19

## 2018-03-05 NOTE — PROGRESS NOTES
Music Therapy Assessment    Donavon Lagos [id number]  [id number]    1940  66 y.o.  male    Patient Telephone Number: [phone] (home)   Mormonism Affiliation: Sikhism   Language: English   Extended Emergency Contact Information  Primary Emergency Contact: [address] Phone: [phone]  Mobile Phone: [phone]  Relation: Spouse   Patient Active Problem List    Diagnosis Date Noted    Hypoxemia 02/19/2018    Stenosis of both internal carotid arteries 09/13/2016    Diabetic peripheral neuropathy associated with type 2 diabetes mellitus ([address] 09/13/2016    Thrombotic stroke involving left cerebellar artery ([facility] 09/12/2016    Stroke (cerebrum) ([facility] 09/09/2016    Multiple myeloma ([facility] 10/26/2015    Anemia 05/13/2012    Diarrhea 05/12/2012    DM (diabetes mellitus), type 2, uncontrolled ([address] 05/12/2012    S/p nephrectomy 05/12/2012    AAA (abdominal aortic aneurysm) ([facility] 05/12/2012    Gastrointestinal disorder     Cancer ([facility]     COPD (chronic obstructive pulmonary disease) ([facility] 05/11/2012    Cancer of kidney ([facility] 05/11/2012    Hypoglycemia, unspecified 05/11/2012    Acute renal failure ([facility] 05/11/2012    Hyperkalemia 05/11/2012    S/P partial colectomy 05/11/2012    HTN (hypertension) 04/28/2011    Acute respiratory failure ([address] 04/26/2011    COPD with acute exacerbation ([facility] 04/26/2011    HTN (hypertension) 04/26/2011        Date: 3/5/2018       Mental Status:   [x] Alert [  ] Aubree Jony [  ]  Confused  [  ] Minimally responsive    Communication Status: [  ] Impaired Speech [  ] Nonverbal -N/A    Physical Status:   [  ] Oxygen in use  [  ] Hard of Hearing [  ] Vision Impaired  [  ] Ambulatory  [  ] Ambulatory with assistance [  ] Non-ambulatory -N/A    Music Preferences, Background: Patient said he likes the music that accompanies the television show [employer], and said he finds [employer] played in the luo shop he regularly goes to to be soothing. Clinical Problem addressed: Improve mood, decrease feelings of stress, increase relaxation. Goal(s) met in session:  Physical/Pain management (Scale of 1-10):    Pre-session rating: Pt denied pain. Post-session rating: Pt denied pain. [x] Increased relaxation   [  ] Regulated breathing patterns  [x] Decreased muscle tension   [  ] Minimized physical distress     Emotional/Psychological:  [  ] Increased self-expression   [  ] Decreased aggressive behavior   [  ] Decreased sadness   [  ] Discussed healthy coping skills     [x] Improved mood    [  ] Decreased withdrawn behavior     Social:  [  ] Decreased feelings of isolation/loneliness [  ] Positive social interaction   [  ] Provided support and/or comfort for family/friends    Spiritual:  [  ] Spiritual support    [  ] Expressed peace  [  ] Expressed javy    [  ] Discussed beliefs    Techniques Utilized (Check all that apply):   [  ] Procedural support MT [x] Music for relaxation [x] Patient preferred music  [  ] Karma analysis  [  ] Song choice  [x] Music for validation  [  ] Entrainment  [  ] Progressive muscle relax. [  ] Guided visualization  [  ] Olga Hernandez  [  ] Patient instrument playing [  ] Roldan Hodge writing  [  ] Mihir Christopher along   [  ] Rosalba Mckeon  [  ] Sensory stimulation  [x] Active Listening  [  ] Music for spiritual support [  ] Making of CDs as gifts    Session Observations:  Referral from Dr. Tom Bocanegra, Palliative. Patient (pt) was observed to have facial muscle tension sitting in a chair. His daughter was sitting nearby. This music therapist (MT) introduced self and pt increased self-expression, as evidenced by (AEB) expressing frustrations with his situation. MT provided active listening, and then offered music to help take his mind off the things troubling him. MT sat across from pt and softly sang and played the [facility] Again with main.  MT selected this song for its pleasant imagery and to validate the pt's expressed desire not to be in the hospital anymore. Pt increased relaxation and self-expression in response to the music. Muscle tension in his face, neck, and shoulders decreased and eventually ceased. During an instrumental part of the song, pt shared that the song was making him remember a trip he took with his spouse to [facility]. He said songs like this one, which to him was both beautiful and sad, reminded him of songs he heard people singing on the [address] [place] [address] Avenue. He briefly reminisced about this feeling and the trip. He said this was enough music for now, and thanked the MT, saying he found it to be soothing. He said he was tired and was going to call his nurse to ask for help getting back into bed. Will follow as able.     ALYX RebolledoBC (Music Therapist-Board Certified)  Spiritual Care Department  Referral-based service

## 2018-03-05 NOTE — PROGRESS NOTES
Bedside shift change report given to brielle  (oncoming nurse) by Georgia  (offgoing nurse). Report included the following information SBAR. SHIFT SUMMARY:  Patient has been very angry and complaining most of the am. His visitors have also been angry and complaining. Patient is in better spirits this afternoon, getting up in the chair and the hallway. [address] NURSING NOTE   Admission Date 2/19/2018   Admission Diagnosis Hypoxemia  dysphagia   Consults IP CONSULT TO NEPHROLOGY  IP CONSULT TO ONCOLOGY  IP CONSULT TO PALLIATIVE CARE - PROVIDER  IP CONSULT TO GASTROENTEROLOGY      Cardiac Monitoring [] Yes [] No      Purposeful Hourly Rounding [] Yes    Conrad Score Total Score: 3   Conrad score 3 or > [] Bed Alarm [] Avasys [] 1:1 sitter [] Patient refused (Place signed refusal form in chart)   Sidney Score Sidney Score: 18   Sidney score 14 or < [] PMT consult [] Wound Care consult    []  Specialty bed  [] Nutrition consult      Influenza Vaccine Received Flu Vaccine for Current Season (usually Sept-March): Yes    Patient/Guardian Refused (Notify MD): No      Oxygen needs? [] Room air Oxygen @  []1L    []2L    []3L   []4L    []5L   []6L     Use home O2? [] Yes [] No  Perform O2 challenge test using  smartphrase (.Homeoxygen)      Last bowel movement Last Bowel Movement Date: 03/05/18      Urinary Catheter             LDAs               Peripheral IV 03/02/18 Right Wrist (Active)   Site Assessment Dry; Intact 3/5/2018  4:26 AM   Phlebitis Assessment 0 3/5/2018  4:26 AM   Infiltration Assessment 0 3/5/2018  4:26 AM   Dressing Status Dry; Intact; Old drainage 3/5/2018  4:26 AM   Dressing Type Transparent;Tape 3/5/2018  4:26 AM   Hub Color/Line Status Blue;Capped 3/5/2018  4:26 AM        Hemodialysis Access 02/22/18 (Active)   Central Line Being Utilized Yes 3/5/2018  4:26 AM   Criteria for Appropriate Use Dialysis/apheresis 3/5/2018  4:26 AM   Date Accessed  03/03/18 3/4/2018  3:13 AM   Access Time  0801 2/28/2018 8:01 AM   Site Assessment Clean, dry, & intact 3/5/2018  4:26 AM   Date of Last Dressing Change 03/03/18 3/4/2018  3:13 AM   Dressing Status Dry;Loose;Clean 3/5/2018  4:26 AM   Dressing Type Ear protector 3/5/2018  4:26 AM   Proximal Hub Color/Line Status Capped 3/5/2018  4:26 AM   Distal Hub Color/Line Status Capped 3/5/2018  4:26 AM                     Readmission Risk Assessment Tool Score High Risk            32       Total Score        2 . Living with Significant Other. Assisted Living. LTAC. SNF. or   Rehab    3 Patient Length of Stay (>5 days = 3)    5 Pt.  Coverage (Medicare=5 , Medicaid, or Self-Pay=4)    22 Charlson Comorbidity Score (Age + Comorbid Conditions)        Criteria that do not apply:    Has Seen PCP in Last 6 Months (Yes=3, No=0)    IP Visits Last 12 Months (1-3=4, 4=9, >4=11)       Expected Length of Stay 7d 0h   Actual Length of Stay 14

## 2018-03-05 NOTE — WOUND CARE
Wound care nurse consult from staff nurse for \" excoriated area located on right upper buttocks: Marly Flores Patient is a 67 y/o CM admitted for hypoxemia and   Past Medical History:   Diagnosis Date    AAA (abdominal [facility] aneurysm) ([facility]     Asthma     Cancer ([facility]     kidney ca    COPD     Diabetes ([facility]     Gastrointestinal disorder     ruptured esphogus    Hypertension     Other ill-defined conditions(799.89)      Past Surgical History:   Procedure Laterality Date    ABDOMEN SURGERY PROC UNLISTED      hernia    ABDOMEN SURGERY PROC UNLISTED      colon resection    HX GI      part of colon removed, ruptured esophagus    HX OTHER SURGICAL      kidney removed    [facility]       Patient main complaint is the food here and listened to him for 5-10 minutes about it before assessing his buttocks. Patient has a blanchable skin tear to right buttocks, it is a partial thickness wound. Recommend:   Right buttock skin tear: cleanse daily with soap and water, pat dry.  Apply a sacral foam dressing to protect and heal.    Ivy Tang RN, CWON,

## 2018-03-05 NOTE — PROGRESS NOTES
Let Dr. Emiliano Phoenix know that patient's blood sugar was in 300's. Nighttime insulin has already been ordered. We will change him to a diabetic diet. OT PT added as a consult.  Dr. Emiliano Phoenix is aware of HGB 7

## 2018-03-05 NOTE — PROGRESS NOTES
Hospitalist Progress Note    NAME: Anuj Forman   :  1940   MRN:  [id number]       Interim Hospital Summary: 66 y.o. male whom presented on 2018 with Flu     Assessment / Plan:  Diabetes type 2 uncontrolled  - qhs lantus resumed today as FS trended up again after weekly Dexa on the day of chemo. Cont SSI (steroid resistant scale for now)  - Hgb A1C 9.8    hx of Smoldering Myeloma/MGUS  Multiple myeloma POA  - heme/onc following; bone marrow bx from 2018 revealed 60% involvement by plasma cells with atypical, almost plasmablastic appearance, indicating an aggressive process. No evidence of plasma cell leukemia   - continue with plasma exchange in conjunction with chemo; Cytoxan with 20% dose reduction and full dose of Velcade with 40 mg Dex after HD and plamspharesis. This will be a weekly regimen (most recent therapy 3/2/2018)  - Beta-2 microglobulin 12.1  - IgG lambda on JAIMIE, total IgG 2293. SFLC: lambda 1780      DIANA on CKD secondary to myeloma POA- was on rescue HD till Saturday, ? Renal recovery  Hx renal cell cancer s/p nephrectomy  - solitary kidney (left); Permacath placement , HD started on , Plasmapheresis stared on ; alternate with HD and Plasmapheresis. - rapidly worsening renal function secondary to full progression of MM  - nephrology following; HD every other day with plasmapheresis in alternative day. PLEX to be done today (has one more PLEX to complete 5 treatments which will be done on ) and HD today. Appreciate Dr. Julien Patel input  - continuation of HD will be determined by renal - No HD today to see if he can come off HD as per Dr Michelle Mcconnell  - hyperkalemia resolved now      Acute hypoxemic respiratory failure secondary to influenza A with pneumonia acute on chronic COPD exacerbation  Sepsis developing on admission (Sepsis POA d/t influenza w/ Pneumonia)  - difficult to wean off O2. Difficulty with weaning off O2 with worsening of SOB. Repeat CXR today.  Last CXR was done 2/23:  New small left pleural effusion. Small bibasilar airspace opacities seen on prior cross-sectional imaging are difficult to appreciate radiographically. - conmpleted tamiflu. 2/20 blood cx no growth  - prednisone off now; completed taper dose. Completed zithromax. Complete rocephin for 10 days (last dose should be 2/28)  - continue with breo, mucinex, and duoneb via metaneb  - send sputum cx if avilable  - repeat CXR: New small left pleural effusion.   - CT chest Prominent emphysema. Bibasilar infiltrates  - pulmonary toilet; incentive spirometry every 1 hour while awake      HTN  - continue cardizem          Hx CVA  - continue statin  Resumed plavix along with ASA post EGD      GERD  Indigestion/dysphagia  - BAS: normal  - GI following; continue with soft diet. Will need a repeat EGD in 2-3 weeks when pt is stable. Unable to do the bx due to recent intake of ASA and plavix. 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         Recommended Disposition: Home health   Pt will be transfer to heme/onc floor, continue with telemetry monitoring  Discussed with pt and his wife about current medical therapy and code status on 2/24/2018. Mrs. Orlando Darling would like to think about this and then discuss with the pt. Pt would like to talk over with his wife. Both Mr. & Mrs. Orlando Darling have been reluctant to talk about goals of care.  However, they shared many concerns of overall hospital care.      Palliative consulted & following      DC Planning-- resume PT/OT eval now to assess for needs       Subjective:     Chief Complaint / Reason for Physician Visit: F/u MM, Renal failure, Bronchitis/Flu  \"I am ok\". Discussed with RN events overnight. Review of Systems:  Symptom Y/N Comments  Symptom Y/N Comments   Fever/Chills n   Chest Pain n    Poor Appetite    Edema     Cough y   Abdominal Pain     Sputum n   Joint Pain     SOB/HUERTA y improved  Pruritis/Rash     Nausea/vomit n   Tolerating PT/OT y    Diarrhea    Tolerating Diet y    Constipation    Other       Could NOT obtain due to:      Objective:     VITALS:   Last 24hrs VS reviewed since prior progress note. Most recent are:  Patient Vitals for the past 24 hrs:   Temp Pulse Resp BP SpO2   03/05/18 1023 97.9 °F (36.6 °C) 78 18 128/84 94 %   03/05/18 0752 - - - - 96 %   03/05/18 0721 97.8 °F (36.6 °C) 77 20 125/64 95 %   03/05/18 0259 97.7 °F (36.5 °C) 81 20 123/65 97 %   03/05/18 0146 - - - - 92 %   03/05/18 0013 97.4 °F (36.3 °C) 74 20 116/46 97 %   03/04/18 2048 - - - - 96 %   03/04/18 1936 98 °F (36.7 °C) 74 20 125/83 96 %   03/04/18 1920 97.6 °F (36.4 °C) 64 20 111/64 95 %   03/04/18 1900 97.6 °F (36.4 °C) 64 20 121/52 94 %   03/04/18 1850 97.9 °F (36.6 °C) 62 18 130/50 95 %   03/04/18 1840 98 °F (36.7 °C) 65 22 118/59 96 %   03/04/18 1820 98.4 °F (36.9 °C) 78 18 126/71 98 %   03/04/18 1730 98.5 °F (36.9 °C) 62 20 111/56 94 %   03/04/18 1532 - - - - 93 %   03/04/18 1531 - - - - 93 %       Intake/Output Summary (Last 24 hours) at 03/05/18 1201  Last data filed at 03/05/18 1013   Gross per 24 hour   Intake             1002 ml   Output             4429 ml   Net            -3427 ml        PHYSICAL EXAM:  General: Chronically ill appearing. Alert, cooperative, no acute distress    EENT:  EOMI. Anicteric sclerae. MMM, L sided dialysis cath noted +  Resp:  Bilateral rhonchi, no wheezing or rales.   No accessory muscle use  CV:  Regular  rhythm,  No edema  GI:  Soft, Non distended, Non tender.  +Bowel sounds  Neurologic:  Alert and oriented X 3, normal speech,   Psych:   Good insight. Not anxious nor agitated  Skin:  No rashes. No jaundice    Reviewed most current lab test results and cultures  YES  Reviewed most current radiology test results   YES  Review and summation of old records today    NO  Reviewed patient's current orders and MAR    YES  PMH/SH reviewed - no change compared to H&P  ________________________________________________________________________  Care Plan discussed with:    Comments   Patient x    Family      RN x    Care Manager x Adonay Stephen   Consultant  x Dr Valentina Ahuja (renal) yesterday                     Multidiciplinary team rounds were held today with , nursing, pharmacist and clinical coordinator. Patient's plan of care was discussed; medications were reviewed and discharge planning was addressed. ________________________________________________________________________  Total NON critical care TIME:  16   Minutes    Total CRITICAL CARE TIME Spent:   Minutes non procedure based      Comments   >50% of visit spent in counseling and coordination of care     ________________________________________________________________________  Ramon Lee MD     Procedures: see electronic medical records for all procedures/Xrays and details which were not copied into this note but were reviewed prior to creation of Plan. LABS:  I reviewed today's most current labs and imaging studies.   Pertinent labs include:  Recent Labs      03/05/18   0006  03/04/18   1032  03/03/18   0317   WBC  7.0  13.3*  10.6   HGB  7.0*  8.8*  8.5*   HCT  21.4*  27.2*  26.1*   PLT  151  198  208     Recent Labs      03/05/18   0006  03/04/18   1032  03/03/18   0317   NA  140  136  137   K  3.9  4.3  5.4*   CL  105  99  106   CO2  27  28  22   GLU  303*  434*  306*   BUN  60*  56*  55*   CREA  3.57*  3.73*  4.11*   CA  7.7*  8.6  8.1*   MG  1.8   --   2.0   PHOS  2.8   --   4.9*   ALB  3.5  3.6  3.8   TBILI  0.4  0.4  0.8   SGOT  <3*  9*  8*   ALT  10*  18  11*       Signed: )Sun ROSSI Garry Forrester MD

## 2018-03-05 NOTE — PROGRESS NOTES
Nutrition Assessment:    INTERVENTIONS/RECOMMENDATIONS:   Consider adding consistent carb diet  Nutrition manager following pt to resolve his concerns     ASSESSMENT:   Chart reviewed. RN reports that pt is very angry about meal service. Pt was discussed with nutrition manager who visited with pt on Friday and plans to re-visit. Despite his dissatisfaction with meal service, RN documentation shows pt is consuming 100% of meals. BG uncontrolled, recommend placing consistent carb to diet order however due to pt extreme anger toward meals service, this restriction may add to his displeasure. DTC is following. Diet Order: Other (comment) (Dental soft)  % Eaten:  Patient Vitals for the past 72 hrs:   % Diet Eaten   18 1002 100 %   18 0918 100 %   18 1430 100 %     Pertinent Medications: [x]Reviewed: colace, humalog, PPI  Pertinent Labs: [x]Reviewed: B, 366, 287  Food Allergies: [x]NKFA  []Other   Last BM:  3/5  Edema:      []RUE   []LUE   []RLE   []LLE      Pressure Ulcer:      [] Stage I   [] Stage II   [] Stage III   [] Stage IV      Anthropometrics: Height: 6' (182.9 cm) Weight: 75.9 kg (167 lb 5.3 oz)    IBW (%IBW):   ( ) UBW (%UBW):   (  %)    BMI: Body mass index is 22.69 kg/(m^2). This BMI is indicative of:  []Underweight   [x]Normal   []Overweight   [] Obesity   [] Extreme Obesity (BMI>40)  Last Weight Metrics:  Weight Loss Metrics 3/5/2018 8/10/2017 2016 2016 2016 2016 2016   Today's Wt 167 lb 5.3 oz 179 lb 11.2 oz 176 lb 3 oz 173 lb 11.6 oz 183 lb 183 lb 183 lb   BMI 22.69 kg/m2 24.37 kg/m2 23.9 kg/m2 23.56 kg/m2 24.81 kg/m2 24.81 kg/m2 24.81 kg/m2       Estimated Nutrition Needs (Based on): 2000 Kcals/day (BMR: 1550 x 1.3) , 80 g (1 g/kg) Protein  Carbohydrate:  At Least 130 g/day  Fluids: 2000 mL/day or per primary team    NUTRITION DIAGNOSES:   Problem:  Inadequate protein-energy intake      Etiology: related to dysphagia     Signs/Symptoms: as evidenced by pt reports that swallowing discomfort has lead to decreased PO intake      NUTRITION INTERVENTIONS:  Meals/Snacks: General/healthful diet   Supplements: Commercial supplement              GOAL:   consume >50% of meals and ONS in 3-5 days and stabilize BG <180 mg/dl    NUTRITION MONITORING AND EVALUATION      Food/Nutrient Intake Outcomes:  Total energy intake  Physical Signs/Symptoms Outcomes: Weight/weight change, Electrolyte and renal profile, GI, Glucose profile    Previous Goal Met:   [x] Met              [] Progressing Towards Goal              [] Not Progressing Towards Goal   Previous Recommendations:   [x] Implemented          [] Not Implemented          [] Not Applicable    LEARNING NEEDS (Diet, Food/Nutrient-Drug Interaction):    [x] None Identified   [] Identified and Education Provided/Documented   [] Identified and Pt declined/was not appropriate     Cultural, Mormon, OR Ethnic Dietary Needs:    [x] None Identified   [] Identified and Addressed     [x] Interdisciplinary Care Plan Reviewed/Documented    [x] Discharge Planning: Consistent carb diet   [] Participated in Interdisciplinary Rounds    NUTRITION RISK:    [] High              [x] Moderate           []  Low  []  Minimal/Uncompromised      Jose Bella RDN  Pager [phone]  Weekend Pager [phone]

## 2018-03-05 NOTE — PROGRESS NOTES
ADULT PROTOCOL: JET AEROSOL  REASSESSMENT    Patient  Irina Vance     66 y.o.   male     3/4/2018  9:01 PM    Breath Sounds Pre Procedure: Right Breath Sounds: Coarse, Diminished                               Left Breath Sounds: Coarse, Diminished    Breath Sounds Post Procedure: Right Breath Sounds: Coarse, Diminished                                 Left Breath Sounds: Coarse, Diminished    Breathing pattern: Pre procedure Breathing Pattern: Regular          Post procedure Breathing Pattern: Regular    Heart Rate: Pre procedure Pulse: 74           Post procedure Pulse: 65    Resp Rate: Pre procedure Respirations: 18           Post procedure Respirations: 18    Peak Flow: Pre bronchodilator             Post bronchodilator       FVC/FEV1:  n/a    Incentive Spirometry:  Actual Volume (ml): 1500 ml          Cough: Pre procedure Cough: Non-productive               Post procedure Cough: Non-productive    Suctioned: NO    Sputum: Pre procedure Sputum amount: Small  Sputum color/odor: White                 Post procedure      Oxygen: O2 Device: Nasal cannula   Flow rate (L/min) 3LPM     Changed: NO    SpO2: Pre procedure SpO2: 96 %   with oxygen              Post procedure SpO2: 99 %  with oxygen    Nebulizer Therapy: Current medications Aerosolized Medications: DuoNeb      Changed: NO    Smoking History: n/a    Problem List:   Patient Active Problem List   Diagnosis Code    Acute respiratory failure ([facility] J96.00    COPD with acute exacerbation ([facility] J44.1    HTN (hypertension) I10    HTN (hypertension) I10    COPD (chronic obstructive pulmonary disease) (HCC) J44.9    Cancer of kidney ([facility] C64.9    Hypoglycemia, unspecified E16.2    Acute renal failure ([facility] N17.9    Hyperkalemia E87.5    S/P partial colectomy Z90.49    Diarrhea R19.7    DM (diabetes mellitus), type 2, uncontrolled ([facility] E11.65    S/p nephrectomy Z90.5    AAA (abdominal aortic aneurysm) ([facility] I71.4    Gastrointestinal disorder K92.9    Cancer (HCC) C80.1    Anemia D64.9    Multiple myeloma (HCC) C90.00    Stroke (cerebrum) (HCC) I63.9    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

## 2018-03-05 NOTE — PROGRESS NOTES
NAME: Suzette Bello        :  1940        MRN:  [id number]        Assessment :    Plan:  --DIANA  Solitary kidney (left)    Smoldering Myeloma --> progression to full myeloma     Hypercalcemia     anemia  EGD--showed lance erosions  DM2    HTN    Influenza A    COPD --Initiated HD on ; HD alternating with PLEX--PLEX #4 today, PLEX #5 3.54.18(last one)--  Creatinine slightly better without HD over the weekend;NO DIALYSIS today,watch for renal recovery    Intiated chemo for MM; initiated plasma exchange in conjunction with chemo on  (monitor response in light chains - look for a 50-60 % drop after 5 PLEX treatments); every other day PLEX x 5; 1 plasma volume; replace with albumin(recheck free light chains in AM)    spot urine protein:creatinine 3.6 grams                 Subjective:   complaining about the food    Objective:     VITALS:   Last 24hrs VS reviewed since prior progress note. Most recent are:  Visit Vitals    /64 (BP 1 Location: Left arm, BP Patient Position: At rest)    Pulse 77    Temp 97.8 °F (36.6 °C)    Resp 20    Ht 6' (1.829 m)    Wt 75.9 kg (167 lb 5.3 oz)    SpO2 96%    BMI 22.69 kg/m2       Intake/Output Summary (Last 24 hours) at 18 1001  Last data filed at 18 0426   Gross per 24 hour   Intake              532 ml   Output             4479 ml   Net            -3947 ml      Telemetry Reviewed:     PHYSICAL EXAM:  General:   Check john paul  NAD    ________________________________________________________________________  Fang Martinez MD     Procedures: see electronic medical records for all procedures/Xrays and details which  were not copied into this note but were reviewed prior to creation of Plan.       LABS:  Recent Labs      18   0006  18   1032   WBC  7.0  13.3*   HGB  7.0*  8.8*   HCT  21.4*  27.2*   PLT  151  198     Recent Labs      18   0006 03/04/18   1032  03/03/18 0317   NA  140  136  137   K  3.9  4.3  5.4*   CL  105  99  106   CO2  27  28  22   BUN  60*  56*  55*   CREA  3.57*  3.73*  4.11*   GLU  303*  434*  306*   CA  7.7*  8.6  8.1*   MG  1.8   --   2.0   PHOS  2.8   --   4.9*     Recent Labs      03/05/18   0006  03/04/18   1032  03/03/18 0317   SGOT  <3*  9*  8*   AP  20*  40*  23*   TP  4.4*  5.5*  5.2*   ALB  3.5  3.6  3.8   GLOB  0.9*  1.9*  1.4*     No results for input(s): INR, PTP, APTT in the last 72 hours. No lab exists for component: INREXT, INREXT   No results for input(s): FE, TIBC, PSAT, FERR in the last 72 hours. No results found for: FOL, RBCF   No results for input(s): PH, PCO2, PO2 in the last 72 hours. No results for input(s): CPK, CKMB in the last 72 hours.     No lab exists for component: TROPONINI  No components found for: [place]  Lab Results   Component Value Date/Time    Color YELLOW/STRAW 02/19/2018 03:07 PM    Appearance CLOUDY (A) 02/19/2018 03:07 PM    Specific gravity 1.020 02/19/2018 03:07 PM    pH (UA) 5.5 02/19/2018 03:07 PM    Protein 100 (A) 02/19/2018 03:07 PM    Glucose NEGATIVE  02/19/2018 03:07 PM    Ketone NEGATIVE  02/19/2018 03:07 PM    Bilirubin NEGATIVE  02/19/2018 03:07 PM    Urobilinogen 0.2 02/19/2018 03:07 PM    Nitrites NEGATIVE  02/19/2018 03:07 PM    Leukocyte Esterase NEGATIVE  02/19/2018 03:07 PM    Epithelial cells FEW 02/19/2018 03:07 PM    Bacteria 1+ (A) 02/19/2018 03:07 PM    WBC 0-4 02/19/2018 03:07 PM    RBC 0-5 02/19/2018 03:07 PM       MEDICATIONS:

## 2018-03-05 NOTE — PROGRESS NOTES
Physical Therapy  Attempting see patient for weekly reassessment. Patient reporting he had recently walked in halls with nursing staff and was not interested in walking again today. Patient expressing frustration regarding care during hospital stay. Provided active listening and again offered to assist patient with a walk but re again refused. Will follow back tomorrow.   Thank you,  Lolis Whitaker, PT

## 2018-03-05 NOTE — PROGRESS NOTES
[address] [facility] SHIFT NURSING NOTE    Bedside and Verbal shift change report given to [place] (oncoming nurse) by Gianluca King (offgoing nurse). Report included the following information SBAR, Procedure Summary, Intake/Output, MAR and Recent Results. SHIFT SUMMARY:         Admission Date 2/19/2018   Admission Diagnosis Hypoxemia  dysphagia   Consults IP CONSULT TO NEPHROLOGY  IP CONSULT TO ONCOLOGY  IP CONSULT TO PALLIATIVE CARE - PROVIDER  IP CONSULT TO GASTROENTEROLOGY        Consults   []PT   []OT   []Speech   []Case Management      [] Palliative       Cardiac Monitoring Order   []Yes   []No     GI Prophylaxis   []Yes   []No           DVT Prophylaxis   SCDs:  Sequential Compression Device: Bilateral          Baron stockings:  Graduated Compression Stockings: Bilateral      [] Medication   []Contraindicated   []None        Activity Level Activity Level: Up with Assistance     Activity Assistance: Partial (one person)   Purposeful Rounding every 1-2 hour? []Yes    Conrad Score  Total Score: 3   Bed Alarm (If score 3 or >)   []Yes   [] Refused (See signed refusal form in chart)   Sidney Score  Sidney Score: 17   Sidney Score (if score 14 or less)   []PMT consult   []Wound Care consult      []Specialty bed   [] Nutrition consult          Needs prior to discharge:   Home O2 required:    []Yes   []No    If yes, how much O2 required?     Other:    Last Bowel Movement: Last Bowel Movement Date: 03/05/18        POST-OP SURGICAL VATS   []Yes   []N/A   Incentive Spirometer:   []Yes   []Refused   Coughing and deep breathing:     []Yes   []Refused   Oral care:     []Yes   []Refused   Understanding (patient education):     []Yes   []Refused   Getting out of bed Number times ambulated in hallway past shift:    Number of times OOB to chair past shift:     Head of bed elevation:     []Yes   []Refused      Influenza Vaccine Received Flu Vaccine for Current Season (usually Sept-March): Yes    Patient/Guardian Refused (Notify MD): No Pneumonia Vaccine           Diet Active Orders   Diet    DIET DENTAL SOFT (SOFT SOLID)      LDAs               Peripheral IV 03/02/18 Right Wrist (Active)   Site Assessment Dry; Intact 3/5/2018  4:26 AM   Phlebitis Assessment 0 3/5/2018  4:26 AM   Infiltration Assessment 0 3/5/2018  4:26 AM   Dressing Status Dry; Intact; Old drainage 3/5/2018  4:26 AM   Dressing Type Transparent;Tape 3/5/2018  4:26 AM   Hub Color/Line Status Blue;Capped 3/5/2018  4:26 AM        Hemodialysis Access 02/22/18 (Active)   Central Line Being Utilized Yes 3/5/2018  4:26 AM   Criteria for Appropriate Use Dialysis/apheresis 3/5/2018  4:26 AM   Date Accessed  03/03/18 3/4/2018  3:13 AM   Access Time  0801 2/28/2018  8:01 AM   Site Assessment Clean, dry, & intact 3/5/2018  4:26 AM   Date of Last Dressing Change 03/03/18 3/4/2018  3:13 AM   Dressing Status Dry;Loose;Clean 3/5/2018  4:26 AM   Dressing Type Ear protector 3/5/2018  4:26 AM   Proximal Hub Color/Line Status Capped 3/5/2018  4:26 AM   Distal Hub Color/Line Status Capped 3/5/2018  4:26 AM                  Urinary Catheter      Intake & Output   Date 03/04/18 0700 - 03/05/18 0659 03/05/18 0700 - 03/06/18 0659   Shift 5107-2059 1428-3172 24 Hour Total 5356-7492 1653-6666 24 Hour Total   I  N  T  A  K  E   P.O.  200 200         P. O.  200 200       I.V.  (mL/kg/hr)  332 332         Anticoagulant  61 61         Rinseback Post Procedure  161 161         Calcium Replacement  110 110       Shift Total  (mL/kg)  532  (7) 532  (7)      O  U  T  P  U  T   Urine  (mL/kg/hr) 450  (0.5)  450         Urine Voided 450  450       Stool            Stool Occurrence(s)  1 x 1 x       Blood  4029 4029         Blood Product Collected  4029 4029       Shift Total  (mL/kg) 450  (5.8) 4029  (53.1) 4479  (59)      NET -787 -7632 -1430      Weight (kg) 77 75.9 75.9 75.9 75.9 75.9         Readmission Risk Assessment Tool Score High Risk            32       Total Score        2 .  Living with Significant Other. Assisted Living. LTAC. SNF. or   Rehab    3 Patient Length of Stay (>5 days = 3)    5 Pt.  Coverage (Medicare=5 , Medicaid, or Self-Pay=4)    22 Charlson Comorbidity Score (Age + Comorbid Conditions)        Criteria that do not apply:    Has Seen PCP in Last 6 Months (Yes=3, No=0)    IP Visits Last 12 Months (1-3=4, 4=9, >4=11)       Expected Length of Stay 4d 21h   Actual Length of Stay 14

## 2018-03-05 NOTE — DIABETES MGMT
DTC Progress Note    Recommendations/ Comments: Please consider adding Lantus 10 units daily to help with hyperglycemia       Chart reviewed on Ana Lombardi. Patient is a 66 y.o. male with known diabetes on NPH and Glipizide at home. A1c:   Lab Results   Component Value Date/Time    Hemoglobin A1c 9.8 (H) 02/20/2018 05:14 AM    Hemoglobin A1c 8.7 (H) 09/10/2016 02:07 AM       Recent Glucose Results:   Lab Results   Component Value Date/Time     (H) 03/05/2018 12:06 AM    GLUCPOC 287 (H) 03/05/2018 08:50 AM    GLUCPOC 366 (H) 03/04/2018 09:11 PM    GLUCPOC 138 (H) 03/04/2018 05:25 PM        Lab Results   Component Value Date/Time    Creatinine 3.57 (H) 03/05/2018 12:06 AM     Estimated Creatinine Clearance: 18.3 mL/min (based on Cr of 3.57). Active Orders   Diet    DIET DENTAL SOFT (SOFT SOLID)        PO intake:   Patient Vitals for the past 72 hrs:   % Diet Eaten   03/05/18 1002 100 %   03/04/18 0918 100 %   03/03/18 1430 100 %       Current hospital DM medication: Lispro Correctional insulin with  \"resistant\" sensitivity, Lispro 5 units ac meals    Will continue to follow as needed. Thank you.   Patrick Loving, [address] [place] [address], [address]  Office:  [phone]

## 2018-03-05 NOTE — PROGRESS NOTES
1115  Cardiopulmonary Care Interdisciplinary rounds were held today to discuss patient plan of care and outcomes. The following members were present: NP/Physician,  Pharmacy, Nursing,and Case Management.     Expected Length of Stay:  7d 0h  Geometric Length of Stay: DRG GLOS: 7    Plan of Care: Continue current treatment plan

## 2018-03-05 NOTE — PROGRESS NOTES
[facility] Hematology-Oncology Progress Note      Nadja Viera  1940  [id number]      3/5/2018  11:43 PM    Follow-up for: progressive multiple myeloma, DIANA     [x] Chart notes since last visit reviewed     [] Medications reviewed for allergies and interactions      Subjective:     Patient complains of the following: fatigue improved, feels much better  But he is very annoyed and aggravated by his care here.     No pain, NO SOB, no CP, no fever, no abd pain, ROS otherwise negative  Objective:     Patient Vitals for the past 24 hrs:   BP Temp Pulse Resp SpO2 Weight   03/05/18 1314 - - - - 92 % -   03/05/18 1023 128/84 97.9 °F (36.6 °C) 78 18 94 % -   03/05/18 0752 - - - - 96 % -   03/05/18 0721 125/64 97.8 °F (36.6 °C) 77 20 95 % -   03/05/18 0259 123/65 97.7 °F (36.5 °C) 81 20 97 % 75.9 kg (167 lb 5.3 oz)   03/05/18 0146 - - - - 92 % -   03/05/18 0013 116/46 97.4 °F (36.3 °C) 74 20 97 % -   03/04/18 2048 - - - - 96 % -   03/04/18 1936 125/83 98 °F (36.7 °C) 74 20 96 % -   03/04/18 1920 111/64 97.6 °F (36.4 °C) 64 20 95 % -   03/04/18 1900 121/52 97.6 °F (36.4 °C) 64 20 94 % -   03/04/18 1850 130/50 97.9 °F (36.6 °C) 62 18 95 % -   03/04/18 1840 118/59 98 °F (36.7 °C) 65 22 96 % -   03/04/18 1820 126/71 98.4 °F (36.9 °C) 78 18 98 % -   03/04/18 1730 111/56 98.5 °F (36.9 °C) 62 20 94 % -       Physical Exam:  General -Alert, and oriented    HEENT: NC, AT, pupils round  Neck: supple, no adenopathy appreciated    Lungs No distress  Abdomen- Soft, NT,ND,   Extre- No edema  Skin Echymosis on upper ext  Neuro- No focal sensory or motor deficits noted  Psych - alert, l.  verbalizes understanding of conversation     Available labs reviewed:  Labs:    Recent Results (from the past 24 hour(s))   GLUCOSE, POC    Collection Time: 03/04/18  5:25 PM   Result Value Ref Range    Glucose (POC) 138 (H) 65 - 100 mg/dL    Performed by 2021 Jose Baker POC    Collection Time: 03/04/18  9:11 PM   Result Value Ref Range    Glucose (POC) 366 (H) 65 - 100 mg/dL    Performed by Belen Singer    CBC W/O DIFF    Collection Time: 03/05/18 12:06 AM   Result Value Ref Range    WBC 7.0 4.1 - 11.1 K/uL    RBC 2.54 (L) 4.10 - 5.70 M/uL    HGB 7.0 (L) 12.1 - 17.0 g/dL    HCT 21.4 (L) 36.6 - 50.3 %    MCV 84.3 80.0 - 99.0 FL    MCH 27.6 26.0 - 34.0 PG    MCHC 32.7 30.0 - 36.5 g/dL    RDW 17.5 (H) 11.5 - 14.5 %    PLATELET 870 534 - 204 K/uL    MPV 11.7 8.9 - 12.9 FL    NRBC 0.0 0  WBC    ABSOLUTE NRBC 0.00 0.00 - 3.32 K/uL   METABOLIC PANEL, COMPREHENSIVE    Collection Time: 03/05/18 12:06 AM   Result Value Ref Range    Sodium 140 136 - 145 mmol/L    Potassium 3.9 3.5 - 5.1 mmol/L    Chloride 105 97 - 108 mmol/L    CO2 27 21 - 32 mmol/L    Anion gap 8 5 - 15 mmol/L    Glucose 303 (H) 65 - 100 mg/dL    BUN 60 (H) 6 - 20 MG/DL    Creatinine 3.57 (H) 0.70 - 1.30 MG/DL    BUN/Creatinine ratio 17 12 - 20      GFR est AA 20 (L) >60 ml/min/1.73m2    GFR est non-AA 17 (L) >60 ml/min/1.73m2    Calcium 7.7 (L) 8.5 - 10.1 MG/DL    Bilirubin, total 0.4 0.2 - 1.0 MG/DL    ALT (SGPT) 10 (L) 12 - 78 U/L    AST (SGOT) <3 (L) 15 - 37 U/L    Alk.  phosphatase 20 (L) 45 - 117 U/L    Protein, total 4.4 (L) 6.4 - 8.2 g/dL    Albumin 3.5 3.5 - 5.0 g/dL    Globulin 0.9 (L) 2.0 - 4.0 g/dL    A-G Ratio 3.9 (H) 1.1 - 2.2     MAGNESIUM    Collection Time: 03/05/18 12:06 AM   Result Value Ref Range    Magnesium 1.8 1.6 - 2.4 mg/dL   PHOSPHORUS    Collection Time: 03/05/18 12:06 AM   Result Value Ref Range    Phosphorus 2.8 2.6 - 4.7 MG/DL   GLUCOSE, POC    Collection Time: 03/05/18  8:50 AM   Result Value Ref Range    Glucose (POC) 287 (H) 65 - 100 mg/dL    Performed by Baldev Irwin, POC    Collection Time: 03/05/18 11:17 AM   Result Value Ref Range    Glucose (POC) 358 (H) 65 - 100 mg/dL    Performed by Aliya Mccarthy (PCT)    GLUCOSE, POC    Collection Time: 03/05/18  4:26 PM   Result Value Ref Range    Glucose (POC) 163 (H) 65 - 100 mg/dL    Performed by Rashard Sinha (PCT)        Imaging:  CXR Results  (Last 48 hours)    None        CT Results  (Last 48 hours)    None            Assessment:   Influenza  History of smoldering myeloma- since 2012  Acute on chronic renal failure attributed to myeloma  Hypercalcemia  DM- worsened by steroids    Plan:     Multiple myeloma- IgG lambda on JAIMIE, total IgG 2293. SFLC: lambda 1780.    ----- BMbx 2/22/18: 50% involvement by monoclonal plasma cells with plasmablastic morphology (previously in 2012 had 10% plasma cells and was followed for years with close observation)  ---- started CyBoRD regimen - C1D1- 2/23/2018 - Cytoxan with 20% dose reduction and full dose of Velcade with 40 mg Dex after HD and plamspharesis. PLEX per Nephrology, last dose was 3/2/2018 . Can be continued weekly in the hospital or outpatient  --- Normochromic normocytic anemia likely secondary to myeloma   Monitor CBC    Anemia- Hct worse at 21- asymptomatic. Repeat cbc in am. May need a transfusion in am. Cont Procrit- 10,000 units Tue-thurs-sat    DIANA  secondary to underlying Multiple Myeloma. Worsened rapidly in hospital. Hemodialysis and plasmapheresis per nephrology. Now off HD since Friday. If he can stay off it further, we could continue his Tx as an outpatient. Dysphagia -  EGD showed hiatal hernia, inflamed mid to distal esophagus, and either severe duodenitis or large laterall spreading villous adenoma. Plan is to continue acid suppression and carafate. PO intake slightly improved. DM- Last hb A1c 9.8 - Will defer to Hospitalist MD. Likely worsened by his weekly Steroids.

## 2018-03-06 LAB
ALBUMIN SERPL-MCNC: 3.4 G/DL (ref 3.5–5)
ALBUMIN/GLOB SERPL: 2.1 {RATIO} (ref 1.1–2.2)
ALP SERPL-CCNC: 32 U/L (ref 45–117)
ALT SERPL-CCNC: 15 U/L (ref 12–78)
ANION GAP SERPL CALC-SCNC: 9 MMOL/L (ref 5–15)
AST SERPL-CCNC: 8 U/L (ref 15–37)
BILIRUB SERPL-MCNC: 0.4 MG/DL (ref 0.2–1)
BUN SERPL-MCNC: 63 MG/DL (ref 6–20)
BUN/CREAT SERPL: 17 (ref 12–20)
CALCIUM SERPL-MCNC: 8.3 MG/DL (ref 8.5–10.1)
CHLORIDE SERPL-SCNC: 106 MMOL/L (ref 97–108)
CO2 SERPL-SCNC: 26 MMOL/L (ref 21–32)
CREAT SERPL-MCNC: 3.78 MG/DL (ref 0.7–1.3)
ERYTHROCYTE [DISTWIDTH] IN BLOOD BY AUTOMATED COUNT: 17.7 % (ref 11.5–14.5)
GLOBULIN SER CALC-MCNC: 1.6 G/DL (ref 2–4)
GLUCOSE BLD STRIP.AUTO-MCNC: 173 MG/DL (ref 65–100)
GLUCOSE BLD STRIP.AUTO-MCNC: 232 MG/DL (ref 65–100)
GLUCOSE BLD STRIP.AUTO-MCNC: 243 MG/DL (ref 65–100)
GLUCOSE BLD STRIP.AUTO-MCNC: 251 MG/DL (ref 65–100)
GLUCOSE BLD STRIP.AUTO-MCNC: 274 MG/DL (ref 65–100)
GLUCOSE SERPL-MCNC: 271 MG/DL (ref 65–100)
HCT VFR BLD AUTO: 24.1 % (ref 36.6–50.3)
HGB BLD-MCNC: 7.7 G/DL (ref 12.1–17)
KAPPA LC FREE SER-MCNC: 10.9 MG/L (ref 3.3–19.4)
KAPPA LC FREE/LAMBDA FREE SER: 0.06 {RATIO} (ref 0.26–1.65)
LAMBDA LC FREE SERPL-MCNC: 175 MG/L (ref 5.7–26.3)
MAGNESIUM SERPL-MCNC: 1.5 MG/DL (ref 1.6–2.4)
MCH RBC QN AUTO: 27 PG (ref 26–34)
MCHC RBC AUTO-ENTMCNC: 32 G/DL (ref 30–36.5)
MCV RBC AUTO: 84.6 FL (ref 80–99)
NRBC # BLD: 0 K/UL (ref 0–0.01)
NRBC BLD-RTO: 0 PER 100 WBC
PHOSPHATE SERPL-MCNC: 3 MG/DL (ref 2.6–4.7)
PLATELET # BLD AUTO: 134 K/UL (ref 150–400)
PMV BLD AUTO: 11.9 FL (ref 8.9–12.9)
POTASSIUM SERPL-SCNC: 4.5 MMOL/L (ref 3.5–5.1)
PROT SERPL-MCNC: 5 G/DL (ref 6.4–8.2)
RBC # BLD AUTO: 2.85 M/UL (ref 4.1–5.7)
SERVICE CMNT-IMP: ABNORMAL
SODIUM SERPL-SCNC: 141 MMOL/L (ref 136–145)
WBC # BLD AUTO: 6.4 K/UL (ref 4.1–11.1)

## 2018-03-06 PROCEDURE — 80053 COMPREHEN METABOLIC PANEL: CPT | Performed by: INTERNAL MEDICINE

## 2018-03-06 PROCEDURE — 36415 COLL VENOUS BLD VENIPUNCTURE: CPT | Performed by: INTERNAL MEDICINE

## 2018-03-06 PROCEDURE — 85027 COMPLETE CBC AUTOMATED: CPT | Performed by: INTERNAL MEDICINE

## 2018-03-06 PROCEDURE — 74011000250 HC RX REV CODE- 250: Performed by: NURSE PRACTITIONER

## 2018-03-06 PROCEDURE — 74011636637 HC RX REV CODE- 636/637: Performed by: INTERNAL MEDICINE

## 2018-03-06 PROCEDURE — 94640 AIRWAY INHALATION TREATMENT: CPT

## 2018-03-06 PROCEDURE — 74011250637 HC RX REV CODE- 250/637: Performed by: PHYSICAL MEDICINE & REHABILITATION

## 2018-03-06 PROCEDURE — 74011250637 HC RX REV CODE- 250/637: Performed by: INTERNAL MEDICINE

## 2018-03-06 PROCEDURE — 74011250636 HC RX REV CODE- 250/636: Performed by: INTERNAL MEDICINE

## 2018-03-06 PROCEDURE — 97530 THERAPEUTIC ACTIVITIES: CPT

## 2018-03-06 PROCEDURE — 97116 GAIT TRAINING THERAPY: CPT | Performed by: PHYSICAL THERAPIST

## 2018-03-06 PROCEDURE — 97165 OT EVAL LOW COMPLEX 30 MIN: CPT

## 2018-03-06 PROCEDURE — 84100 ASSAY OF PHOSPHORUS: CPT | Performed by: INTERNAL MEDICINE

## 2018-03-06 PROCEDURE — 74011636637 HC RX REV CODE- 636/637: Performed by: EMERGENCY MEDICINE

## 2018-03-06 PROCEDURE — 83735 ASSAY OF MAGNESIUM: CPT | Performed by: INTERNAL MEDICINE

## 2018-03-06 PROCEDURE — 74011250637 HC RX REV CODE- 250/637: Performed by: EMERGENCY MEDICINE

## 2018-03-06 PROCEDURE — 65660000000 HC RM CCU STEPDOWN

## 2018-03-06 PROCEDURE — 82962 GLUCOSE BLOOD TEST: CPT

## 2018-03-06 RX ORDER — INSULIN GLARGINE 100 [IU]/ML
15 INJECTION, SOLUTION SUBCUTANEOUS
Status: DISCONTINUED | OUTPATIENT
Start: 2018-03-07 | End: 2018-03-07

## 2018-03-06 RX ADMIN — ASPIRIN 81 MG 81 MG: 81 TABLET ORAL at 08:28

## 2018-03-06 RX ADMIN — INSULIN LISPRO 4 UNITS: 100 INJECTION, SOLUTION INTRAVENOUS; SUBCUTANEOUS at 12:33

## 2018-03-06 RX ADMIN — IPRATROPIUM BROMIDE AND ALBUTEROL SULFATE 3 ML: .5; 3 SOLUTION RESPIRATORY (INHALATION) at 20:48

## 2018-03-06 RX ADMIN — FLUTICASONE FUROATE AND VILANTEROL TRIFENATATE 1 PUFF: 100; 25 POWDER RESPIRATORY (INHALATION) at 08:35

## 2018-03-06 RX ADMIN — CLOPIDOGREL BISULFATE 75 MG: 75 TABLET ORAL at 08:29

## 2018-03-06 RX ADMIN — SUCRALFATE 1 G: 1 SUSPENSION ORAL at 07:46

## 2018-03-06 RX ADMIN — DILTIAZEM HYDROCHLORIDE 240 MG: 240 CAPSULE, COATED, EXTENDED RELEASE ORAL at 08:29

## 2018-03-06 RX ADMIN — PANTOPRAZOLE SODIUM 40 MG: 40 TABLET, DELAYED RELEASE ORAL at 07:46

## 2018-03-06 RX ADMIN — MONTELUKAST SODIUM 10 MG: 10 TABLET, COATED ORAL at 08:34

## 2018-03-06 RX ADMIN — IPRATROPIUM BROMIDE AND ALBUTEROL SULFATE 3 ML: .5; 3 SOLUTION RESPIRATORY (INHALATION) at 08:15

## 2018-03-06 RX ADMIN — INSULIN LISPRO 5 UNITS: 100 INJECTION, SOLUTION INTRAVENOUS; SUBCUTANEOUS at 17:41

## 2018-03-06 RX ADMIN — SUCRALFATE 1 G: 1 SUSPENSION ORAL at 12:35

## 2018-03-06 RX ADMIN — INSULIN LISPRO 7 UNITS: 100 INJECTION, SOLUTION INTRAVENOUS; SUBCUTANEOUS at 08:31

## 2018-03-06 RX ADMIN — INSULIN LISPRO 5 UNITS: 100 INJECTION, SOLUTION INTRAVENOUS; SUBCUTANEOUS at 08:31

## 2018-03-06 RX ADMIN — Medication 10 ML: at 21:02

## 2018-03-06 RX ADMIN — SUCRALFATE 1 G: 1 SUSPENSION ORAL at 21:01

## 2018-03-06 RX ADMIN — IPRATROPIUM BROMIDE AND ALBUTEROL SULFATE 3 ML: .5; 3 SOLUTION RESPIRATORY (INHALATION) at 02:50

## 2018-03-06 RX ADMIN — SUCRALFATE 1 G: 1 SUSPENSION ORAL at 17:43

## 2018-03-06 RX ADMIN — MELATONIN 3 MG ORAL TABLET 3 MG: 3 TABLET ORAL at 21:01

## 2018-03-06 RX ADMIN — GUAIFENESIN 600 MG: 600 TABLET, EXTENDED RELEASE ORAL at 08:30

## 2018-03-06 RX ADMIN — GUAIFENESIN 600 MG: 600 TABLET, EXTENDED RELEASE ORAL at 17:43

## 2018-03-06 RX ADMIN — IPRATROPIUM BROMIDE AND ALBUTEROL SULFATE 3 ML: .5; 3 SOLUTION RESPIRATORY (INHALATION) at 14:37

## 2018-03-06 RX ADMIN — UMECLIDINIUM 1 PUFF: 62.5 AEROSOL, POWDER ORAL at 08:35

## 2018-03-06 RX ADMIN — DOCUSATE SODIUM 100 MG: 100 CAPSULE, LIQUID FILLED ORAL at 17:43

## 2018-03-06 RX ADMIN — DOCUSATE SODIUM 100 MG: 100 CAPSULE, LIQUID FILLED ORAL at 08:30

## 2018-03-06 RX ADMIN — INSULIN LISPRO 5 UNITS: 100 INJECTION, SOLUTION INTRAVENOUS; SUBCUTANEOUS at 12:34

## 2018-03-06 RX ADMIN — ERYTHROPOIETIN 10000 UNITS: 10000 INJECTION, SOLUTION INTRAVENOUS; SUBCUTANEOUS at 17:48

## 2018-03-06 RX ADMIN — INSULIN GLARGINE 10 UNITS: 100 INJECTION, SOLUTION SUBCUTANEOUS at 21:01

## 2018-03-06 RX ADMIN — PANTOPRAZOLE SODIUM 40 MG: 40 TABLET, DELAYED RELEASE ORAL at 17:42

## 2018-03-06 RX ADMIN — INSULIN LISPRO 4 UNITS: 100 INJECTION, SOLUTION INTRAVENOUS; SUBCUTANEOUS at 17:40

## 2018-03-06 NOTE — PROGRESS NOTES
Nutrition Assessment:    INTERVENTIONS/RECOMMENDATIONS:   Meals/Snacks: General/healthful diet:  Continue dental soft diet. Enc po. Supplements: Continue Mightly Shakes with meals. ASSESSMENT:   3/6:  Chart reviewed; med noted for COPD on admission with influenza A. Noted for dysphagia (requires food soft enough to mash with a fork) and a hx of DM. Pt has been upset with some inconsistencies in meal service. Patient reports that food has to be soft cooked, which we will ensure through the menu ordering process. Pt also reports upset that he received regular sugar packets on meal tray. Diet has been updated to a Consistent CHO Diet so this does not occur in the future. RD personally placed patients dinner order for tonight and set up for an earlier delivery time. Pt reports that he has multiple chronic medical issues at this time and feels overwhelmed. Reports that he recognizes the need to optimize nutritional intake. Pt is drinking the [employer]. Diet Order: Consistent carb (Dental Soft)  % Eaten:  Patient Vitals for the past 72 hrs:   % Diet Eaten   03/05/18 1225 20 %   03/05/18 1002 100 %   03/04/18 0918 100 %     Pertinent Medications: [x] Reviewed []Other:  Pertinent Labs: [x]Reviewed  []Other:   -247  Food Allergies: [x]None []Other:     Last BM: 3/5   [x]Active     []Hyperactive  []Hypoactive       [] Absent  BS  Skin:    [x] Intact   [] Incision  [] Breakdown   []Edema   []Other:    Anthropometrics: Height: 6' (182.9 cm) Weight: 76.2 kg (167 lb 15.9 oz)    IBW (%IBW):   ( ) UBW (%UBW):   (  %)    BMI: Body mass index is 22.78 kg/(m^2).     This BMI is indicative of:  []Underweight   [x]Normal   []Overweight   [] Obesity   [] Extreme Obesity (BMI>40)  Last Weight Metrics:  Weight Loss Metrics 3/6/2018 8/10/2017 9/13/2016 9/9/2016 6/28/2016 6/27/2016 6/14/2016   Today's Wt 167 lb 15.9 oz 179 lb 11.2 oz 176 lb 3 oz 173 lb 11.6 oz 183 lb 183 lb 183 lb   BMI 22.78 kg/m2 24.37 kg/m2 23.9 kg/m2 23.56 kg/m2 24.81 kg/m2 24.81 kg/m2 24.81 kg/m2       Estimated Nutrition Needs (Based on): 2000 Kcals/day (BMR: 1550 x 1.3) , 80 g (1 g/kg) Protein  Carbohydrate: At Least 130 g/day  Fluids: 2000 mL/day    NUTRITION DIAGNOSES:   Problem:  Inadequate protein-energy intake      Etiology: related to dysphagia     Signs/Symptoms: as evidenced by pt reports that swallowing discomfort has lead to decreased PO intake    Previous Nutrition Dx:  [] Resolved  [] Unresolved           [x] Progressing    NUTRITION INTERVENTIONS:  Meals/Snacks: General/healthful diet   Supplements: Commercial supplement              GOAL:   consume >50% of meals and ONS in 3-5 days    NUTRITION MONITORING AND EVALUATION   Food/Nutrient Intake Outcomes:  Total energy intake  Physical Signs/Symptoms Outcomes: Weight/weight change, Electrolyte and renal profile, GI, Glucose profile    Previous Goal Met:   [] Met              [x] Progressing Towards Goal              [] Not Progressing Towards Goal   Previous Recommendations:   [x] Implemented          [] Not Implemented          [] Not Applicable    LEARNING NEEDS (Diet, Food/Nutrient-Drug Interaction):    [x] None Identified   [] Identified and Education Provided/Documented   [] Identified and Pt declined/was not appropriate     Cultural, Amish, OR Ethnic Dietary Needs:    [x] None Identified   [] Identified and Addressed     [x] Interdisciplinary Care Plan Reviewed/Documented    [x] Discharge Planning:  Continue CCD for BG management   [] Participated in Interdisciplinary Rounds    NUTRITION RISK:    [x] Patient At Nutritional Risk    [x] High              [] Moderate/Mild           []  Low     [] Patient Not At [facility], [address]  Pager [phone]  Weekend Pager [phone]

## 2018-03-06 NOTE — PROGRESS NOTES
[facility] Hematology-Oncology Progress Note      Heavener Begin  1940  [id number]      3/6/2018  11:43 PM    Follow-up for: progressive multiple myeloma, DIANA     [x] Chart notes since last visit reviewed     [] Medications reviewed for allergies and interactions      Subjective:     Patient complains of the following: fatigue improved, feels much better  But he is very annoyed and aggravated by his care here.     No pain, NO SOB, no CP, no fever, no abd pain, ROS otherwise negative  Objective:     Patient Vitals for the past 24 hrs:   BP Temp Pulse Resp SpO2 Weight   03/06/18 1437 - - - - 94 % -   03/06/18 1430 131/56 98.1 °F (36.7 °C) 85 18 95 % -   03/06/18 1048 134/74 98.4 °F (36.9 °C) 86 18 93 % -   03/06/18 0815 - - - - 94 % -   03/06/18 0712 141/84 98.3 °F (36.8 °C) 84 18 97 % -   03/06/18 0249 - - - - 96 % -   03/06/18 0244 137/57 98.1 °F (36.7 °C) 84 20 95 % 76.2 kg (167 lb 15.9 oz)   03/06/18 0030 129/53 97.9 °F (36.6 °C) 82 20 95 % -   03/05/18 2026 - - - - 93 % -   03/05/18 1945 125/56 98 °F (36.7 °C) 67 20 100 % -       Physical Exam:  General -Alert, and oriented    HEENT: NC, AT, pupils round  Neck: supple, no adenopathy appreciated    Lungs No distress  Abdomen- Soft, NT,ND,   Extre- No edema  Skin Echymosis on upper ext  Neuro- No focal sensory or motor deficits noted  Psych - alert, l.  verbalizes understanding of conversation     Available labs reviewed:  Labs:    Recent Results (from the past 24 hour(s))   GLUCOSE, POC    Collection Time: 03/05/18  4:26 PM   Result Value Ref Range    Glucose (POC) 163 (H) 65 - 100 mg/dL    Performed by Kinjal Darby (PCT)    GLUCOSE, POC    Collection Time: 03/05/18  8:37 PM   Result Value Ref Range    Glucose (POC) 149 (H) 65 - 100 mg/dL    Performed by Sonia Cart    CBC W/O DIFF    Collection Time: 03/06/18  2:48 AM   Result Value Ref Range    WBC 6.4 4.1 - 11.1 K/uL    RBC 2.85 (L) 4.10 - 5.70 M/uL    HGB 7.7 (L) 12.1 - 17.0 g/dL    HCT 24.1 (L) 36.6 - 50.3 % MCV 84.6 80.0 - 99.0 FL    MCH 27.0 26.0 - 34.0 PG    MCHC 32.0 30.0 - 36.5 g/dL    RDW 17.7 (H) 11.5 - 14.5 %    PLATELET 322 (L) 169 - 400 K/uL    MPV 11.9 8.9 - 12.9 FL    NRBC 0.0 0  WBC    ABSOLUTE NRBC 0.00 0.00 - 8.69 K/uL   METABOLIC PANEL, COMPREHENSIVE    Collection Time: 03/06/18  2:48 AM   Result Value Ref Range    Sodium 141 136 - 145 mmol/L    Potassium 4.5 3.5 - 5.1 mmol/L    Chloride 106 97 - 108 mmol/L    CO2 26 21 - 32 mmol/L    Anion gap 9 5 - 15 mmol/L    Glucose 271 (H) 65 - 100 mg/dL    BUN 63 (H) 6 - 20 MG/DL    Creatinine 3.78 (H) 0.70 - 1.30 MG/DL    BUN/Creatinine ratio 17 12 - 20      GFR est AA 19 (L) >60 ml/min/1.73m2    GFR est non-AA 16 (L) >60 ml/min/1.73m2    Calcium 8.3 (L) 8.5 - 10.1 MG/DL    Bilirubin, total 0.4 0.2 - 1.0 MG/DL    ALT (SGPT) 15 12 - 78 U/L    AST (SGOT) 8 (L) 15 - 37 U/L    Alk.  phosphatase 32 (L) 45 - 117 U/L    Protein, total 5.0 (L) 6.4 - 8.2 g/dL    Albumin 3.4 (L) 3.5 - 5.0 g/dL    Globulin 1.6 (L) 2.0 - 4.0 g/dL    A-G Ratio 2.1 1.1 - 2.2     MAGNESIUM    Collection Time: 03/06/18  2:48 AM   Result Value Ref Range    Magnesium 1.5 (L) 1.6 - 2.4 mg/dL   PHOSPHORUS    Collection Time: 03/06/18  2:48 AM   Result Value Ref Range    Phosphorus 3.0 2.6 - 4.7 MG/DL   GLUCOSE, POC    Collection Time: 03/06/18  7:39 AM   Result Value Ref Range    Glucose (POC) 274 (H) 65 - 100 mg/dL    Performed by Lorie Ybarra (PCT)    GLUCOSE, POC    Collection Time: 03/06/18  7:48 AM   Result Value Ref Range    Glucose (POC) 251 (H) 65 - 100 mg/dL    Performed by Rita Mustafa    GLUCOSE, POC    Collection Time: 03/06/18 11:10 AM   Result Value Ref Range    Glucose (POC) 232 (H) 65 - 100 mg/dL    Performed by Lorie Ybarra (PCT)        Imaging:  CXR Results  (Last 48 hours)    None        CT Results  (Last 48 hours)    None            Assessment:   Influenza  History of smoldering myeloma- since 2012  Acute on chronic renal failure attributed to myeloma  Hypercalcemia  DM- worsened by steroids    Plan:     Multiple myeloma- IgG lambda on JAIMIE, total IgG 2293. SFLC: lambda 1780.    ----- BMbx 2/22/18: 50% involvement by monoclonal plasma cells with plasmablastic morphology (previously in 2012 had 10% plasma cells and was followed for years with close observation)  ---- started CyBoRD regimen - C1D1- 2/23/2018 - Cytoxan with 20% dose reduction and full dose of Velcade with 40 mg Dex after HD and plamspharesis. PLEX per Nephrology, last dose was 3/2/2018 . Can be continued weekly in the hospital or outpatient  --- Normochromic normocytic anemia likely secondary to myeloma   Monitor CBC    Anemia- Hct worse at 21- asymptomatic. Repeat cbc in am. May need a transfusion in am. Cont Procrit- 10,000 units Tue-thurs-sat    DIANA  secondary to underlying Multiple Myeloma. Hemodialysis and plasmapheresis per nephrology. Now off HD since Friday. If he can stay off it further, we could continue his Tx as an outpatient - none needed today    Dysphagia -  EGD showed hiatal hernia, inflamed mid to distal esophagus, and either severe duodenitis or large laterall spreading villous adenoma. Plan is to continue acid suppression and carafate. PO intake  improved. DM- Last hb A1c 9.8 - Will defer to Hospitalist MD. Likely worsened by his weekly Steroids.     Hopefully D/c in next 1-2 days, may need a unit of pRBCs prior to d/c

## 2018-03-06 NOTE — PROGRESS NOTES
Chart reviewed. Briefly visited w/ pt to let him know that I would not be able to stay and speak w/ him for a significant period of time. He was okay w/ this. I told him that I would visit tomorrow. He still relays that the level of care provided here is not good. He had expressed wish to be transferred to the [facility].  Then later, he said that now since he is in the middle of chemotx and other tx's, he does not want to transfer to the [facility].     D/w Frandy Rosas, pt's bedside RN. Time and input appreciated.      Cole Luis MD  Palliative Care Team

## 2018-03-06 NOTE — PROGRESS NOTES
Responded to request to visit with patient and provide pastoral support. Provided empathetic listening as patient offered life review and update on events since his hospitalization. 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 Through discussion we identified that the patient is struggling with the many things that are out of his control since his recent complicated diagnosis and increased medical needs. Explored and identified some of patient's greatest desires and needs at this time which include the opportunity to be able shower and shave independently. Discussed how these tasks are representative of his previously held independence and expectations. Patient also feels blessed for the time and good health he has enjoyed and throughout the conversation continuously reminded himself to be grateful to God. Patient appreciates prayer and shared that he is currently praying to be well enough to stop dialysis and return home to spend the time he has remaining where he is most comfortable. Offered prayer per patient's request and assured of ongoing support as needed and desired . [employer].    Paging Service [phone])

## 2018-03-06 NOTE — PROGRESS NOTES
Physical Therapy Goals  Initiated 2/21/2018  All goals remain appropriate for next 7 days- 3/6/18  1. Patient will move from supine to sit and sit to supine , scoot up and down and roll side to side in bed with independence within 7 day(s). 2.  Patient will transfer from bed to chair and chair to bed with independence using the least restrictive device within 7 day(s). 3.  Patient will perform sit to stand with independence within 7 day(s). 4.  Patient will ambulate with independence for 200 feet with the least restrictive device within 7 day(s). 5.  Patient will ascend/descend 12 stairs with 1 handrail(s) with modified independence within 7 day(s). physical Therapy TREATMENT: WEEKLY REASSESSMENT  Patient: Linell Boast (89 y.o. male)  Date: 3/6/2018  Diagnosis: Hypoxemia  dysphagia <principal problem not specified>  Procedure(s) (LRB):  ESOPHAGOGASTRODUODENOSCOPY (EGD) (N/A) 6 Days Post-Op  Precautions:  falls  Chart, physical therapy assessment, plan of care and goals were reviewed. ASSESSMENT: Patient agreeable to ambulation in avelar. Patient requiring SBA for transfers and CGA for ambulation. Gait is steady overall when ambulating in avelar using RW for support demonstrating short shuffled steps but no overt LOB noted. Patient demonstrating increased SOB with distance walked and O2 sats decreased to 84% on RA during activity. On 2 l/min O2 sats were 92% during activity. Patient demonstrating decreased safety awareness with mobility is smaller spaces like the bathroom requiring increased VC for safety. Patient demonstrates lack of insight into deficits and decreased safety awareness which increase his risk of falls. Patient is dismissive to any attempts to educate patient on fall prevention and safe mobility. He is also dismissive to attempts to discuss discharge recommendations. Patient lives in a 2 story home with bedroom on 2nd floor.  Depending on further progress with therapy and assistance available at home, recommend HHPT with 24 hour supervision vs SNF. Patient's progression toward goals since last assessment: slow progress in therapy. Goals remain appropriate for next 7 days     PLAN:  Goals have been updated based on progression since last assessment. Patient continues to benefit from skilled intervention to address the above impairments. Continue to follow the patient 4 times a week to address goals. Planned Interventions:  [x]              Bed Mobility Training             []       Neuromuscular Re-Education  [x]              Transfer Training                   []       Orthotic/Prosthetic Training  [x]              Gait Training                         []       Modalities  []              Therapeutic Exercises           []       Edema Management/Control  [x]              Therapeutic Activities            [x]       Patient and Family Training/Education  [x]              Other (comment):stairs  Discharge Recommendations: Home Health with supervision vs SNF  Further Equipment Recommendations for Discharge: portable oxygen, shower chair and rolling walker     SUBJECTIVE:   Patient stated I don't know why we are talking about home.  I'm still in the hospital.    OBJECTIVE DATA SUMMARY:   Critical Behavior:  Neurologic State: Alert             Strength:   Strength: Generally decreased, functional                      Functional Mobility Training:  Bed Mobility:      deferred; upon arrival patient sitting in reclining chair              Transfers:  Sit to Stand: Stand-by assistance  Stand to Sit: Contact guard assistance (VC for safety)                             Balance:  Sitting: Intact  Standing: Impaired  Standing - Static: Good;Constant support  Standing - Dynamic : Good (in halls but fair is smaller spaces-bathroom)  Ambulation/Gait Training:  Distance (ft): 100 Feet (ft)  Assistive Device: Walker, rolling;Gait belt  Ambulation - Level of Assistance: Contact guard assistance (occasional VC for safety)        Gait Abnormalities: Decreased step clearance (increased forward flexion over RW)        Base of Support: Widened     Speed/Veronica: Pace decreased (<100 feet/min); Slow  Step Length: Left shortened;Right shortened      Gait is slow but steady overall when walking in straight-aways in avelar- demonstrating short shuffled steps and increased forward flexion over RW. Patient demonstrating increased SOB with distance walked and O2 noted to decrease to 84% requiring 2 l/min to improve to 91%. In smaller spaces such as the bathroom patient demonstrating poor safety awareness requiring increased VC for safe management of RW       Pain:  Pain Scale 1: Numeric (0 - 10)  Pain Intensity 1: 0              Activity Tolerance:   Pulse Oximetry Assessment    91% at rest on room air  84% while ambulating on room air  95% at rest on 2LPM  92% while ambulating on 2LPM  Please refer to the flowsheet for vital signs taken during this treatment.   After treatment:   [x]  Patient left in no apparent distress sitting up in chair  []  Patient left in no apparent distress in bed  [x]  Call bell left within reach  [x]  Nursing notified  [x]  Caregiver present  [x]  Bed alarm activated    COMMUNICATION/COLLABORATION:   The patients plan of care was discussed with: Occupational Therapist, Registered Nurse and     Lolis Whitaker, PT   Time Calculation: 13 mins

## 2018-03-06 NOTE — PROGRESS NOTES
Problem: Self Care Deficits Care Plan (Adult)  Goal: *Acute Goals and Plan of Care (Insert Text)  Occupational Therapy Goals  Initiated 3/6/2018  1. Patient will perform standing ADLs at sink x 5 minutes without seated rest break with supervision/set-up within 7 day(s). 2.  Patient will perform lower body dressing utilizing compensatory techniques with supervision/set-up within 7 day(s). 3.  Patient will perform toilet transfers with least restrictive DME with supervision/set-up within 7 day(s). 4.  Patient will perform all aspects of toileting with supervision/set-up within 7 day(s). 5.  Patient will perform functional mobility with RW/ADL without verbal cueing for safety within 7 days. 6.  Patient will utilize energy conservation techniques during functional activities with verbal cues within 7 day(s). Occupational Therapy EVALUATION  Patient: Chon Menchaca (16 y.o. male)  Date: 3/6/2018  Primary Diagnosis: Hypoxemia  dysphagia  Procedure(s) (LRB):  ESOPHAGOGASTRODUODENOSCOPY (EGD) (N/A) 6 Days Post-Op   Precautions:  Bed Alarm, Fall    ASSESSMENT :  Based on the objective data described below, the patient presents with decreased safety awareness, risk for falls, need for supplemental 2 L with activity, decreased endurance and decline in functional status. Pt received in chair, SBA to CGA for sit<>stand and toilet transfer. However, pt demonstrated decreased safety awareness and required verbal cues to reach back for chair arms and use RW properly in bathroom. Noted pt often stepped outside frame of walker with transitions. With activity, pt desaturated to 88% at rest via room air and as low as 84% on 2 L with activity. Overall pt is needing frequent rest breaks. Attempted to educate pt on PLB technique as well as purchasing a shower chair for energy conservation at home. Pt is very dismissive of therapists' recommendations with poor insight into his deficits.  Recommend discharge home with [facility] and PT with additional 24 hour support/assist. At this time pt will need home O2 and benefit from a shower chair. Oxygen Saturations  At rest room air: 88% to 91% with desaturation while talking  Activity on room air: 87%  Activity via 2 L: 84% but recovered within 1 minute with seated rest break  At rest room air: 93%    Patient will benefit from skilled intervention to address the above impairments. Patients rehabilitation potential is considered to be Good  Factors which may influence rehabilitation potential include:   []             None noted  []             Mental ability/status  [x]             Medical condition  []             Home/family situation and support systems  [x]             Safety awareness  []             Pain tolerance/management  []             Other:      PLAN :  Recommendations and Planned Interventions:  [x]               Self Care Training                  [x]        Therapeutic Activities  [x]               Functional Mobility Training    []        Cognitive Retraining  [x]               Therapeutic Exercises           [x]        Endurance Activities  [x]               Balance Training                   []        Neuromuscular Re-Education  []               Visual/Perceptual Training     [x]   Home Safety Training  [x]               Patient Education                 [x]        Family Training/Education  []               Other (comment):    Frequency/Duration: Patient will be followed by occupational therapy 3 times a week to address goals. Discharge Recommendations: HHOT and PT with initial 24 hour A   Further Equipment Recommendations for Discharge: home O2 and shower chair     SUBJECTIVE:   Patient stated I mean I am sure I will be fine once I get home.     OBJECTIVE DATA SUMMARY:   HISTORY:   Past Medical History:   Diagnosis Date    [facility]     Asthma     Cancer [facility]     kidney ca    COPD     Diabetes ([facility]     Gastrointestinal disorder ruptured esphogus    Hypertension     Other ill-defined conditions(799.89)      Past Surgical History:   Procedure Laterality Date    ABDOMEN SURGERY PROC UNLISTED      hernia    ABDOMEN SURGERY PROC UNLISTED      colon resection    HX GI      part of colon removed, ruptured esophagus    HX OTHER SURGICAL      kidney removed    REMV KIDNEY,COMPLICATED         Prior Level of Function/Environment/Context: independent, lives with wife  Occupations in which the patient is/was successful, what are the barriers preventing that success:   Performance Patterns (routines, roles, habits, and rituals):   Personal Interests and/or values:   Expanded or extensive additional review of patient history:     Home Situation  Home Environment: Private residence  # Steps to Enter: 0 (den on ground floor but 2 steps to rest of house)  [employer] to [employer]: Yes  [employer] : Right  One/Two Story Residence: Two story (sleeps on couch downstairs)  # of Interior Steps: 12  Living Alone: No  Support Systems: Spouse/Significant Other/Partner  Patient Expects to be Discharged to[de-identified] Patient room  Current DME Used/Available at Home: Grab bars, Raised toilet seat, Cane, straight, Wheelchair  Tub or Shower Type: Tub/Shower combination  [x]  Right hand dominant   []  Left hand dominant    EXAMINATION OF PERFORMANCE DEFICITS:  Cognitive/Behavioral Status:                      Skin: intact    Edema: none noted    Hearing: Auditory  Auditory Impairment: Hard of hearing, bilateral, Hearing aid(s)  Hearing Aids/Status:  At home    Vision/Perceptual:    Tracking: Able to track stimulus in all quadrants w/o difficulty                                Range of Motion:    AROM: Generally decreased, functional                         Strength:    Strength: Generally decreased, functional                Coordination:  Coordination: Within functional limits            Tone & Sensation:    Tone: Normal                         Balance:  Sitting: Intact  Standing: Impaired  Standing - Static: Good;Constant support  Standing - Dynamic : Good (in halls but fair is smaller spaces-bathroom)    Functional Mobility and Transfers for ADLs:  Bed Mobility:       Transfers:  Sit to Stand: Stand-by assistance  Stand to Sit: Contact guard assistance (VC for safety)  Toilet Transfer : Contact guard assistance (decreased safety awareness, poor RW use)    ADL Assessment:  Feeding: Independent    Oral Facial Hygiene/Grooming: Contact guard assistance    Bathing: Minimum assistance    Upper Body Dressing: Supervision    Lower Body Dressing: Minimum assistance    Toileting: Contact guard assistance                ADL Intervention and task modifications:     Pt educated on role of OT and required verbal cues for safety and proper hand placement during ADLs/functional transfers. Attempted to demonstrate to pt sequencing and technique for PLB. Pt dismissive, not making eye contact for demonstration. Also attempted to educate pt on shower chair at home for energy conservation. Instructed that pt will likely discharge home with home O2 and to wear his oxygen while showering to prevent hypoxia and possible syncope. Pt dismissive  of recommendations stating he didn't know when he was discharging and he will manage at home. However, pt did report increase fatigue and SOB after performing bathing and standing ADLs tasks at home prior to admission. Functional Measure:  Barthel Index:    Bathin  Bladder: 10  Bowels: 10  Groomin  Dressin  Feeding: 10  Mobility: 10  Stairs: 0  Toilet Use: 5  Transfer (Bed to Chair and Back): 10  Total: 65       Barthel and G-code impairment scale:  Percentage of impairment CH  0% CI  1-19% CJ  20-39% CK  40-59% CL  60-79% CM  80-99% CN  100%   Barthel Score 0-100 100 99-80 79-60 59-40 20-39 1-19   0   Barthel Score 0-20 20 17-19 13-16 9-12 5-8 1-4 0      The Barthel ADL Index: Guidelines  1.  The index should be used as a record of what a patient does, not as a record of what a patient could do. 2. The main aim is to establish degree of independence from any help, physical or verbal, however minor and for whatever reason. 3. The need for supervision renders the patient not independent. 4. A patient's performance should be established using the best available evidence. Asking the patient, friends/relatives and nurses are the usual sources, but direct observation and common sense are also important. However direct testing is not needed. 5. Usually the patient's performance over the preceding 24-48 hours is important, but occasionally longer periods will be relevant. 6. Middle categories imply that the patient supplies over 50 per cent of the effort. 7. Use of aids to be independent is allowed. Sharla Jeronimo., Barthel, D.W. (4214). Functional evaluation: the Barthel Index. [facility] (14)2. Norval Low lara [place], J.J.M.F, Nate Morgan., Carrie Camp., [place], [address] (1999). Measuring the change indisability after inpatient rehabilitation; comparison of the responsiveness of the Barthel Index and Functional [place] Measure. Journal of Neurology, Neurosurgery, and Psychiatry, 66(4), 942-076. Cecilia Mccormick, N.J.A, ANTONINO Patel.J.JAZIEL, & Pacheco Ramey, M.A. (2004.) Assessment of post-stroke quality of life in cost-effectiveness studies: The usefulness of the Barthel Index and the EuroQoL-5D. Quality of Life Research, 13, 360-80         G codes: In compliance with CMSs Claims Based Outcome Reporting, the following G-code set was chosen for this patient based on their primary functional limitation being treated: The outcome measure chosen to determine the severity of the functional limitation was the barthel with a score of 65/100 which was correlated with the impairment scale. ?  Self Care:     - CURRENT STATUS: CJ - 20%-39% impaired, limited or restricted    - GOAL STATUS: CI - 1%-19% impaired, limited or restricted    - D/C STATUS:  ---------------To be determined---------------     Occupational Therapy Evaluation Charge Determination   History Examination Decision-Making   LOW Complexity : Brief history review  LOW Complexity : 1-3 performance deficits relating to physical, cognitive , or psychosocial skils that result in activity limitations and / or participation restrictions  MEDIUM Complexity : Patient may present with comorbidities that affect occupational performnce. Miniml to moderate modification of tasks or assistance (eg, physical or verbal ) with assesment(s) is necessary to enable patient to complete evaluation       Based on the above components, the patient evaluation is determined to be of the following complexity level: LOW   Pain:  Pain Scale 1: Numeric (0 - 10)  Pain Intensity 1: 0              Activity Tolerance:   Need for 2 L  Please refer to the flowsheet for vital signs taken during this treatment. After treatment:   [x] Patient left in no apparent distress sitting up in chair  [] Patient left in no apparent distress in bed  [x] Call bell left within reach  [x] Nursing notified  [x] Caregiver present  [x] Bed alarm activated    COMMUNICATION/EDUCATION:   The patients plan of care was discussed with: Physical Therapist and Registered Nurse.  [] Home safety education was provided and the patient/caregiver indicated understanding. [] Patient/family have participated as able in goal setting and plan of care. [] Patient/family agree to work toward stated goals and plan of care. [] Patient understands intent and goals of therapy, but is neutral about his/her participation. [] Patient is unable to participate in goal setting and plan of care. This patients plan of care is appropriate for delegation to [facility].     Thank you for this referral.  Anson Kahn OT  Time Calculation: 18 mins

## 2018-03-06 NOTE — PROGRESS NOTES
Occupational Therapy  Chart reviewed. Referral received. Attempted to see pt for OT evaluation. Pt agreeable to participate. Organized lines/leads, noted 88-91% on RA at rest. MD then present. Patient requesting time to sit and talk with MD. Session aborted. Will continue to follow.  Vallarie Claude, MS, OTR/L]

## 2018-03-06 NOTE — PROGRESS NOTES
Physical Therapy  Attempting to see patient for PT this am. Patient in reclining chair on RA. O2 sats at rest fluctuating between 88-92%. MD arrival to speak with patient. Therapy session aborted. Will follow back.   Lolis Whitaker, PT

## 2018-03-06 NOTE — PROGRESS NOTES
ADULT PROTOCOL: JET AEROSOL  REASSESSMENT    Patient  Jimmy Garay     66 y.o.   male     3/6/2018  2:51 PM    Breath Sounds Pre Procedure: Right Breath Sounds: Expiratory wheezing, Coarse                               Left Breath Sounds: Expiratory wheezing, Coarse    Breath Sounds Post Procedure: Right Breath Sounds: Coarse                                 Left Breath Sounds: Coarse    Breathing pattern: Pre procedure Breathing Pattern: Regular          Post procedure Breathing Pattern: Regular    Heart Rate: Pre procedure Pulse: 84           Post procedure Pulse: 74    Resp Rate: Pre procedure Respirations: 18           Post procedure Respirations: 20      Incentive Spirometry:  Actual Volume (ml): 1500 ml          Cough: Pre procedure Cough: Non-productive               Post procedure Cough: Productive        Sputum: Pre procedure Sputum amount: Small  Sputum color/odor: White                 Post procedure Sputum amount: Scant  Sputum color/odor: Tan  Sputum consistency:  Thick    Oxygen: O2 Device: Room air   Room air     Changed: NO    SpO2: Pre procedure SpO2: 94 %   without oxygen              Post procedure SpO2: 96 %  without oxygen    Nebulizer Therapy: Current medications Aerosolized Medications: DuoNeb      Changed: NO    Smoking History: current smoker  Problem List:   Patient Active Problem List   Diagnosis Code    Acute respiratory failure (HCC) J96.00    COPD with acute exacerbation (HCC) J44.1    HTN (hypertension) I10    HTN (hypertension) I10    COPD (chronic obstructive pulmonary disease) (HCC) J44.9    [facility] kidney ([facility] C64.9    Hypoglycemia, unspecified E16.2    Acute renal failure ([facility] N17.9    Hyperkalemia E87.5    S/P partial colectomy Z90.49    Diarrhea R19.7    DM (diabetes mellitus), type 2, uncontrolled ([facility] E11.65    S/p nephrectomy Z90.5    AAA (abdominal aortic aneurysm) ([facility] I71.4    Gastrointestinal disorder K92.9    Cancer ([facility] C80.1    Anemia D64.9    Multiple myeloma (HCC) C90.00    Stroke (cerebrum) (HCC) I63.9    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

## 2018-03-06 NOTE — PROGRESS NOTES
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 Most recent are:  Visit Vitals    /84 (BP 1 Location: Right arm, BP Patient Position: At rest)    Pulse 84    Temp 98.3 °F (36.8 °C)    Resp 18    Ht 6' (1.829 m)    Wt 76.2 kg (167 lb 15.9 oz)    SpO2 94%    BMI 22.78 kg/m2       Intake/Output Summary (Last 24 hours) at 18 1001  Last data filed at 18 0412   Gross per 24 hour   Intake              390 ml   Output              940 ml   Net             -550 ml      Telemetry Reviewed:     PHYSICAL EXAM:  General:   Check john paul  NAD    ________________________________________________________________________  Jn Arriaga MD     Procedures: see electronic medical records for all procedures/Xrays and details which  were not copied into this note but were reviewed prior to creation of Plan.       LABS:  Recent Labs      18   0006   WBC  6.4  7.0   HGB  7.7*  7.0*   HCT  24.1*  21.4*   PLT  134*  151     Recent Labs      18   0248  18   0006  18   1032   NA  141  140  136   K  4.5  3.9  4.3   CL  106  105  99   CO2  26  27  28   BUN  63*  60*  56*   CREA  3.78*  3.57* 3.73*   GLU  271*  303*  434*   CA  8.3*  7.7*  8.6   MG  1.5*  1.8   --    PHOS  3.0  2.8   --      Recent Labs      03/06/18   0248  03/05/18   0006  03/04/18   1032   SGOT  8*  <3*  9*   AP  32*  20*  40*   TP  5.0*  4.4*  5.5*   ALB  3.4*  3.5  3.6   GLOB  1.6*  0.9*  1.9*     No results for input(s): INR, PTP, APTT in the last 72 hours. No lab exists for component: INREXT, INREXT   No results for input(s): FE, TIBC, PSAT, FERR in the last 72 hours. No results found for: FOL, RBCF   No results for input(s): PH, PCO2, PO2 in the last 72 hours. No results for input(s): CPK, CKMB in the last 72 hours.     No lab exists for component: TROPONINI  No components found for: [place]  Lab Results   Component Value Date/Time    Color YELLOW/STRAW 02/19/2018 03:07 PM    Appearance CLOUDY (A) 02/19/2018 03:07 PM    Specific gravity 1.020 02/19/2018 03:07 PM    pH (UA) 5.5 02/19/2018 03:07 PM    Protein 100 (A) 02/19/2018 03:07 PM    Glucose NEGATIVE  02/19/2018 03:07 PM    Ketone NEGATIVE  02/19/2018 03:07 PM    Bilirubin NEGATIVE  02/19/2018 03:07 PM    Urobilinogen 0.2 02/19/2018 03:07 PM    Nitrites NEGATIVE  02/19/2018 03:07 PM    Leukocyte Esterase NEGATIVE  02/19/2018 03:07 PM    Epithelial cells FEW 02/19/2018 03:07 PM    Bacteria 1+ (A) 02/19/2018 03:07 PM    WBC 0-4 02/19/2018 03:07 PM    RBC 0-5 02/19/2018 03:07 PM       MEDICATIONS:

## 2018-03-06 NOTE — PROGRESS NOTES
[facility] SHIFT NURSING NOTE    Bedside and Verbal shift change report given to Lulu (oncoming nurse) by Jacob (offgoing nurse). Report included the following information SBAR, Procedure Summary, Intake/Output, MAR and Recent Results. SHIFT SUMMARY:   Offered patient to ambulate in the avelar multiple times, pt states he will let nursing staff know if and when he is ready. Pt's dressing on Harvinder catheter mostly off neck, replaced dressing. Admission Date 2/19/2018   Admission Diagnosis Hypoxemia  dysphagia   Consults IP CONSULT TO NEPHROLOGY  IP CONSULT TO ONCOLOGY  IP CONSULT TO PALLIATIVE CARE - PROVIDER  IP CONSULT TO GASTROENTEROLOGY        Consults   []PT   []OT   []Speech   []Case Management      [] Palliative       Cardiac Monitoring Order   []Yes   []No     GI Prophylaxis   []Yes   []No           DVT Prophylaxis   SCDs:  Sequential Compression Device: Bilateral          Baron stockings:  Graduated Compression Stockings: Bilateral      [] Medication   []Contraindicated   []None        Activity Level Activity Level: Up with Assistance     Activity Assistance: Partial (one person)   Purposeful Rounding every 1-2 hour? []Yes    Conrad Score  Total Score: 3   Bed Alarm (If score 3 or >)   []Yes   [] Refused (See signed refusal form in chart)   Sidney Score  Sidney Score: 17   Sidney Score (if score 14 or less)   []PMT consult   []Wound Care consult      []Specialty bed   [] Nutrition consult          Needs prior to discharge:   Home O2 required:    []Yes   []No    If yes, how much O2 required?     Other:    Last Bowel Movement: Last Bowel Movement Date: 03/05/18        POST-OP SURGICAL VATS   []Yes   []N/A   Incentive Spirometer:   []Yes   []Refused   Coughing and deep breathing:     []Yes   []Refused   Oral care:     []Yes   []Refused   Understanding (patient education):     []Yes   []Refused   Getting out of bed Number times ambulated in hallway past shift:    Number of times OOB to chair past shift: Head of bed elevation:     []Yes   []Refused      Influenza Vaccine Received Flu Vaccine for Current Season (usually Sept-March): Yes    Patient/Guardian Refused (Notify MD): No   Pneumonia Vaccine           Diet Active Orders   Diet    DIET DIABETIC CONSISTENT CARB Soft Solids      LDAs               Peripheral IV 03/02/18 Right Wrist (Active)   Site Assessment Dry; Intact 3/6/2018  4:12 AM   Phlebitis Assessment 0 3/6/2018  4:12 AM   Infiltration Assessment 0 3/6/2018  4:12 AM   Dressing Status Intact;Dry; Old drainage 3/6/2018  4:12 AM   Dressing Type Transparent;Tape 3/6/2018  4:12 AM   Hub Color/Line Status Blue;Capped 3/6/2018  4:12 AM        Hemodialysis Access 02/22/18 (Active)   Central Line Being Utilized Yes 3/6/2018  4:12 AM   Criteria for Appropriate Use Dialysis/apheresis 3/6/2018  4:12 AM   Date Accessed  03/03/18 3/4/2018  3:13 AM   Access Time  0801 2/28/2018  8:01 AM   Site Assessment Clean, dry, & intact 3/6/2018  4:12 AM   Date of Last Dressing Change 03/05/18 3/5/2018  7:30 PM   Dressing Status Clean, dry, & intact 3/6/2018  4:12 AM   Dressing Type Disk with Chlorhexadine gluconate (CHG) 3/6/2018  4:12 AM   Proximal Hub Color/Line Status Capped 3/6/2018  4:12 AM   Distal Hub Color/Line Status Capped 3/6/2018  4:12 AM                  Urinary Catheter      Intake & Output   Date 03/05/18 0700 - 03/06/18 0659 03/06/18 0700 - 03/07/18 0659   Shift 0758-4878 4500-2818 24 Hour Total 3011-2719 4631-1651 24 Hour Total   I  N  T  A  K  E   P.O. 620 120 740         P. O. 620 120 740       Shift Total  (mL/kg) 620  (8.2) 120  (1.6) 740  (9.7)      O  U  T  P  U  T   Urine  (mL/kg/hr) 400  (0.4) 540 940         Urine Voided 400 540 940         Urine Occurrence(s)  2 x 2 x       Shift Total  (mL/kg) 400  (5.3) 540  (7.1) 940  (12.3)       -420 -200      Weight (kg) 75.9 76.2 76.2 76.2 76.2 76.2         Readmission Risk Assessment Tool Score High Risk            32       Total Score        2 . Living with Significant Other. Assisted Living. LTAC. SNF. or   Rehab    3 Patient Length of Stay (>5 days = 3)    5 Pt.  Coverage (Medicare=5 , Medicaid, or Self-Pay=4)    22 Charlson Comorbidity Score (Age + Comorbid Conditions)        Criteria that do not apply:    Has Seen PCP in Last 6 Months (Yes=3, No=0)    IP Visits Last 12 Months (1-3=4, 4=9, >4=11)       Expected Length of Stay 7d 0h   Actual Length of Stay 15

## 2018-03-07 LAB
ANION GAP SERPL CALC-SCNC: 6 MMOL/L (ref 5–15)
BASOPHILS # BLD: 0 K/UL (ref 0–0.1)
BASOPHILS NFR BLD: 0 % (ref 0–1)
BUN SERPL-MCNC: 63 MG/DL (ref 6–20)
BUN/CREAT SERPL: 17 (ref 12–20)
CALCIUM SERPL-MCNC: 8.5 MG/DL (ref 8.5–10.1)
CHLORIDE SERPL-SCNC: 107 MMOL/L (ref 97–108)
CO2 SERPL-SCNC: 29 MMOL/L (ref 21–32)
CREAT SERPL-MCNC: 3.63 MG/DL (ref 0.7–1.3)
DIFFERENTIAL METHOD BLD: ABNORMAL
EOSINOPHIL # BLD: 0.2 K/UL (ref 0–0.4)
EOSINOPHIL NFR BLD: 4 % (ref 0–7)
ERYTHROCYTE [DISTWIDTH] IN BLOOD BY AUTOMATED COUNT: 17.8 % (ref 11.5–14.5)
GLUCOSE BLD STRIP.AUTO-MCNC: 118 MG/DL (ref 65–100)
GLUCOSE BLD STRIP.AUTO-MCNC: 209 MG/DL (ref 65–100)
GLUCOSE BLD STRIP.AUTO-MCNC: 212 MG/DL (ref 65–100)
GLUCOSE BLD STRIP.AUTO-MCNC: 243 MG/DL (ref 65–100)
GLUCOSE SERPL-MCNC: 214 MG/DL (ref 65–100)
HCT VFR BLD AUTO: 22.9 % (ref 36.6–50.3)
HGB BLD-MCNC: 7.3 G/DL (ref 12.1–17)
IMM GRANULOCYTES # BLD: 0 K/UL (ref 0–0.04)
IMM GRANULOCYTES NFR BLD AUTO: 1 % (ref 0–0.5)
LYMPHOCYTES # BLD: 0.7 K/UL (ref 0.8–3.5)
LYMPHOCYTES NFR BLD: 12 % (ref 12–49)
MCH RBC QN AUTO: 26.8 PG (ref 26–34)
MCHC RBC AUTO-ENTMCNC: 31.9 G/DL (ref 30–36.5)
MCV RBC AUTO: 84.2 FL (ref 80–99)
MONOCYTES # BLD: 0.3 K/UL (ref 0–1)
MONOCYTES NFR BLD: 5 % (ref 5–13)
NEUTS SEG # BLD: 4.7 K/UL (ref 1.8–8)
NEUTS SEG NFR BLD: 79 % (ref 32–75)
NRBC # BLD: 0 K/UL (ref 0–0.01)
NRBC BLD-RTO: 0 PER 100 WBC
PLATELET # BLD AUTO: 127 K/UL (ref 150–400)
PMV BLD AUTO: 11.2 FL (ref 8.9–12.9)
POTASSIUM SERPL-SCNC: 4.2 MMOL/L (ref 3.5–5.1)
RBC # BLD AUTO: 2.72 M/UL (ref 4.1–5.7)
SERVICE CMNT-IMP: ABNORMAL
SODIUM SERPL-SCNC: 142 MMOL/L (ref 136–145)
WBC # BLD AUTO: 6 K/UL (ref 4.1–11.1)

## 2018-03-07 PROCEDURE — 65660000000 HC RM CCU STEPDOWN

## 2018-03-07 PROCEDURE — 85025 COMPLETE CBC W/AUTO DIFF WBC: CPT | Performed by: INTERNAL MEDICINE

## 2018-03-07 PROCEDURE — 30233N1 TRANSFUSION OF NONAUTOLOGOUS RED BLOOD CELLS INTO PERIPHERAL VEIN, PERCUTANEOUS APPROACH: ICD-10-PCS | Performed by: INTERNAL MEDICINE

## 2018-03-07 PROCEDURE — 36430 TRANSFUSION BLD/BLD COMPNT: CPT

## 2018-03-07 PROCEDURE — 74011636637 HC RX REV CODE- 636/637: Performed by: INTERNAL MEDICINE

## 2018-03-07 PROCEDURE — 74011000250 HC RX REV CODE- 250: Performed by: NURSE PRACTITIONER

## 2018-03-07 PROCEDURE — 80048 BASIC METABOLIC PNL TOTAL CA: CPT | Performed by: INTERNAL MEDICINE

## 2018-03-07 PROCEDURE — 86900 BLOOD TYPING SEROLOGIC ABO: CPT | Performed by: INTERNAL MEDICINE

## 2018-03-07 PROCEDURE — 74011250637 HC RX REV CODE- 250/637: Performed by: INTERNAL MEDICINE

## 2018-03-07 PROCEDURE — 86923 COMPATIBILITY TEST ELECTRIC: CPT | Performed by: INTERNAL MEDICINE

## 2018-03-07 PROCEDURE — 36415 COLL VENOUS BLD VENIPUNCTURE: CPT | Performed by: INTERNAL MEDICINE

## 2018-03-07 PROCEDURE — 82962 GLUCOSE BLOOD TEST: CPT

## 2018-03-07 PROCEDURE — P9016 RBC LEUKOCYTES REDUCED: HCPCS | Performed by: INTERNAL MEDICINE

## 2018-03-07 PROCEDURE — 74011250636 HC RX REV CODE- 250/636: Performed by: INTERNAL MEDICINE

## 2018-03-07 PROCEDURE — 94640 AIRWAY INHALATION TREATMENT: CPT

## 2018-03-07 PROCEDURE — 74011250637 HC RX REV CODE- 250/637: Performed by: EMERGENCY MEDICINE

## 2018-03-07 PROCEDURE — 74011250637 HC RX REV CODE- 250/637: Performed by: PHYSICAL MEDICINE & REHABILITATION

## 2018-03-07 PROCEDURE — 74011636637 HC RX REV CODE- 636/637: Performed by: EMERGENCY MEDICINE

## 2018-03-07 RX ORDER — INSULIN GLARGINE 100 [IU]/ML
20 INJECTION, SOLUTION SUBCUTANEOUS
Status: DISCONTINUED | OUTPATIENT
Start: 2018-03-08 | End: 2018-03-08

## 2018-03-07 RX ORDER — SODIUM CHLORIDE 9 MG/ML
250 INJECTION, SOLUTION INTRAVENOUS AS NEEDED
Status: DISCONTINUED | OUTPATIENT
Start: 2018-03-07 | End: 2018-03-09 | Stop reason: HOSPADM

## 2018-03-07 RX ORDER — ACETAMINOPHEN 325 MG/1
650 TABLET ORAL ONCE
Status: COMPLETED | OUTPATIENT
Start: 2018-03-07 | End: 2018-03-07

## 2018-03-07 RX ORDER — FUROSEMIDE 10 MG/ML
20 INJECTION INTRAMUSCULAR; INTRAVENOUS ONCE
Status: COMPLETED | OUTPATIENT
Start: 2018-03-07 | End: 2018-03-07

## 2018-03-07 RX ORDER — DIPHENHYDRAMINE HCL 25 MG
25 CAPSULE ORAL ONCE
Status: COMPLETED | OUTPATIENT
Start: 2018-03-07 | End: 2018-03-07

## 2018-03-07 RX ADMIN — INSULIN GLARGINE 15 UNITS: 100 INJECTION, SOLUTION SUBCUTANEOUS at 21:33

## 2018-03-07 RX ADMIN — IPRATROPIUM BROMIDE AND ALBUTEROL SULFATE 3 ML: .5; 3 SOLUTION RESPIRATORY (INHALATION) at 14:29

## 2018-03-07 RX ADMIN — IPRATROPIUM BROMIDE AND ALBUTEROL SULFATE 3 ML: .5; 3 SOLUTION RESPIRATORY (INHALATION) at 20:23

## 2018-03-07 RX ADMIN — DIPHENHYDRAMINE HYDROCHLORIDE 25 MG: 25 CAPSULE ORAL at 13:24

## 2018-03-07 RX ADMIN — GUAIFENESIN 600 MG: 600 TABLET, EXTENDED RELEASE ORAL at 17:15

## 2018-03-07 RX ADMIN — FUROSEMIDE 20 MG: 10 INJECTION, SOLUTION INTRAMUSCULAR; INTRAVENOUS at 13:24

## 2018-03-07 RX ADMIN — UMECLIDINIUM 1 PUFF: 62.5 AEROSOL, POWDER ORAL at 09:09

## 2018-03-07 RX ADMIN — DOCUSATE SODIUM 100 MG: 100 CAPSULE, LIQUID FILLED ORAL at 09:05

## 2018-03-07 RX ADMIN — ASPIRIN 81 MG 81 MG: 81 TABLET ORAL at 09:05

## 2018-03-07 RX ADMIN — INSULIN LISPRO 5 UNITS: 100 INJECTION, SOLUTION INTRAVENOUS; SUBCUTANEOUS at 09:07

## 2018-03-07 RX ADMIN — INSULIN LISPRO 4 UNITS: 100 INJECTION, SOLUTION INTRAVENOUS; SUBCUTANEOUS at 09:07

## 2018-03-07 RX ADMIN — INSULIN LISPRO 4 UNITS: 100 INJECTION, SOLUTION INTRAVENOUS; SUBCUTANEOUS at 17:15

## 2018-03-07 RX ADMIN — INSULIN LISPRO 4 UNITS: 100 INJECTION, SOLUTION INTRAVENOUS; SUBCUTANEOUS at 13:18

## 2018-03-07 RX ADMIN — INSULIN LISPRO 5 UNITS: 100 INJECTION, SOLUTION INTRAVENOUS; SUBCUTANEOUS at 17:15

## 2018-03-07 RX ADMIN — PANTOPRAZOLE SODIUM 40 MG: 40 TABLET, DELAYED RELEASE ORAL at 07:40

## 2018-03-07 RX ADMIN — GUAIFENESIN 600 MG: 600 TABLET, EXTENDED RELEASE ORAL at 09:05

## 2018-03-07 RX ADMIN — PANTOPRAZOLE SODIUM 40 MG: 40 TABLET, DELAYED RELEASE ORAL at 17:15

## 2018-03-07 RX ADMIN — DILTIAZEM HYDROCHLORIDE 240 MG: 240 CAPSULE, COATED, EXTENDED RELEASE ORAL at 09:06

## 2018-03-07 RX ADMIN — SUCRALFATE 1 G: 1 SUSPENSION ORAL at 07:40

## 2018-03-07 RX ADMIN — INSULIN LISPRO 5 UNITS: 100 INJECTION, SOLUTION INTRAVENOUS; SUBCUTANEOUS at 13:18

## 2018-03-07 RX ADMIN — CLOPIDOGREL BISULFATE 75 MG: 75 TABLET ORAL at 09:06

## 2018-03-07 RX ADMIN — MONTELUKAST SODIUM 10 MG: 10 TABLET, COATED ORAL at 09:05

## 2018-03-07 RX ADMIN — Medication 10 ML: at 21:30

## 2018-03-07 RX ADMIN — DOCUSATE SODIUM 100 MG: 100 CAPSULE, LIQUID FILLED ORAL at 17:15

## 2018-03-07 RX ADMIN — FLUTICASONE FUROATE AND VILANTEROL TRIFENATATE 1 PUFF: 100; 25 POWDER RESPIRATORY (INHALATION) at 09:09

## 2018-03-07 RX ADMIN — MELATONIN 3 MG ORAL TABLET 3 MG: 3 TABLET ORAL at 21:29

## 2018-03-07 RX ADMIN — SUCRALFATE 1 G: 1 SUSPENSION ORAL at 13:18

## 2018-03-07 RX ADMIN — IPRATROPIUM BROMIDE AND ALBUTEROL SULFATE 3 ML: .5; 3 SOLUTION RESPIRATORY (INHALATION) at 01:42

## 2018-03-07 RX ADMIN — IPRATROPIUM BROMIDE AND ALBUTEROL SULFATE 3 ML: .5; 3 SOLUTION RESPIRATORY (INHALATION) at 08:27

## 2018-03-07 RX ADMIN — ACETAMINOPHEN 650 MG: 325 TABLET ORAL at 13:24

## 2018-03-07 NOTE — PROGRESS NOTES
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 Most recent are:  Visit Vitals    /57 (BP 1 Location: Right arm, BP Patient Position: At rest)    Pulse 87    Temp 98.1 °F (36.7 °C)    Resp 20    Ht 6' (1.829 m)    Wt 77.1 kg (169 lb 14.4 oz)    SpO2 94%    BMI 23.04 kg/m2       Intake/Output Summary (Last 24 hours) at 18 0483  Last data filed at 18 0751   Gross per 24 hour   Intake              120 ml   Output              750 ml   Net             -630 ml      Telemetry Reviewed:     PHYSICAL EXAM:  General:   Check john paul  NAD    ________________________________________________________________________  Laith Buckley MD     Procedures: see electronic medical records for all procedures/Xrays and details which  were not copied into this note but were reviewed prior to creation of Plan.       LABS:  Recent Labs      18   0248   WBC  6.0  6.4   HGB  7.3*  7.7*   HCT  22.9*  24.1*   PLT  127*  134*     Recent Labs      18   01518   0248  18   0006   NA  142  141  140   K  4.2  4.5  3.9   CL  107  106  105   CO2  29  26  27   BUN  63*  63*  60*   CREA  3.63*  3.78* 3.57*   GLU  214*  271*  303*   CA  8.5  8.3*  7.7*   MG   --   1.5*  1.8   PHOS   --   3.0  2.8     Recent Labs      03/06/18   0248  03/05/18   0006  03/04/18   1032   SGOT  8*  <3*  9*   AP  32*  20*  40*   TP  5.0*  4.4*  5.5*   ALB  3.4*  3.5  3.6   GLOB  1.6*  0.9*  1.9*     No results for input(s): INR, PTP, APTT in the last 72 hours. No lab exists for component: INREXT, INREXT   No results for input(s): FE, TIBC, PSAT, FERR in the last 72 hours. No results found for: FOL, RBCF   No results for input(s): PH, PCO2, PO2 in the last 72 hours. No results for input(s): CPK, CKMB in the last 72 hours.     No lab exists for component: TROPONINI  No components found for: [place]  Lab Results   Component Value Date/Time    Color YELLOW/STRAW 02/19/2018 03:07 PM    Appearance CLOUDY (A) 02/19/2018 03:07 PM    Specific gravity 1.020 02/19/2018 03:07 PM    pH (UA) 5.5 02/19/2018 03:07 PM    Protein 100 (A) 02/19/2018 03:07 PM    Glucose NEGATIVE  02/19/2018 03:07 PM    Ketone NEGATIVE  02/19/2018 03:07 PM    Bilirubin NEGATIVE  02/19/2018 03:07 PM    Urobilinogen 0.2 02/19/2018 03:07 PM    Nitrites NEGATIVE  02/19/2018 03:07 PM    Leukocyte Esterase NEGATIVE  02/19/2018 03:07 PM    Epithelial cells FEW 02/19/2018 03:07 PM    Bacteria 1+ (A) 02/19/2018 03:07 PM    WBC 0-4 02/19/2018 03:07 PM    RBC 0-5 02/19/2018 03:07 PM       MEDICATIONS:

## 2018-03-07 NOTE — PROGRESS NOTES
[facility] Hematology-Oncology Progress Note      Michelle Hamilton  1940  [id number]      3/7/2018  11:43 PM    Follow-up for: progressive multiple myeloma, DIANA     [x] Chart notes since last visit reviewed: Feels better   [x] Medications reviewed for allergies and interactions      Subjective:     Patient complains of the following: fatigue improved, feels much better  But he is very annoyed and aggravated by his care here.     No pain, NO SOB, no CP, no fever, no abd pain, ROS otherwise negative  Objective:     Patient Vitals for the past 24 hrs:   BP Temp Pulse Resp SpO2 Weight   03/07/18 1127 114/87 97.5 °F (36.4 °C) 94 20 99 % -   03/07/18 0827 - - - - 94 % -   03/07/18 0749 118/57 98.1 °F (36.7 °C) 87 20 93 % -   03/07/18 0328 - - - - - 77.1 kg (169 lb 14.4 oz)   03/07/18 0326 120/56 97.9 °F (36.6 °C) 86 18 93 % -   03/07/18 0143 - - - - 90 % -   03/06/18 2333 113/49 97.4 °F (36.3 °C) 80 18 93 % -   03/06/18 1922 136/44 98.1 °F (36.7 °C) 87 18 97 % -   03/06/18 1437 - - - - 94 % -   03/06/18 1430 131/56 98.1 °F (36.7 °C) 85 18 95 % -       Physical Exam:  General -Alert, and oriented    HEENT: NC, AT, pupils round  Neck: supple, no adenopathy appreciated    Lungs No distress  Abdomen- Soft, NT,ND,   Extre- No edema  Skin Echymosis on upper ext  Neuro- No focal sensory or motor deficits noted  Psych - alert, l.  verbalizes understanding of conversation     Available labs reviewed:  Labs:    Recent Results (from the past 24 hour(s))   GLUCOSE, POC    Collection Time: 03/06/18  4:23 PM   Result Value Ref Range    Glucose (POC) 243 (H) 65 - 100 mg/dL    Performed by Lovina Skiff (PCT)    GLUCOSE, POC    Collection Time: 03/06/18  8:43 PM   Result Value Ref Range    Glucose (POC) 173 (H) 65 - 100 mg/dL    Performed by Karuna Morrow (PCT)    CBC WITH AUTOMATED DIFF    Collection Time: 03/07/18  1:55 AM   Result Value Ref Range    WBC 6.0 4.1 - 11.1 K/uL    RBC 2.72 (L) 4.10 - 5.70 M/uL    HGB 7.3 (L) 12.1 - 17.0 g/dL HCT 22.9 (L) 36.6 - 50.3 %    MCV 84.2 80.0 - 99.0 FL    MCH 26.8 26.0 - 34.0 PG    MCHC 31.9 30.0 - 36.5 g/dL    RDW 17.8 (H) 11.5 - 14.5 %    PLATELET 512 (L) 025 - 400 K/uL    MPV 11.2 8.9 - 12.9 FL    NRBC 0.0 0  WBC    ABSOLUTE NRBC 0.00 0.00 - 0.01 K/uL    NEUTROPHILS 79 (H) 32 - 75 %    LYMPHOCYTES 12 12 - 49 %    MONOCYTES 5 5 - 13 %    EOSINOPHILS 4 0 - 7 %    BASOPHILS 0 0 - 1 %    IMMATURE GRANULOCYTES 1 (H) 0.0 - 0.5 %    ABS. NEUTROPHILS 4.7 1.8 - 8.0 K/UL    ABS. LYMPHOCYTES 0.7 (L) 0.8 - 3.5 K/UL    ABS. MONOCYTES 0.3 0.0 - 1.0 K/UL    ABS. EOSINOPHILS 0.2 0.0 - 0.4 K/UL    ABS. BASOPHILS 0.0 0.0 - 0.1 K/UL    ABS. IMM. GRANS. 0.0 0.00 - 0.04 K/UL    DF AUTOMATED     METABOLIC PANEL, BASIC    Collection Time: 03/07/18  1:55 AM   Result Value Ref Range    Sodium 142 136 - 145 mmol/L    Potassium 4.2 3.5 - 5.1 mmol/L    Chloride 107 97 - 108 mmol/L    CO2 29 21 - 32 mmol/L    Anion gap 6 5 - 15 mmol/L    Glucose 214 (H) 65 - 100 mg/dL    BUN 63 (H) 6 - 20 MG/DL    Creatinine 3.63 (H) 0.70 - 1.30 MG/DL    BUN/Creatinine ratio 17 12 - 20      GFR est AA 20 (L) >60 ml/min/1.73m2    GFR est non-AA 16 (L) >60 ml/min/1.73m2    Calcium 8.5 8.5 - 10.1 MG/DL   GLUCOSE, POC    Collection Time: 03/07/18  7:51 AM   Result Value Ref Range    Glucose (POC) 209 (H) 65 - 100 mg/dL    Performed by [facility]        Imaging:  CXR Results  (Last 48 hours)    None        CT Results  (Last 48 hours)    None            Assessment:   Influenza  History of smoldering myeloma- since 2012  Acute on chronic renal failure attributed to myeloma  Hypercalcemia  DM- worsened by steroids    Plan:     Multiple myeloma- IgG lambda on JAIMIE, total IgG 2293.  SFLC: lambda 1780.- Repeat FLC has dropped very significantly     ----- BMbx 2/22/18: 50% involvement by monoclonal plasma cells with plasmablastic morphology (previously in 2012 had 10% plasma cells and was followed for years with close observation)  ---- started CyBoRD regimen - C1D1- 2/23/2018 - Cytoxan with 20% dose reduction and full dose of Velcade with 40 mg Dex after HD and plamspharesis. PLEX per Nephrology, last dose was 3/2/2018 . Plan for D15 treatment with Cytoxan, dex and bortezomib tomorrow and then  Can be continued weekly as outpatient. D/W Pharmacist  --- Normochromic normocytic anemia likely secondary to myeloma - Will give 1 unit pRBC prior to d/c with 20 mg iv lasix. Hct worse at 21- asymptomatic. Repeat cbc in am. Cont Procrit- 10,000 units Tue-thurs-sat    ----DIANA  secondary to underlying Multiple Myeloma. Hemodialysis and plasmapheresis per nephrology. Now off HD since Friday. If he can stay off it further, we could continue his Tx as an outpatient - none needed today    ----Dysphagia -  EGD showed hiatal hernia, inflamed mid to distal esophagus, and either severe duodenitis or large laterall spreading villous adenoma. Plan is to continue acid suppression and carafate. PO intake  improved.     ----DM- Last hb A1c 9.8 - Will defer to Hospitalist MD. Likely worsened by his weekly Steroids. Hopefully D/ctomorrow after Christelle Mary removed and receives his chemo treatment     F/U with [facility]- to continue weekly CyBorD and procrit- next week    D/W Dr. Chico Mendez.  Greatly appreciate his help

## 2018-03-07 NOTE — PROGRESS NOTES
Pt does not want to use the metaneb machine at this time. Pt advised me that he is sleepy wants to use the mask and take a nap. Pt stated that he does not feel that congested at this time.  Will monitor

## 2018-03-07 NOTE — PROGRESS NOTES
7:30 PM Bedside report received from Anjali Valdez RN.    3:45 AM Patient refusing a bath at this time. Patient states, \"I might wait until after breakfast.\" Patient also refusing standing scale at this time. Weight obtained via bed scale.

## 2018-03-07 NOTE — PROGRESS NOTES
Bedside shift change report given to last (oncoming nurse) by kayla (offgoing nurse). Report included the following information SBAR. SHIFT SUMMARY:            [address] NURSING NOTE   Admission Date 2/19/2018   Admission Diagnosis Hypoxemia  dysphagia   Consults IP CONSULT TO NEPHROLOGY  IP CONSULT TO ONCOLOGY  IP CONSULT TO PALLIATIVE CARE - PROVIDER  IP CONSULT TO GASTROENTEROLOGY      Cardiac Monitoring [x] Yes [] No      Purposeful Hourly Rounding [x] Yes    Conrad Score Total Score: 3   Conrad score 3 or > [x] Bed Alarm [] Avasys [] 1:1 sitter [] Patient refused (Place signed refusal form in chart)   Sidney Score Sidney Score: 18   Sidney score 14 or < [] PMT consult [x] Wound Care consult    []  Specialty bed  [x] Nutrition consult      Influenza Vaccine Received Flu Vaccine for Current Season (usually Sept-March): Yes    Patient/Guardian Refused (Notify MD): No      Oxygen needs?  [x] Room air Oxygen @  []1L    []2L    []3L   []4L    []5L   []6L     Use home O2? [] Yes [x] No  Perform O2 challenge test using  smartphrase (.Homeoxygen)      Last bowel movement Last Bowel Movement Date: 03/05/18      Urinary Catheter             LDAs               Peripheral IV 03/02/18 Right Wrist (Active)   Site Assessment Clean, dry, & intact 3/6/2018  8:05 AM   Phlebitis Assessment 0 3/6/2018  8:05 AM   Infiltration Assessment 0 3/6/2018  8:05 AM   Dressing Status Clean, dry, & intact 3/6/2018  8:05 AM   Dressing Type Transparent 3/6/2018  8:05 AM   Hub Color/Line Status Blue;Capped 3/6/2018  8:05 AM        Hemodialysis Access 02/22/18 (Active)   Central Line Being Utilized Yes 3/6/2018  8:05 AM   Criteria for Appropriate Use Dialysis/apheresis 3/6/2018  8:05 AM   Date Accessed  03/03/18 3/4/2018  3:13 AM   Access Time  0801 2/28/2018  8:01 AM   Site Assessment Clean, dry, & intact 3/6/2018  8:05 AM   Date of Last Dressing Change 03/05/18 3/5/2018  7:30 PM   Dressing Status Clean, dry, & intact 3/6/2018  8:05 AM Dressing Type Disk with Chlorhexadine gluconate (CHG); Transparent 3/6/2018  8:05 AM   Proximal Hub Color/Line Status Capped 3/6/2018  8:05 AM   Distal Hub Color/Line Status Capped 3/6/2018  8:05 AM                     Readmission Risk Assessment Tool Score High Risk            32       Total Score        2 . Living with Significant Other. Assisted Living. LTAC. SNF. or   Rehab    3 Patient Length of Stay (>5 days = 3)    5 Pt.  Coverage (Medicare=5 , Medicaid, or Self-Pay=4)    22 Charlson Comorbidity Score (Age + Comorbid Conditions)        Criteria that do not apply:    Has Seen PCP in Last 6 Months (Yes=3, No=0)    IP Visits Last 12 Months (1-3=4, 4=9, >4=11)       Expected Length of Stay 7d 0h   Actual Length of Stay 15

## 2018-03-07 NOTE — PROGRESS NOTES
CM met with pt re: discharge planning. CM introduced self, role. Pt verbalized understanding. CM offered information re: SNF providers. Pt stated \"I don't care where I go, but talk to my wife and have her decide. \" CM called pt's wife, Rochelle Grullon ([phone]), re: SNF. CM introduced self, role. Rochelle Grullon verbalized understanding. CM offered the list of SNF facilities to pt's wife via telephone. Wife stated she would like a referral to [facility]. CM sent referral via [facility] with anticipated d/c date for tomorrow, 3/8/18. Wife stated she has no further questions or needs at this time. Signed FOC placed on bedside chart. CM to continue to remain available for discharge planning as needed. Care Management Interventions  PCP Verified by CM:  Yes  Mode of Transport at Discharge: BLS  Transition of Care Consult (CM Consult): SNF  Partner SNF: Yes  Discharge Durable Medical Equipment: No  Physical Therapy Consult: Yes  Occupational Therapy Consult: Yes  Speech Therapy Consult: Yes  Current Support Network: Lives with Spouse, Own Home  Confirm Follow Up Transport: Family  Plan discussed with Pt/Family/Caregiver: Yes  Freedom of Choice Offered: Yes  Discharge Location  Discharge Placement: Skilled nursing facility    Olvin Borrego MSANTONINO  [facility] Manager  [phone]

## 2018-03-07 NOTE — PROGRESS NOTES
ADULT PROTOCOL: JET AEROSOL  REASSESSMENT    Patient  Irina Vance     66 y.o.   male     3/7/2018  10:19 AM    Breath Sounds Pre Procedure: Right Breath Sounds: Coarse                               Left Breath Sounds: Coarse    Breath Sounds Post Procedure: Right Breath Sounds: Coarse                                 Left Breath Sounds: Coarse    Breathing pattern: Pre procedure Breathing Pattern: Regular          Post procedure Breathing Pattern: Regular    Heart Rate: Pre procedure Pulse: 82           Post procedure Pulse: 74    Resp Rate: Pre procedure Respirations: 18           Post procedure Respirations: 20    Cough: Pre procedure Cough: Congested               Post procedure Cough: Congested    Oxygen: O2 Device: Room air     Changed: NO    SpO2: Pre procedure SpO2: 94 %   without oxygen              Post procedure SpO2: 96 %  without oxygen    Nebulizer Therapy: Current medications Aerosolized Medications: DuoNeb      Changed: NO        Problem List:   Patient Active Problem List   Diagnosis Code    Acute respiratory failure ([facility]) J96.00    COPD with acute exacerbation ([facility]) J44.1    HTN (hypertension) I10    HTN (hypertension) I10    COPD (chronic obstructive pulmonary disease) ([facility]) J44.9    [facility] kidney ([facility] C64.9    Hypoglycemia, unspecified E16.2    Acute renal failure ([facility] N17.9    Hyperkalemia E87.5    S/P partial colectomy Z90.49    Diarrhea R19.7    DM (diabetes mellitus), type 2, uncontrolled ([facility] E11.65    S/p nephrectomy Z90.5    AAA (abdominal aortic aneurysm) ([facility]) I71.4    Gastrointestinal disorder K92.9    Cancer ([facility] C80.1    Anemia D64.9    Multiple myeloma ([facility] C90.00    Stroke (cerebrum) ([facility]) I63.9    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

## 2018-03-07 NOTE — PROGRESS NOTES
1430 - called blood bank to see if pt's blood was ready, they informed that a type and screen was needed  1440 - attempted to draw type and screen x 2 attempts without success. 1500 - oncoming nurse, Annalisa zuleta was able to obtain sample, sample sent to blood bank. 1500 - shift report given to Annalisa zuleta RN.

## 2018-03-07 NOTE — PROGRESS NOTES
Hospitalist Progress Note    NAME: Susan Art   :  1940   MRN:  [id number]       Interim Hospital Summary: 66 y.o. male whom presented on 2018 with Flu     Assessment / Plan:    Diabetes type 2 uncontrolled  - Hgb A1C 9.8;  on glucotrol and NPH PTA.    -adjusting lantus with SSI prn    Hx of Smoldering Myeloma since   Anemia  - heme/onc following; bone marrow bx from 2018 revealed 60% involvement by plasma cells with atypical, almost plasmablastic appearance, indicating an aggressive process. No evidence of plasma cell leukemia   - Beta-2 microglobulin 12.1  - IgG lambda on JAIMIE, total IgG 2293. SFLC: lambda 1780  - PLEX per nephrology, last 3/02  - started on chemotherapy        DIANA on CKD secondary to myeloma POA  Hx renal cell cancer s/p nephrectomy  - solitary kidney (left); Permacath placement , HD started on ,   - weaning off dialysis, last Friday. Nephrology help appreciated      Acute hypoxemic respiratory failure and sepsis secondary to influenza A with pneumonia Acute on chronic COPD exacerbation  - CT chest Prominent emphysema. Bibasilar infiltrates  - difficult to wean off O2. Difficulty with weaning off O2 with worsening of SOB. Repeat CXR today.  Last CXR was done :  New small left pleural effusion. Small bibasilar airspace opacities seen on prior cross-sectional imaging are difficult to appreciate radiographically. - conmpleted tamiflu.  blood cx no growth  - Completed zithromax. Complete rocephin for 10 days   - continue with breo, mucinex, and duoneb via metaneb      HTN  - continue cardizem      Hx CVA  - continue statin; Resumed plavix along with ASA post EGD      GERD  Indigestion/dysphagia  - BAS: normal  - EGD 18 large H hernia. Multiple lance's erosions, severe duodenitis vs large laterally spreading villous adenoma. Will need a repeat EGD in 2-3 weeks when pt is stable.   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         Recommended Disposition: SNF once off HD          Subjective:     Chief Complaint / Reason for Physician Visit:   F/u MM, Renal failure, Bronchitis/Flu  Mood better today. No new complaints    Review of Systems:  Symptom Y/N Comments  Symptom Y/N Comments   Fever/Chills n   Chest Pain n    Poor Appetite    Edema     Cough y   Abdominal Pain     Sputum n   Joint Pain     SOB/HUERTA  improved  Pruritis/Rash     Nausea/vomit n   Tolerating PT/OT y    Diarrhea    Tolerating Diet y    Constipation    Other       Could NOT obtain due to:      Objective:     VITALS:   Last 24hrs VS reviewed since prior progress note. Most recent are:  Patient Vitals for the past 24 hrs:   Temp Pulse Resp BP SpO2   03/06/18 1922 98.1 °F (36.7 °C) 87 18 136/44 97 %   03/06/18 1437 - - - - 94 %   03/06/18 1430 98.1 °F (36.7 °C) 85 18 131/56 95 %   03/06/18 1048 98.4 °F (36.9 °C) 86 18 134/74 93 %   03/06/18 0815 - - - - 94 %   03/06/18 0712 98.3 °F (36.8 °C) 84 18 141/84 97 %   03/06/18 0249 - - - - 96 %   03/06/18 0244 98.1 °F (36.7 °C) 84 20 137/57 95 %   03/06/18 0030 97.9 °F (36.6 °C) 82 20 129/53 95 %       Intake/Output Summary (Last 24 hours) at 03/06/18 2050  Last data filed at 03/06/18 2000   Gross per 24 hour   Intake              180 ml   Output              375 ml   Net             -195 ml        PHYSICAL EXAM:  General: Chronically ill appearing. Alert, cooperative, no acute distress    EENT:  EOMI. Anicteric sclerae. MMM, L sided dialysis cath noted +  Resp:  Bilateral rhonchi, no wheezing or rales.   No accessory muscle use  CV:  Regular  rhythm,  No edema  GI:  Soft, Non distended, Non tender.  +Bowel sounds  Neurologic:  Alert and oriented X 3, normal speech,   Psych:   Good insight. Not anxious nor agitated  Skin:  No rashes. No jaundice    Reviewed most current lab test results and cultures  YES  Reviewed most current radiology test results   YES  Review and summation of old records today    NO  Reviewed patient's current orders and MAR    YES  PMH/SH reviewed - no change compared to H&P  ________________________________________________________________________  Care Plan discussed with:    Comments   Patient x    Family      RN x    Care Manager x    Consultant  x                      Multidiciplinary team rounds were held today with , nursing, pharmacist and clinical coordinator. Patient's plan of care was discussed; medications were reviewed and discharge planning was addressed. ________________________________________________________________________  Total NON critical care TIME:  25   Minutes    Total CRITICAL CARE TIME Spent:   Minutes non procedure based      Comments   >50% of visit spent in counseling and coordination of care     ________________________________________________________________________  Steve Eckert MD     Procedures: see electronic medical records for all procedures/Xrays and details which were not copied into this note but were reviewed prior to creation of Plan. LABS:  I reviewed today's most current labs and imaging studies.   Pertinent labs include:  Recent Labs      03/06/18   0248  03/05/18   0006  03/04/18   1032   WBC  6.4  7.0  13.3*   HGB  7.7*  7.0*  8.8*   HCT  24.1*  21.4*  27.2*   PLT  134*  151  198     Recent Labs      03/06/18   0248  03/05/18   0006  03/04/18   1032   NA  141  140  136   K  4.5  3.9  4.3   CL  106  105  99   CO2  26  27  28   GLU  271*  303*  434*   BUN  63*  60*  56*   CREA  3.78*  3.57*  3.73*   CA  8.3*  7.7*  8.6   MG  1.5*  1.8   --    PHOS  3.0  2.8   --    ALB  3.4*  3.5  3.6   TBILI  0.4 0.4  0.4   SGOT  8*  <3*  9*   ALT  15  10*  18       Signed: )Rich Madison MD

## 2018-03-08 LAB
ABO + RH BLD: NORMAL
ANION GAP SERPL CALC-SCNC: 8 MMOL/L (ref 5–15)
BLD PROD TYP BPU: NORMAL
BLOOD GROUP ANTIBODIES SERPL: NORMAL
BPU ID: NORMAL
BUN SERPL-MCNC: 66 MG/DL (ref 6–20)
BUN/CREAT SERPL: 19 (ref 12–20)
CALCIUM SERPL-MCNC: 8.4 MG/DL (ref 8.5–10.1)
CHLORIDE SERPL-SCNC: 108 MMOL/L (ref 97–108)
CO2 SERPL-SCNC: 25 MMOL/L (ref 21–32)
CREAT SERPL-MCNC: 3.45 MG/DL (ref 0.7–1.3)
CROSSMATCH RESULT,%XM: NORMAL
ERYTHROCYTE [DISTWIDTH] IN BLOOD BY AUTOMATED COUNT: 17.5 % (ref 11.5–14.5)
GLUCOSE BLD STRIP.AUTO-MCNC: 126 MG/DL (ref 65–100)
GLUCOSE BLD STRIP.AUTO-MCNC: 175 MG/DL (ref 65–100)
GLUCOSE BLD STRIP.AUTO-MCNC: 325 MG/DL (ref 65–100)
GLUCOSE BLD STRIP.AUTO-MCNC: 359 MG/DL (ref 65–100)
GLUCOSE SERPL-MCNC: 180 MG/DL (ref 65–100)
HCT VFR BLD AUTO: 26.5 % (ref 36.6–50.3)
HGB BLD-MCNC: 8.5 G/DL (ref 12.1–17)
MCH RBC QN AUTO: 27.3 PG (ref 26–34)
MCHC RBC AUTO-ENTMCNC: 32.1 G/DL (ref 30–36.5)
MCV RBC AUTO: 85.2 FL (ref 80–99)
NRBC # BLD: 0.02 K/UL (ref 0–0.01)
NRBC BLD-RTO: 0.2 PER 100 WBC
PLATELET # BLD AUTO: 127 K/UL (ref 150–400)
PMV BLD AUTO: 11.2 FL (ref 8.9–12.9)
POTASSIUM SERPL-SCNC: 4.1 MMOL/L (ref 3.5–5.1)
RBC # BLD AUTO: 3.11 M/UL (ref 4.1–5.7)
SERVICE CMNT-IMP: ABNORMAL
SODIUM SERPL-SCNC: 141 MMOL/L (ref 136–145)
SPECIMEN EXP DATE BLD: NORMAL
STATUS OF UNIT,%ST: NORMAL
UNIT DIVISION, %UDIV: 0
WBC # BLD AUTO: 8.3 K/UL (ref 4.1–11.1)

## 2018-03-08 PROCEDURE — 80048 BASIC METABOLIC PNL TOTAL CA: CPT

## 2018-03-08 PROCEDURE — 74011250637 HC RX REV CODE- 250/637: Performed by: INTERNAL MEDICINE

## 2018-03-08 PROCEDURE — 94640 AIRWAY INHALATION TREATMENT: CPT

## 2018-03-08 PROCEDURE — 74011000250 HC RX REV CODE- 250: Performed by: INTERNAL MEDICINE

## 2018-03-08 PROCEDURE — 74011250636 HC RX REV CODE- 250/636: Performed by: INTERNAL MEDICINE

## 2018-03-08 PROCEDURE — 74011000258 HC RX REV CODE- 258: Performed by: INTERNAL MEDICINE

## 2018-03-08 PROCEDURE — 74011250637 HC RX REV CODE- 250/637: Performed by: EMERGENCY MEDICINE

## 2018-03-08 PROCEDURE — 65660000000 HC RM CCU STEPDOWN

## 2018-03-08 PROCEDURE — 97535 SELF CARE MNGMENT TRAINING: CPT

## 2018-03-08 PROCEDURE — 82962 GLUCOSE BLOOD TEST: CPT

## 2018-03-08 PROCEDURE — 36415 COLL VENOUS BLD VENIPUNCTURE: CPT | Performed by: INTERNAL MEDICINE

## 2018-03-08 PROCEDURE — 74011636637 HC RX REV CODE- 636/637: Performed by: EMERGENCY MEDICINE

## 2018-03-08 PROCEDURE — 74011636637 HC RX REV CODE- 636/637: Performed by: INTERNAL MEDICINE

## 2018-03-08 PROCEDURE — 74011000250 HC RX REV CODE- 250: Performed by: NURSE PRACTITIONER

## 2018-03-08 PROCEDURE — 97116 GAIT TRAINING THERAPY: CPT

## 2018-03-08 PROCEDURE — 85027 COMPLETE CBC AUTOMATED: CPT | Performed by: INTERNAL MEDICINE

## 2018-03-08 PROCEDURE — 74011250637 HC RX REV CODE- 250/637: Performed by: PHYSICAL MEDICINE & REHABILITATION

## 2018-03-08 RX ORDER — INSULIN GLARGINE 100 [IU]/ML
25 INJECTION, SOLUTION SUBCUTANEOUS
Status: DISCONTINUED | OUTPATIENT
Start: 2018-03-08 | End: 2018-03-08

## 2018-03-08 RX ORDER — GRANISETRON HYDROCHLORIDE 1 MG/ML
1 INJECTION, SOLUTION INTRAVENOUS ONCE
Status: COMPLETED | OUTPATIENT
Start: 2018-03-08 | End: 2018-03-08

## 2018-03-08 RX ADMIN — SUCRALFATE 1 G: 1 SUSPENSION ORAL at 22:27

## 2018-03-08 RX ADMIN — CLOPIDOGREL BISULFATE 75 MG: 75 TABLET ORAL at 09:04

## 2018-03-08 RX ADMIN — INSULIN LISPRO 8 UNITS: 100 INJECTION, SOLUTION INTRAVENOUS; SUBCUTANEOUS at 22:00

## 2018-03-08 RX ADMIN — PANTOPRAZOLE SODIUM 40 MG: 40 TABLET, DELAYED RELEASE ORAL at 16:53

## 2018-03-08 RX ADMIN — DILTIAZEM HYDROCHLORIDE 240 MG: 240 CAPSULE, COATED, EXTENDED RELEASE ORAL at 09:02

## 2018-03-08 RX ADMIN — SUCRALFATE 1 G: 1 SUSPENSION ORAL at 16:53

## 2018-03-08 RX ADMIN — MELATONIN 3 MG ORAL TABLET 3 MG: 3 TABLET ORAL at 22:28

## 2018-03-08 RX ADMIN — ASPIRIN 81 MG 81 MG: 81 TABLET ORAL at 09:04

## 2018-03-08 RX ADMIN — IPRATROPIUM BROMIDE AND ALBUTEROL SULFATE 3 ML: .5; 3 SOLUTION RESPIRATORY (INHALATION) at 07:39

## 2018-03-08 RX ADMIN — ERYTHROPOIETIN 10000 UNITS: 10000 INJECTION, SOLUTION INTRAVENOUS; SUBCUTANEOUS at 18:52

## 2018-03-08 RX ADMIN — SUCRALFATE 1 G: 1 SUSPENSION ORAL at 12:12

## 2018-03-08 RX ADMIN — FLUTICASONE FUROATE AND VILANTEROL TRIFENATATE 1 PUFF: 100; 25 POWDER RESPIRATORY (INHALATION) at 12:13

## 2018-03-08 RX ADMIN — CYCLOPHOSPHAMIDE 500 MG: 500 INJECTION, POWDER, FOR SOLUTION INTRAVENOUS; ORAL at 14:13

## 2018-03-08 RX ADMIN — INSULIN LISPRO 5 UNITS: 100 INJECTION, SOLUTION INTRAVENOUS; SUBCUTANEOUS at 16:30

## 2018-03-08 RX ADMIN — GUAIFENESIN 600 MG: 600 TABLET, EXTENDED RELEASE ORAL at 18:32

## 2018-03-08 RX ADMIN — IPRATROPIUM BROMIDE AND ALBUTEROL SULFATE 3 ML: .5; 3 SOLUTION RESPIRATORY (INHALATION) at 02:09

## 2018-03-08 RX ADMIN — INSULIN LISPRO 3 UNITS: 100 INJECTION, SOLUTION INTRAVENOUS; SUBCUTANEOUS at 07:30

## 2018-03-08 RX ADMIN — GUAIFENESIN 600 MG: 600 TABLET, EXTENDED RELEASE ORAL at 09:02

## 2018-03-08 RX ADMIN — INSULIN LISPRO 5 UNITS: 100 INJECTION, SOLUTION INTRAVENOUS; SUBCUTANEOUS at 11:30

## 2018-03-08 RX ADMIN — UMECLIDINIUM 1 PUFF: 62.5 AEROSOL, POWDER ORAL at 12:13

## 2018-03-08 RX ADMIN — INSULIN LISPRO 5 UNITS: 100 INJECTION, SOLUTION INTRAVENOUS; SUBCUTANEOUS at 07:30

## 2018-03-08 RX ADMIN — PANTOPRAZOLE SODIUM 40 MG: 40 TABLET, DELAYED RELEASE ORAL at 09:02

## 2018-03-08 RX ADMIN — Medication 10 ML: at 13:46

## 2018-03-08 RX ADMIN — INSULIN LISPRO 10 UNITS: 100 INJECTION, SOLUTION INTRAVENOUS; SUBCUTANEOUS at 16:30

## 2018-03-08 RX ADMIN — INSULIN HUMAN 25 UNITS: 100 INJECTION, SUSPENSION SUBCUTANEOUS at 22:27

## 2018-03-08 RX ADMIN — MONTELUKAST SODIUM 10 MG: 10 TABLET, COATED ORAL at 09:02

## 2018-03-08 RX ADMIN — DEXAMETHASONE SODIUM PHOSPHATE 40 MG: 4 INJECTION, SOLUTION INTRA-ARTICULAR; INTRALESIONAL; INTRAMUSCULAR; INTRAVENOUS; SOFT TISSUE at 13:15

## 2018-03-08 RX ADMIN — IPRATROPIUM BROMIDE AND ALBUTEROL SULFATE 3 ML: .5; 3 SOLUTION RESPIRATORY (INHALATION) at 20:04

## 2018-03-08 RX ADMIN — GRANISETRON HYDROCHLORIDE 1 MG: 1 INJECTION, SOLUTION INTRAVENOUS at 13:15

## 2018-03-08 RX ADMIN — DOCUSATE SODIUM 100 MG: 100 CAPSULE, LIQUID FILLED ORAL at 09:02

## 2018-03-08 RX ADMIN — BORTEZOMIB 3.1 MG: 3.5 INJECTION, POWDER, LYOPHILIZED, FOR SOLUTION INTRAVENOUS; SUBCUTANEOUS at 14:12

## 2018-03-08 NOTE — PROGRESS NOTES
ADULT PROTOCOL: JET AEROSOL  REASSESSMENT    Patient  Loyd Steinberg     66 y.o.   male     3/8/2018  10:16 AM    Breath Sounds Pre Procedure: Right Breath Sounds: Diminished                               Left Breath Sounds: Diminished    Breath Sounds Post Procedure: Right Breath Sounds: Diminished                                 Left Breath Sounds: Diminished    Breathing pattern: Pre procedure Breathing Pattern: Regular          Post procedure Breathing Pattern: Regular    Heart Rate: Pre procedure Pulse: 77           Post procedure Pulse: 74    Resp Rate: Pre procedure Respirations: 18           Post procedure Respirations: 20      Cough: Pre procedure Cough: Congested               Post procedure Cough: Non-productive    Oxygen: O2 Device: Room air      Changed: NO    SpO2: Pre procedure SpO2: 92 %   without oxygen              Post procedure SpO2: 96 %  without oxygen    Nebulizer Therapy: Current medications Aerosolized Medications: DuoNeb      Changed: NO      Problem List:   Patient Active Problem List   Diagnosis Code    Acute respiratory failure ([facility]) J96.00    COPD with acute exacerbation ([facility]) J44.1    HTN (hypertension) I10    HTN (hypertension) I10    COPD (chronic obstructive pulmonary disease) ([facility]) J44.9    Cancer of kidney ([facility]) C64.9    Hypoglycemia, unspecified E16.2    Acute renal failure ([facility] N17.9    Hyperkalemia E87.5    S/P partial colectomy Z90.49    Diarrhea R19.7    DM (diabetes mellitus), type 2, uncontrolled ([facility] E11.65    S/p nephrectomy Z90.5    AAA (abdominal aortic aneurysm) ([facility]) I71.4    Gastrointestinal disorder K92.9    Cancer ([facility] C80.1    Anemia D64.9    Multiple myeloma ([facility]) C90.00    Stroke (cerebrum) ([facility]) I63.9    Thrombotic stroke involving left cerebellar artery ([facility]) H49.051    Stenosis of both internal carotid arteries I65.23    Diabetic peripheral neuropathy associated with type 2 diabetes mellitus ([facility]) E11.42    Hypoxemia R09.02 Respiratory Therapist: Olamide French, RT

## 2018-03-08 NOTE — PROGRESS NOTES
Problem: Self Care Deficits Care Plan (Adult)  Goal: *Acute Goals and Plan of Care (Insert Text)  Occupational Therapy Goals  Initiated 3/6/2018  1. Patient will perform standing ADLs at sink x 5 minutes without seated rest break with supervision/set-up within 7 day(s). 2.  Patient will perform lower body dressing utilizing compensatory techniques with supervision/set-up within 7 day(s). 3.  Patient will perform toilet transfers with least restrictive DME with supervision/set-up within 7 day(s). 4.  Patient will perform all aspects of toileting with supervision/set-up within 7 day(s). 5.  Patient will perform functional mobility with RW/ADL without verbal cueing for safety within 7 days. 6.  Patient will utilize energy conservation techniques during functional activities with verbal cues within 7 day(s). Occupational Therapy TREATMENT  Patient: Akhil Alfredo (86 y.o. male)  Date: 3/8/2018  Diagnosis: Hypoxemia  dysphagia <principal problem not specified>  Procedure(s) (LRB):  ESOPHAGOGASTRODUODENOSCOPY (EGD) (N/A) 8 Days Post-Op  Precautions: Bed Alarm, Fall  Chart, occupational therapy assessment, plan of care, and goals were reviewed. ASSESSMENT:  Nursing cleared for therapy. Patient received sitting, agreeable to therapy. Provided education on energy conservation techniques and PLB. Patient with mild agitation, reporting many other medical staff have educated him on PLB. Needing seated rest following ambulation with PT. Recovered with seated rest and PLB on RA within one minute. Provided education on safety with toileting tasks, completed with CGA without mobility AD but use of grab bar for sit > stand. Patient with increased coughing with activity. Left up in chair with alarm on. Recommend SNF for additional rehab secondary to extended hospitalization and complex medical case.   He is presenting below his baseline needing additional time and is a risk for falls because of Deconditioning. Patient in agreement and is hopeful to leave to rehab after chemo today. Progression toward goals:  []       Improving appropriately and progressing toward goals  [x]       Improving slowly and progressing toward goals  []       Not making progress toward goals and plan of care will be adjusted     PLAN:  Patient continues to benefit from skilled intervention to address the above impairments. Continue treatment per established plan of care. Discharge Recommendations:  Skilled Nursing Facility  Further Equipment Recommendations for Discharge:  TBD SNF     SUBJECTIVE:   Patient stated I am tired of them telling me in the nose and out the mouth.     OBJECTIVE DATA SUMMARY:   Cognitive/Behavioral Status:  Neurologic State: Alert; Appropriate for age                   Functional Mobility and Transfers for ADLs:  Bed Mobility:       Transfers:  Sit to Stand: Stand-by assistance  Functional Transfers  Toilet Transfer : Contact guard assistance (without RW- use of grab bar for sit > stand)    Balance:  Sitting: Intact  Standing: Intact; With support    ADL Intervention:   Provided education on energy conservation including alternating activity with rest and pacing. Patient with fair understanding. Will benefit from additional education with rehab. Copleted toileting without AD to bathroom with CGA, use of grab bar for sit > stand. No LOB with good pacing. Pain:  Pain Scale 1: Numeric (0 - 10)  Pain Intensity 1: 0              Activity Tolerance:   SpO2 to 84% with ambulation with PT then increase to 92% in sitting within one minute and up to 97%. Increased coughing with activity.      After treatment:   [x] Patient left in no apparent distress sitting up in chair  [] Patient left in no apparent distress in bed  [x] Call bell left within reach  [x] Nursing notified  [] Caregiver present  [x] Bed alarm activated    COMMUNICATION/COLLABORATION:   The patients plan of care was discussed with: Physical Therapist, Registered Nurse and Pateint    Helen Gil, OT  Time Calculation: 17 mins

## 2018-03-08 NOTE — PROGRESS NOTES
Mr Yue Tai refused to do the meta neb nebulizer for his treatment,  Mr Yue Tai requested to do his nebulizer treatments tonight with just the mask neb, he is also receiving blood tonight.

## 2018-03-08 NOTE — PROGRESS NOTES
7:20 PM Bedside report received from Tyler, [facility]. PRBCs transfusing x1 hour into #22 R FA. Patient tolerating well. VSS. No c/o pain. Will continue to monitor closely. 9:30 PM Blood transfusion complete. Patient tolerated well. Wound care complete. Will continue to monitor. 3:30 AM Paged Dr. Luciano Chau to clarify if BMP should be drawn this AM. Orders received. BMP and CBC drawn and sent to lab.    6:40 AM Patient requesting a bath after breakfast.    7:45 AM Bleeding noted at previous PIV site on R forearm. Bandaid removed and revealed a skin tear. Skin tear dressed with non-adherent pad and wrapped with josiah.  Patient assisted to recliner and set up for breakfast.

## 2018-03-08 NOTE — PROGRESS NOTES
Hospitalist Progress Note    NAME: Brain New   :  1940   MRN:  [id number]       Interim Hospital Summary: 66 y.o. male whom presented on 2018 with Flu     Assessment / Plan:    Diabetes type 2 uncontrolled  - Hgb A1C 9.8;  on glucotrol and NPH PTA.    - increase lantus 20 with SSI prn    Hx of Smoldering Myeloma since   Anemia  - heme/onc following; bone marrow bx from 2018 revealed 60% involvement by plasma cells with atypical, almost plasmablastic appearance, indicating an aggressive process. No evidence of plasma cell leukemia   - Beta-2 microglobulin 12.1  - IgG lambda on JAIMIE, total IgG 2293. SFLC: lambda 1780  - PLEX per nephrology, last 3/02  - started on chemotherapy        DIANA on CKD secondary to myeloma POA  Hx renal cell cancer s/p nephrectomy  - solitary kidney (left); Permacath placement , HD started on ,   - weaning off dialysis, last Friday. Nephrology help appreciated      Acute hypoxemic respiratory failure and sepsis secondary to influenza A with pneumonia Acute on chronic COPD exacerbation  - CT chest Prominent emphysema. Bibasilar infiltrates  - difficult to wean off O2. Difficulty with weaning off O2 with worsening of SOB. Repeat CXR today.  Last CXR was done :  New small left pleural effusion. Small bibasilar airspace opacities seen on prior cross-sectional imaging are difficult to appreciate radiographically. - conmpleted tamiflu. ; blood cx no growth  - Completed zithromax. Complete rocephin for 10 days   - continue with breo, mucinex, and duoneb via metaneb      HTN  - continue cardizem CD     Hx CVA  - continue asa, plavix and statin      GERD  Indigestion/dysphagia  - BAS: normal  - EGD 18 large H hernia. Multiple lance's erosions, severe duodenitis vs large laterally spreading villous adenoma. Will need a repeat EGD in 2-3 weeks when pt is stable.   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         Recommended Disposition: SNF once off HD          Subjective:     Chief Complaint / Reason for Physician Visit:   F/u MM, Renal failure, Bronchitis/Flu    Review of Systems:  Symptom Y/N Comments  Symptom Y/N Comments   Fever/Chills n   Chest Pain n    Poor Appetite    Edema     Cough y   Abdominal Pain     Sputum n   Joint Pain     SOB/HUERTA  improved  Pruritis/Rash     Nausea/vomit n   Tolerating PT/OT y    Diarrhea    Tolerating Diet y    Constipation    Other       Could NOT obtain due to:      Objective:     VITALS:   Last 24hrs VS reviewed since prior progress note. Most recent are:  Patient Vitals for the past 24 hrs:   Temp Pulse Resp BP SpO2   03/07/18 2125 97.9 °F (36.6 °C) 93 20 117/60 -   03/07/18 2024 98.1 °F (36.7 °C) 87 20 119/65 95 %   03/07/18 2023 - - - - 95 %   03/07/18 1927 98.3 °F (36.8 °C) 86 20 121/66 96 %   03/07/18 1852 98.1 °F (36.7 °C) 86 18 123/68 96 %   03/07/18 1838 98.3 °F (36.8 °C) 86 16 117/64 93 %   03/07/18 1819 98.1 °F (36.7 °C) 86 18 109/56 94 %   03/07/18 1540 97.9 °F (36.6 °C) 83 20 118/48 98 %   03/07/18 1430 - - - - 98 %   03/07/18 1127 97.5 °F (36.4 °C) 94 20 114/87 99 %   03/07/18 0827 - - - - 94 %   03/07/18 0749 98.1 °F (36.7 °C) 87 20 118/57 93 %   03/07/18 0326 97.9 °F (36.6 °C) 86 18 120/56 93 %   03/07/18 0143 - - - - 90 %   03/06/18 2333 97.4 °F (36.3 °C) 80 18 113/49 93 %       Intake/Output Summary (Last 24 hours) at 03/07/18 2222  Last data filed at 03/07/18 1730   Gross per 24 hour   Intake              300 ml   Output             1800 ml   Net            -1500 ml        PHYSICAL EXAM:  General: Chronically ill appearing. Alert, cooperative, no acute distress    EENT:  EOMI. Anicteric sclerae. MMM, L sided dialysis cath noted +  Resp:  Bilateral rhonchi, no wheezing or rales. No accessory muscle use  CV:  Regular  rhythm,  No edema  GI:  Soft, Non distended, Non tender.  +Bowel sounds  Neurologic:  Alert and oriented X 3, normal speech,   Psych:   Good insight. Not anxious nor agitated  Skin:  No rashes. No jaundice    Reviewed most current lab test results and cultures  YES  Reviewed most current radiology test results   YES  Review and summation of old records today    NO  Reviewed patient's current orders and MAR    YES  PMH/SH reviewed - no change compared to H&P  ________________________________________________________________________  Care Plan discussed with:    Comments   Patient x    Family  x Daughters   RN x    Care Manager x    Consultant                        Multidiciplinary team rounds were held today with , nursing, pharmacist and clinical coordinator. Patient's plan of care was discussed; medications were reviewed and discharge planning was addressed. ________________________________________________________________________  Total NON critical care TIME:  25   Minutes    Total CRITICAL CARE TIME Spent:   Minutes non procedure based      Comments   >50% of visit spent in counseling and coordination of care     ________________________________________________________________________  Molina Corbett MD     Procedures: see electronic medical records for all procedures/Xrays and details which were not copied into this note but were reviewed prior to creation of Plan. LABS:  I reviewed today's most current labs and imaging studies.   Pertinent labs include:  Recent Labs      03/07/18   0155  03/06/18   0248  03/05/18   0006   WBC  6.0  6.4  7.0   HGB  7.3*  7.7*  7.0*   HCT  22.9*  24.1*  21.4*   PLT  127*  134*  151     Recent Labs      03/07/18   0155  03/06/18   0248  03/05/18   0006   NA  142  141 140   K  4.2  4.5  3.9   CL  107  106  105   CO2  29  26  27   GLU  214*  271*  303*   BUN  63*  63*  60*   CREA  3.63*  3.78*  3.57*   CA  8.5  8.3*  7.7*   MG   --   1.5*  1.8   PHOS   --   3.0  2.8   ALB   --   3.4*  3.5   TBILI   --   0.4  0.4   SGOT   --   8*  <3*   ALT   --   15  10*       Signed: )Ashlie Carrillo MD

## 2018-03-08 NOTE — PROGRESS NOTES
[facility] Hematology-Oncology Progress Note      Brain [place]  1940  [id number]      3/8/2018  11:43 PM    Follow-up for: progressive multiple myeloma, DIANA     [x] Chart notes since last visit reviewed: Feels better   [x] Medications reviewed for allergies and interactions      Subjective:     Patient complains of the following: fatigue improved, feels much better  But he is very annoyed and aggravated by his care here.     No pain, NO SOB, no CP, no fever, no abd pain, ROS otherwise negative  Objective:     Patient Vitals for the past 24 hrs:   BP Temp Pulse Resp SpO2 Weight   03/08/18 0807 121/64 98.2 °F (36.8 °C) 84 16 93 % -   03/08/18 0739 - - - - 92 % -   03/08/18 0223 124/61 97.3 °F (36.3 °C) 79 20 93 % 77.2 kg (170 lb 1.6 oz)   03/08/18 0209 - - - - 93 % -   03/07/18 2316 137/60 98.7 °F (37.1 °C) 81 20 94 % -   03/07/18 2125 117/60 97.9 °F (36.6 °C) 93 20 92 % -   03/07/18 2024 119/65 98.1 °F (36.7 °C) 87 20 95 % -   03/07/18 2023 - - - - 95 % -   03/07/18 1927 121/66 98.3 °F (36.8 °C) 86 20 96 % -   03/07/18 1852 123/68 98.1 °F (36.7 °C) 86 18 96 % -   03/07/18 1838 117/64 98.3 °F (36.8 °C) 86 16 93 % -   03/07/18 1819 109/56 98.1 °F (36.7 °C) 86 18 94 % -   03/07/18 1540 118/48 97.9 °F (36.6 °C) 83 20 98 % -   03/07/18 1430 - - - - 98 % -   03/07/18 1127 114/87 97.5 °F (36.4 °C) 94 20 99 % -       Physical Exam:  General -Alert, and oriented    HEENT: NC, AT, pupils round  Neck: supple, no adenopathy appreciated    Lungs No distress  Abdomen- Soft, NT,ND,   Extre- No edema  Skin Echymosis on upper ext  Neuro- No focal sensory or motor deficits noted  Psych - alert, l.  verbalizes understanding of conversation     Available labs reviewed:  Labs:    Recent Results (from the past 24 hour(s))   GLUCOSE, POC    Collection Time: 03/07/18 11:30 AM   Result Value Ref Range    Glucose (POC) 243 (H) 65 - 100 mg/dL    Performed by Homero Bond    TYPE + CROSSMATCH    Collection Time: 03/07/18  3:40 PM   Result Value Ref Range    Crossmatch Expiration 03/10/2018     ABO/Rh(D) A POSITIVE     Antibody screen NEG     Unit number [id number]     Blood component type RC LR     Unit division 00     Status of unit ISSUED     Crossmatch result Compatible    GLUCOSE, POC    Collection Time: 03/07/18  5:00 PM   Result Value Ref Range    Glucose (POC) 212 (H) 65 - 100 mg/dL    Performed by Bert WATKINS    GLUCOSE, POC    Collection Time: 03/07/18  8:43 PM   Result Value Ref Range    Glucose (POC) 118 (H) 65 - 100 mg/dL    Performed by Rey Beasley    CBC W/O DIFF    Collection Time: 03/08/18  2:59 AM   Result Value Ref Range    WBC 8.3 4.1 - 11.1 K/uL    RBC 3.11 (L) 4.10 - 5.70 M/uL    HGB 8.5 (L) 12.1 - 17.0 g/dL    HCT 26.5 (L) 36.6 - 50.3 %    MCV 85.2 80.0 - 99.0 FL    MCH 27.3 26.0 - 34.0 PG    MCHC 32.1 30.0 - 36.5 g/dL    RDW 17.5 (H) 11.5 - 14.5 %    PLATELET 220 (L) 062 - 400 K/uL    MPV 11.2 8.9 - 12.9 FL    NRBC 0.2 (H) 0  WBC    ABSOLUTE NRBC 0.02 (H) 0.00 - 8.09 K/uL   METABOLIC PANEL, BASIC    Collection Time: 03/08/18  2:59 AM   Result Value Ref Range    Sodium 141 136 - 145 mmol/L    Potassium 4.1 3.5 - 5.1 mmol/L    Chloride 108 97 - 108 mmol/L    CO2 25 21 - 32 mmol/L    Anion gap 8 5 - 15 mmol/L    Glucose 180 (H) 65 - 100 mg/dL    BUN 66 (H) 6 - 20 MG/DL    Creatinine 3.45 (H) 0.70 - 1.30 MG/DL    BUN/Creatinine ratio 19 12 - 20      GFR est AA 21 (L) >60 ml/min/1.73m2    GFR est non-AA 17 (L) >60 ml/min/1.73m2    Calcium 8.4 (L) 8.5 - 10.1 MG/DL   GLUCOSE, POC    Collection Time: 03/08/18  8:10 AM   Result Value Ref Range    Glucose (POC) 175 (H) 65 - 100 mg/dL    Performed by Jesus Magdaleno (PCT)        Imaging:  CXR Results  (Last 48 hours)    None        CT Results  (Last 48 hours)    None            Assessment:   Influenza  History of smoldering myeloma- since 2012  Acute on chronic renal failure attributed to myeloma  Hypercalcemia  DM- worsened by steroids    Plan:     Multiple myeloma- IgG lambda on JAIMIE, total IgG 2293. SFLC: lambda 1780.- Repeat FLC has dropped very significantly     ----- BMbx 2/22/18: 50% involvement by monoclonal plasma cells with plasmablastic morphology (previously in 2012 had 10% plasma cells and was followed for years with close observation)  ---- started CyBoRD regimen - C1D1- 2/23/2018 - Cytoxan with 20% dose reduction and full dose of Velcade with 40 mg Dex after HD and plamspharesis. PLEX per Nephrology, last dose was 3/2/2018 . Plan for D15 treatment with Cytoxan, dex and bortezomib today - 38/2018 and then  Can be continued weekly as outpatient. D/W Pharmacist- Theo Nur- appreciate her help  --- Normochromic normocytic anemia likely secondary to myeloma - s/p 1 unit pRBC on 3/7/2018 - hct is bit better  Hct worse at 21- asymptomatic. Repeat cbc in am. Cont Procrit- 10,000 units Tue-thurs-sat    ----DIANA  secondary to underlying Multiple Myeloma. Hemodialysis and plasmapheresis per nephrology. Now off HD since last Friday. Noted plans for d/c Harvinder today and then f/u with Nephrology as outpatient. Appreciate their help.    ----Dysphagia -  EGD showed hiatal hernia, inflamed mid to distal esophagus, and either severe duodenitis or large laterall spreading villous adenoma. Plan is to continue acid suppression and carafate. PO intake  improved.     ----DM- Last hb A1c 9.8 - Will defer to Hospitalist MD. Likely worsened by his weekly Steroids.     Ok to D/c today  after Harvinder removed, Procrit given and Chemo given today prior to d/c    F/U with [facility]- to continue weekly CyBorD and procrit- next week

## 2018-03-08 NOTE — PROGRESS NOTES
Hospitalist Progress Note    NAME: Jimmy Garay   :  1940   MRN:  [id number]       Interim Hospital Summary: 66 y.o. male whom presented on 2018 with Flu     Assessment / Plan:    Diabetes type 2 uncontrolled  - Hgb A1C 9.8;  on glucotrol and NPH 25 QHS PTA. - will switch back to his usual NPH dosing today    Hx of Smoldering Myeloma since   Anemia  - heme/onc following; bone marrow bx from 2018 revealed 60% involvement by plasma cells with atypical, almost plasmablastic appearance, indicating an aggressive process. No evidence of plasma cell leukemia   - Beta-2 microglobulin 12.1  - IgG lambda on JAIMIE, total IgG 2293. SFLC: lambda 1780  - PLEX per nephrology, last 3/02  - started on chemotherapy  . Continue weekly through [facility] office. Patient will be transported from the SNF weekly for this.        DIANA on CKD secondary to myeloma POA  Hx renal cell cancer s/p nephrectomy  - solitary kidney (left); Permacath placement , HD started on ,   - weaning off dialysis and catheter removed today. Nephrology help appreciated      Acute hypoxemic respiratory failure and sepsis secondary to influenza A with pneumonia Acute on chronic COPD exacerbation  - CT chest Prominent emphysema. Bibasilar infiltrates  - difficult to wean off O2. Difficulty with weaning off O2 with worsening of SOB. Repeat CXR today.  Last CXR was done :  New small left pleural effusion. Small bibasilar airspace opacities seen on prior cross-sectional imaging are difficult to appreciate radiographically. - conmpleted tamiflu. ; blood cx no growth  - Completed zithromax. Complete rocephin for 10 days   - continue with breo, mucinex, and duoneb via metaneb      HTN  - continue cardizem CD     Hx CVA  - continue asa, plavix and statin      GERD  Indigestion/dysphagia  - BAS: normal  - EGD 18 large H hernia. Multiple lance's erosions, severe duodenitis vs large laterally spreading villous adenoma. Will need a repeat EGD in 2-3 weeks when pt is stable. 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         Recommended Disposition: SNF once off HD          Subjective:     Chief Complaint / Reason for Physician Visit:   F/u MM, Renal failure, Bronchitis/Flu    Review of Systems:  Symptom Y/N Comments  Symptom Y/N Comments   Fever/Chills n   Chest Pain n    Poor Appetite    Edema     Cough y   Abdominal Pain     Sputum n   Joint Pain     SOB/HUERTA  improved  Pruritis/Rash     Nausea/vomit n   Tolerating PT/OT y    Diarrhea    Tolerating Diet y    Constipation    Other       Could NOT obtain due to:      Objective:     VITALS:   Last 24hrs VS reviewed since prior progress note.  Most recent are:  Patient Vitals for the past 24 hrs:   Temp Pulse Resp BP SpO2   03/08/18 1548 98.3 °F (36.8 °C) 84 20 125/58 92 %   03/08/18 1147 - 85 16 112/61 93 %   03/08/18 0807 98.2 °F (36.8 °C) 84 16 121/64 93 %   03/08/18 0739 - - - - 92 %   03/08/18 0223 97.3 °F (36.3 °C) 79 20 124/61 93 %   03/08/18 0209 - - - - 93 %   03/07/18 2316 98.7 °F (37.1 °C) 81 20 137/60 94 %   03/07/18 2125 97.9 °F (36.6 °C) 93 20 117/60 92 %   03/07/18 2024 98.1 °F (36.7 °C) 87 20 119/65 95 %   03/07/18 2023 - - - - 95 %   03/07/18 1927 98.3 °F (36.8 °C) 86 20 121/66 96 %   03/07/18 1852 98.1 °F (36.7 °C) 86 18 123/68 96 %   03/07/18 1838 98.3 °F (36.8 °C) 86 16 117/64 93 %   03/07/18 1819 98.1 °F (36.7 °C) 86 18 109/56 94 %       Intake/Output Summary (Last 24 hours) at 03/08/18 1701  Last data filed at 03/08/18 0909   Gross per 24 hour   Intake            836.7 ml   Output             1250 ml Net           -413.3 ml        PHYSICAL EXAM:  General: Chronically ill appearing. Alert, cooperative, no acute distress    EENT:  EOMI. Anicteric sclerae. MMM, L sided dialysis cath noted +  Resp:  Bilateral rhonchi, no wheezing or rales. No accessory muscle use  CV:  Regular  rhythm,  No edema  GI:  Soft, Non distended, Non tender.  +Bowel sounds  Neurologic:  Alert and oriented X 3, normal speech,   Psych:   Good insight. Not anxious nor agitated  Skin:  No rashes. No jaundice    Reviewed most current lab test results and cultures  YES  Reviewed most current radiology test results   YES  Review and summation of old records today    NO  Reviewed patient's current orders and MAR    YES  PMH/SH reviewed - no change compared to H&P  ________________________________________________________________________  Care Plan discussed with:    Comments   Patient x    Family   Daughters   RN x    Care Manager x    Consultant                        Multidiciplinary team rounds were held today with , nursing, pharmacist and clinical coordinator. Patient's plan of care was discussed; medications were reviewed and discharge planning was addressed. ________________________________________________________________________  Total NON critical care TIME:  25   Minutes    Total CRITICAL CARE TIME Spent:   Minutes non procedure based      Comments   >50% of visit spent in counseling and coordination of care     ________________________________________________________________________  Markus Lubin MD     Procedures: see electronic medical records for all procedures/Xrays and details which were not copied into this note but were reviewed prior to creation of Plan. LABS:  I reviewed today's most current labs and imaging studies.   Pertinent labs include:  Recent Labs      03/08/18   0259  03/07/18   0155  03/06/18   0248   WBC  8.3  6.0  6.4   HGB  8.5*  7.3*  7.7*   HCT  26.5*  22.9*  24.1*   PLT  127*  127*  134* Recent Labs      03/08/18   0259  03/07/18   0155  03/06/18   0248   NA  141  142  141   K  4.1  4.2  4.5   CL  108  107  106   CO2  25  29  26   GLU  180*  214*  271*   BUN  66*  63*  63*   CREA  3.45*  3.63*  3.78*   CA  8.4*  8.5  8.3*   MG   --    --   1.5*   PHOS   --    --   3.0   ALB   --    --   3.4*   TBILI   --    --   0.4   SGOT   --    --   8*   ALT   --    --   15       Signed: )Tiara Walters MD

## 2018-03-08 NOTE — PROGRESS NOTES
Problem: Mobility Impaired (Adult and Pediatric)  Goal: *Acute Goals and Plan of Care (Insert Text)  Physical Therapy Goals  Initiated 2/21/2018  All goals remain appropriate for next 7 days- 3/6/18  1. Patient will move from supine to sit and sit to supine , scoot up and down and roll side to side in bed with independence within 7 day(s). 2.  Patient will transfer from bed to chair and chair to bed with independence using the least restrictive device within 7 day(s). 3.  Patient will perform sit to stand with independence within 7 day(s). 4.  Patient will ambulate with independence for 200 feet with the least restrictive device within 7 day(s). 5.  Patient will ascend/descend 12 stairs with 1 handrail(s) with modified independence within 7 day(s). physical Therapy TREATMENT  Patient: Anuj Forman (41 y.o. male)  Date: 3/8/2018  Diagnosis: Hypoxemia  dysphagia <principal problem not specified>  Procedure(s) (LRB):  ESOPHAGOGASTRODUODENOSCOPY (EGD) (N/A) 8 Days Post-Op  Precautions: Bed Alarm, Fall  Chart, physical therapy assessment, plan of care and goals were reviewed. ASSESSMENT:  Pt was received sitting up in the chair and cleared by nursing to mobilize. Ambulated on RA during session and pt dropped to 84% with ambulation. He reported concerns about care in the hospital, but seems pleasant and agreeable with PT/OT. Sit<>stand was performed with SBA. Ambulated in the avelar for 175ft with RW and CGA/SBA, declined attempting to ambulate long distance without AD. Oxygen dropped initially to 74% on RA and recovered to min 80s with PLB. Ambulated back to the room and remained ~84%. He was returned to sitting up in the chair and needed cues for safety with RW (leaving in behind and reaching for chair). After resting he ambulated into the bathroom and performed toilet transfer with SBA and using grab bar, stated he needed the bar and declined attempting without. Returned to the chair and left sitting up. Continue to recommend SNF  Progression toward goals:  []    Improving appropriately and progressing toward goals  [x]    Improving slowly and progressing toward goals  []    Not making progress toward goals and plan of care will be adjusted     PLAN:  Patient continues to benefit from skilled intervention to address the above impairments. Continue treatment per established plan of care. Discharge Recommendations:  Klaus Gaytan  Further Equipment Recommendations for Discharge:  TBD     SUBJECTIVE:   Patient stated Deysi alan two for helping me.     OBJECTIVE DATA SUMMARY:   Critical Behavior:  Neurologic State: Alert           Functional Mobility Training:  Bed Mobility:      session began and ended in sitting              Transfers:  Sit to Stand: Stand-by assistance  Stand to Sit: Stand-by assistance (performed unsafely (leaving walker behind))                             Balance:  Sitting: Intact  Standing: Intact; With support  Ambulation/Gait Training:  Distance (ft): 175 Feet (ft)  Assistive Device: Gait belt;Walker, rolling  Ambulation - Level of Assistance: Contact guard assistance;Stand-by assistance        Gait Abnormalities: Decreased step clearance (incrased forward flexion)        Base of Support: Widened     Speed/Veronica: Pace decreased (<100 feet/min); Shuffled  Step Length: Left shortened;Right shortened                Pain:  Pain Scale 1: Numeric (0 - 10)  Pain Intensity 1: 0              Activity Tolerance:   hypoxic  Please refer to the flowsheet for vital signs taken during this treatment.   After treatment:   [x]    Patient left in no apparent distress sitting up in chair  []    Patient left in no apparent distress in bed  [x]    Call bell left within reach  [x]    Nursing notified  []    Caregiver present  [x]    Bed alarm activated    COMMUNICATION/COLLABORATION:   The patients plan of care was discussed with: Occupational Therapist and Registered Nurse    Shabbir Cohen PT, DPT   Time Calculation: 17 mins

## 2018-03-08 NOTE — PROGRESS NOTES
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 Most recent are:  Visit Vitals    /64 (BP 1 Location: Right arm, BP Patient Position: Sitting)    Pulse 84    Temp 98.2 °F (36.8 °C)    Resp 16    Ht 6' (1.829 m)    Wt 77.2 kg (170 lb 1.6 oz)    SpO2 93%    BMI 23.07 kg/m2       Intake/Output Summary (Last 24 hours) at 18 0947  Last data filed at 18 3273   Gross per 24 hour   Intake            836.7 ml   Output             1975 ml   Net          -1138.3 ml      Telemetry Reviewed:     PHYSICAL EXAM:  General:    resting    ________________________________________________________________________  Tayla Forrester MD     Procedures: see electronic medical records for all procedures/Xrays and details which  were not copied into this note but were reviewed prior to creation of Plan.       LABS:  Recent Labs      18   02518   0155   WBC  8.3  6.0   HGB  8.5*  7.3*   HCT  26.5*  22.9*   PLT  127*  127*     Recent Labs      18   0259  18   0155  18   0248   NA  141  142  141   K  4.1  4.2  4.5   CL  108  107  106   CO2  25  29  26   BUN  66*  63*  63*   CREA  3.45*  3.63*  3.78*   GLU  180*  214*  271*   CA  8.4*  8.5  8.3*   MG   --    --   1.5*   PHOS   --    --   3.0     Recent Labs      18   0248   PORSHA  8*   AP 32*   TP  5.0*   ALB  3.4*   GLOB  1.6*     No results for input(s): INR, PTP, APTT in the last 72 hours. No lab exists for component: INREXT, INREXT   No results for input(s): FE, TIBC, PSAT, FERR in the last 72 hours. No results found for: FOL, RBCF   No results for input(s): PH, PCO2, PO2 in the last 72 hours. No results for input(s): CPK, CKMB in the last 72 hours.     No lab exists for component: TROPONINI  No components found for: [place]  Lab Results   Component Value Date/Time    Color YELLOW/STRAW 02/19/2018 03:07 PM    Appearance CLOUDY (A) 02/19/2018 03:07 PM    Specific gravity 1.020 02/19/2018 03:07 PM    pH (UA) 5.5 02/19/2018 03:07 PM    Protein 100 (A) 02/19/2018 03:07 PM    Glucose NEGATIVE  02/19/2018 03:07 PM    Ketone NEGATIVE  02/19/2018 03:07 PM    Bilirubin NEGATIVE  02/19/2018 03:07 PM    Urobilinogen 0.2 02/19/2018 03:07 PM    Nitrites NEGATIVE  02/19/2018 03:07 PM    Leukocyte Esterase NEGATIVE  02/19/2018 03:07 PM    Epithelial cells FEW 02/19/2018 03:07 PM    Bacteria 1+ (A) 02/19/2018 03:07 PM    WBC 0-4 02/19/2018 03:07 PM    RBC 0-5 02/19/2018 03:07 PM       MEDICATIONS:

## 2018-03-08 NOTE — PROGRESS NOTES
CM held Care Conference with pt and MD. Pt to discharge tomorrow morning to [facility]. Pt is agreeable at this time. CM sent referral to [facility] for BLS transport via [employer] for 9:00AM, pt accepted.      Pt to discharge to [facility] at 9:00AM via [facility] BLS transport    REY Garcia Supervisee in Social Work, [address] [place] [address]  [phone]

## 2018-03-09 VITALS
BODY MASS INDEX: 22.69 KG/M2 | HEART RATE: 89 BPM | OXYGEN SATURATION: 92 % | TEMPERATURE: 97.6 F | WEIGHT: 167.55 LBS | RESPIRATION RATE: 18 BRPM | SYSTOLIC BLOOD PRESSURE: 113 MMHG | DIASTOLIC BLOOD PRESSURE: 58 MMHG | HEIGHT: 72 IN

## 2018-03-09 LAB
GLUCOSE BLD STRIP.AUTO-MCNC: 318 MG/DL (ref 65–100)
SERVICE CMNT-IMP: ABNORMAL

## 2018-03-09 PROCEDURE — 94640 AIRWAY INHALATION TREATMENT: CPT

## 2018-03-09 PROCEDURE — 74011250637 HC RX REV CODE- 250/637: Performed by: INTERNAL MEDICINE

## 2018-03-09 PROCEDURE — 74011250637 HC RX REV CODE- 250/637: Performed by: EMERGENCY MEDICINE

## 2018-03-09 PROCEDURE — 82962 GLUCOSE BLOOD TEST: CPT

## 2018-03-09 PROCEDURE — 74011000250 HC RX REV CODE- 250: Performed by: NURSE PRACTITIONER

## 2018-03-09 PROCEDURE — 77030011256 HC DRSG MEPILEX <16IN NO BORD MOLN -A

## 2018-03-09 PROCEDURE — 74011636637 HC RX REV CODE- 636/637: Performed by: EMERGENCY MEDICINE

## 2018-03-09 RX ORDER — INSULIN LISPRO 100 [IU]/ML
INJECTION, SOLUTION INTRAVENOUS; SUBCUTANEOUS
Qty: 1 VIAL | Refills: 0 | Status: SHIPPED
Start: 2018-03-09 | End: 2019-01-01

## 2018-03-09 RX ORDER — DOCUSATE SODIUM 100 MG/1
100 CAPSULE, LIQUID FILLED ORAL 2 TIMES DAILY
Qty: 60 CAP | Refills: 2 | Status: SHIPPED
Start: 2018-03-09 | End: 2018-01-01

## 2018-03-09 RX ORDER — IPRATROPIUM BROMIDE AND ALBUTEROL SULFATE 2.5; .5 MG/3ML; MG/3ML
3 SOLUTION RESPIRATORY (INHALATION)
Qty: 30 NEBULE | Refills: 1 | Status: SHIPPED | OUTPATIENT
Start: 2018-03-09 | End: 2019-01-01

## 2018-03-09 RX ORDER — PANTOPRAZOLE SODIUM 40 MG/1
40 TABLET, DELAYED RELEASE ORAL
Qty: 60 TAB | Refills: 1 | Status: SHIPPED
Start: 2018-03-09 | End: 2018-01-01

## 2018-03-09 RX ORDER — SUCRALFATE 1 G/10ML
1 SUSPENSION ORAL
Qty: 1200 ML | Refills: 1 | Status: SHIPPED
Start: 2018-03-09 | End: 2018-01-01

## 2018-03-09 RX ORDER — IPRATROPIUM BROMIDE AND ALBUTEROL SULFATE 2.5; .5 MG/3ML; MG/3ML
3 SOLUTION RESPIRATORY (INHALATION)
Status: DISCONTINUED | OUTPATIENT
Start: 2018-03-09 | End: 2018-03-09 | Stop reason: HOSPADM

## 2018-03-09 RX ADMIN — DILTIAZEM HYDROCHLORIDE 240 MG: 240 CAPSULE, COATED, EXTENDED RELEASE ORAL at 08:09

## 2018-03-09 RX ADMIN — UMECLIDINIUM 1 PUFF: 62.5 AEROSOL, POWDER ORAL at 08:13

## 2018-03-09 RX ADMIN — MONTELUKAST SODIUM 10 MG: 10 TABLET, COATED ORAL at 08:09

## 2018-03-09 RX ADMIN — FLUTICASONE FUROATE AND VILANTEROL TRIFENATATE 1 PUFF: 100; 25 POWDER RESPIRATORY (INHALATION) at 08:13

## 2018-03-09 RX ADMIN — IPRATROPIUM BROMIDE AND ALBUTEROL SULFATE 3 ML: .5; 3 SOLUTION RESPIRATORY (INHALATION) at 01:26

## 2018-03-09 RX ADMIN — GUAIFENESIN 600 MG: 600 TABLET, EXTENDED RELEASE ORAL at 08:09

## 2018-03-09 RX ADMIN — ASPIRIN 81 MG 81 MG: 81 TABLET ORAL at 08:09

## 2018-03-09 RX ADMIN — CLOPIDOGREL BISULFATE 75 MG: 75 TABLET ORAL at 08:09

## 2018-03-09 RX ADMIN — INSULIN LISPRO 5 UNITS: 100 INJECTION, SOLUTION INTRAVENOUS; SUBCUTANEOUS at 08:08

## 2018-03-09 RX ADMIN — PANTOPRAZOLE SODIUM 40 MG: 40 TABLET, DELAYED RELEASE ORAL at 08:09

## 2018-03-09 RX ADMIN — INSULIN LISPRO 10 UNITS: 100 INJECTION, SOLUTION INTRAVENOUS; SUBCUTANEOUS at 08:08

## 2018-03-09 NOTE — PROGRESS NOTES
Resting with eyes closed appears comfortable and relaxed    03/09/2018 0000 Up to bathroom with assist. orin activity well.    03/09/2018 0200 Eyes closed appears to be sleeping at this time NAD noted.     03/09/2018 0600 awake denies needs or c/o

## 2018-03-09 NOTE — DISCHARGE SUMMARY
Hospitalist Discharge Summary     Patient ID:  Delfina Stovall  [id number]  66 y.o.  1940    PCP on record: Roopa Davis MD    Admit date: 2/19/2018  Discharge date and time: 3/9/2018      DISCHARGE DIAGNOSIS:    Acute hypoxemic respiratory failure and sepsis secondary to influenza A with pneumonia Acute on chronic COPD exacerbation  Hx of Smoldering Myeloma since 2012  Anemia  DIANA on CKD secondary to myeloma POA  Hx renal cell cancer s/p nephrectomy  Diabetes type 2 uncontrolled  HTN  Hx CVA  GERD  Indigestion/dysphagia  - EGD 2/28/18 large H hernia. Multiple lance's erosions, severe duodenitis vs large laterally spreading villous adenoma. 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  male with a history of COPD, DM, HTN, CVA and MM who was referred to this ER because of SOB and low oxygen saturations. Patient started 2 days ago with chills, cough, yellow sputum, SOB and difficulty sleeping. He has some prednisone at home on standby and he took 2 tablets. His symptoms got worse and he presented to a walk in clinic today and found his oxygen to be in the 70s so he was referred to the ER. CT chest demonstrates bibasilar infiltrates and he is also influenza positive. We were asked to admit for work up and evaluation of the above problems    ______________________________________________________________________  DISCHARGE SUMMARY/HOSPITAL COURSE:  for full details see H&P, daily progress notes, labs, consult notes.      Acute hypoxemic respiratory failure and sepsis secondary to influenza A with pneumonia Acute on chronic COPD exacerbation  - CT chest Prominent emphysema. Bibasilar infiltrates  - conmpleted tamiflu. 2/20; blood cx no growth  - Completed zithromax. Complete rocephin for 10 days   - continue with symbicort, spiriva and duoneb prn     Hx of Smoldering Myeloma since 2012  Anemia  - heme/onc following; bone marrow bx from 2/22/2018 revealed 60% involvement by plasma cells with atypical, almost plasmablastic appearance, indicating an aggressive process. No evidence of plasma cell leukemia   - Beta-2 microglobulin 12.1  - IgG lambda on JAIMIE, total IgG 2293. SFLC: lambda 1780  - started CyBoRD regimen - C1D1- 2/23/2018 - Cytoxan with 20% dose reduction and full dose of Velcade with 40 mg Dex after HD and plamspharesis. PLEX per Nephrology, last dose was 3/2/2018 . Plan for D15 treatment with Cytoxan, dex and bortezomib today - 38/2018 and then  Can be continued weekly as outpatient through Dr Luna Pepper office - will ask SNF to help arrange while he is still in rehab.       DIANA on CKD secondary to myeloma POA  Hx renal cell cancer s/p nephrectomy  - solitary kidney (left); Permacath placement 2/22, HD started on 2/22,   - weaned off dialysis and catheter removed 3/08. Nephrology help appreciated. Renal function now stable off dialysis      Diabetes type 2 uncontrolled  - Hgb A1C 9.8;     - will switch back to his usual NPH + glucotrol regimen    HTN  - continue cardizem CD      Hx CVA  - continue asa, plavix and statin      GERD  Indigestion/dysphagia  - BAS: normal  - EGD 2/28/18 large H hernia. Multiple lance's erosions, severe duodenitis vs large laterally spreading villous adenoma. Will need a repeat EGD in 2-3 weeks when pt is stable.   Pt must be off from ASA and Plavix for repeat EGD   - continue with PPI & sucralfate      S/p colectomy for diverticulitis  Hx esophageal rupture from violent vomiting  Abdominal aortic aneurysm   -hx of aortic sleeve years ago      _______________________________________________________________________  Patient seen and examined by me on discharge day. Pertinent Findings:  Gen:    Not in distress  Chest: Coarse BS, no wheezing  CVS:   Regular rhythm. No edema  Abd:  Soft, not distended, not tender  Neuro:  Alert, Oriented x 4, grossly non focal exam  _______________________________________________________________________  DISCHARGE MEDICATIONS:   Current Discharge Medication List      START taking these medications    Details   albuterol-ipratropium (DUO-NEB) 2.5 mg-0.5 mg/3 ml nebu 3 mL by Nebulization route every four (4) hours as needed. Qty: 30 Nebule, Refills: 1      docusate sodium (COLACE) 100 mg capsule Take 1 Cap by mouth two (2) times a day for 90 days. Qty: 60 Cap, Refills: 2      insulin lispro (HUMALOG) 100 unit/mL injection INITIATE CORRECTIVE INSULIN PROTOCOL (TOAN):  RX TOAN Normal Sensitivity (Average weight)  For Blood Sugar (mg/dl) of:              111-150=2 units  151-200=4 units  201-250=6 units  251-300=9 units  301-350=12 units  Over 350= Call MD  Qty: 1 Vial, Refills: 0      pantoprazole (PROTONIX) 40 mg tablet Take 1 Tab by mouth Before breakfast and dinner for 60 days. Qty: 60 Tab, Refills: 1      sucralfate (CARAFATE) 100 mg/mL suspension Take 10 mL by mouth Before breakfast, lunch, dinner and at bedtime for 60 days. Qty: 1200 mL, Refills: 1         CONTINUE these medications which have NOT CHANGED    Details   insulin NPH (NOVOLIN N, HUMULIN N) 100 unit/mL injection 25 Units by SubCUTAneous route nightly. Qty: 1 Vial, Refills: 1      acetaminophen (TYLENOL) 325 mg tablet Take 2 Tabs by mouth every four (4) hours as needed for Fever (For temp greater than or equal to 38.5 C or 101.3 F (Unless hepatic failure or contrindicated). Give first line for fever. ). Qty: 40 Tab, Refills: 0      aspirin 81 mg chewable tablet Take 1 Tab by mouth daily.   Qty: 30 Tab, Refills: 1      clopidogrel (PLAVIX) 75 mg tablet Take 1 Tab by mouth daily. Qty: 30 Tab, Refills: 1      cholecalciferol (VITAMIN D3) 1,000 unit cap Take 1,000 Units by mouth daily. rosuvastatin (CRESTOR) 40 mg tablet Take 20 mg by mouth nightly. tiotropium (SPIRIVA WITH HANDIHALER) 18 mcg inhalation capsule Take 1 Cap by inhalation daily. glipiZIDE (GLUCOTROL) 10 mg tablet Take 5 mg by mouth daily. budesonide-formoterol (SYMBICORT) 160-4.5 mcg/actuation HFA inhaler Take 2 Puffs by inhalation daily. Indications: COPD ASSOCIATED WITH CHRONIC BRONCHITIS      albuterol (PROVENTIL HFA, VENTOLIN HFA) 90 mcg/Actuation inhaler Take 2 Puffs by inhalation every six (6) hours as needed for Wheezing and Shortness of Breath. Qty: 1 Inhaler, Refills: 2      diltiazem CD (CARDIZEM CD) 240 mg ER capsule Take 1 Cap by mouth daily. Qty: 30 Cap, Refills: 2      montelukast (SINGULAIR) 10 mg tablet Take 10 mg by mouth daily. STOP taking these medications       predniSONE (STERAPRED) 5 mg dose pack Comments:   Reason for Stopping:         insulin regular (NOVOLIN R) 100 unit/mL injection Comments:   Reason for Stopping:         ranitidine (ZANTAC) 150 mg tablet Comments:   Reason for Stopping:         albuterol (PROVENTIL VENTOLIN) 2.5 mg /3 mL (0.083 %) nebulizer solution Comments:   Reason for Stopping:         loratadine 10 mg cap Comments:   Reason for Stopping:               My Recommended Diet, Activity, Wound Care, and follow-up labs are listed in the patient's Discharge Insturctions which I have personally completed and reviewed.   Risk of deterioration: Low    Condition at Discharge:  Stable  _____________________________________________________________________    Disposition  SNF/LTC  ____________________________________________________________________    Care Plan discussed with:   Patient, Family, RN, Care Manager, Consultant    ____________________________________________________________________    Code Status: Full Code  ____________________________________________________________________      Condition at Discharge:  Stable  _____________________________________________________________________  Follow up with:   PCP : Roopa Davis MD  Follow-up Information     Follow up With Details Comments Contact [facility]    [facility] This is your post-acute skilled nursing facility rehabilitation provider. [address]  [phone]      Imani Curran in 25 days MD's clinic will contact patient directly to schedule appointment.   [facility] 4  [phone]    Bárbara Piedra MD Go in 1 week Continue OP Chemotherapy treatment as scheduled by MD's office  [address], [place] In 1 month  [address] [facility] [address] [phone]      Roopa Davis MD   Patient can only remember the practice name and not the physician                Total time in minutes spent coordinating this discharge (includes going over instructions, follow-up, prescriptions, and preparing report for sign off to her PCP) :  35 minutes    Signed:  Molina Corbett MD

## 2018-03-09 NOTE — PROGRESS NOTES
Pt to discharge to [facility] by [facility] transport at 9:00AM. PCS paperwork placed on bedside chart. Pt will continue OP Chemotherapy treatment at Dr. Rush Griffin office, confirmed with Hematology and [facility] Liaison. CM attempted to schedule PCP f/u appointment at [facility] (Mikie Butterfield MD), CM informed by call center that they will send message to clinic and contact pt directly to schedule appointment. All information entered into pt AVS.     Pt has no additional CM needs at this time. Call Report [phone]. Please ensure any hard prescriptions for narcotics discharge with pt to SNF. Care Management Interventions  PCP Verified by CM:  Yes  Palliative Care Criteria Met (RRAT>21 & CHF Dx)?: No  Mode of Transport at Discharge: [facility] ([facility])  [facility] Transport Time of Discharge: 0900  Transition of Care Consult (CM Consult): SNF [facility].)  Partner SNF: Yes  Discharge Durable Medical Equipment: No  Health Maintenance Reviewed: Yes  Physical Therapy Consult: Yes  Occupational Therapy Consult: Yes  Speech Therapy Consult: Yes  Current Support Network: Lives with Spouse, Own Home, Family Lives Nearby  Confirm Follow Up Transport: Family  Plan discussed with Pt/Family/Caregiver: Yes  Freedom of Choice Offered: Yes  Discharge Location  Discharge Placement: Skilled nursing facility [facility].)    REY Garcia Supervisee in Social Work, [address] [place] [address]  [phone]

## 2018-03-09 NOTE — PROGRESS NOTES
Bedside and Verbal shift change report given to Vance Spencer RN (oncoming nurse) by Rodri Rizzo RN (offgoing nurse). Report included the following information SBAR. SHIFT SUMMARY:            [address] NURSING NOTE   Admission Date 2/19/2018   Admission Diagnosis Hypoxemia  dysphagia   Consults IP CONSULT TO NEPHROLOGY  IP CONSULT TO ONCOLOGY  IP CONSULT TO PALLIATIVE CARE - PROVIDER  IP CONSULT TO GASTROENTEROLOGY      Cardiac Monitoring [] Yes [] No      Purposeful Hourly Rounding [] Yes    Conrad Score Total Score: 3   Conrad score 3 or > [] Bed Alarm [] Avasys [] 1:1 sitter [] Patient refused (Place signed refusal form in chart)   Sidney Score Sidney Score: 18   Sidney score 14 or < [] PMT consult [] Wound Care consult    []  Specialty bed  [] Nutrition consult      Influenza Vaccine Received Flu Vaccine for Current Season (usually Sept-March): Yes    Patient/Guardian Refused (Notify MD): No      Oxygen needs? [x] Room air Oxygen @  []1L    []2L    []3L   []4L    []5L   []6L     Use home O2? [] Yes [] No  Perform O2 challenge test using  smartphrase (.Homeoxygen)      Last bowel movement Last Bowel Movement Date: 03/07/18      Urinary Catheter             LDAs               Peripheral IV 03/07/18 Right; Lower Arm (Active)   Site Assessment Clean, dry, & intact 3/8/2018  8:03 AM   Phlebitis Assessment 0 3/8/2018  8:03 AM   Infiltration Assessment 0 3/8/2018  8:03 AM   Dressing Status Clean, dry, & intact 3/8/2018  8:03 AM   Dressing Type Transparent 3/8/2018  8:03 AM   Hub Color/Line Status Blue 3/8/2018  8:03 AM                         Readmission Risk Assessment Tool Score High Risk            32       Total Score        2 . Living with Significant Other. Assisted Living. LTAC. SNF. or   Rehab    3 Patient Length of Stay (>5 days = 3)    5 Pt.  Coverage (Medicare=5 , Medicaid, or Self-Pay=4)    22 Charlson Comorbidity Score (Age + Comorbid Conditions)        Criteria that do not apply:    Has Seen PCP in Last 6 Months (Yes=3, No=0)    IP Visits Last 12 Months (1-3=4, 4=9, >4=11)       Expected Length of Stay 7d 0h   Actual Length of Stay 17

## 2018-03-09 NOTE — PROGRESS NOTES
Hospital to [address] [place] [address]                                                                        66 y.o.   male    Ben Agarwal   Room: 2290/    [facility] 2 CARDIOPULMONARY CARE  Unit Phone# :  [phone]      Formerly [facility]   [facility]  [address] [address]  Dept: [phone]  Loc: [phone]                    SITUATION     Admitted:  2/19/2018         Attending Provider:  Tiara Walters MD       Consultations:  IP CONSULT TO NEPHROLOGY  IP CONSULT TO ONCOLOGY  IP CONSULT TO PALLIATIVE CARE - PROVIDER  IP CONSULT TO GASTROENTEROLOGY    PCP:  Óscar Jama MD   None    Treatment Team: Attending Provider: Tiara Walters MD; Utilization Review: Chelo Christian; Consulting Provider: Jinny Madrigal MD; Consulting Provider: Melecio Garcia. Jesus Cagle MD; Consulting Provider: Saul Cotto MD; Consulting Provider: Wesly Lopez MD; Care Manager: Tennille Stone    Admitting Dx:  Hypoxemia  dysphagia       Principal Problem: <principal problem not specified>    9 Days Post-Op of   Procedure(s):  ESOPHAGOGASTRODUODENOSCOPY (EGD)   BY: Wesly Lopez MD             ON: 2/28/2018                  Code Status: Full Code                Advance Directives:   Advance Care Planning 2/19/2018   Patient's Healthcare Decision Maker is: Verbal statement (Legal Next of Kin remains as decision maker)   Primary Decision Maker Name Josette Lovell   Primary Decision Maker Relationship to Patient -   Confirm Advance Directive Yes, not on file   Patient Would Like to Complete Advance Directive -    (Send w/patient)   No Doesnt Have       Isolation:  There are currently no Active Isolations       MDRO: No current active infections    Pain Medications given:  n/a    Last dose: 2/22/2018 at  0900    Special Equipment needed: no  Type of equipment:         BACKGROUND     Allergies:   Allergies   Allergen Reactions    Niacin Rash    Niacin Unknown (comments)     Hot flashes Past Medical History:   Diagnosis Date    AAA (abdominal aortic aneurysm) (HCC)     Asthma     Cancer ([facility] [address]     kidney ca    COPD     Diabetes ([facility] [address] [facility]     Gastrointestinal disorder     ruptured esphogus    Hypertension     Other ill-defined conditions(799.89)        Past Surgical History:   Procedure Laterality Date    ABDOMEN SURGERY PROC UNLISTED      hernia    ABDOMEN SURGERY PROC UNLISTED      colon resection    HX GI      part of colon removed, ruptured esophagus    HX OTHER SURGICAL      kidney removed    REMV KIDNEY,COMPLICATED         Prescriptions Prior to Admission   Medication Sig    predniSONE (STERAPRED) 5 mg dose pack See administration instruction per 5mg dose pack    insulin NPH (NOVOLIN N, HUMULIN N) 100 unit/mL injection 25 Units by SubCUTAneous route nightly.  acetaminophen (TYLENOL) 325 mg tablet Take 2 Tabs by mouth every four (4) hours as needed for Fever (For temp greater than or equal to 38.5 C or 101.3 F (Unless hepatic failure or contrindicated). Give first line for fever. ).  aspirin 81 mg chewable tablet Take 1 Tab by mouth daily.  clopidogrel (PLAVIX) 75 mg tablet Take 1 Tab by mouth daily.  cholecalciferol (VITAMIN D3) 1,000 unit cap Take 1,000 Units by mouth daily.  glipiZIDE (GLUCOTROL) 10 mg tablet Take 2.5 mg by mouth every evening.  insulin regular (NOVOLIN R) 100 unit/mL injection by SubCUTAneous route.  rosuvastatin (CRESTOR) 40 mg tablet Take 20 mg by mouth nightly.  tiotropium (SPIRIVA WITH HANDIHALER) 18 mcg inhalation capsule Take 1 Cap by inhalation daily.  ranitidine (ZANTAC) 150 mg tablet Take 150 mg by mouth daily.  glipiZIDE (GLUCOTROL) 10 mg tablet Take 5 mg by mouth daily.  budesonide-formoterol (SYMBICORT) 160-4.5 mcg/actuation HFA inhaler Take 2 Puffs by inhalation daily.     Indications: COPD ASSOCIATED WITH CHRONIC BRONCHITIS    albuterol (PROVENTIL HFA, VENTOLIN HFA) 90 mcg/Actuation inhaler Take 2 Puffs by inhalation every six (6) hours as needed for Wheezing and Shortness of Breath.  albuterol (PROVENTIL VENTOLIN) 2.5 mg /3 mL (0.083 %) nebulizer solution 1.25 mg by Nebulization route once.  loratadine 10 mg cap Take 10 mg by mouth daily.  diltiazem CD (CARDIZEM CD) 240 mg ER capsule Take 1 Cap by mouth daily.  montelukast (SINGULAIR) 10 mg tablet Take 10 mg by mouth daily. Hard scripts included in transfer packet no    Vaccinations:    Immunization History   Administered Date(s) Administered    Influenza Vaccine (Quad) PF 10/27/2015, 09/13/2016    Influenza Vaccine Split 10/01/2011    ZZZ-RETIRED (DO NOT USE) Pneumococcal Vaccine (Unspecified Type) 10/01/2011       Readmission Risks:    Known Risks: n/a        The Charlson CoMorbitiy Index tool is an evidenced based tool that has more automatic generated information. The tool looks at many different items such as the age of the patient, how many times they were admitted in the last calendar year, current length of stay in the hospital and their diagnosis. All of these items are pulled automatically from information documented in the chart from various places and will generate a score that predicts whether a patient is at low (less than 13), medium (13-20) or high (21 or greater) risk of being readmitted.         ASSESSMENT                Temp: 97.6 °F (36.4 °C) (03/09/18 0748) Pulse (Heart Rate): 89 (03/09/18 0748)     Resp Rate: 18 (03/09/18 0748)           BP: 113/58 (03/09/18 0748)     O2 Sat (%): 92 % (03/09/18 0748)     Weight: 76 kg (167 lb 8.8 oz)    Height: 6' (182.9 cm) (02/19/18 1450)       If above not within 1 hour of discharge:    BP:_____  P:____  R:____ T:_____ O2 Sat: ___%  O2: ______    Active Orders   Diet    DIET DIABETIC CONSISTENT CARB Soft Solids         Orientation: oriented to time, place, person and situation     Active Behaviors: None                                   Active Lines/Drains:  (Peg Tube / Peoples / CL or S/L?): no    Urinary Status: Voiding     Last BM: Last Bowel Movement Date: 03/07/18     Skin Integrity: Intact   Wound Buttocks Posterior-DRESSING STATUS: Changed per order    Wound Buttocks Posterior-DRESSING TYPE: Foam    Mobility: Slightly limited   Weight Bearing Status: WBAT (Weight Bearing as Tolerated)      Gait Training  Assistive Device: Gait belt, Walker, rolling  Ambulation - Level of Assistance: Contact guard assistance, Stand-by assistance  Distance (ft): 175 Feet (ft)         Lab Results   Component Value Date/Time    Glucose 180 (H) 03/08/2018 02:59 AM    Hemoglobin A1c 9.8 (H) 02/20/2018 05:14 AM    INR 1.2 (H) 02/25/2018 03:46 AM    INR 1.0 09/09/2016 10:09 AM    HGB 8.5 (L) 03/08/2018 02:59 AM    HGB 7.3 (L) 03/07/2018 01:55 AM        RECOMMENDATION     See After Visit Summary (AVS) for:  · Discharge instructions  · After [address]   · Special equipment needed (entered pre-discharge by Care Management)  · Medication Reconciliation    · Follow up Appointment(s)         Report given/sent by:  Shelton Crowley                    Verbal report given to: [facility]  FAXED to:  n/a         Estimated discharge time:  3/9/2018 at [facility]

## 2018-03-09 NOTE — PROGRESS NOTES
Pt discharged from floor via [facility], IV and tele removed and report called to SNF. Pt educated on discharge.

## 2018-03-09 NOTE — PROGRESS NOTES
Palliative Medicine Consult  Александр: [phone] ([phone])    Patient Name: Osmar Peters  YOB: 1940    Date of Initial Consult: 03/02/2018  Reason for Consult: care decisions  Requesting Provider: Davide Moreno NP   Primary Care Physician: Roopa Davis MD     SUMMARY:   Osmar Peters is a 66 y.o. with PMH most significant for MM, COPD, h/o CVA, DM, HTN who was admitted on 2/19/2018 for SOB and hypoxemia. Current medical issues leading to Palliative Medicine involvement include: care decisions in setting of current condition and chronic medical problems. PALLIATIVE DIAGNOSES:   1. SOB  2. Fatigue  3. Constipation   4. Weakness  5. Debility  6. Counseling regarding goals of care and advance care planning     PLAN:   Brief Summary of Plan  -visited w/ pt in his rm. No family at bedside  -may be able to engage in more discussion regarding goals of care at a later time   -we will continue to establish therapeutic alliance as well as provide psychosocial support    1. Goals of Care/Goals of Medical Treatment- based on conversation w/ pt, assumed to be for full, restorative treatment and all measures that support this. 2. Advance Care Planning- pt's code status is FULL CODE per active EMR order. Code status was not discussed w/ pt today. No AMD on file. In absence of AMD, wife would be surrogate decision maker. 3. Information Sharing-   03/08/2018  Pt is in good spirits  Says he feels pretty good  Was told he might be able to be d/c'ed scott  He has no complaints or issues at this time. Did not really want to speak much as he is tired. Family did come and visit yesterday. Son will be here later today. 03/02/2018  Pt began speaking about his frustration and anger at some aspects of his care received. He later said that it is only 10%; 90% of the folks here at this hospital is doing a wonderful job. He shares that this hospital is no better than \"third world hospital\". He then adds, \"[place]\" as an example of country of origin. He says he has been going to this hospital for many yrs; the level of care has markedly declined. His frustration and anger were recognized. We then spoke about his condition. He says that he may have \"months left\" but no one knows. He hopes for his overall condition and level of fxn to improve. He wishes to get out of the hospital.      He says that things are progressing. He mentions that his 1 kidney's function needs to improve. He spoke about being in [employer] for 33 yrs. 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 He said that if flying those planes was done like this hospital is run, then they would all be in the [facility]. He points to the pulse ox probe on his finger. He says it is unacceptable for a hospital to use rubber bands to make it work when you can get one from a store for a reasonable cost.     At a later point during my visit, I provided info about our team and its role in his care. He was unfamiliar with palliative care. He verbalized understanding of all the points made including the following:  -we are an interdisciplinary team serving as a bridge of communication and advocate on behalf of the patient and family when needed  -we are a support care service that, when needed, assists w/ sx mgmt  -though hospice is \"under\" palliative medicine, our service is not hospice nor does a consult visit by our service mean that hospice is being or should be considered  -we are not here to make medical decisions, and our being consulted does not equate to a change in a patient's overall condition  -our involvement is independent of clinical trajectory and/or medical decisions made    I relayed to him that we can continue discussions on Monday. He was receptive to this. 4. Psychosocial Support- We will continue to support patient and family during this difficult hospitalization. See above. 5. Spiritual- at this time, there are no apparent spiritual concerns. 6. Symptom Management- Pt had BM earlier today. Reports 2nd in about 2 wks. Amenable to starting stool softener.   -start Colace 100 mg PO bid    I have discussed with patients bedside RN, Jersey Veras. Time and input appreciated. I have discussed with our palliative care IDT. Thank you for this consult that has provided us with the opportunity to be involved in this patient's care. We will continue to follow along with you. Should you have any questions or concerns prior to our next visit at the bedside, please do not hesitate to contact us at [phone]). Nichole Smith MD  Palliative Care Team     GOALS OF CARE / TREATMENT PREFERENCES:     GOALS OF CARE:         TREATMENT PREFERENCES:   Code Status: Full Code    Advance Care Planning:  Advance Care Planning 2/19/2018   Patient's Healthcare Decision Maker is: Verbal statement (Legal Next of Kin remains as decision maker)   Primary Decision Maker Name Naveen Acosta   Primary Decision Maker Relationship to Patient -   Confirm Advance Directive Yes, not on file   Patient Would Like to Complete Advance Directive -           Other Instructions: none at this time        Other:    As far as possible, the palliative care team has discussed with patient / health care proxy about goals of care / treatment preferences for patient. HISTORY:     History obtained from: pt, chart    CHIEF COMPLAINT: none at this time      HPI/SUBJECTIVE:    The patient is:   [x] Verbal and participatory  [] Non-participatory due to:   No o/n events or issues. No complaints. Pt is eager to get out of hospital.     Pt is a 65 y/o WM w/ PMH noted above who presented to our ED w/ SOB and low O2 sats. Pt reported having chills a couple of days prior to day of ED presentation. He also had cough productive of yellow sputum, SOB, and difficulty w/ sleeping.       Pt had some prednisone at home and took 2 tabs. Sx's worsened. Pt went to a walk-in clinic and was found to have O2 sats in 76s. He was referred to our ED. Pt reports that he drove himself. CT chest demonstrates bibasilar infiltrates. He was flu +. Pt was admitted for further w/u and mgmt. Clinical Pain Assessment (nonverbal scale for severity on nonverbal patients):   Clinical Pain Assessment  Severity: 0          Duration: for how long has pt been experiencing pain (e.g., 2 days, 1 month, years)  Frequency: how often pain is an issue (e.g., several times per day, once every few days, constant)     FUNCTIONAL ASSESSMENT:     Palliative Performance Scale (PPS):  PPS: 40       PSYCHOSOCIAL/SPIRITUAL SCREENING:     Palliative IDT has assessed this patient for cultural preferences / practices and a referral made as appropriate to needs (Cultural Services, Patient Advocacy, Ethics, etc.)    Advance Care Planning:  Advance Care Planning 2/19/2018   Patient's Healthcare Decision Maker is: Verbal statement (Legal Next of Kin remains as decision maker)   Primary Decision Maker Name Denys Jerez   Primary Decision Maker Relationship to Patient -   Confirm Advance Directive Yes, not on file   Patient Would Like to Complete Advance Directive -       Any spiritual / Muslim concerns:  [] Yes /  [x] No    Caregiver Burnout:  [] Yes /  [] No /  [x] No Caregiver Present      Anticipatory grief assessment:   [] Normal  / [] Maladaptive       ESAS Anxiety: Anxiety: 0    ESAS Depression:          REVIEW OF SYSTEMS:     Positive and pertinent negative findings in ROS are noted above in HPI. The following systems were [x] reviewed / [] unable to be reviewed as noted in HPI  Other findings are noted below. Systems: constitutional, ears/nose/mouth/throat, respiratory, gastrointestinal, genitourinary, musculoskeletal, integumentary, neurologic, psychiatric, endocrine. Positive findings noted below.   Modified ESAS Completed by: provider   Fatigue: 2 Drowsiness: 0     Pain: 0   Anxiety: 0 Nausea: 0   Anorexia: 0 Dyspnea: 2     Constipation: Yes     Stool Occurrence(s): 1        PHYSICAL EXAM:     From RN flowsheet:  Wt Readings from Last 3 Encounters:   03/08/18 170 lb 1.6 oz (77.2 kg)   08/10/17 179 lb 11.2 oz (81.5 kg)   09/13/16 176 lb 3 oz (79.9 kg)     Blood pressure 126/63, pulse 94, temperature 98.1 °F (36.7 °C), resp. rate 20, height 6' (1.829 m), weight 170 lb 1.6 oz (77.2 kg), SpO2 92 %. Pain Scale 1: Numeric (0 - 10)  Pain Intensity 1: 0                 Last bowel movement, if known:     Gen: chronically sick, in NAD, fatigued  HEENT: NC/AT, MMM, NC securely in place  Respiratory: breathing not labored, symmetric  Neurologic: was asleep but easily awakened by my coming into his rm, alert, following commands, moving all extremities  Psychiatric: full affect, no hallucinations  No delusions  No obvious si/sx's of agitation or anxiety         HISTORY:     Active Problems:    Hypoxemia (2/19/2018)      Past Medical History:   Diagnosis Date    AAA (abdominal aortic aneurysm) (HCC)     Asthma     Cancer ([facility] [address]     kidney ca    COPD     Diabetes ([facility] [address]     Gastrointestinal disorder     ruptured esphogus    Hypertension     Other ill-defined conditions(799.89)       Past Surgical History:   Procedure Laterality Date    ABDOMEN SURGERY PROC UNLISTED      hernia    ABDOMEN SURGERY PROC UNLISTED      colon resection    HX GI      part of colon removed, ruptured esophagus    HX OTHER SURGICAL      kidney removed    REMV KIDNEY,COMPLICATED        Family History   Problem Relation Age of Onset    Hypertension Mother       History reviewed, no pertinent family history.   Social History   Substance Use Topics    Smoking status: Current Every Day Smoker     Packs/day: 0.50     Years: 60.00    Smokeless tobacco: Never Used    Alcohol use Yes      Comment: 2 drinks a month     Allergies   Allergen Reactions    Niacin Rash    Niacin Unknown (comments) Hot flashes      Current Facility-Administered Medications   Medication Dose Route Frequency    insulin NPH (NOVOLIN N, HUMULIN N) injection 25 Units  25 Units SubCUTAneous QHS    0.9% sodium chloride infusion 250 mL  250 mL IntraVENous PRN    zinc oxide-cod liver oil (DESITIN) 40 % paste   Topical PRN    clopidogrel (PLAVIX) tablet 75 mg  75 mg Oral DAILY    docusate sodium (COLACE) capsule 100 mg  100 mg Oral BID    epoetin gisele (EPOGEN;PROCRIT) injection 10,000 Units  10,000 Units SubCUTAneous Q TUE, THU & SAT    albuterol-ipratropium (DUO-NEB) 2.5 MG-0.5 MG/3 ML  3 mL Nebulization Q6H RT    lidocaine (XYLOCAINE) 2 % viscous solution 15 mL  15 mL Mouth/Throat PRN    anticoagulant citrate dextrose (ACD-A) solution  1,500 mL In Vitro DAILY PRN    calcium gluconate 1 g in 0.9% sodium chloride 100 mL IVPB  1 g IntraVENous Q48H PRN    albumin human 5% (BUMINATE) solution 175 g  175 g IntraVENous Q48H    hydrALAZINE (APRESOLINE) 20 mg/mL injection 20 mg  20 mg IntraVENous Q6H PRN    midazolam (VERSED) injection 5 mg  5 mg IntraVENous Multiple    pantoprazole (PROTONIX) tablet 40 mg  40 mg Oral ACB&D    sucralfate (CARAFATE) 100 mg/mL oral suspension 1 g  1 g Oral AC&HS    insulin lispro (HUMALOG) injection 5 Units  5 Units SubCUTAneous TIDAC    melatonin tablet 3 mg  3 mg Oral QHS PRN    insulin lispro (HUMALOG) injection   SubCUTAneous AC&HS    sodium chloride (NS) flush 5-10 mL  5-10 mL IntraVENous PRN    guaiFENesin ER (MUCINEX) tablet 600 mg  600 mg Oral BID    albuterol-ipratropium (DUO-NEB) 2.5 MG-0.5 MG/3 ML  3 mL Nebulization Q4H PRN    acetaminophen (TYLENOL) tablet 650 mg  650 mg Oral Q4H PRN    ondansetron (ZOFRAN) injection 4 mg  4 mg IntraVENous Q4H PRN    glucose chewable tablet 16 g  4 Tab Oral PRN    dextrose (D50W) injection syrg 12.5-25 g  12.5-25 g IntraVENous PRN    glucagon (GLUCAGEN) injection 1 mg  1 mg IntraMUSCular PRN    aspirin chewable tablet 81 mg  81 mg Oral DAILY    dilTIAZem CD (CARDIZEM CD) capsule 240 mg  240 mg Oral DAILY    montelukast (SINGULAIR) tablet 10 mg  10 mg Oral DAILY    umeclidinium (INCRUSE ELLIPTA) 62.5 mcg/actuation  1 Puff Inhalation DAILY    fluticasone-vilanterol (BREO ELLIPTA) 100mcg-25mcg/puff  1 Puff Inhalation DAILY          LAB AND IMAGING FINDINGS:     Lab Results   Component Value Date/Time    WBC 8.3 03/08/2018 02:59 AM    HGB 8.5 (L) 03/08/2018 02:59 AM    PLATELET 909 (L) 02/91/9123 02:59 AM     Lab Results   Component Value Date/Time    Sodium 141 03/08/2018 02:59 AM    Potassium 4.1 03/08/2018 02:59 AM    Chloride 108 03/08/2018 02:59 AM    CO2 25 03/08/2018 02:59 AM    BUN 66 (H) 03/08/2018 02:59 AM    Creatinine 3.45 (H) 03/08/2018 02:59 AM    Calcium 8.4 (L) 03/08/2018 02:59 AM    Magnesium 1.5 (L) 03/06/2018 02:48 AM    Phosphorus 3.0 03/06/2018 02:48 AM      Lab Results   Component Value Date/Time    AST (SGOT) 8 (L) 03/06/2018 02:48 AM    Alk.  phosphatase 32 (L) 03/06/2018 02:48 AM    Protein, total 5.0 (L) 03/06/2018 02:48 AM    Albumin 3.4 (L) 03/06/2018 02:48 AM    Globulin 1.6 (L) 03/06/2018 02:48 AM     Lab Results   Component Value Date/Time    INR 1.2 (H) 02/25/2018 03:46 AM    Prothrombin time 12.2 (H) 02/25/2018 03:46 AM    aPTT 28.2 09/10/2016 02:07 AM      Lab Results   Component Value Date/Time    Iron 72 05/14/2012 04:07 AM    TIBC 155 (L) 05/14/2012 04:07 AM    Iron % saturation 46 05/14/2012 04:07 AM    Ferritin 585 (H) 05/14/2012 04:07 AM      Lab Results   Component Value Date/Time    pH 7.37 02/19/2018 03:50 PM    PCO2 34 (L) 02/19/2018 03:50 PM    PO2 70 (L) 02/19/2018 03:50 PM     No components found for: [place]   Lab Results   Component Value Date/Time    CK 98 02/21/2018 02:23 PM    CK - MB 1.5 05/13/2012 10:35 AM                Total time: 25 min  Counseling / coordination time, spent as noted above: 15 min  > 50% counseling / coordination?: yes    Prolonged service was provided for  []30 min   []75 min in face to face time in the presence of the patient, spent as noted above. Time Start:   Time End:   Note: this can only be billed with 88395 (initial) or 44013 (follow up). If multiple start / stop times, list each separately.

## 2018-03-13 ENCOUNTER — PATIENT OUTREACH (OUTPATIENT)
Dept: CASE MANAGEMENT | Age: 78
End: 2018-03-13

## 2018-03-13 NOTE — PROGRESS NOTES
Community Care Team Documentation for Patient in [facility]  Initial Follow Up       Patient was admitted to [facility] from 2/19 to 3/9. Patient was discharged to [facility], on 3/9 (date). See previous [facility] Care Team documentation under Patient Outreach. RRAT score:  10    Advance Medical Directive on file in EMR? Not on file    Total Hospitalizations/ED visits last 6 months? IP - 1; ED - 0    PCP : Roopa Davis MD    Per Amandeep Ramirez at [facility], Reviewed [place] back questions with SNF to ensure that patient arrived safely and with admission packet in order. Discussed needed follow up appointments: oncology - Warren Luque - 1 week, endo - Shun Lauren MD - 1 month, Lindalee Moritz, MD - [facility] [place] - 3 weeks. Oncology FU scheduled for Thursday 3/15. Pt being skilled by PT/OT. Currently contact guard with hygiene, bathing and toileting. Supervision with upper body ADLs. Contact guard with lower body ADLs. Independent with bed mobility. Supervision with transfers. Ambulating 75ft with RW and contact guard assist. SOB reported last night- 2L O2 continuous ordered. Pt has been on O2 in the past but was able to be weaned off. Goal is to wean off O2 prior to DC. Plan to return home with wife. LOS: TBD. SNF Attending:  David Bañuelos MD    Medications were not reconciled and general patient assessment was not completed during this skilled nursing facility outreach.      JASIEL Horne

## 2018-03-27 NOTE — PROGRESS NOTES
Community Care Team Documentation for Patient in [facility]  Subsequent Follow up     Patient remains at [facility] ([facility]). See previous [facility] Team notes. PCP : Óscar Jama MD - patient uses [facility]      Per Woody Díaz at [facility], PT/OT continue. Currently transferring at modified independent, ambulating 250 ft with cane at supervision, independent for toileting, and supervision for bathing and dressing. O2 only at bedtime PRN. Attempting to wean completely. Plan for DC 4/3 to home with wife and [facility]. SNF to ask about PCP and ensure patient understands need for PCP follow up appointment. Medications were not reconciled and general patient assessment was not completed during this skilled nursing facility outreach.      Shira Rene, MSN, RN, ACNS-BC, [facility]  Nurse Navigator, [place] [phone]

## 2018-04-03 NOTE — PROGRESS NOTES
Community Care Team Documentation for Patient in [facility]  Subsequent Follow up     Patient remains at [facility] ([facility]). See previous [facility] Team notes. PCP : Kelley Cabezas MD - PCP is Stephon Ko at [facility] ([facility] AT [place]    Per Emily Dasilva at [facility], At DC, patient was ambulating independently with or without walker. O2 was weaned and discontinued. Patient was DC 4/3 to home with wife. No home health needed. Wife scheduled PCP follow up with provider at [facility] ([facility]) AT [place].    Medications were not reconciled and general patient assessment was not completed during this skilled nursing facility outreach.      oJsh Corbin, MSN, RN, ACNS-BC, [facility]  Nurse Navigator, [employer] [phone]

## 2019-01-01 ENCOUNTER — APPOINTMENT (OUTPATIENT)
Dept: NON INVASIVE DIAGNOSTICS | Age: 79
DRG: 871 | End: 2019-01-01
Attending: HOSPITALIST
Payer: MEDICARE

## 2019-01-01 ENCOUNTER — HOSPITAL ENCOUNTER (INPATIENT)
Age: 79
LOS: 3 days | Discharge: HOME | DRG: 191 | End: 2019-03-13
Attending: STUDENT IN AN ORGANIZED HEALTH CARE EDUCATION/TRAINING PROGRAM | Admitting: INTERNAL MEDICINE
Payer: MEDICARE

## 2019-01-01 ENCOUNTER — APPOINTMENT (OUTPATIENT)
Dept: CT IMAGING | Age: 79
DRG: 871 | End: 2019-01-01
Attending: HOSPITALIST
Payer: MEDICARE

## 2019-01-01 ENCOUNTER — APPOINTMENT (OUTPATIENT)
Dept: GENERAL RADIOLOGY | Age: 79
DRG: 191 | End: 2019-01-01
Attending: EMERGENCY MEDICINE
Payer: MEDICARE

## 2019-01-01 ENCOUNTER — HOSPITAL ENCOUNTER (INPATIENT)
Age: 79
LOS: 4 days | DRG: 871 | End: 2019-03-20
Attending: EMERGENCY MEDICINE | Admitting: HOSPITALIST
Payer: MEDICARE

## 2019-01-01 ENCOUNTER — APPOINTMENT (OUTPATIENT)
Dept: GENERAL RADIOLOGY | Age: 79
DRG: 871 | End: 2019-01-01
Attending: ANESTHESIOLOGY
Payer: MEDICARE

## 2019-01-01 ENCOUNTER — APPOINTMENT (OUTPATIENT)
Dept: VASCULAR SURGERY | Age: 79
DRG: 871 | End: 2019-01-01
Attending: INTERNAL MEDICINE
Payer: MEDICARE

## 2019-01-01 ENCOUNTER — APPOINTMENT (OUTPATIENT)
Dept: GENERAL RADIOLOGY | Age: 79
DRG: 871 | End: 2019-01-01
Attending: INTERNAL MEDICINE
Payer: MEDICARE

## 2019-01-01 ENCOUNTER — APPOINTMENT (OUTPATIENT)
Dept: NUCLEAR MEDICINE | Age: 79
DRG: 191 | End: 2019-01-01
Attending: INTERNAL MEDICINE
Payer: MEDICARE

## 2019-01-01 ENCOUNTER — APPOINTMENT (OUTPATIENT)
Dept: VASCULAR SURGERY | Age: 79
DRG: 191 | End: 2019-01-01
Attending: INTERNAL MEDICINE
Payer: MEDICARE

## 2019-01-01 ENCOUNTER — APPOINTMENT (OUTPATIENT)
Dept: GENERAL RADIOLOGY | Age: 79
DRG: 871 | End: 2019-01-01
Attending: HOSPITALIST
Payer: MEDICARE

## 2019-01-01 ENCOUNTER — HOSPITAL ENCOUNTER (INPATIENT)
Age: 79
LOS: 6 days | Discharge: HOME | DRG: 871 | End: 2019-02-02
Attending: STUDENT IN AN ORGANIZED HEALTH CARE EDUCATION/TRAINING PROGRAM | Admitting: HOSPITALIST
Payer: MEDICARE

## 2019-01-01 ENCOUNTER — APPOINTMENT (OUTPATIENT)
Dept: GENERAL RADIOLOGY | Age: 79
DRG: 871 | End: 2019-01-01
Attending: EMERGENCY MEDICINE
Payer: MEDICARE

## 2019-01-01 ENCOUNTER — PATIENT OUTREACH (OUTPATIENT)
Dept: CASE MANAGEMENT | Age: 79
End: 2019-01-01

## 2019-01-01 ENCOUNTER — APPOINTMENT (OUTPATIENT)
Dept: GENERAL RADIOLOGY | Age: 79
DRG: 871 | End: 2019-01-01
Attending: STUDENT IN AN ORGANIZED HEALTH CARE EDUCATION/TRAINING PROGRAM
Payer: MEDICARE

## 2019-01-01 VITALS
DIASTOLIC BLOOD PRESSURE: 77 MMHG | OXYGEN SATURATION: 97 % | HEART RATE: 98 BPM | RESPIRATION RATE: 16 BRPM | HEIGHT: 72 IN | TEMPERATURE: 97.6 F | SYSTOLIC BLOOD PRESSURE: 137 MMHG | WEIGHT: 142.2 LBS | BODY MASS INDEX: 19.26 KG/M2

## 2019-01-01 VITALS
SYSTOLIC BLOOD PRESSURE: 117 MMHG | WEIGHT: 137.13 LBS | HEIGHT: 72 IN | OXYGEN SATURATION: 27 % | TEMPERATURE: 97 F | BODY MASS INDEX: 18.57 KG/M2 | DIASTOLIC BLOOD PRESSURE: 69 MMHG

## 2019-01-01 VITALS
WEIGHT: 136.8 LBS | BODY MASS INDEX: 18.53 KG/M2 | RESPIRATION RATE: 18 BRPM | DIASTOLIC BLOOD PRESSURE: 72 MMHG | HEART RATE: 92 BPM | HEIGHT: 72 IN | TEMPERATURE: 97.4 F | SYSTOLIC BLOOD PRESSURE: 111 MMHG | OXYGEN SATURATION: 100 %

## 2019-01-01 DIAGNOSIS — R53.81 DEBILITY: ICD-10-CM

## 2019-01-01 DIAGNOSIS — J44.1 COPD EXACERBATION (HCC): ICD-10-CM

## 2019-01-01 DIAGNOSIS — E16.2 HYPOGLYCEMIA: ICD-10-CM

## 2019-01-01 DIAGNOSIS — I48.91 ATRIAL FIBRILLATION WITH RVR (HCC): ICD-10-CM

## 2019-01-01 DIAGNOSIS — R06.02 SHORTNESS OF BREATH: ICD-10-CM

## 2019-01-01 DIAGNOSIS — A41.9 SEPSIS, DUE TO UNSPECIFIED ORGANISM: Primary | ICD-10-CM

## 2019-01-01 DIAGNOSIS — J44.1 COPD EXACERBATION (HCC): Primary | ICD-10-CM

## 2019-01-01 DIAGNOSIS — C90.02 MULTIPLE MYELOMA IN RELAPSE (HCC): ICD-10-CM

## 2019-01-01 DIAGNOSIS — K92.1 GASTROINTESTINAL HEMORRHAGE WITH MELENA: ICD-10-CM

## 2019-01-01 DIAGNOSIS — R53.83 FATIGUE, UNSPECIFIED TYPE: ICD-10-CM

## 2019-01-01 DIAGNOSIS — R63.4 WEIGHT LOSS, NON-INTENTIONAL: ICD-10-CM

## 2019-01-01 DIAGNOSIS — J96.01 ACUTE RESPIRATORY FAILURE WITH HYPOXIA (HCC): ICD-10-CM

## 2019-01-01 DIAGNOSIS — Z71.89 COUNSELING REGARDING ADVANCED CARE PLANNING AND GOALS OF CARE: ICD-10-CM

## 2019-01-01 LAB
ABO + RH BLD: NORMAL
ALBUMIN SERPL-MCNC: 2 G/DL (ref 3.5–5)
ALBUMIN SERPL-MCNC: 2.1 G/DL (ref 3.5–5)
ALBUMIN SERPL-MCNC: 2.1 G/DL (ref 3.5–5)
ALBUMIN SERPL-MCNC: 2.5 G/DL (ref 3.5–5)
ALBUMIN SERPL-MCNC: 2.6 G/DL (ref 3.5–5)
ALBUMIN SERPL-MCNC: 2.9 G/DL (ref 3.5–5)
ALBUMIN/GLOB SERPL: 0.6 {RATIO} (ref 1.1–2.2)
ALBUMIN/GLOB SERPL: 0.6 {RATIO} (ref 1.1–2.2)
ALBUMIN/GLOB SERPL: 0.7 {RATIO} (ref 1.1–2.2)
ALBUMIN/GLOB SERPL: 0.8 {RATIO} (ref 1.1–2.2)
ALBUMIN/GLOB SERPL: 0.8 {RATIO} (ref 1.1–2.2)
ALP SERPL-CCNC: 47 U/L (ref 45–117)
ALP SERPL-CCNC: 48 U/L (ref 45–117)
ALP SERPL-CCNC: 49 U/L (ref 45–117)
ALP SERPL-CCNC: 50 U/L (ref 45–117)
ALP SERPL-CCNC: 57 U/L (ref 45–117)
ALP SERPL-CCNC: 58 U/L (ref 45–117)
ALP SERPL-CCNC: 58 U/L (ref 45–117)
ALP SERPL-CCNC: 64 U/L (ref 45–117)
ALT SERPL-CCNC: 14 U/L (ref 12–78)
ALT SERPL-CCNC: 15 U/L (ref 12–78)
ALT SERPL-CCNC: 15 U/L (ref 12–78)
ALT SERPL-CCNC: 16 U/L (ref 12–78)
ALT SERPL-CCNC: 16 U/L (ref 12–78)
ALT SERPL-CCNC: 17 U/L (ref 12–78)
ALT SERPL-CCNC: 17 U/L (ref 12–78)
ALT SERPL-CCNC: 18 U/L (ref 12–78)
ANION GAP SERPL CALC-SCNC: 10 MMOL/L (ref 5–15)
ANION GAP SERPL CALC-SCNC: 10 MMOL/L (ref 5–15)
ANION GAP SERPL CALC-SCNC: 11 MMOL/L (ref 5–15)
ANION GAP SERPL CALC-SCNC: 12 MMOL/L (ref 5–15)
ANION GAP SERPL CALC-SCNC: 5 MMOL/L (ref 5–15)
ANION GAP SERPL CALC-SCNC: 6 MMOL/L (ref 5–15)
ANION GAP SERPL CALC-SCNC: 7 MMOL/L (ref 5–15)
ANION GAP SERPL CALC-SCNC: 8 MMOL/L (ref 5–15)
ANION GAP SERPL CALC-SCNC: 9 MMOL/L (ref 5–15)
APPEARANCE UR: ABNORMAL
APPEARANCE UR: CLEAR
APTT PPP: 121.5 SEC (ref 22.1–32)
APTT PPP: 20.7 SEC (ref 22.1–32)
APTT PPP: 25.6 SEC (ref 22.1–32)
APTT PPP: 34.8 SEC (ref 22.1–32)
APTT PPP: 51 SEC (ref 22.1–32)
ARTERIAL PATENCY WRIST A: ABNORMAL
ARTERIAL PATENCY WRIST A: NO
ARTERIAL PATENCY WRIST A: YES
ARTERIAL PATENCY WRIST A: YES
AST SERPL-CCNC: 10 U/L (ref 15–37)
AST SERPL-CCNC: 11 U/L (ref 15–37)
AST SERPL-CCNC: 12 U/L (ref 15–37)
AST SERPL-CCNC: 12 U/L (ref 15–37)
AST SERPL-CCNC: 7 U/L (ref 15–37)
AST SERPL-CCNC: 8 U/L (ref 15–37)
AST SERPL-CCNC: 9 U/L (ref 15–37)
AST SERPL-CCNC: 9 U/L (ref 15–37)
ATRIAL RATE: 108 BPM
ATRIAL RATE: 123 BPM
ATRIAL RATE: 178 BPM
B PERT DNA SPEC QL NAA+PROBE: NOT DETECTED
BACTERIA SPEC CULT: ABNORMAL
BACTERIA SPEC CULT: ABNORMAL
BACTERIA SPEC CULT: NORMAL
BACTERIA URNS QL MICRO: NEGATIVE /HPF
BACTERIA URNS QL MICRO: NEGATIVE /HPF
BASE DEFICIT BLD-SCNC: 1 MMOL/L
BASE DEFICIT BLD-SCNC: 4 MMOL/L
BASE DEFICIT BLD-SCNC: 6 MMOL/L
BASE DEFICIT BLD-SCNC: 8 MMOL/L
BASE DEFICIT BLD-SCNC: 9 MMOL/L
BASE EXCESS BLD CALC-SCNC: 1 MMOL/L
BASOPHILS # BLD: 0 K/UL (ref 0–0.1)
BASOPHILS # BLD: 0.1 K/UL (ref 0–0.1)
BASOPHILS # BLD: 0.1 K/UL (ref 0–0.1)
BASOPHILS NFR BLD: 0 % (ref 0–1)
BASOPHILS NFR BLD: 1 % (ref 0–1)
BASOPHILS NFR BLD: 1 % (ref 0–1)
BDY SITE: ABNORMAL
BILIRUB DIRECT SERPL-MCNC: 0.1 MG/DL (ref 0–0.2)
BILIRUB DIRECT SERPL-MCNC: 0.1 MG/DL (ref 0–0.2)
BILIRUB SERPL-MCNC: 0.2 MG/DL (ref 0.2–1)
BILIRUB SERPL-MCNC: 0.3 MG/DL (ref 0.2–1)
BILIRUB SERPL-MCNC: 0.4 MG/DL (ref 0.2–1)
BILIRUB SERPL-MCNC: 0.5 MG/DL (ref 0.2–1)
BILIRUB UR QL: NEGATIVE
BILIRUB UR QL: NEGATIVE
BLOOD GROUP ANTIBODIES SERPL: NORMAL
BNP SERPL-MCNC: 550 PG/ML (ref 0–450)
BNP SERPL-MCNC: 757 PG/ML
BUN SERPL-MCNC: 16 MG/DL (ref 6–20)
BUN SERPL-MCNC: 19 MG/DL (ref 6–20)
BUN SERPL-MCNC: 22 MG/DL (ref 6–20)
BUN SERPL-MCNC: 23 MG/DL (ref 6–20)
BUN SERPL-MCNC: 24 MG/DL (ref 6–20)
BUN SERPL-MCNC: 24 MG/DL (ref 6–20)
BUN SERPL-MCNC: 27 MG/DL (ref 6–20)
BUN SERPL-MCNC: 30 MG/DL (ref 6–20)
BUN SERPL-MCNC: 30 MG/DL (ref 6–20)
BUN SERPL-MCNC: 47 MG/DL (ref 6–20)
BUN SERPL-MCNC: 47 MG/DL (ref 6–20)
BUN SERPL-MCNC: 48 MG/DL (ref 6–20)
BUN SERPL-MCNC: 52 MG/DL (ref 6–20)
BUN SERPL-MCNC: 54 MG/DL (ref 6–20)
BUN SERPL-MCNC: 72 MG/DL (ref 6–20)
BUN/CREAT SERPL: 15 (ref 12–20)
BUN/CREAT SERPL: 17 (ref 12–20)
BUN/CREAT SERPL: 19 (ref 12–20)
BUN/CREAT SERPL: 20 (ref 12–20)
BUN/CREAT SERPL: 21 (ref 12–20)
BUN/CREAT SERPL: 23 (ref 12–20)
BUN/CREAT SERPL: 26 (ref 12–20)
BUN/CREAT SERPL: 29 (ref 12–20)
BUN/CREAT SERPL: 30 (ref 12–20)
BUN/CREAT SERPL: 31 (ref 12–20)
BUN/CREAT SERPL: 33 (ref 12–20)
BUN/CREAT SERPL: 35 (ref 12–20)
BUN/CREAT SERPL: 35 (ref 12–20)
C PNEUM DNA SPEC QL NAA+PROBE: NOT DETECTED
CALCIUM SERPL-MCNC: 5.8 MG/DL (ref 8.5–10.1)
CALCIUM SERPL-MCNC: 5.9 MG/DL (ref 8.5–10.1)
CALCIUM SERPL-MCNC: 6.3 MG/DL (ref 8.5–10.1)
CALCIUM SERPL-MCNC: 6.3 MG/DL (ref 8.5–10.1)
CALCIUM SERPL-MCNC: 6.7 MG/DL (ref 8.5–10.1)
CALCIUM SERPL-MCNC: 7 MG/DL (ref 8.5–10.1)
CALCIUM SERPL-MCNC: 7.3 MG/DL (ref 8.5–10.1)
CALCIUM SERPL-MCNC: 7.4 MG/DL (ref 8.5–10.1)
CALCIUM SERPL-MCNC: 7.6 MG/DL (ref 8.5–10.1)
CALCIUM SERPL-MCNC: 8.2 MG/DL (ref 8.5–10.1)
CALCIUM SERPL-MCNC: 8.8 MG/DL (ref 8.5–10.1)
CALCIUM SERPL-MCNC: 9.6 MG/DL (ref 8.5–10.1)
CALCIUM SERPL-MCNC: 9.8 MG/DL (ref 8.5–10.1)
CALCULATED P AXIS, ECG09: 70 DEGREES
CALCULATED P AXIS, ECG09: 73 DEGREES
CALCULATED R AXIS, ECG10: 14 DEGREES
CALCULATED R AXIS, ECG10: 61 DEGREES
CALCULATED R AXIS, ECG10: 69 DEGREES
CALCULATED T AXIS, ECG11: 61 DEGREES
CALCULATED T AXIS, ECG11: 73 DEGREES
CALCULATED T AXIS, ECG11: 80 DEGREES
CC UR VC: NORMAL
CHLORIDE SERPL-SCNC: 102 MMOL/L (ref 97–108)
CHLORIDE SERPL-SCNC: 104 MMOL/L (ref 97–108)
CHLORIDE SERPL-SCNC: 109 MMOL/L (ref 97–108)
CHLORIDE SERPL-SCNC: 112 MMOL/L (ref 97–108)
CHLORIDE SERPL-SCNC: 113 MMOL/L (ref 97–108)
CHLORIDE SERPL-SCNC: 114 MMOL/L (ref 97–108)
CHLORIDE SERPL-SCNC: 114 MMOL/L (ref 97–108)
CHLORIDE SERPL-SCNC: 115 MMOL/L (ref 97–108)
CHLORIDE SERPL-SCNC: 115 MMOL/L (ref 97–108)
CHLORIDE SERPL-SCNC: 116 MMOL/L (ref 97–108)
CHLORIDE SERPL-SCNC: 117 MMOL/L (ref 97–108)
CHLORIDE SERPL-SCNC: 118 MMOL/L (ref 97–108)
CHOLEST SERPL-MCNC: 101 MG/DL
CK MB CFR SERPL CALC: 9.1 % (ref 0–2.5)
CK MB SERPL-MCNC: 6.4 NG/ML (ref 5–25)
CK SERPL-CCNC: 70 U/L (ref 39–308)
CO2 SERPL-SCNC: 15 MMOL/L (ref 21–32)
CO2 SERPL-SCNC: 15 MMOL/L (ref 21–32)
CO2 SERPL-SCNC: 19 MMOL/L (ref 21–32)
CO2 SERPL-SCNC: 20 MMOL/L (ref 21–32)
CO2 SERPL-SCNC: 22 MMOL/L (ref 21–32)
CO2 SERPL-SCNC: 23 MMOL/L (ref 21–32)
CO2 SERPL-SCNC: 24 MMOL/L (ref 21–32)
CO2 SERPL-SCNC: 27 MMOL/L (ref 21–32)
CO2 SERPL-SCNC: 28 MMOL/L (ref 21–32)
COLOR UR: ABNORMAL
COLOR UR: ABNORMAL
COMMENT, HOLDF: NORMAL
CORTIS SERPL-MCNC: 37.1 UG/DL
CREAT SERPL-MCNC: 0.78 MG/DL (ref 0.7–1.3)
CREAT SERPL-MCNC: 0.85 MG/DL (ref 0.7–1.3)
CREAT SERPL-MCNC: 0.91 MG/DL (ref 0.7–1.3)
CREAT SERPL-MCNC: 0.98 MG/DL (ref 0.7–1.3)
CREAT SERPL-MCNC: 1.06 MG/DL (ref 0.7–1.3)
CREAT SERPL-MCNC: 1.14 MG/DL (ref 0.7–1.3)
CREAT SERPL-MCNC: 1.19 MG/DL (ref 0.7–1.3)
CREAT SERPL-MCNC: 1.44 MG/DL (ref 0.7–1.3)
CREAT SERPL-MCNC: 1.44 MG/DL (ref 0.7–1.3)
CREAT SERPL-MCNC: 1.5 MG/DL (ref 0.7–1.3)
CREAT SERPL-MCNC: 1.51 MG/DL (ref 0.7–1.3)
CREAT SERPL-MCNC: 1.6 MG/DL (ref 0.7–1.3)
CREAT SERPL-MCNC: 1.62 MG/DL (ref 0.7–1.3)
CREAT SERPL-MCNC: 1.72 MG/DL (ref 0.7–1.3)
CREAT SERPL-MCNC: 2.04 MG/DL (ref 0.7–1.3)
D DIMER PPP FEU-MCNC: 5.82 MG/L FEU (ref 0–0.65)
DIAGNOSIS, 93000: NORMAL
DIFFERENTIAL METHOD BLD: ABNORMAL
ECHO AO ROOT DIAM: 3.21 CM
ECHO LA MAJOR AXIS: 3.08 CM
ECHO LA TO AORTIC ROOT RATIO: 0.96
ECHO LV E' LATERAL VELOCITY: 6.9 CM/S
ECHO LV E' SEPTAL VELOCITY: 4.91 CM/S
ECHO LV INTERNAL DIMENSION DIASTOLIC: 4.08 CM (ref 4.2–5.9)
ECHO LV INTERNAL DIMENSION SYSTOLIC: 2.6 CM
ECHO LV IVSD: 1.13 CM (ref 0.6–1)
ECHO LV MASS 2D: 173.2 G (ref 88–224)
ECHO LV MASS INDEX 2D: 97 G/M2 (ref 49–115)
ECHO LV POSTERIOR WALL DIASTOLIC: 1.07 CM (ref 0.6–1)
ECHO MV A VELOCITY: 74.76 CM/S
ECHO MV AREA PHT: 4.1 CM2
ECHO MV E DECELERATION TIME (DT): 187.2 MS
ECHO MV E VELOCITY: 66.57 CM/S
ECHO MV E/A RATIO: 0.89
ECHO MV E/E' LATERAL: 9.65
ECHO MV E/E' RATIO (AVERAGED): 11.6
ECHO MV E/E' SEPTAL: 13.56
ECHO MV PRESSURE HALF TIME (PHT): 54.3 MS
EOSINOPHIL # BLD: 0 K/UL (ref 0–0.4)
EOSINOPHIL # BLD: 0.1 K/UL (ref 0–0.4)
EOSINOPHIL # BLD: 0.3 K/UL (ref 0–0.4)
EOSINOPHIL # BLD: 0.5 K/UL (ref 0–0.4)
EOSINOPHIL NFR BLD: 0 % (ref 0–7)
EOSINOPHIL NFR BLD: 2 % (ref 0–7)
EOSINOPHIL NFR BLD: 3 % (ref 0–7)
EOSINOPHIL NFR BLD: 6 % (ref 0–7)
EPITH CASTS URNS QL MICRO: ABNORMAL /LPF
EPITH CASTS URNS QL MICRO: ABNORMAL /LPF
ERYTHROCYTE [DISTWIDTH] IN BLOOD BY AUTOMATED COUNT: 17.4 % (ref 11.5–14.5)
ERYTHROCYTE [DISTWIDTH] IN BLOOD BY AUTOMATED COUNT: 17.8 % (ref 11.5–14.5)
ERYTHROCYTE [DISTWIDTH] IN BLOOD BY AUTOMATED COUNT: 17.9 % (ref 11.5–14.5)
ERYTHROCYTE [DISTWIDTH] IN BLOOD BY AUTOMATED COUNT: 18 % (ref 11.5–14.5)
ERYTHROCYTE [DISTWIDTH] IN BLOOD BY AUTOMATED COUNT: 18.1 % (ref 11.5–14.5)
ERYTHROCYTE [DISTWIDTH] IN BLOOD BY AUTOMATED COUNT: 18.6 % (ref 11.5–14.5)
ERYTHROCYTE [DISTWIDTH] IN BLOOD BY AUTOMATED COUNT: 19.3 % (ref 11.5–14.5)
ERYTHROCYTE [DISTWIDTH] IN BLOOD BY AUTOMATED COUNT: 19.5 % (ref 11.5–14.5)
ERYTHROCYTE [DISTWIDTH] IN BLOOD BY AUTOMATED COUNT: 19.6 % (ref 11.5–14.5)
ERYTHROCYTE [DISTWIDTH] IN BLOOD BY AUTOMATED COUNT: 19.9 % (ref 11.5–14.5)
ERYTHROCYTE [DISTWIDTH] IN BLOOD BY AUTOMATED COUNT: 20.2 % (ref 11.5–14.5)
ERYTHROCYTE [DISTWIDTH] IN BLOOD BY AUTOMATED COUNT: 20.3 % (ref 11.5–14.5)
ERYTHROCYTE [DISTWIDTH] IN BLOOD BY AUTOMATED COUNT: 20.8 % (ref 11.5–14.5)
EST. AVERAGE GLUCOSE BLD GHB EST-MCNC: 166 MG/DL
FERRITIN SERPL-MCNC: 125 NG/ML (ref 26–388)
FERRITIN SERPL-MCNC: 167 NG/ML (ref 26–388)
FLUAV H1 2009 PAND RNA SPEC QL NAA+PROBE: NOT DETECTED
FLUAV H1 RNA SPEC QL NAA+PROBE: NOT DETECTED
FLUAV H3 RNA SPEC QL NAA+PROBE: NOT DETECTED
FLUAV SUBTYP SPEC NAA+PROBE: NOT DETECTED
FLUBV RNA SPEC QL NAA+PROBE: NOT DETECTED
FOLATE SERPL-MCNC: 14.1 NG/ML (ref 5–21)
GAS FLOW.O2 O2 DELIVERY SYS: ABNORMAL L/MIN
GAS FLOW.O2 SETTING OXYMISER: 14 BPM
GAS FLOW.O2 SETTING OXYMISER: 14 BPM
GLOBULIN SER CALC-MCNC: 2.9 G/DL (ref 2–4)
GLOBULIN SER CALC-MCNC: 3.2 G/DL (ref 2–4)
GLOBULIN SER CALC-MCNC: 3.3 G/DL (ref 2–4)
GLOBULIN SER CALC-MCNC: 3.5 G/DL (ref 2–4)
GLOBULIN SER CALC-MCNC: 3.6 G/DL (ref 2–4)
GLOBULIN SER CALC-MCNC: 3.7 G/DL (ref 2–4)
GLUCOSE BLD STRIP.AUTO-MCNC: 114 MG/DL (ref 65–100)
GLUCOSE BLD STRIP.AUTO-MCNC: 116 MG/DL (ref 65–100)
GLUCOSE BLD STRIP.AUTO-MCNC: 118 MG/DL (ref 65–100)
GLUCOSE BLD STRIP.AUTO-MCNC: 119 MG/DL (ref 65–100)
GLUCOSE BLD STRIP.AUTO-MCNC: 121 MG/DL (ref 65–100)
GLUCOSE BLD STRIP.AUTO-MCNC: 127 MG/DL (ref 65–100)
GLUCOSE BLD STRIP.AUTO-MCNC: 134 MG/DL (ref 65–100)
GLUCOSE BLD STRIP.AUTO-MCNC: 136 MG/DL (ref 65–100)
GLUCOSE BLD STRIP.AUTO-MCNC: 150 MG/DL (ref 65–100)
GLUCOSE BLD STRIP.AUTO-MCNC: 155 MG/DL (ref 65–100)
GLUCOSE BLD STRIP.AUTO-MCNC: 161 MG/DL (ref 65–100)
GLUCOSE BLD STRIP.AUTO-MCNC: 161 MG/DL (ref 65–100)
GLUCOSE BLD STRIP.AUTO-MCNC: 166 MG/DL (ref 65–100)
GLUCOSE BLD STRIP.AUTO-MCNC: 181 MG/DL (ref 65–100)
GLUCOSE BLD STRIP.AUTO-MCNC: 181 MG/DL (ref 65–100)
GLUCOSE BLD STRIP.AUTO-MCNC: 182 MG/DL (ref 65–100)
GLUCOSE BLD STRIP.AUTO-MCNC: 183 MG/DL (ref 65–100)
GLUCOSE BLD STRIP.AUTO-MCNC: 183 MG/DL (ref 65–100)
GLUCOSE BLD STRIP.AUTO-MCNC: 198 MG/DL (ref 65–100)
GLUCOSE BLD STRIP.AUTO-MCNC: 202 MG/DL (ref 65–100)
GLUCOSE BLD STRIP.AUTO-MCNC: 202 MG/DL (ref 65–100)
GLUCOSE BLD STRIP.AUTO-MCNC: 203 MG/DL (ref 65–100)
GLUCOSE BLD STRIP.AUTO-MCNC: 206 MG/DL (ref 65–100)
GLUCOSE BLD STRIP.AUTO-MCNC: 208 MG/DL (ref 65–100)
GLUCOSE BLD STRIP.AUTO-MCNC: 212 MG/DL (ref 65–100)
GLUCOSE BLD STRIP.AUTO-MCNC: 214 MG/DL (ref 65–100)
GLUCOSE BLD STRIP.AUTO-MCNC: 224 MG/DL (ref 65–100)
GLUCOSE BLD STRIP.AUTO-MCNC: 224 MG/DL (ref 65–100)
GLUCOSE BLD STRIP.AUTO-MCNC: 231 MG/DL (ref 65–100)
GLUCOSE BLD STRIP.AUTO-MCNC: 232 MG/DL (ref 65–100)
GLUCOSE BLD STRIP.AUTO-MCNC: 232 MG/DL (ref 65–100)
GLUCOSE BLD STRIP.AUTO-MCNC: 234 MG/DL (ref 65–100)
GLUCOSE BLD STRIP.AUTO-MCNC: 237 MG/DL (ref 65–100)
GLUCOSE BLD STRIP.AUTO-MCNC: 242 MG/DL (ref 65–100)
GLUCOSE BLD STRIP.AUTO-MCNC: 250 MG/DL (ref 65–100)
GLUCOSE BLD STRIP.AUTO-MCNC: 251 MG/DL (ref 65–100)
GLUCOSE BLD STRIP.AUTO-MCNC: 254 MG/DL (ref 65–100)
GLUCOSE BLD STRIP.AUTO-MCNC: 256 MG/DL (ref 65–100)
GLUCOSE BLD STRIP.AUTO-MCNC: 283 MG/DL (ref 65–100)
GLUCOSE BLD STRIP.AUTO-MCNC: 298 MG/DL (ref 65–100)
GLUCOSE BLD STRIP.AUTO-MCNC: 306 MG/DL (ref 65–100)
GLUCOSE BLD STRIP.AUTO-MCNC: 360 MG/DL (ref 65–100)
GLUCOSE BLD STRIP.AUTO-MCNC: 403 MG/DL (ref 65–100)
GLUCOSE BLD STRIP.AUTO-MCNC: 417 MG/DL (ref 65–100)
GLUCOSE BLD STRIP.AUTO-MCNC: 421 MG/DL (ref 65–100)
GLUCOSE BLD STRIP.AUTO-MCNC: 443 MG/DL (ref 65–100)
GLUCOSE BLD STRIP.AUTO-MCNC: 456 MG/DL (ref 65–100)
GLUCOSE BLD STRIP.AUTO-MCNC: 86 MG/DL (ref 65–100)
GLUCOSE BLD STRIP.AUTO-MCNC: 91 MG/DL (ref 65–100)
GLUCOSE BLD STRIP.AUTO-MCNC: 97 MG/DL (ref 65–100)
GLUCOSE SERPL-MCNC: 123 MG/DL (ref 65–100)
GLUCOSE SERPL-MCNC: 157 MG/DL (ref 65–100)
GLUCOSE SERPL-MCNC: 161 MG/DL (ref 65–100)
GLUCOSE SERPL-MCNC: 164 MG/DL (ref 65–100)
GLUCOSE SERPL-MCNC: 174 MG/DL (ref 65–100)
GLUCOSE SERPL-MCNC: 185 MG/DL (ref 65–100)
GLUCOSE SERPL-MCNC: 193 MG/DL (ref 65–100)
GLUCOSE SERPL-MCNC: 222 MG/DL (ref 65–100)
GLUCOSE SERPL-MCNC: 249 MG/DL (ref 65–100)
GLUCOSE SERPL-MCNC: 297 MG/DL (ref 65–100)
GLUCOSE SERPL-MCNC: 317 MG/DL (ref 65–100)
GLUCOSE SERPL-MCNC: 34 MG/DL (ref 65–100)
GLUCOSE SERPL-MCNC: 403 MG/DL (ref 65–100)
GLUCOSE SERPL-MCNC: 42 MG/DL (ref 65–100)
GLUCOSE SERPL-MCNC: 74 MG/DL (ref 65–100)
GLUCOSE UR STRIP.AUTO-MCNC: 250 MG/DL
GLUCOSE UR STRIP.AUTO-MCNC: NEGATIVE MG/DL
GRAM STN SPEC: ABNORMAL
HADV DNA SPEC QL NAA+PROBE: NOT DETECTED
HBA1C MFR BLD: 7.4 % (ref 4.2–6.3)
HCO3 BLD-SCNC: 18.9 MMOL/L (ref 22–26)
HCO3 BLD-SCNC: 19.3 MMOL/L (ref 22–26)
HCO3 BLD-SCNC: 21.2 MMOL/L (ref 22–26)
HCO3 BLD-SCNC: 21.4 MMOL/L (ref 22–26)
HCO3 BLD-SCNC: 23.8 MMOL/L (ref 22–26)
HCO3 BLD-SCNC: 25.2 MMOL/L (ref 22–26)
HCOV 229E RNA SPEC QL NAA+PROBE: NOT DETECTED
HCOV HKU1 RNA SPEC QL NAA+PROBE: NOT DETECTED
HCOV NL63 RNA SPEC QL NAA+PROBE: NOT DETECTED
HCOV OC43 RNA SPEC QL NAA+PROBE: NOT DETECTED
HCT VFR BLD AUTO: 23.4 % (ref 36.6–50.3)
HCT VFR BLD AUTO: 23.7 % (ref 36.6–50.3)
HCT VFR BLD AUTO: 24.1 % (ref 36.6–50.3)
HCT VFR BLD AUTO: 24.7 % (ref 36.6–50.3)
HCT VFR BLD AUTO: 24.9 % (ref 36.6–50.3)
HCT VFR BLD AUTO: 25.2 % (ref 36.6–50.3)
HCT VFR BLD AUTO: 25.6 % (ref 36.6–50.3)
HCT VFR BLD AUTO: 26.7 % (ref 36.6–50.3)
HCT VFR BLD AUTO: 27.3 % (ref 36.6–50.3)
HCT VFR BLD AUTO: 27.9 % (ref 36.6–50.3)
HCT VFR BLD AUTO: 28.2 % (ref 36.6–50.3)
HCT VFR BLD AUTO: 29.9 % (ref 36.6–50.3)
HCT VFR BLD AUTO: 30.1 % (ref 36.6–50.3)
HCT VFR BLD AUTO: 31.4 % (ref 36.6–50.3)
HCT VFR BLD AUTO: 32.6 % (ref 36.6–50.3)
HDLC SERPL-MCNC: 59 MG/DL
HDLC SERPL: 1.7 {RATIO} (ref 0–5)
HEMOCCULT STL QL: POSITIVE
HGB BLD-MCNC: 6.8 G/DL (ref 12.1–17)
HGB BLD-MCNC: 7.1 G/DL (ref 12.1–17)
HGB BLD-MCNC: 7.4 G/DL (ref 12.1–17)
HGB BLD-MCNC: 7.6 G/DL (ref 12.1–17)
HGB BLD-MCNC: 7.6 G/DL (ref 12.1–17)
HGB BLD-MCNC: 7.9 G/DL (ref 12.1–17)
HGB BLD-MCNC: 8.3 G/DL (ref 12.1–17)
HGB BLD-MCNC: 8.4 G/DL (ref 12.1–17)
HGB BLD-MCNC: 8.6 G/DL (ref 12.1–17)
HGB BLD-MCNC: 8.7 G/DL (ref 12.1–17)
HGB BLD-MCNC: 8.8 G/DL (ref 12.1–17)
HGB BLD-MCNC: 9.5 G/DL (ref 12.1–17)
HGB BLD-MCNC: 9.8 G/DL (ref 12.1–17)
HGB UR QL STRIP: ABNORMAL
HGB UR QL STRIP: NEGATIVE
HMPV RNA SPEC QL NAA+PROBE: NOT DETECTED
HPIV1 RNA SPEC QL NAA+PROBE: NOT DETECTED
HPIV2 RNA SPEC QL NAA+PROBE: NOT DETECTED
HPIV3 RNA SPEC QL NAA+PROBE: NOT DETECTED
HPIV4 RNA SPEC QL NAA+PROBE: NOT DETECTED
HYALINE CASTS URNS QL MICRO: ABNORMAL /LPF (ref 0–5)
IGA SERPL-MCNC: 274 MG/DL (ref 70–400)
IGG SERPL-MCNC: 725 MG/DL (ref 700–1600)
IGM SERPL-MCNC: 37 MG/DL (ref 40–230)
IMM GRANULOCYTES # BLD AUTO: 0 K/UL
IMM GRANULOCYTES # BLD AUTO: 0.1 K/UL (ref 0–0.04)
IMM GRANULOCYTES # BLD AUTO: 0.2 K/UL (ref 0–0.04)
IMM GRANULOCYTES NFR BLD AUTO: 0 %
IMM GRANULOCYTES NFR BLD AUTO: 1 % (ref 0–0.5)
IMM GRANULOCYTES NFR BLD AUTO: 2 % (ref 0–0.5)
INR PPP: 1.1 (ref 0.9–1.1)
IRON SATN MFR SERPL: 20 % (ref 20–50)
IRON SERPL-MCNC: 45 UG/DL (ref 35–150)
IRON SERPL-MCNC: 45 UG/DL (ref 35–150)
KETONES UR QL STRIP.AUTO: ABNORMAL MG/DL
KETONES UR QL STRIP.AUTO: NEGATIVE MG/DL
LACTATE BLD-SCNC: 2.64 MMOL/L (ref 0.4–2)
LACTATE BLD-SCNC: 3.24 MMOL/L (ref 0.4–2)
LACTATE SERPL-SCNC: 1 MMOL/L (ref 0.4–2)
LACTATE SERPL-SCNC: 1.2 MMOL/L (ref 0.4–2)
LACTATE SERPL-SCNC: 2 MMOL/L (ref 0.4–2)
LACTATE SERPL-SCNC: 2.1 MMOL/L (ref 0.4–2)
LDLC SERPL CALC-MCNC: 31.6 MG/DL (ref 0–100)
LEUKOCYTE ESTERASE UR QL STRIP.AUTO: NEGATIVE
LEUKOCYTE ESTERASE UR QL STRIP.AUTO: NEGATIVE
LIPID PROFILE,FLP: NORMAL
LYMPHOCYTES # BLD: 0.2 K/UL (ref 0.8–3.5)
LYMPHOCYTES # BLD: 0.2 K/UL (ref 0.8–3.5)
LYMPHOCYTES # BLD: 0.3 K/UL (ref 0.8–3.5)
LYMPHOCYTES # BLD: 0.3 K/UL (ref 0.8–3.5)
LYMPHOCYTES # BLD: 0.4 K/UL (ref 0.8–3.5)
LYMPHOCYTES # BLD: 0.7 K/UL (ref 0.8–3.5)
LYMPHOCYTES # BLD: 1.5 K/UL (ref 0.8–3.5)
LYMPHOCYTES # BLD: 1.5 K/UL (ref 0.8–3.5)
LYMPHOCYTES NFR BLD: 14 % (ref 12–49)
LYMPHOCYTES NFR BLD: 18 % (ref 12–49)
LYMPHOCYTES NFR BLD: 19 % (ref 12–49)
LYMPHOCYTES NFR BLD: 21 % (ref 12–49)
LYMPHOCYTES NFR BLD: 5 % (ref 12–49)
LYMPHOCYTES NFR BLD: 8 % (ref 12–49)
M PNEUMO DNA SPEC QL NAA+PROBE: NOT DETECTED
MAGNESIUM SERPL-MCNC: 1.4 MG/DL (ref 1.6–2.4)
MAGNESIUM SERPL-MCNC: 1.5 MG/DL (ref 1.6–2.4)
MAGNESIUM SERPL-MCNC: 1.6 MG/DL (ref 1.6–2.4)
MAGNESIUM SERPL-MCNC: 1.7 MG/DL (ref 1.6–2.4)
MAGNESIUM SERPL-MCNC: 1.9 MG/DL (ref 1.6–2.4)
MCH RBC QN AUTO: 28.2 PG (ref 26–34)
MCH RBC QN AUTO: 28.2 PG (ref 26–34)
MCH RBC QN AUTO: 28.4 PG (ref 26–34)
MCH RBC QN AUTO: 28.5 PG (ref 26–34)
MCH RBC QN AUTO: 28.5 PG (ref 26–34)
MCH RBC QN AUTO: 28.7 PG (ref 26–34)
MCH RBC QN AUTO: 28.7 PG (ref 26–34)
MCH RBC QN AUTO: 28.9 PG (ref 26–34)
MCH RBC QN AUTO: 29.3 PG (ref 26–34)
MCH RBC QN AUTO: 29.4 PG (ref 26–34)
MCH RBC QN AUTO: 29.4 PG (ref 26–34)
MCH RBC QN AUTO: 29.9 PG (ref 26–34)
MCH RBC QN AUTO: 30.4 PG (ref 26–34)
MCH RBC QN AUTO: 30.5 PG (ref 26–34)
MCH RBC QN AUTO: 30.5 PG (ref 26–34)
MCHC RBC AUTO-ENTMCNC: 28.5 G/DL (ref 30–36.5)
MCHC RBC AUTO-ENTMCNC: 28.9 G/DL (ref 30–36.5)
MCHC RBC AUTO-ENTMCNC: 28.9 G/DL (ref 30–36.5)
MCHC RBC AUTO-ENTMCNC: 29.1 G/DL (ref 30–36.5)
MCHC RBC AUTO-ENTMCNC: 29.4 G/DL (ref 30–36.5)
MCHC RBC AUTO-ENTMCNC: 29.5 G/DL (ref 30–36.5)
MCHC RBC AUTO-ENTMCNC: 29.7 G/DL (ref 30–36.5)
MCHC RBC AUTO-ENTMCNC: 29.8 G/DL (ref 30–36.5)
MCHC RBC AUTO-ENTMCNC: 30 G/DL (ref 30–36.5)
MCHC RBC AUTO-ENTMCNC: 30 G/DL (ref 30–36.5)
MCHC RBC AUTO-ENTMCNC: 30.1 G/DL (ref 30–36.5)
MCHC RBC AUTO-ENTMCNC: 30.2 G/DL (ref 30–36.5)
MCHC RBC AUTO-ENTMCNC: 30.3 G/DL (ref 30–36.5)
MCHC RBC AUTO-ENTMCNC: 30.8 G/DL (ref 30–36.5)
MCHC RBC AUTO-ENTMCNC: 31.1 G/DL (ref 30–36.5)
MCV RBC AUTO: 100 FL (ref 80–99)
MCV RBC AUTO: 100.9 FL (ref 80–99)
MCV RBC AUTO: 101.7 FL (ref 80–99)
MCV RBC AUTO: 102.2 FL (ref 80–99)
MCV RBC AUTO: 103.4 FL (ref 80–99)
MCV RBC AUTO: 103.4 FL (ref 80–99)
MCV RBC AUTO: 94.3 FL (ref 80–99)
MCV RBC AUTO: 94.4 FL (ref 80–99)
MCV RBC AUTO: 94.7 FL (ref 80–99)
MCV RBC AUTO: 95.2 FL (ref 80–99)
MCV RBC AUTO: 95.4 FL (ref 80–99)
MCV RBC AUTO: 95.5 FL (ref 80–99)
MCV RBC AUTO: 95.6 FL (ref 80–99)
MCV RBC AUTO: 97.4 FL (ref 80–99)
MCV RBC AUTO: 97.7 FL (ref 80–99)
MONOCYTES # BLD: 0.1 K/UL (ref 0–1)
MONOCYTES # BLD: 0.3 K/UL (ref 0–1)
MONOCYTES # BLD: 0.3 K/UL (ref 0–1)
MONOCYTES # BLD: 0.7 K/UL (ref 0–1)
MONOCYTES # BLD: 0.7 K/UL (ref 0–1)
MONOCYTES # BLD: 1.2 K/UL (ref 0–1)
MONOCYTES NFR BLD: 11 % (ref 5–13)
MONOCYTES NFR BLD: 16 % (ref 5–13)
MONOCYTES NFR BLD: 2 % (ref 5–13)
MONOCYTES NFR BLD: 20 % (ref 5–13)
MONOCYTES NFR BLD: 6 % (ref 5–13)
MONOCYTES NFR BLD: 6 % (ref 5–13)
MONOCYTES NFR BLD: 8 % (ref 5–13)
MONOCYTES NFR BLD: 8 % (ref 5–13)
NEUTS BAND NFR BLD MANUAL: 10 % (ref 0–6)
NEUTS BAND NFR BLD MANUAL: 14 % (ref 0–6)
NEUTS BAND NFR BLD MANUAL: 2 % (ref 0–6)
NEUTS BAND NFR BLD MANUAL: 7 % (ref 0–6)
NEUTS SEG # BLD: 0.8 K/UL (ref 1.8–8)
NEUTS SEG # BLD: 1.4 K/UL (ref 1.8–8)
NEUTS SEG # BLD: 1.8 K/UL (ref 1.8–8)
NEUTS SEG # BLD: 1.8 K/UL (ref 1.8–8)
NEUTS SEG # BLD: 4.4 K/UL (ref 1.8–8)
NEUTS SEG # BLD: 4.7 K/UL (ref 1.8–8)
NEUTS SEG # BLD: 5.7 K/UL (ref 1.8–8)
NEUTS SEG # BLD: 5.8 K/UL (ref 1.8–8)
NEUTS SEG NFR BLD: 47 % (ref 32–75)
NEUTS SEG NFR BLD: 57 % (ref 32–75)
NEUTS SEG NFR BLD: 64 % (ref 32–75)
NEUTS SEG NFR BLD: 68 % (ref 32–75)
NEUTS SEG NFR BLD: 68 % (ref 32–75)
NEUTS SEG NFR BLD: 78 % (ref 32–75)
NEUTS SEG NFR BLD: 84 % (ref 32–75)
NEUTS SEG NFR BLD: 91 % (ref 32–75)
NITRITE UR QL STRIP.AUTO: NEGATIVE
NITRITE UR QL STRIP.AUTO: NEGATIVE
NRBC # BLD: 0 K/UL (ref 0–0.01)
NRBC # BLD: 0.02 K/UL (ref 0–0.01)
NRBC # BLD: 0.03 K/UL (ref 0–0.01)
NRBC # BLD: 0.03 K/UL (ref 0–0.01)
NRBC # BLD: 0.05 K/UL (ref 0–0.01)
NRBC # BLD: 0.13 K/UL (ref 0–0.01)
NRBC BLD-RTO: 0 PER 100 WBC
NRBC BLD-RTO: 0.3 PER 100 WBC
NRBC BLD-RTO: 0.5 PER 100 WBC
NRBC BLD-RTO: 0.5 PER 100 WBC
NRBC BLD-RTO: 1.7 PER 100 WBC
NRBC BLD-RTO: 2.8 PER 100 WBC
O2/TOTAL GAS SETTING VFR VENT: 0.7 %
O2/TOTAL GAS SETTING VFR VENT: 100 %
O2/TOTAL GAS SETTING VFR VENT: 30 %
O2/TOTAL GAS SETTING VFR VENT: 30 %
O2/TOTAL GAS SETTING VFR VENT: 40 %
P-R INTERVAL, ECG05: 152 MS
P-R INTERVAL, ECG05: 152 MS
PCO2 BLD: 36.7 MMHG (ref 35–45)
PCO2 BLD: 38 MMHG (ref 35–45)
PCO2 BLD: 38.3 MMHG (ref 35–45)
PCO2 BLD: 40.7 MMHG (ref 35–45)
PCO2 BLD: 47.3 MMHG (ref 35–45)
PCO2 BLD: 53.2 MMHG (ref 35–45)
PEEP RESPIRATORY: 5 CMH2O
PEEP RESPIRATORY: 6 CMH2O
PEEP RESPIRATORY: 6 CMH2O
PH BLD: 7.17 [PH] (ref 7.35–7.45)
PH BLD: 7.26 [PH] (ref 7.35–7.45)
PH BLD: 7.27 [PH] (ref 7.35–7.45)
PH BLD: 7.37 [PH] (ref 7.35–7.45)
PH BLD: 7.4 [PH] (ref 7.35–7.45)
PH BLD: 7.43 [PH] (ref 7.35–7.45)
PH UR STRIP: 5 [PH] (ref 5–8)
PH UR STRIP: 7 [PH] (ref 5–8)
PHOSPHATE SERPL-MCNC: 2.5 MG/DL (ref 2.6–4.7)
PHOSPHATE SERPL-MCNC: 3.2 MG/DL (ref 2.6–4.7)
PHOSPHATE SERPL-MCNC: 3.9 MG/DL (ref 2.6–4.7)
PIP ISTAT,IPIP: 10
PIP ISTAT,IPIP: 14
PIP ISTAT,IPIP: 16
PIP ISTAT,IPIP: 26
PLATELET # BLD AUTO: 108 K/UL (ref 150–400)
PLATELET # BLD AUTO: 115 K/UL (ref 150–400)
PLATELET # BLD AUTO: 121 K/UL (ref 150–400)
PLATELET # BLD AUTO: 128 K/UL (ref 150–400)
PLATELET # BLD AUTO: 133 K/UL (ref 150–400)
PLATELET # BLD AUTO: 134 K/UL (ref 150–400)
PLATELET # BLD AUTO: 144 K/UL (ref 150–400)
PLATELET # BLD AUTO: 146 K/UL (ref 150–400)
PLATELET # BLD AUTO: 147 K/UL (ref 150–400)
PLATELET # BLD AUTO: 150 K/UL (ref 150–400)
PLATELET # BLD AUTO: 155 K/UL (ref 150–400)
PLATELET # BLD AUTO: 155 K/UL (ref 150–400)
PLATELET # BLD AUTO: 162 K/UL (ref 150–400)
PLATELET # BLD AUTO: 188 K/UL (ref 150–400)
PLATELET # BLD AUTO: 93 K/UL (ref 150–400)
PLATELET COMMENTS,PCOM: ABNORMAL
PMV BLD AUTO: 10.6 FL (ref 8.9–12.9)
PMV BLD AUTO: 10.7 FL (ref 8.9–12.9)
PMV BLD AUTO: 10.7 FL (ref 8.9–12.9)
PMV BLD AUTO: 11.2 FL (ref 8.9–12.9)
PMV BLD AUTO: 11.2 FL (ref 8.9–12.9)
PMV BLD AUTO: 11.3 FL (ref 8.9–12.9)
PMV BLD AUTO: 11.3 FL (ref 8.9–12.9)
PMV BLD AUTO: 11.4 FL (ref 8.9–12.9)
PMV BLD AUTO: 11.6 FL (ref 8.9–12.9)
PMV BLD AUTO: 11.6 FL (ref 8.9–12.9)
PMV BLD AUTO: 11.9 FL (ref 8.9–12.9)
PMV BLD AUTO: 12 FL (ref 8.9–12.9)
PMV BLD AUTO: 12.1 FL (ref 8.9–12.9)
PO2 BLD: 105 MMHG (ref 80–100)
PO2 BLD: 247 MMHG (ref 80–100)
PO2 BLD: 66 MMHG (ref 80–100)
PO2 BLD: 70 MMHG (ref 80–100)
PO2 BLD: 79 MMHG (ref 80–100)
PO2 BLD: 96 MMHG (ref 80–100)
POTASSIUM SERPL-SCNC: 3.3 MMOL/L (ref 3.5–5.1)
POTASSIUM SERPL-SCNC: 3.6 MMOL/L (ref 3.5–5.1)
POTASSIUM SERPL-SCNC: 3.8 MMOL/L (ref 3.5–5.1)
POTASSIUM SERPL-SCNC: 3.9 MMOL/L (ref 3.5–5.1)
POTASSIUM SERPL-SCNC: 4 MMOL/L (ref 3.5–5.1)
POTASSIUM SERPL-SCNC: 4.2 MMOL/L (ref 3.5–5.1)
POTASSIUM SERPL-SCNC: 4.3 MMOL/L (ref 3.5–5.1)
POTASSIUM SERPL-SCNC: 4.3 MMOL/L (ref 3.5–5.1)
POTASSIUM SERPL-SCNC: 4.5 MMOL/L (ref 3.5–5.1)
POTASSIUM SERPL-SCNC: 4.6 MMOL/L (ref 3.5–5.1)
POTASSIUM SERPL-SCNC: 4.6 MMOL/L (ref 3.5–5.1)
POTASSIUM SERPL-SCNC: 4.9 MMOL/L (ref 3.5–5.1)
POTASSIUM SERPL-SCNC: 5 MMOL/L (ref 3.5–5.1)
PRESSURE SUPPORT SETTING VENT: 5 CMH2O
PROCALCITONIN SERPL-MCNC: 0.1 NG/ML
PROT SERPL-MCNC: 4.9 G/DL (ref 6.4–8.2)
PROT SERPL-MCNC: 4.9 G/DL (ref 6.4–8.2)
PROT SERPL-MCNC: 5 G/DL (ref 6.4–8.2)
PROT SERPL-MCNC: 5.2 G/DL (ref 6.4–8.2)
PROT SERPL-MCNC: 5.8 G/DL (ref 6.4–8.2)
PROT SERPL-MCNC: 5.9 G/DL (ref 6.4–8.2)
PROT SERPL-MCNC: 6.1 G/DL (ref 6.4–8.2)
PROT SERPL-MCNC: 6.4 G/DL (ref 6.4–8.2)
PROT UR STRIP-MCNC: 100 MG/DL
PROT UR STRIP-MCNC: 30 MG/DL
PROTHROMBIN TIME: 11.5 SEC (ref 9–11.1)
Q-T INTERVAL, ECG07: 286 MS
Q-T INTERVAL, ECG07: 316 MS
Q-T INTERVAL, ECG07: 330 MS
QRS DURATION, ECG06: 78 MS
QRS DURATION, ECG06: 84 MS
QRS DURATION, ECG06: 88 MS
QTC CALCULATION (BEZET), ECG08: 442 MS
QTC CALCULATION (BEZET), ECG08: 452 MS
QTC CALCULATION (BEZET), ECG08: 477 MS
RBC # BLD AUTO: 2.32 M/UL (ref 4.1–5.7)
RBC # BLD AUTO: 2.33 M/UL (ref 4.1–5.7)
RBC # BLD AUTO: 2.33 M/UL (ref 4.1–5.7)
RBC # BLD AUTO: 2.49 M/UL (ref 4.1–5.7)
RBC # BLD AUTO: 2.62 M/UL (ref 4.1–5.7)
RBC # BLD AUTO: 2.64 M/UL (ref 4.1–5.7)
RBC # BLD AUTO: 2.67 M/UL (ref 4.1–5.7)
RBC # BLD AUTO: 2.68 M/UL (ref 4.1–5.7)
RBC # BLD AUTO: 2.76 M/UL (ref 4.1–5.7)
RBC # BLD AUTO: 2.82 M/UL (ref 4.1–5.7)
RBC # BLD AUTO: 2.93 M/UL (ref 4.1–5.7)
RBC # BLD AUTO: 3.07 M/UL (ref 4.1–5.7)
RBC # BLD AUTO: 3.08 M/UL (ref 4.1–5.7)
RBC # BLD AUTO: 3.29 M/UL (ref 4.1–5.7)
RBC # BLD AUTO: 3.41 M/UL (ref 4.1–5.7)
RBC #/AREA URNS HPF: ABNORMAL /HPF (ref 0–5)
RBC #/AREA URNS HPF: ABNORMAL /HPF (ref 0–5)
RBC MORPH BLD: ABNORMAL
RSV RNA SPEC QL NAA+PROBE: NOT DETECTED
RV+EV RNA SPEC QL NAA+PROBE: NOT DETECTED
SAMPLES BEING HELD,HOLD: NORMAL
SAO2 % BLD: 100 % (ref 92–97)
SAO2 % BLD: 86 % (ref 92–97)
SAO2 % BLD: 91 % (ref 92–97)
SAO2 % BLD: 94 % (ref 92–97)
SAO2 % BLD: 98 % (ref 92–97)
SAO2 % BLD: 98 % (ref 92–97)
SERVICE CMNT-IMP: ABNORMAL
SERVICE CMNT-IMP: NORMAL
SODIUM SERPL-SCNC: 135 MMOL/L (ref 136–145)
SODIUM SERPL-SCNC: 140 MMOL/L (ref 136–145)
SODIUM SERPL-SCNC: 141 MMOL/L (ref 136–145)
SODIUM SERPL-SCNC: 142 MMOL/L (ref 136–145)
SODIUM SERPL-SCNC: 143 MMOL/L (ref 136–145)
SODIUM SERPL-SCNC: 144 MMOL/L (ref 136–145)
SODIUM SERPL-SCNC: 144 MMOL/L (ref 136–145)
SODIUM SERPL-SCNC: 147 MMOL/L (ref 136–145)
SODIUM SERPL-SCNC: 147 MMOL/L (ref 136–145)
SODIUM SERPL-SCNC: 148 MMOL/L (ref 136–145)
SODIUM SERPL-SCNC: 149 MMOL/L (ref 136–145)
SP GR UR REFRACTOMETRY: 1.02 (ref 1–1.03)
SP GR UR REFRACTOMETRY: 1.02 (ref 1–1.03)
SPECIMEN EXP DATE BLD: NORMAL
SPECIMEN TYPE: ABNORMAL
SPONTANEOUS TIMED, IST: YES
T4 FREE SERPL-MCNC: 1.2 NG/DL (ref 0.8–1.5)
THERAPEUTIC RANGE,PTTT: ABNORMAL SECS (ref 58–77)
THERAPEUTIC RANGE,PTTT: NORMAL SECS (ref 58–77)
TIBC SERPL-MCNC: 227 UG/DL (ref 250–450)
TOTAL CELLS COUNTED SPEC: 50
TOTAL CELLS COUNTED SPEC: 50
TOTAL RESP. RATE, ITRR: 24
TOTAL RESP. RATE, ITRR: 25
TOTAL RESP. RATE, ITRR: 25
TOTAL RESP. RATE, ITRR: 26
TRIGL SERPL-MCNC: 52 MG/DL (ref ?–150)
TROPONIN I SERPL-MCNC: <0.05 NG/ML
TSH SERPL DL<=0.05 MIU/L-ACNC: 0.12 UIU/ML (ref 0.36–3.74)
TSH SERPL DL<=0.05 MIU/L-ACNC: 0.22 UIU/ML (ref 0.36–3.74)
UA: UC IF INDICATED,UAUC: ABNORMAL
UROBILINOGEN UR QL STRIP.AUTO: 0.2 EU/DL (ref 0.2–1)
UROBILINOGEN UR QL STRIP.AUTO: 1 EU/DL (ref 0.2–1)
VENTILATION MODE VENT: ABNORMAL
VENTRICULAR RATE, ECG03: 108 BPM
VENTRICULAR RATE, ECG03: 123 BPM
VENTRICULAR RATE, ECG03: 167 BPM
VIT B12 SERPL-MCNC: 697 PG/ML (ref 193–986)
VIT B12 SERPL-MCNC: 896 PG/ML (ref 193–986)
VLDLC SERPL CALC-MCNC: 10.4 MG/DL
VT SETTING VENT: 520 ML
VT SETTING VENT: 520 ML
WBC # BLD AUTO: 1.1 K/UL (ref 4.1–11.1)
WBC # BLD AUTO: 1.7 K/UL (ref 4.1–11.1)
WBC # BLD AUTO: 2.2 K/UL (ref 4.1–11.1)
WBC # BLD AUTO: 2.4 K/UL (ref 4.1–11.1)
WBC # BLD AUTO: 2.9 K/UL (ref 4.1–11.1)
WBC # BLD AUTO: 3.3 K/UL (ref 4.1–11.1)
WBC # BLD AUTO: 4.7 K/UL (ref 4.1–11.1)
WBC # BLD AUTO: 5.5 K/UL (ref 4.1–11.1)
WBC # BLD AUTO: 5.8 K/UL (ref 4.1–11.1)
WBC # BLD AUTO: 5.8 K/UL (ref 4.1–11.1)
WBC # BLD AUTO: 6.2 K/UL (ref 4.1–11.1)
WBC # BLD AUTO: 7.1 K/UL (ref 4.1–11.1)
WBC # BLD AUTO: 7.2 K/UL (ref 4.1–11.1)
WBC # BLD AUTO: 7.8 K/UL (ref 4.1–11.1)
WBC # BLD AUTO: 8.6 K/UL (ref 4.1–11.1)
WBC URNS QL MICRO: ABNORMAL /HPF (ref 0–4)
WBC URNS QL MICRO: ABNORMAL /HPF (ref 0–4)

## 2019-01-01 PROCEDURE — 94640 AIRWAY INHALATION TREATMENT: CPT

## 2019-01-01 PROCEDURE — 80048 BASIC METABOLIC PNL TOTAL CA: CPT

## 2019-01-01 PROCEDURE — 84484 ASSAY OF TROPONIN QUANT: CPT

## 2019-01-01 PROCEDURE — 74011250636 HC RX REV CODE- 250/636: Performed by: NURSE PRACTITIONER

## 2019-01-01 PROCEDURE — 74011000250 HC RX REV CODE- 250: Performed by: INTERNAL MEDICINE

## 2019-01-01 PROCEDURE — 74011250636 HC RX REV CODE- 250/636: Performed by: HOSPITALIST

## 2019-01-01 PROCEDURE — 74011636637 HC RX REV CODE- 636/637: Performed by: HOSPITALIST

## 2019-01-01 PROCEDURE — 85610 PROTHROMBIN TIME: CPT

## 2019-01-01 PROCEDURE — 77030011256 HC DRSG MEPILEX <16IN NO BORD MOLN -A

## 2019-01-01 PROCEDURE — 87040 BLOOD CULTURE FOR BACTERIA: CPT

## 2019-01-01 PROCEDURE — 74011000258 HC RX REV CODE- 258: Performed by: HOSPITALIST

## 2019-01-01 PROCEDURE — 83735 ASSAY OF MAGNESIUM: CPT

## 2019-01-01 PROCEDURE — 74011250636 HC RX REV CODE- 250/636: Performed by: INTERNAL MEDICINE

## 2019-01-01 PROCEDURE — 81001 URINALYSIS AUTO W/SCOPE: CPT

## 2019-01-01 PROCEDURE — 85027 COMPLETE CBC AUTOMATED: CPT

## 2019-01-01 PROCEDURE — 74011250636 HC RX REV CODE- 250/636: Performed by: PHYSICAL MEDICINE & REHABILITATION

## 2019-01-01 PROCEDURE — 96374 THER/PROPH/DIAG INJ IV PUSH: CPT

## 2019-01-01 PROCEDURE — 85025 COMPLETE CBC W/AUTO DIFF WBC: CPT

## 2019-01-01 PROCEDURE — 74011636637 HC RX REV CODE- 636/637: Performed by: INTERNAL MEDICINE

## 2019-01-01 PROCEDURE — 85730 THROMBOPLASTIN TIME PARTIAL: CPT

## 2019-01-01 PROCEDURE — 82962 GLUCOSE BLOOD TEST: CPT

## 2019-01-01 PROCEDURE — 74011000250 HC RX REV CODE- 250: Performed by: EMERGENCY MEDICINE

## 2019-01-01 PROCEDURE — 87186 SC STD MICRODIL/AGAR DIL: CPT

## 2019-01-01 PROCEDURE — 30233N1 TRANSFUSION OF NONAUTOLOGOUS RED BLOOD CELLS INTO PERIPHERAL VEIN, PERCUTANEOUS APPROACH: ICD-10-PCS | Performed by: HOSPITALIST

## 2019-01-01 PROCEDURE — 74011000250 HC RX REV CODE- 250

## 2019-01-01 PROCEDURE — 74011636637 HC RX REV CODE- 636/637: Performed by: NURSE PRACTITIONER

## 2019-01-01 PROCEDURE — 76450000000

## 2019-01-01 PROCEDURE — 77030013079 HC BLNKT BAIR HGGR 3M -A

## 2019-01-01 PROCEDURE — 84443 ASSAY THYROID STIM HORMONE: CPT

## 2019-01-01 PROCEDURE — 74011000250 HC RX REV CODE- 250: Performed by: HOSPITALIST

## 2019-01-01 PROCEDURE — 71046 X-RAY EXAM CHEST 2 VIEWS: CPT

## 2019-01-01 PROCEDURE — 77010033678 HC OXYGEN DAILY

## 2019-01-01 PROCEDURE — 74011250637 HC RX REV CODE- 250/637: Performed by: INTERNAL MEDICINE

## 2019-01-01 PROCEDURE — 36415 COLL VENOUS BLD VENIPUNCTURE: CPT

## 2019-01-01 PROCEDURE — 74011250637 HC RX REV CODE- 250/637: Performed by: NURSE PRACTITIONER

## 2019-01-01 PROCEDURE — 84100 ASSAY OF PHOSPHORUS: CPT

## 2019-01-01 PROCEDURE — 74011250636 HC RX REV CODE- 250/636: Performed by: STUDENT IN AN ORGANIZED HEALTH CARE EDUCATION/TRAINING PROGRAM

## 2019-01-01 PROCEDURE — 94760 N-INVAS EAR/PLS OXIMETRY 1: CPT

## 2019-01-01 PROCEDURE — 80061 LIPID PANEL: CPT

## 2019-01-01 PROCEDURE — 65660000000 HC RM CCU STEPDOWN

## 2019-01-01 PROCEDURE — 74011250637 HC RX REV CODE- 250/637: Performed by: HOSPITALIST

## 2019-01-01 PROCEDURE — 74011000258 HC RX REV CODE- 258: Performed by: EMERGENCY MEDICINE

## 2019-01-01 PROCEDURE — 86900 BLOOD TYPING SEROLOGIC ABO: CPT

## 2019-01-01 PROCEDURE — 74011000250 HC RX REV CODE- 250: Performed by: PHYSICAL MEDICINE & REHABILITATION

## 2019-01-01 PROCEDURE — 94664 DEMO&/EVAL PT USE INHALER: CPT

## 2019-01-01 PROCEDURE — 82272 OCCULT BLD FECES 1-3 TESTS: CPT

## 2019-01-01 PROCEDURE — 80053 COMPREHEN METABOLIC PANEL: CPT

## 2019-01-01 PROCEDURE — 99285 EMERGENCY DEPT VISIT HI MDM: CPT

## 2019-01-01 PROCEDURE — 85379 FIBRIN DEGRADATION QUANT: CPT

## 2019-01-01 PROCEDURE — 74011250636 HC RX REV CODE- 250/636: Performed by: EMERGENCY MEDICINE

## 2019-01-01 PROCEDURE — 87086 URINE CULTURE/COLONY COUNT: CPT

## 2019-01-01 PROCEDURE — 65610000006 HC RM INTENSIVE CARE

## 2019-01-01 PROCEDURE — 74011000258 HC RX REV CODE- 258: Performed by: INTERNAL MEDICINE

## 2019-01-01 PROCEDURE — 74018 RADEX ABDOMEN 1 VIEW: CPT

## 2019-01-01 PROCEDURE — 82533 TOTAL CORTISOL: CPT

## 2019-01-01 PROCEDURE — A9558 XE133 XENON 10MCI: HCPCS

## 2019-01-01 PROCEDURE — 65270000032 HC RM SEMIPRIVATE

## 2019-01-01 PROCEDURE — 74011000258 HC RX REV CODE- 258: Performed by: STUDENT IN AN ORGANIZED HEALTH CARE EDUCATION/TRAINING PROGRAM

## 2019-01-01 PROCEDURE — 0BH17EZ INSERTION OF ENDOTRACHEAL AIRWAY INTO TRACHEA, VIA NATURAL OR ARTIFICIAL OPENING: ICD-10-PCS | Performed by: HOSPITALIST

## 2019-01-01 PROCEDURE — 94660 CPAP INITIATION&MGMT: CPT

## 2019-01-01 PROCEDURE — 70496 CT ANGIOGRAPHY HEAD: CPT

## 2019-01-01 PROCEDURE — 83605 ASSAY OF LACTIC ACID: CPT

## 2019-01-01 PROCEDURE — 36600 WITHDRAWAL OF ARTERIAL BLOOD: CPT

## 2019-01-01 PROCEDURE — 65660000001 HC RM ICU INTERMED STEPDOWN

## 2019-01-01 PROCEDURE — 77030013140 HC MSK NEB VYRM -A

## 2019-01-01 PROCEDURE — C9113 INJ PANTOPRAZOLE SODIUM, VIA: HCPCS | Performed by: HOSPITALIST

## 2019-01-01 PROCEDURE — 82803 BLOOD GASES ANY COMBINATION: CPT

## 2019-01-01 PROCEDURE — P9016 RBC LEUKOCYTES REDUCED: HCPCS

## 2019-01-01 PROCEDURE — 71045 X-RAY EXAM CHEST 1 VIEW: CPT

## 2019-01-01 PROCEDURE — 96365 THER/PROPH/DIAG IV INF INIT: CPT

## 2019-01-01 PROCEDURE — 87633 RESP VIRUS 12-25 TARGETS: CPT

## 2019-01-01 PROCEDURE — 74011636320 HC RX REV CODE- 636/320: Performed by: INTERNAL MEDICINE

## 2019-01-01 PROCEDURE — 93306 TTE W/DOPPLER COMPLETE: CPT

## 2019-01-01 PROCEDURE — 93970 EXTREMITY STUDY: CPT

## 2019-01-01 PROCEDURE — 96375 TX/PRO/DX INJ NEW DRUG ADDON: CPT

## 2019-01-01 PROCEDURE — 82784 ASSAY IGA/IGD/IGG/IGM EACH: CPT

## 2019-01-01 PROCEDURE — 82607 VITAMIN B-12: CPT

## 2019-01-01 PROCEDURE — 86870 RBC ANTIBODY IDENTIFICATION: CPT

## 2019-01-01 PROCEDURE — 97530 THERAPEUTIC ACTIVITIES: CPT

## 2019-01-01 PROCEDURE — 77030012879 HC MSK CPAP FLL FAC PHIL -B

## 2019-01-01 PROCEDURE — 97161 PT EVAL LOW COMPLEX 20 MIN: CPT

## 2019-01-01 PROCEDURE — 83540 ASSAY OF IRON: CPT

## 2019-01-01 PROCEDURE — 77030029684 HC NEB SM VOL KT MONA -A

## 2019-01-01 PROCEDURE — 71250 CT THORAX DX C-: CPT

## 2019-01-01 PROCEDURE — 5A1945Z RESPIRATORY VENTILATION, 24-96 CONSECUTIVE HOURS: ICD-10-PCS | Performed by: HOSPITALIST

## 2019-01-01 PROCEDURE — 0042T CT CODE NEURO PERF W CBF: CPT

## 2019-01-01 PROCEDURE — 83036 HEMOGLOBIN GLYCOSYLATED A1C: CPT

## 2019-01-01 PROCEDURE — 86923 COMPATIBILITY TEST ELECTRIC: CPT

## 2019-01-01 PROCEDURE — 80076 HEPATIC FUNCTION PANEL: CPT

## 2019-01-01 PROCEDURE — 77030004950 HC CATH ENTRL NG COVD -A

## 2019-01-01 PROCEDURE — 94002 VENT MGMT INPAT INIT DAY: CPT

## 2019-01-01 PROCEDURE — 94003 VENT MGMT INPAT SUBQ DAY: CPT

## 2019-01-01 PROCEDURE — 51798 US URINE CAPACITY MEASURE: CPT

## 2019-01-01 PROCEDURE — 96376 TX/PRO/DX INJ SAME DRUG ADON: CPT

## 2019-01-01 PROCEDURE — 77030008771 HC TU NG SALEM SUMP -A

## 2019-01-01 PROCEDURE — 65270000029 HC RM PRIVATE

## 2019-01-01 PROCEDURE — 96361 HYDRATE IV INFUSION ADD-ON: CPT

## 2019-01-01 PROCEDURE — 84439 ASSAY OF FREE THYROXINE: CPT

## 2019-01-01 PROCEDURE — 77030018798 HC PMP KT ENTRL FED COVD -A

## 2019-01-01 PROCEDURE — 82728 ASSAY OF FERRITIN: CPT

## 2019-01-01 PROCEDURE — 82746 ASSAY OF FOLIC ACID SERUM: CPT

## 2019-01-01 PROCEDURE — 36430 TRANSFUSION BLD/BLD COMPNT: CPT

## 2019-01-01 PROCEDURE — 83880 ASSAY OF NATRIURETIC PEPTIDE: CPT

## 2019-01-01 PROCEDURE — 93005 ELECTROCARDIOGRAM TRACING: CPT

## 2019-01-01 PROCEDURE — 70450 CT HEAD/BRAIN W/O DYE: CPT

## 2019-01-01 PROCEDURE — 77030032490 HC SLV COMPR SCD KNE COVD -B

## 2019-01-01 PROCEDURE — 74011250636 HC RX REV CODE- 250/636

## 2019-01-01 PROCEDURE — 77030010545

## 2019-01-01 PROCEDURE — 87077 CULTURE AEROBIC IDENTIFY: CPT

## 2019-01-01 PROCEDURE — 84145 PROCALCITONIN (PCT): CPT

## 2019-01-01 PROCEDURE — 87070 CULTURE OTHR SPECIMN AEROBIC: CPT

## 2019-01-01 PROCEDURE — 94762 N-INVAS EAR/PLS OXIMTRY CONT: CPT

## 2019-01-01 PROCEDURE — 82550 ASSAY OF CK (CPK): CPT

## 2019-01-01 RX ORDER — DILTIAZEM HYDROCHLORIDE 5 MG/ML
15 INJECTION INTRAVENOUS
Status: DISCONTINUED | OUTPATIENT
Start: 2019-01-01 | End: 2019-01-01

## 2019-01-01 RX ORDER — SODIUM CHLORIDE 0.9 % (FLUSH) 0.9 %
5-40 SYRINGE (ML) INJECTION AS NEEDED
Status: DISCONTINUED | OUTPATIENT
Start: 2019-01-01 | End: 2019-01-01 | Stop reason: HOSPADM

## 2019-01-01 RX ORDER — DEXTROSE 50 % IN WATER (D50W) INTRAVENOUS SYRINGE
12.5-25 AS NEEDED
Status: DISCONTINUED | OUTPATIENT
Start: 2019-01-01 | End: 2019-01-01

## 2019-01-01 RX ORDER — BUDESONIDE 0.5 MG/2ML
500 INHALANT ORAL
Status: DISCONTINUED | OUTPATIENT
Start: 2019-01-01 | End: 2019-01-01 | Stop reason: HOSPADM

## 2019-01-01 RX ORDER — DEXTROSE 50 % IN WATER (D50W) INTRAVENOUS SYRINGE
25-50 AS NEEDED
Status: DISCONTINUED | OUTPATIENT
Start: 2019-01-01 | End: 2019-01-01 | Stop reason: HOSPADM

## 2019-01-01 RX ORDER — DEXTROSE 50 % IN WATER (D50W) INTRAVENOUS SYRINGE
25-50 AS NEEDED
Status: DISCONTINUED | OUTPATIENT
Start: 2019-01-01 | End: 2019-01-01 | Stop reason: SDUPTHER

## 2019-01-01 RX ORDER — DOCUSATE SODIUM 100 MG/1
100 CAPSULE, LIQUID FILLED ORAL 2 TIMES DAILY
Status: DISCONTINUED | OUTPATIENT
Start: 2019-01-01 | End: 2019-01-01 | Stop reason: HOSPADM

## 2019-01-01 RX ORDER — SODIUM CHLORIDE 0.9 % (FLUSH) 0.9 %
10 SYRINGE (ML) INJECTION
Status: COMPLETED | OUTPATIENT
Start: 2019-01-01 | End: 2019-01-01

## 2019-01-01 RX ORDER — DIPHENHYDRAMINE HCL 25 MG
25 CAPSULE ORAL
Status: DISCONTINUED | OUTPATIENT
Start: 2019-01-01 | End: 2019-01-01 | Stop reason: HOSPADM

## 2019-01-01 RX ORDER — HEPARIN SODIUM 10000 [USP'U]/100ML
18-36 INJECTION, SOLUTION INTRAVENOUS
Status: DISCONTINUED | OUTPATIENT
Start: 2019-01-01 | End: 2019-01-01

## 2019-01-01 RX ORDER — FENTANYL CITRATE 50 UG/ML
12.5 INJECTION, SOLUTION INTRAMUSCULAR; INTRAVENOUS
Status: DISCONTINUED | OUTPATIENT
Start: 2019-01-01 | End: 2019-01-01

## 2019-01-01 RX ORDER — ACETAMINOPHEN 325 MG/1
650 TABLET ORAL
Status: DISCONTINUED | OUTPATIENT
Start: 2019-01-01 | End: 2019-01-01 | Stop reason: HOSPADM

## 2019-01-01 RX ORDER — IPRATROPIUM BROMIDE AND ALBUTEROL SULFATE 2.5; .5 MG/3ML; MG/3ML
3 SOLUTION RESPIRATORY (INHALATION)
Status: DISCONTINUED | OUTPATIENT
Start: 2019-01-01 | End: 2019-01-01

## 2019-01-01 RX ORDER — LORAZEPAM 2 MG/ML
2 INJECTION INTRAMUSCULAR
Status: DISCONTINUED | OUTPATIENT
Start: 2019-01-01 | End: 2019-01-01 | Stop reason: HOSPADM

## 2019-01-01 RX ORDER — ALBUTEROL SULFATE 0.83 MG/ML
5 SOLUTION RESPIRATORY (INHALATION)
Status: DISCONTINUED | OUTPATIENT
Start: 2019-01-01 | End: 2019-01-01

## 2019-01-01 RX ORDER — INSULIN GLARGINE 100 [IU]/ML
35 INJECTION, SOLUTION SUBCUTANEOUS DAILY
Status: DISCONTINUED | OUTPATIENT
Start: 2019-01-01 | End: 2019-01-01 | Stop reason: HOSPADM

## 2019-01-01 RX ORDER — BALSAM PERU/CASTOR OIL
OINTMENT (GRAM) TOPICAL 2 TIMES DAILY
Status: DISCONTINUED | OUTPATIENT
Start: 2019-01-01 | End: 2019-01-01

## 2019-01-01 RX ORDER — DEXTROSE 50 % IN WATER (D50W) INTRAVENOUS SYRINGE
Status: COMPLETED
Start: 2019-01-01 | End: 2019-01-01

## 2019-01-01 RX ORDER — SODIUM CHLORIDE 9 MG/ML
125 INJECTION, SOLUTION INTRAVENOUS CONTINUOUS
Status: DISCONTINUED | OUTPATIENT
Start: 2019-01-01 | End: 2019-01-01

## 2019-01-01 RX ORDER — PREDNISONE 20 MG/1
40 TABLET ORAL
Qty: 4 TAB | Refills: 0 | Status: SHIPPED | OUTPATIENT
Start: 2019-01-01 | End: 2019-01-01

## 2019-01-01 RX ORDER — BISACODYL 5 MG
5 TABLET, DELAYED RELEASE (ENTERIC COATED) ORAL DAILY PRN
Status: DISCONTINUED | OUTPATIENT
Start: 2019-01-01 | End: 2019-01-01 | Stop reason: HOSPADM

## 2019-01-01 RX ORDER — INSULIN LISPRO 100 [IU]/ML
6 INJECTION, SOLUTION INTRAVENOUS; SUBCUTANEOUS
Status: DISCONTINUED | OUTPATIENT
Start: 2019-01-01 | End: 2019-01-01 | Stop reason: HOSPADM

## 2019-01-01 RX ORDER — INSULIN LISPRO 100 [IU]/ML
INJECTION, SOLUTION INTRAVENOUS; SUBCUTANEOUS
Status: DISCONTINUED | OUTPATIENT
Start: 2019-01-01 | End: 2019-01-01 | Stop reason: HOSPADM

## 2019-01-01 RX ORDER — MONTELUKAST SODIUM 10 MG/1
10 TABLET ORAL DAILY
Status: DISCONTINUED | OUTPATIENT
Start: 2019-01-01 | End: 2019-01-01 | Stop reason: HOSPADM

## 2019-01-01 RX ORDER — GLYCOPYRROLATE 0.2 MG/ML
0.2 INJECTION INTRAMUSCULAR; INTRAVENOUS
Status: DISCONTINUED | OUTPATIENT
Start: 2019-01-01 | End: 2019-01-01

## 2019-01-01 RX ORDER — OSELTAMIVIR PHOSPHATE 75 MG/1
75 CAPSULE ORAL 2 TIMES DAILY
Status: DISCONTINUED | OUTPATIENT
Start: 2019-01-01 | End: 2019-01-01

## 2019-01-01 RX ORDER — IPRATROPIUM BROMIDE AND ALBUTEROL SULFATE 2.5; .5 MG/3ML; MG/3ML
3 SOLUTION RESPIRATORY (INHALATION)
Status: DISCONTINUED | OUTPATIENT
Start: 2019-01-01 | End: 2019-01-01 | Stop reason: HOSPADM

## 2019-01-01 RX ORDER — INSULIN LISPRO 100 [IU]/ML
INJECTION, SOLUTION INTRAVENOUS; SUBCUTANEOUS
Status: DISCONTINUED | OUTPATIENT
Start: 2019-01-01 | End: 2019-01-01

## 2019-01-01 RX ORDER — ROSUVASTATIN CALCIUM 10 MG/1
20 TABLET, COATED ORAL
Status: DISCONTINUED | OUTPATIENT
Start: 2019-01-01 | End: 2019-01-01 | Stop reason: HOSPADM

## 2019-01-01 RX ORDER — LENALIDOMIDE 25 MG/1
25 CAPSULE ORAL
COMMUNITY
End: 2019-01-01

## 2019-01-01 RX ORDER — INSULIN LISPRO 100 [IU]/ML
INJECTION, SOLUTION INTRAVENOUS; SUBCUTANEOUS EVERY 4 HOURS
Status: DISCONTINUED | OUTPATIENT
Start: 2019-01-01 | End: 2019-01-01

## 2019-01-01 RX ORDER — PREDNISONE 20 MG/1
40 TABLET ORAL
Qty: 6 TAB | Refills: 0 | Status: SHIPPED | OUTPATIENT
Start: 2019-01-01 | End: 2019-01-01

## 2019-01-01 RX ORDER — INSULIN ASPART 100 [IU]/ML
10 INJECTION, SOLUTION INTRAVENOUS; SUBCUTANEOUS
COMMUNITY
End: 2019-01-01

## 2019-01-01 RX ORDER — CALCIUM CARBONATE 200(500)MG
200 TABLET,CHEWABLE ORAL 2 TIMES DAILY WITH MEALS
Qty: 20 TAB | Refills: 0 | Status: SHIPPED | OUTPATIENT
Start: 2019-01-01 | End: 2019-01-01

## 2019-01-01 RX ORDER — MAGNESIUM SULFATE 100 %
4 CRYSTALS MISCELLANEOUS AS NEEDED
Status: DISCONTINUED | OUTPATIENT
Start: 2019-01-01 | End: 2019-01-01 | Stop reason: HOSPADM

## 2019-01-01 RX ORDER — ONDANSETRON 2 MG/ML
4 INJECTION INTRAMUSCULAR; INTRAVENOUS
Status: DISCONTINUED | OUTPATIENT
Start: 2019-01-01 | End: 2019-01-01 | Stop reason: HOSPADM

## 2019-01-01 RX ORDER — SUCCINYLCHOLINE CHLORIDE 20 MG/ML
140 INJECTION INTRAMUSCULAR; INTRAVENOUS
Status: COMPLETED | OUTPATIENT
Start: 2019-01-01 | End: 2019-01-01

## 2019-01-01 RX ORDER — LENALIDOMIDE 25 MG/1
25 CAPSULE ORAL DAILY
Status: DISCONTINUED | OUTPATIENT
Start: 2019-01-01 | End: 2019-01-01

## 2019-01-01 RX ORDER — GUAIFENESIN 100 MG/5ML
81 LIQUID (ML) ORAL DAILY
Status: DISCONTINUED | OUTPATIENT
Start: 2019-01-01 | End: 2019-01-01 | Stop reason: HOSPADM

## 2019-01-01 RX ORDER — SODIUM CHLORIDE 9 MG/ML
75 INJECTION, SOLUTION INTRAVENOUS CONTINUOUS
Status: DISPENSED | OUTPATIENT
Start: 2019-01-01 | End: 2019-01-01

## 2019-01-01 RX ORDER — INSULIN GLARGINE 100 [IU]/ML
25 INJECTION, SOLUTION SUBCUTANEOUS
Status: DISCONTINUED | OUTPATIENT
Start: 2019-01-01 | End: 2019-01-01 | Stop reason: HOSPADM

## 2019-01-01 RX ORDER — MAGNESIUM SULFATE 100 %
4 CRYSTALS MISCELLANEOUS AS NEEDED
Status: DISCONTINUED | OUTPATIENT
Start: 2019-01-01 | End: 2019-01-01

## 2019-01-01 RX ORDER — SUCCINYLCHOLINE CHLORIDE 20 MG/ML
INJECTION INTRAMUSCULAR; INTRAVENOUS
Status: COMPLETED
Start: 2019-01-01 | End: 2019-01-01

## 2019-01-01 RX ORDER — MAGNESIUM SULFATE HEPTAHYDRATE 40 MG/ML
2 INJECTION, SOLUTION INTRAVENOUS ONCE
Status: COMPLETED | OUTPATIENT
Start: 2019-01-01 | End: 2019-01-01

## 2019-01-01 RX ORDER — SODIUM CHLORIDE, SODIUM LACTATE, POTASSIUM CHLORIDE, CALCIUM CHLORIDE 600; 310; 30; 20 MG/100ML; MG/100ML; MG/100ML; MG/100ML
125 INJECTION, SOLUTION INTRAVENOUS CONTINUOUS
Status: DISCONTINUED | OUTPATIENT
Start: 2019-01-01 | End: 2019-01-01

## 2019-01-01 RX ORDER — SODIUM CHLORIDE 9 MG/ML
250 INJECTION, SOLUTION INTRAVENOUS AS NEEDED
Status: DISCONTINUED | OUTPATIENT
Start: 2019-01-01 | End: 2019-01-01 | Stop reason: HOSPADM

## 2019-01-01 RX ORDER — DEXAMETHASONE SODIUM PHOSPHATE 4 MG/ML
2 INJECTION, SOLUTION INTRA-ARTICULAR; INTRALESIONAL; INTRAMUSCULAR; INTRAVENOUS; SOFT TISSUE
Status: DISCONTINUED | OUTPATIENT
Start: 2019-01-01 | End: 2019-01-01 | Stop reason: HOSPADM

## 2019-01-01 RX ORDER — DEXAMETHASONE 4 MG/1
20 TABLET ORAL
COMMUNITY
End: 2019-01-01

## 2019-01-01 RX ORDER — PANTOPRAZOLE SODIUM 40 MG/1
40 TABLET, DELAYED RELEASE ORAL 2 TIMES DAILY
COMMUNITY
End: 2019-01-01

## 2019-01-01 RX ORDER — HYDROMORPHONE HYDROCHLORIDE 1 MG/ML
2 INJECTION, SOLUTION INTRAMUSCULAR; INTRAVENOUS; SUBCUTANEOUS
Status: DISCONTINUED | OUTPATIENT
Start: 2019-01-01 | End: 2019-01-01 | Stop reason: HOSPADM

## 2019-01-01 RX ORDER — LEVOFLOXACIN 5 MG/ML
500 INJECTION, SOLUTION INTRAVENOUS ONCE
Status: COMPLETED | OUTPATIENT
Start: 2019-01-01 | End: 2019-01-01

## 2019-01-01 RX ORDER — ALBUTEROL SULFATE 0.83 MG/ML
2.5 SOLUTION RESPIRATORY (INHALATION)
Status: COMPLETED | OUTPATIENT
Start: 2019-01-01 | End: 2019-01-01

## 2019-01-01 RX ORDER — INSULIN GLARGINE 100 [IU]/ML
30 INJECTION, SOLUTION SUBCUTANEOUS DAILY
Status: DISCONTINUED | OUTPATIENT
Start: 2019-01-01 | End: 2019-01-01

## 2019-01-01 RX ORDER — INSULIN LISPRO 100 [IU]/ML
INJECTION, SOLUTION INTRAVENOUS; SUBCUTANEOUS EVERY 6 HOURS
Status: DISCONTINUED | OUTPATIENT
Start: 2019-01-01 | End: 2019-01-01

## 2019-01-01 RX ORDER — MORPHINE SULFATE 2 MG/ML
2 INJECTION, SOLUTION INTRAMUSCULAR; INTRAVENOUS
Status: DISCONTINUED | OUTPATIENT
Start: 2019-01-01 | End: 2019-01-01 | Stop reason: HOSPADM

## 2019-01-01 RX ORDER — CALCIUM CARBONATE 200(500)MG
200 TABLET,CHEWABLE ORAL 2 TIMES DAILY WITH MEALS
Status: DISCONTINUED | OUTPATIENT
Start: 2019-01-01 | End: 2019-01-01 | Stop reason: HOSPADM

## 2019-01-01 RX ORDER — GUAIFENESIN 600 MG/1
1200 TABLET, EXTENDED RELEASE ORAL EVERY 12 HOURS
Status: DISCONTINUED | OUTPATIENT
Start: 2019-01-01 | End: 2019-01-01 | Stop reason: HOSPADM

## 2019-01-01 RX ORDER — DULOXETIN HYDROCHLORIDE 30 MG/1
30 CAPSULE, DELAYED RELEASE ORAL DAILY
COMMUNITY
End: 2019-01-01

## 2019-01-01 RX ORDER — HEPARIN SODIUM 5000 [USP'U]/ML
5000 INJECTION, SOLUTION INTRAVENOUS; SUBCUTANEOUS EVERY 8 HOURS
Status: DISCONTINUED | OUTPATIENT
Start: 2019-01-01 | End: 2019-01-01

## 2019-01-01 RX ORDER — PREDNISONE 20 MG/1
40 TABLET ORAL
Status: DISCONTINUED | OUTPATIENT
Start: 2019-01-01 | End: 2019-01-01 | Stop reason: HOSPADM

## 2019-01-01 RX ORDER — ARFORMOTEROL TARTRATE 15 UG/2ML
15 SOLUTION RESPIRATORY (INHALATION)
Status: DISCONTINUED | OUTPATIENT
Start: 2019-01-01 | End: 2019-01-01 | Stop reason: HOSPADM

## 2019-01-01 RX ORDER — VANCOMYCIN/0.9 % SOD CHLORIDE 1.5G/250ML
1500 PLASTIC BAG, INJECTION (ML) INTRAVENOUS ONCE
Status: COMPLETED | OUTPATIENT
Start: 2019-01-01 | End: 2019-01-01

## 2019-01-01 RX ORDER — HEPARIN SODIUM 5000 [USP'U]/ML
80 INJECTION, SOLUTION INTRAVENOUS; SUBCUTANEOUS ONCE
Status: COMPLETED | OUTPATIENT
Start: 2019-01-01 | End: 2019-01-01

## 2019-01-01 RX ORDER — SODIUM CHLORIDE 0.9 % (FLUSH) 0.9 %
5-10 SYRINGE (ML) INJECTION AS NEEDED
Status: DISCONTINUED | OUTPATIENT
Start: 2019-01-01 | End: 2019-01-01 | Stop reason: HOSPADM

## 2019-01-01 RX ORDER — SODIUM CHLORIDE 0.9 % (FLUSH) 0.9 %
5-40 SYRINGE (ML) INJECTION EVERY 8 HOURS
Status: DISCONTINUED | OUTPATIENT
Start: 2019-01-01 | End: 2019-01-01 | Stop reason: HOSPADM

## 2019-01-01 RX ORDER — DEXAMETHASONE SODIUM PHOSPHATE 4 MG/ML
4 INJECTION, SOLUTION INTRA-ARTICULAR; INTRALESIONAL; INTRAMUSCULAR; INTRAVENOUS; SOFT TISSUE ONCE
Status: COMPLETED | OUTPATIENT
Start: 2019-01-01 | End: 2019-01-01

## 2019-01-01 RX ORDER — ENOXAPARIN SODIUM 100 MG/ML
40 INJECTION SUBCUTANEOUS EVERY 24 HOURS
Status: DISCONTINUED | OUTPATIENT
Start: 2019-01-01 | End: 2019-01-01 | Stop reason: HOSPADM

## 2019-01-01 RX ORDER — OSELTAMIVIR PHOSPHATE 6 MG/ML
30 FOR SUSPENSION ORAL 2 TIMES DAILY
Status: DISCONTINUED | OUTPATIENT
Start: 2019-01-01 | End: 2019-01-01

## 2019-01-01 RX ORDER — PREDNISONE 20 MG/1
TABLET ORAL
Qty: 12 TAB | Refills: 0 | Status: SHIPPED | OUTPATIENT
Start: 2019-01-01 | End: 2019-01-01

## 2019-01-01 RX ORDER — SODIUM CHLORIDE 9 MG/ML
100 INJECTION, SOLUTION INTRAVENOUS CONTINUOUS
Status: DISCONTINUED | OUTPATIENT
Start: 2019-01-01 | End: 2019-01-01

## 2019-01-01 RX ORDER — INSULIN GLARGINE 100 [IU]/ML
10 INJECTION, SOLUTION SUBCUTANEOUS DAILY
Status: DISCONTINUED | OUTPATIENT
Start: 2019-01-01 | End: 2019-01-01

## 2019-01-01 RX ORDER — ROSUVASTATIN CALCIUM 10 MG/1
20 TABLET, COATED ORAL
Status: DISCONTINUED | OUTPATIENT
Start: 2019-01-01 | End: 2019-01-01

## 2019-01-01 RX ORDER — INSULIN GLARGINE 100 [IU]/ML
30 INJECTION, SOLUTION SUBCUTANEOUS
COMMUNITY
End: 2019-01-01

## 2019-01-01 RX ORDER — INSULIN ASPART 100 [IU]/ML
6 INJECTION, SOLUTION INTRAVENOUS; SUBCUTANEOUS
COMMUNITY
End: 2019-01-01

## 2019-01-01 RX ORDER — LEVOFLOXACIN 250 MG/1
250 TABLET ORAL EVERY 24 HOURS
Status: DISCONTINUED | OUTPATIENT
Start: 2019-01-01 | End: 2019-01-01 | Stop reason: HOSPADM

## 2019-01-01 RX ORDER — CLOPIDOGREL BISULFATE 75 MG/1
75 TABLET ORAL DAILY
Status: DISCONTINUED | OUTPATIENT
Start: 2019-01-01 | End: 2019-01-01 | Stop reason: HOSPADM

## 2019-01-01 RX ORDER — IBUPROFEN 200 MG
1 TABLET ORAL EVERY 24 HOURS
Status: DISCONTINUED | OUTPATIENT
Start: 2019-01-01 | End: 2019-01-01 | Stop reason: HOSPADM

## 2019-01-01 RX ORDER — BUDESONIDE 0.5 MG/2ML
500 INHALANT ORAL
Status: DISCONTINUED | OUTPATIENT
Start: 2019-01-01 | End: 2019-01-01

## 2019-01-01 RX ORDER — IBUPROFEN 200 MG
1 TABLET ORAL EVERY 24 HOURS
Qty: 30 PATCH | Refills: 0 | Status: SHIPPED | OUTPATIENT
Start: 2019-01-01 | End: 2019-01-01

## 2019-01-01 RX ORDER — POTASSIUM CHLORIDE 750 MG/1
40 TABLET, FILM COATED, EXTENDED RELEASE ORAL
Status: COMPLETED | OUTPATIENT
Start: 2019-01-01 | End: 2019-01-01

## 2019-01-01 RX ORDER — SCOLOPAMINE TRANSDERMAL SYSTEM 1 MG/1
1 PATCH, EXTENDED RELEASE TRANSDERMAL
Status: DISCONTINUED | OUTPATIENT
Start: 2019-01-01 | End: 2019-01-01 | Stop reason: HOSPADM

## 2019-01-01 RX ORDER — LORAZEPAM 2 MG/ML
2 INJECTION INTRAMUSCULAR ONCE
Status: COMPLETED | OUTPATIENT
Start: 2019-01-01 | End: 2019-01-01

## 2019-01-01 RX ORDER — ETOMIDATE 2 MG/ML
INJECTION INTRAVENOUS
Status: COMPLETED
Start: 2019-01-01 | End: 2019-01-01

## 2019-01-01 RX ORDER — HYDROMORPHONE HYDROCHLORIDE 2 MG/ML
2 INJECTION, SOLUTION INTRAMUSCULAR; INTRAVENOUS; SUBCUTANEOUS ONCE
Status: COMPLETED | OUTPATIENT
Start: 2019-01-01 | End: 2019-01-01

## 2019-01-01 RX ORDER — DEXTROSE 50 % IN WATER (D50W) INTRAVENOUS SYRINGE
12.5-25 AS NEEDED
Status: DISCONTINUED | OUTPATIENT
Start: 2019-01-01 | End: 2019-01-01 | Stop reason: SDUPTHER

## 2019-01-01 RX ORDER — PANTOPRAZOLE SODIUM 40 MG/1
40 TABLET, DELAYED RELEASE ORAL 2 TIMES DAILY
Status: DISCONTINUED | OUTPATIENT
Start: 2019-01-01 | End: 2019-01-01 | Stop reason: HOSPADM

## 2019-01-01 RX ORDER — DULOXETIN HYDROCHLORIDE 30 MG/1
30 CAPSULE, DELAYED RELEASE ORAL DAILY
Status: DISCONTINUED | OUTPATIENT
Start: 2019-01-01 | End: 2019-01-01

## 2019-01-01 RX ORDER — GLYCOPYRROLATE 0.2 MG/ML
0.1 INJECTION INTRAMUSCULAR; INTRAVENOUS ONCE
Status: DISCONTINUED | OUTPATIENT
Start: 2019-01-01 | End: 2019-01-01

## 2019-01-01 RX ORDER — PROPOFOL 10 MG/ML
0-50 VIAL (ML) INTRAVENOUS
Status: DISCONTINUED | OUTPATIENT
Start: 2019-01-01 | End: 2019-01-01

## 2019-01-01 RX ORDER — LEVOFLOXACIN 250 MG/1
750 TABLET ORAL EVERY 24 HOURS
Status: DISCONTINUED | OUTPATIENT
Start: 2019-01-01 | End: 2019-01-01 | Stop reason: HOSPADM

## 2019-01-01 RX ORDER — DULOXETIN HYDROCHLORIDE 30 MG/1
30 CAPSULE, DELAYED RELEASE ORAL DAILY
Status: DISCONTINUED | OUTPATIENT
Start: 2019-01-01 | End: 2019-01-01 | Stop reason: HOSPADM

## 2019-01-01 RX ORDER — INSULIN LISPRO 100 [IU]/ML
6 INJECTION, SOLUTION INTRAVENOUS; SUBCUTANEOUS
Status: DISCONTINUED | OUTPATIENT
Start: 2019-01-01 | End: 2019-01-01

## 2019-01-01 RX ORDER — GUAIFENESIN 600 MG/1
600 TABLET, EXTENDED RELEASE ORAL EVERY 12 HOURS
Status: DISCONTINUED | OUTPATIENT
Start: 2019-01-01 | End: 2019-01-01

## 2019-01-01 RX ORDER — OSELTAMIVIR PHOSPHATE 6 MG/ML
75 FOR SUSPENSION ORAL 2 TIMES DAILY
Status: DISCONTINUED | OUTPATIENT
Start: 2019-01-01 | End: 2019-01-01

## 2019-01-01 RX ORDER — GLYCOPYRROLATE 0.2 MG/ML
0.4 INJECTION INTRAMUSCULAR; INTRAVENOUS ONCE
Status: COMPLETED | OUTPATIENT
Start: 2019-01-01 | End: 2019-01-01

## 2019-01-01 RX ORDER — ETOMIDATE 2 MG/ML
20 INJECTION INTRAVENOUS ONCE
Status: COMPLETED | OUTPATIENT
Start: 2019-01-01 | End: 2019-01-01

## 2019-01-01 RX ORDER — MELATONIN
1000 DAILY
Status: DISCONTINUED | OUTPATIENT
Start: 2019-01-01 | End: 2019-01-01 | Stop reason: HOSPADM

## 2019-01-01 RX ORDER — DEXTROSE 50 % IN WATER (D50W) INTRAVENOUS SYRINGE
50
Status: COMPLETED | OUTPATIENT
Start: 2019-01-01 | End: 2019-01-01

## 2019-01-01 RX ORDER — CALCIUM CARBONATE 200(500)MG
200 TABLET,CHEWABLE ORAL
Status: DISCONTINUED | OUTPATIENT
Start: 2019-01-01 | End: 2019-01-01 | Stop reason: HOSPADM

## 2019-01-01 RX ORDER — DEXTROSE 50 % IN WATER (D50W) INTRAVENOUS SYRINGE
12.5-25 AS NEEDED
Status: DISCONTINUED | OUTPATIENT
Start: 2019-01-01 | End: 2019-01-01 | Stop reason: HOSPADM

## 2019-01-01 RX ORDER — LEVOFLOXACIN 5 MG/ML
250 INJECTION, SOLUTION INTRAVENOUS EVERY 24 HOURS
Status: DISCONTINUED | OUTPATIENT
Start: 2019-01-01 | End: 2019-01-01 | Stop reason: CLARIF

## 2019-01-01 RX ADMIN — METHYLPREDNISOLONE SODIUM SUCCINATE 60 MG: 40 INJECTION, POWDER, FOR SOLUTION INTRAMUSCULAR; INTRAVENOUS at 00:03

## 2019-01-01 RX ADMIN — SUCCINYLCHOLINE CHLORIDE 140 MG: 20 INJECTION INTRAMUSCULAR; INTRAVENOUS at 16:10

## 2019-01-01 RX ADMIN — DULOXETINE HYDROCHLORIDE 30 MG: 30 CAPSULE, DELAYED RELEASE ORAL at 11:17

## 2019-01-01 RX ADMIN — AMIODARONE HYDROCHLORIDE 0.5 MG/MIN: 50 INJECTION, SOLUTION INTRAVENOUS at 01:46

## 2019-01-01 RX ADMIN — INSULIN LISPRO 3 UNITS: 100 INJECTION, SOLUTION INTRAVENOUS; SUBCUTANEOUS at 12:28

## 2019-01-01 RX ADMIN — BUDESONIDE 500 MCG: 0.5 INHALANT RESPIRATORY (INHALATION) at 09:25

## 2019-01-01 RX ADMIN — INSULIN LISPRO 5 UNITS: 100 INJECTION, SOLUTION INTRAVENOUS; SUBCUTANEOUS at 11:49

## 2019-01-01 RX ADMIN — ENOXAPARIN SODIUM 40 MG: 40 INJECTION SUBCUTANEOUS at 22:25

## 2019-01-01 RX ADMIN — SODIUM CHLORIDE 40 MG: 9 INJECTION INTRAMUSCULAR; INTRAVENOUS; SUBCUTANEOUS at 13:45

## 2019-01-01 RX ADMIN — ALBUTEROL SULFATE 2.5 MG: 2.5 SOLUTION RESPIRATORY (INHALATION) at 19:36

## 2019-01-01 RX ADMIN — ENOXAPARIN SODIUM 40 MG: 40 INJECTION SUBCUTANEOUS at 23:02

## 2019-01-01 RX ADMIN — IPRATROPIUM BROMIDE AND ALBUTEROL SULFATE 3 ML: .5; 3 SOLUTION RESPIRATORY (INHALATION) at 00:13

## 2019-01-01 RX ADMIN — Medication 10 ML: at 14:00

## 2019-01-01 RX ADMIN — INSULIN LISPRO 7 UNITS: 100 INJECTION, SOLUTION INTRAVENOUS; SUBCUTANEOUS at 12:06

## 2019-01-01 RX ADMIN — Medication 10 ML: at 21:30

## 2019-01-01 RX ADMIN — VANCOMYCIN HYDROCHLORIDE 750 MG: 750 INJECTION, POWDER, LYOPHILIZED, FOR SOLUTION INTRAVENOUS at 13:01

## 2019-01-01 RX ADMIN — PANTOPRAZOLE SODIUM 40 MG: 40 TABLET, DELAYED RELEASE ORAL at 09:03

## 2019-01-01 RX ADMIN — PIPERACILLIN SODIUM,TAZOBACTAM SODIUM 3.38 G: 3; .375 INJECTION, POWDER, FOR SOLUTION INTRAVENOUS at 11:50

## 2019-01-01 RX ADMIN — HEPARIN SODIUM 5000 UNITS: 5000 INJECTION INTRAVENOUS; SUBCUTANEOUS at 06:50

## 2019-01-01 RX ADMIN — INSULIN GLARGINE 25 UNITS: 100 INJECTION, SOLUTION SUBCUTANEOUS at 21:57

## 2019-01-01 RX ADMIN — BUDESONIDE 500 MCG: 0.5 INHALANT RESPIRATORY (INHALATION) at 20:13

## 2019-01-01 RX ADMIN — IPRATROPIUM BROMIDE AND ALBUTEROL SULFATE 3 ML: .5; 3 SOLUTION RESPIRATORY (INHALATION) at 19:25

## 2019-01-01 RX ADMIN — ASPIRIN 81 MG CHEWABLE TABLET 81 MG: 81 TABLET CHEWABLE at 08:06

## 2019-01-01 RX ADMIN — IPRATROPIUM BROMIDE AND ALBUTEROL SULFATE 3 ML: .5; 3 SOLUTION RESPIRATORY (INHALATION) at 09:02

## 2019-01-01 RX ADMIN — SODIUM CHLORIDE 100 ML/HR: 900 INJECTION, SOLUTION INTRAVENOUS at 00:18

## 2019-01-01 RX ADMIN — INSULIN LISPRO 4 UNITS: 100 INJECTION, SOLUTION INTRAVENOUS; SUBCUTANEOUS at 13:03

## 2019-01-01 RX ADMIN — INSULIN GLARGINE 35 UNITS: 100 INJECTION, SOLUTION SUBCUTANEOUS at 08:14

## 2019-01-01 RX ADMIN — BUDESONIDE 500 MCG: 0.5 INHALANT RESPIRATORY (INHALATION) at 08:22

## 2019-01-01 RX ADMIN — DULOXETINE HYDROCHLORIDE 30 MG: 30 CAPSULE, DELAYED RELEASE ORAL at 08:37

## 2019-01-01 RX ADMIN — HEPARIN SODIUM 5000 UNITS: 5000 INJECTION INTRAVENOUS; SUBCUTANEOUS at 23:21

## 2019-01-01 RX ADMIN — SODIUM CHLORIDE 1000 ML: 900 INJECTION, SOLUTION INTRAVENOUS at 21:26

## 2019-01-01 RX ADMIN — PANTOPRAZOLE SODIUM 40 MG: 40 TABLET, DELAYED RELEASE ORAL at 08:57

## 2019-01-01 RX ADMIN — INSULIN LISPRO 3 UNITS: 100 INJECTION, SOLUTION INTRAVENOUS; SUBCUTANEOUS at 13:08

## 2019-01-01 RX ADMIN — PANTOPRAZOLE SODIUM 40 MG: 40 TABLET, DELAYED RELEASE ORAL at 09:01

## 2019-01-01 RX ADMIN — INSULIN LISPRO 4 UNITS: 100 INJECTION, SOLUTION INTRAVENOUS; SUBCUTANEOUS at 11:56

## 2019-01-01 RX ADMIN — Medication 50 MCG/HR: at 11:32

## 2019-01-01 RX ADMIN — PIPERACILLIN SODIUM,TAZOBACTAM SODIUM 3.38 G: 3; .375 INJECTION, POWDER, FOR SOLUTION INTRAVENOUS at 11:46

## 2019-01-01 RX ADMIN — METHYLPREDNISOLONE SODIUM SUCCINATE 40 MG: 40 INJECTION, POWDER, FOR SOLUTION INTRAMUSCULAR; INTRAVENOUS at 06:31

## 2019-01-01 RX ADMIN — PIPERACILLIN SODIUM,TAZOBACTAM SODIUM 3.38 G: 3; .375 INJECTION, POWDER, FOR SOLUTION INTRAVENOUS at 11:07

## 2019-01-01 RX ADMIN — CLOPIDOGREL BISULFATE 75 MG: 75 TABLET, FILM COATED ORAL at 09:21

## 2019-01-01 RX ADMIN — SODIUM CHLORIDE 40 MG: 9 INJECTION INTRAMUSCULAR; INTRAVENOUS; SUBCUTANEOUS at 12:28

## 2019-01-01 RX ADMIN — VITAMIN D, TAB 1000IU (100/BT) 1000 UNITS: 25 TAB at 08:44

## 2019-01-01 RX ADMIN — INSULIN LISPRO 3 UNITS: 100 INJECTION, SOLUTION INTRAVENOUS; SUBCUTANEOUS at 08:16

## 2019-01-01 RX ADMIN — PANTOPRAZOLE SODIUM 40 MG: 40 TABLET, DELAYED RELEASE ORAL at 17:00

## 2019-01-01 RX ADMIN — METHYLPREDNISOLONE SODIUM SUCCINATE 40 MG: 40 INJECTION, POWDER, FOR SOLUTION INTRAMUSCULAR; INTRAVENOUS at 20:19

## 2019-01-01 RX ADMIN — ASPIRIN 81 MG CHEWABLE TABLET 81 MG: 81 TABLET CHEWABLE at 08:41

## 2019-01-01 RX ADMIN — SODIUM CHLORIDE: 450 INJECTION, SOLUTION INTRAVENOUS at 01:21

## 2019-01-01 RX ADMIN — AMIODARONE HYDROCHLORIDE 0.5 MG/MIN: 50 INJECTION, SOLUTION INTRAVENOUS at 08:52

## 2019-01-01 RX ADMIN — BUDESONIDE 500 MCG: 0.5 INHALANT RESPIRATORY (INHALATION) at 19:36

## 2019-01-01 RX ADMIN — SODIUM CHLORIDE 1000 ML: 900 INJECTION, SOLUTION INTRAVENOUS at 18:50

## 2019-01-01 RX ADMIN — INSULIN LISPRO 6 UNITS: 100 INJECTION, SOLUTION INTRAVENOUS; SUBCUTANEOUS at 08:57

## 2019-01-01 RX ADMIN — INSULIN LISPRO 6 UNITS: 100 INJECTION, SOLUTION INTRAVENOUS; SUBCUTANEOUS at 12:33

## 2019-01-01 RX ADMIN — INSULIN GLARGINE 30 UNITS: 100 INJECTION, SOLUTION SUBCUTANEOUS at 08:45

## 2019-01-01 RX ADMIN — METHYLPREDNISOLONE SODIUM SUCCINATE 40 MG: 40 INJECTION, POWDER, FOR SOLUTION INTRAMUSCULAR; INTRAVENOUS at 08:44

## 2019-01-01 RX ADMIN — INSULIN GLARGINE 35 UNITS: 100 INJECTION, SOLUTION SUBCUTANEOUS at 09:00

## 2019-01-01 RX ADMIN — ASPIRIN 81 MG CHEWABLE TABLET 81 MG: 81 TABLET CHEWABLE at 09:03

## 2019-01-01 RX ADMIN — FENTANYL CITRATE 12.5 MCG: 50 INJECTION, SOLUTION INTRAMUSCULAR; INTRAVENOUS at 05:56

## 2019-01-01 RX ADMIN — CASTOR OIL AND BALSAM, PERU: 788; 87 OINTMENT TOPICAL at 08:25

## 2019-01-01 RX ADMIN — GUAIFENESIN 600 MG: 600 TABLET, EXTENDED RELEASE ORAL at 21:28

## 2019-01-01 RX ADMIN — POTASSIUM CHLORIDE 40 MEQ: 750 TABLET, EXTENDED RELEASE ORAL at 08:38

## 2019-01-01 RX ADMIN — AMIODARONE HYDROCHLORIDE 0.5 MG/MIN: 50 INJECTION, SOLUTION INTRAVENOUS at 07:00

## 2019-01-01 RX ADMIN — CEFEPIME 2 G: 2 INJECTION, POWDER, FOR SOLUTION INTRAVENOUS at 08:27

## 2019-01-01 RX ADMIN — INSULIN LISPRO 10 UNITS: 100 INJECTION, SOLUTION INTRAVENOUS; SUBCUTANEOUS at 07:17

## 2019-01-01 RX ADMIN — MONTELUKAST SODIUM 10 MG: 10 TABLET, FILM COATED ORAL at 08:06

## 2019-01-01 RX ADMIN — METHYLPREDNISOLONE SODIUM SUCCINATE 40 MG: 40 INJECTION, POWDER, FOR SOLUTION INTRAMUSCULAR; INTRAVENOUS at 08:06

## 2019-01-01 RX ADMIN — SODIUM CHLORIDE 178 ML: 900 INJECTION, SOLUTION INTRAVENOUS at 19:35

## 2019-01-01 RX ADMIN — METHYLPREDNISOLONE SODIUM SUCCINATE 40 MG: 40 INJECTION, POWDER, FOR SOLUTION INTRAMUSCULAR; INTRAVENOUS at 23:34

## 2019-01-01 RX ADMIN — VITAMIN D, TAB 1000IU (100/BT) 1000 UNITS: 25 TAB at 08:51

## 2019-01-01 RX ADMIN — BUDESONIDE 500 MCG: 0.5 INHALANT RESPIRATORY (INHALATION) at 07:48

## 2019-01-01 RX ADMIN — CALCIUM GLUCONATE 1 G: 94 INJECTION, SOLUTION INTRAVENOUS at 04:53

## 2019-01-01 RX ADMIN — GUAIFENESIN 1200 MG: 600 TABLET, EXTENDED RELEASE ORAL at 22:24

## 2019-01-01 RX ADMIN — LORAZEPAM 2 MG: 2 INJECTION INTRAMUSCULAR; INTRAVENOUS at 09:37

## 2019-01-01 RX ADMIN — METHYLPREDNISOLONE SODIUM SUCCINATE 60 MG: 40 INJECTION, POWDER, FOR SOLUTION INTRAMUSCULAR; INTRAVENOUS at 07:10

## 2019-01-01 RX ADMIN — CALCIUM GLUCONATE 1 G: 98 INJECTION, SOLUTION INTRAVENOUS at 08:58

## 2019-01-01 RX ADMIN — ARFORMOTEROL TARTRATE 15 MCG: 15 SOLUTION RESPIRATORY (INHALATION) at 08:19

## 2019-01-01 RX ADMIN — VITAMIN D, TAB 1000IU (100/BT) 1000 UNITS: 25 TAB at 08:58

## 2019-01-01 RX ADMIN — CASTOR OIL AND BALSAM, PERU: 788; 87 OINTMENT TOPICAL at 08:27

## 2019-01-01 RX ADMIN — CASTOR OIL AND BALSAM, PERU: 788; 87 OINTMENT TOPICAL at 18:40

## 2019-01-01 RX ADMIN — IPRATROPIUM BROMIDE AND ALBUTEROL SULFATE 3 ML: .5; 3 SOLUTION RESPIRATORY (INHALATION) at 07:01

## 2019-01-01 RX ADMIN — MONTELUKAST SODIUM 10 MG: 10 TABLET, FILM COATED ORAL at 08:14

## 2019-01-01 RX ADMIN — INSULIN GLARGINE 35 UNITS: 100 INJECTION, SOLUTION SUBCUTANEOUS at 09:59

## 2019-01-01 RX ADMIN — CASTOR OIL AND BALSAM, PERU: 788; 87 OINTMENT TOPICAL at 18:00

## 2019-01-01 RX ADMIN — HEPARIN SODIUM 4950 UNITS: 5000 INJECTION INTRAVENOUS; SUBCUTANEOUS at 18:13

## 2019-01-01 RX ADMIN — INSULIN GLARGINE 35 UNITS: 100 INJECTION, SOLUTION SUBCUTANEOUS at 11:39

## 2019-01-01 RX ADMIN — PIPERACILLIN SODIUM,TAZOBACTAM SODIUM 3.38 G: 3; .375 INJECTION, POWDER, FOR SOLUTION INTRAVENOUS at 11:39

## 2019-01-01 RX ADMIN — SODIUM CHLORIDE 1000 ML: 900 INJECTION, SOLUTION INTRAVENOUS at 19:35

## 2019-01-01 RX ADMIN — Medication 10 ML: at 07:06

## 2019-01-01 RX ADMIN — AMIODARONE HYDROCHLORIDE 0.5 MG/MIN: 50 INJECTION, SOLUTION INTRAVENOUS at 18:21

## 2019-01-01 RX ADMIN — ROSUVASTATIN CALCIUM 20 MG: 10 TABLET, FILM COATED ORAL at 23:21

## 2019-01-01 RX ADMIN — LORAZEPAM 2 MG: 2 INJECTION INTRAMUSCULAR; INTRAVENOUS at 09:53

## 2019-01-01 RX ADMIN — INSULIN LISPRO 4 UNITS: 100 INJECTION, SOLUTION INTRAVENOUS; SUBCUTANEOUS at 12:33

## 2019-01-01 RX ADMIN — ARFORMOTEROL TARTRATE 15 MCG: 15 SOLUTION RESPIRATORY (INHALATION) at 21:36

## 2019-01-01 RX ADMIN — FENTANYL CITRATE 12.5 MCG: 50 INJECTION, SOLUTION INTRAMUSCULAR; INTRAVENOUS at 05:35

## 2019-01-01 RX ADMIN — BUDESONIDE 500 MCG: 0.5 INHALANT RESPIRATORY (INHALATION) at 21:22

## 2019-01-01 RX ADMIN — METHYLPREDNISOLONE SODIUM SUCCINATE 60 MG: 40 INJECTION, POWDER, FOR SOLUTION INTRAMUSCULAR; INTRAVENOUS at 05:33

## 2019-01-01 RX ADMIN — INSULIN LISPRO 6 UNITS: 100 INJECTION, SOLUTION INTRAVENOUS; SUBCUTANEOUS at 13:03

## 2019-01-01 RX ADMIN — SODIUM CHLORIDE 40 MG: 9 INJECTION INTRAMUSCULAR; INTRAVENOUS; SUBCUTANEOUS at 01:21

## 2019-01-01 RX ADMIN — MAGNESIUM SULFATE HEPTAHYDRATE 2 G: 40 INJECTION, SOLUTION INTRAVENOUS at 11:44

## 2019-01-01 RX ADMIN — GLYCOPYRROLATE 0.4 MG: 0.2 INJECTION, SOLUTION INTRAMUSCULAR; INTRAVENOUS at 09:37

## 2019-01-01 RX ADMIN — IPRATROPIUM BROMIDE AND ALBUTEROL SULFATE 3 ML: .5; 3 SOLUTION RESPIRATORY (INHALATION) at 16:35

## 2019-01-01 RX ADMIN — ROSUVASTATIN CALCIUM 20 MG: 10 TABLET, FILM COATED ORAL at 21:08

## 2019-01-01 RX ADMIN — PIPERACILLIN SODIUM,TAZOBACTAM SODIUM 3.38 G: 3; .375 INJECTION, POWDER, FOR SOLUTION INTRAVENOUS at 03:08

## 2019-01-01 RX ADMIN — METHYLPREDNISOLONE SODIUM SUCCINATE 60 MG: 40 INJECTION, POWDER, FOR SOLUTION INTRAMUSCULAR; INTRAVENOUS at 05:47

## 2019-01-01 RX ADMIN — CEFEPIME 2 G: 2 INJECTION, POWDER, FOR SOLUTION INTRAVENOUS at 21:19

## 2019-01-01 RX ADMIN — ENOXAPARIN SODIUM 40 MG: 40 INJECTION SUBCUTANEOUS at 22:57

## 2019-01-01 RX ADMIN — PREDNISONE 40 MG: 20 TABLET ORAL at 07:03

## 2019-01-01 RX ADMIN — BUDESONIDE 500 MCG: 0.5 INHALANT RESPIRATORY (INHALATION) at 07:00

## 2019-01-01 RX ADMIN — CALCIUM CARBONATE (ANTACID) CHEW TAB 500 MG 200 MG: 500 CHEW TAB at 08:44

## 2019-01-01 RX ADMIN — LEVOFLOXACIN 250 MG: 5 INJECTION, SOLUTION INTRAVENOUS at 22:47

## 2019-01-01 RX ADMIN — CLOPIDOGREL BISULFATE 75 MG: 75 TABLET, FILM COATED ORAL at 09:01

## 2019-01-01 RX ADMIN — DEXTROSE 150 MG: 50 INJECTION, SOLUTION INTRAVENOUS at 18:52

## 2019-01-01 RX ADMIN — ENOXAPARIN SODIUM 40 MG: 40 INJECTION SUBCUTANEOUS at 23:05

## 2019-01-01 RX ADMIN — CEFEPIME 2 G: 2 INJECTION, POWDER, FOR SOLUTION INTRAVENOUS at 21:15

## 2019-01-01 RX ADMIN — PIPERACILLIN SODIUM,TAZOBACTAM SODIUM 3.38 G: 3; .375 INJECTION, POWDER, FOR SOLUTION INTRAVENOUS at 18:59

## 2019-01-01 RX ADMIN — SODIUM CHLORIDE 40 MG: 9 INJECTION INTRAMUSCULAR; INTRAVENOUS; SUBCUTANEOUS at 12:07

## 2019-01-01 RX ADMIN — METHYLPREDNISOLONE SODIUM SUCCINATE 40 MG: 40 INJECTION, POWDER, FOR SOLUTION INTRAMUSCULAR; INTRAVENOUS at 21:08

## 2019-01-01 RX ADMIN — ASPIRIN 81 MG CHEWABLE TABLET 81 MG: 81 TABLET CHEWABLE at 08:14

## 2019-01-01 RX ADMIN — INSULIN LISPRO 6 UNITS: 100 INJECTION, SOLUTION INTRAVENOUS; SUBCUTANEOUS at 13:16

## 2019-01-01 RX ADMIN — METHYLPREDNISOLONE SODIUM SUCCINATE 60 MG: 40 INJECTION, POWDER, FOR SOLUTION INTRAMUSCULAR; INTRAVENOUS at 23:14

## 2019-01-01 RX ADMIN — INSULIN LISPRO 6 UNITS: 100 INJECTION, SOLUTION INTRAVENOUS; SUBCUTANEOUS at 08:58

## 2019-01-01 RX ADMIN — IPRATROPIUM BROMIDE AND ALBUTEROL SULFATE 3 ML: .5; 3 SOLUTION RESPIRATORY (INHALATION) at 08:04

## 2019-01-01 RX ADMIN — METHYLPREDNISOLONE SODIUM SUCCINATE 60 MG: 125 INJECTION, POWDER, FOR SOLUTION INTRAMUSCULAR; INTRAVENOUS at 19:53

## 2019-01-01 RX ADMIN — CLOPIDOGREL BISULFATE 75 MG: 75 TABLET, FILM COATED ORAL at 09:03

## 2019-01-01 RX ADMIN — MONTELUKAST SODIUM 10 MG: 10 TABLET, FILM COATED ORAL at 08:42

## 2019-01-01 RX ADMIN — Medication 40 ML: at 14:00

## 2019-01-01 RX ADMIN — IPRATROPIUM BROMIDE AND ALBUTEROL SULFATE 3 ML: .5; 3 SOLUTION RESPIRATORY (INHALATION) at 09:25

## 2019-01-01 RX ADMIN — IOPAMIDOL 120 ML: 755 INJECTION, SOLUTION INTRAVENOUS at 21:11

## 2019-01-01 RX ADMIN — SODIUM CHLORIDE 1000 ML: 900 INJECTION, SOLUTION INTRAVENOUS at 18:48

## 2019-01-01 RX ADMIN — HYDROMORPHONE HYDROCHLORIDE 2 MG: 1 INJECTION, SOLUTION INTRAMUSCULAR; INTRAVENOUS; SUBCUTANEOUS at 09:53

## 2019-01-01 RX ADMIN — INSULIN LISPRO 4 UNITS: 100 INJECTION, SOLUTION INTRAVENOUS; SUBCUTANEOUS at 16:56

## 2019-01-01 RX ADMIN — ARFORMOTEROL TARTRATE 15 MCG: 15 SOLUTION RESPIRATORY (INHALATION) at 19:54

## 2019-01-01 RX ADMIN — PANTOPRAZOLE SODIUM 40 MG: 40 TABLET, DELAYED RELEASE ORAL at 08:06

## 2019-01-01 RX ADMIN — IPRATROPIUM BROMIDE AND ALBUTEROL SULFATE 3 ML: .5; 3 SOLUTION RESPIRATORY (INHALATION) at 04:28

## 2019-01-01 RX ADMIN — DEXTROSE MONOHYDRATE 25 G: 500 INJECTION PARENTERAL at 20:20

## 2019-01-01 RX ADMIN — CALCIUM CARBONATE (ANTACID) CHEW TAB 500 MG 200 MG: 500 CHEW TAB at 07:03

## 2019-01-01 RX ADMIN — INSULIN LISPRO 3 UNITS: 100 INJECTION, SOLUTION INTRAVENOUS; SUBCUTANEOUS at 17:59

## 2019-01-01 RX ADMIN — METHYLPREDNISOLONE SODIUM SUCCINATE 60 MG: 40 INJECTION, POWDER, FOR SOLUTION INTRAMUSCULAR; INTRAVENOUS at 06:58

## 2019-01-01 RX ADMIN — MONTELUKAST SODIUM 10 MG: 10 TABLET, FILM COATED ORAL at 09:11

## 2019-01-01 RX ADMIN — VANCOMYCIN HYDROCHLORIDE 750 MG: 750 INJECTION, POWDER, LYOPHILIZED, FOR SOLUTION INTRAVENOUS at 22:25

## 2019-01-01 RX ADMIN — IPRATROPIUM BROMIDE AND ALBUTEROL SULFATE 3 ML: .5; 3 SOLUTION RESPIRATORY (INHALATION) at 12:06

## 2019-01-01 RX ADMIN — INSULIN LISPRO 6 UNITS: 100 INJECTION, SOLUTION INTRAVENOUS; SUBCUTANEOUS at 16:09

## 2019-01-01 RX ADMIN — MONTELUKAST SODIUM 10 MG: 10 TABLET, FILM COATED ORAL at 08:57

## 2019-01-01 RX ADMIN — IPRATROPIUM BROMIDE AND ALBUTEROL SULFATE 3 ML: .5; 3 SOLUTION RESPIRATORY (INHALATION) at 15:40

## 2019-01-01 RX ADMIN — IPRATROPIUM BROMIDE AND ALBUTEROL SULFATE 3 ML: .5; 3 SOLUTION RESPIRATORY (INHALATION) at 03:09

## 2019-01-01 RX ADMIN — BUDESONIDE 500 MCG: 0.5 INHALANT RESPIRATORY (INHALATION) at 07:49

## 2019-01-01 RX ADMIN — BUDESONIDE 500 MCG: 0.5 INHALANT RESPIRATORY (INHALATION) at 19:33

## 2019-01-01 RX ADMIN — VITAMIN D, TAB 1000IU (100/BT) 1000 UNITS: 25 TAB at 08:14

## 2019-01-01 RX ADMIN — FENTANYL CITRATE 12.5 MCG: 50 INJECTION, SOLUTION INTRAMUSCULAR; INTRAVENOUS at 09:39

## 2019-01-01 RX ADMIN — CASTOR OIL AND BALSAM, PERU: 788; 87 OINTMENT TOPICAL at 17:14

## 2019-01-01 RX ADMIN — Medication 10 ML: at 06:51

## 2019-01-01 RX ADMIN — INSULIN LISPRO 6 UNITS: 100 INJECTION, SOLUTION INTRAVENOUS; SUBCUTANEOUS at 17:19

## 2019-01-01 RX ADMIN — PANTOPRAZOLE SODIUM 40 MG: 40 TABLET, DELAYED RELEASE ORAL at 08:14

## 2019-01-01 RX ADMIN — Medication 10 ML: at 21:34

## 2019-01-01 RX ADMIN — ROSUVASTATIN CALCIUM 20 MG: 10 TABLET, FILM COATED ORAL at 21:56

## 2019-01-01 RX ADMIN — PANTOPRAZOLE SODIUM 40 MG: 40 TABLET, DELAYED RELEASE ORAL at 18:33

## 2019-01-01 RX ADMIN — METHYLPREDNISOLONE SODIUM SUCCINATE 60 MG: 40 INJECTION, POWDER, FOR SOLUTION INTRAMUSCULAR; INTRAVENOUS at 12:27

## 2019-01-01 RX ADMIN — IPRATROPIUM BROMIDE AND ALBUTEROL SULFATE 3 ML: .5; 3 SOLUTION RESPIRATORY (INHALATION) at 19:36

## 2019-01-01 RX ADMIN — INSULIN LISPRO 6 UNITS: 100 INJECTION, SOLUTION INTRAVENOUS; SUBCUTANEOUS at 11:40

## 2019-01-01 RX ADMIN — SODIUM CHLORIDE, SODIUM LACTATE, POTASSIUM CHLORIDE, AND CALCIUM CHLORIDE 125 ML/HR: 600; 310; 30; 20 INJECTION, SOLUTION INTRAVENOUS at 23:23

## 2019-01-01 RX ADMIN — Medication 10 ML: at 21:11

## 2019-01-01 RX ADMIN — SODIUM CHLORIDE: 450 INJECTION, SOLUTION INTRAVENOUS at 12:35

## 2019-01-01 RX ADMIN — SODIUM CHLORIDE: 450 INJECTION, SOLUTION INTRAVENOUS at 23:41

## 2019-01-01 RX ADMIN — ASPIRIN 81 MG CHEWABLE TABLET 81 MG: 81 TABLET CHEWABLE at 08:44

## 2019-01-01 RX ADMIN — PANTOPRAZOLE SODIUM 40 MG: 40 TABLET, DELAYED RELEASE ORAL at 17:35

## 2019-01-01 RX ADMIN — ROSUVASTATIN CALCIUM 20 MG: 10 TABLET, FILM COATED ORAL at 22:24

## 2019-01-01 RX ADMIN — Medication 10 ML: at 06:52

## 2019-01-01 RX ADMIN — SODIUM CHLORIDE, SODIUM LACTATE, POTASSIUM CHLORIDE, AND CALCIUM CHLORIDE 125 ML/HR: 600; 310; 30; 20 INJECTION, SOLUTION INTRAVENOUS at 10:27

## 2019-01-01 RX ADMIN — DOCUSATE SODIUM 100 MG: 100 CAPSULE, LIQUID FILLED ORAL at 08:44

## 2019-01-01 RX ADMIN — CASTOR OIL AND BALSAM, PERU: 788; 87 OINTMENT TOPICAL at 09:00

## 2019-01-01 RX ADMIN — AMIODARONE HYDROCHLORIDE 1 MG/MIN: 50 INJECTION, SOLUTION INTRAVENOUS at 19:15

## 2019-01-01 RX ADMIN — SODIUM CHLORIDE 100 ML: 900 INJECTION, SOLUTION INTRAVENOUS at 21:11

## 2019-01-01 RX ADMIN — INSULIN GLARGINE 10 UNITS: 100 INJECTION, SOLUTION SUBCUTANEOUS at 12:46

## 2019-01-01 RX ADMIN — INSULIN LISPRO 3 UNITS: 100 INJECTION, SOLUTION INTRAVENOUS; SUBCUTANEOUS at 17:02

## 2019-01-01 RX ADMIN — DILTIAZEM HYDROCHLORIDE 12.5 MG/HR: 5 INJECTION, SOLUTION INTRAVENOUS at 18:43

## 2019-01-01 RX ADMIN — PANTOPRAZOLE SODIUM 40 MG: 40 TABLET, DELAYED RELEASE ORAL at 11:17

## 2019-01-01 RX ADMIN — METHYLPREDNISOLONE SODIUM SUCCINATE 60 MG: 40 INJECTION, POWDER, FOR SOLUTION INTRAMUSCULAR; INTRAVENOUS at 23:06

## 2019-01-01 RX ADMIN — PANTOPRAZOLE SODIUM 40 MG: 40 TABLET, DELAYED RELEASE ORAL at 08:44

## 2019-01-01 RX ADMIN — SODIUM CHLORIDE: 450 INJECTION, SOLUTION INTRAVENOUS at 11:51

## 2019-01-01 RX ADMIN — ETOMIDATE 20 MG: 2 INJECTION INTRAVENOUS at 16:10

## 2019-01-01 RX ADMIN — IPRATROPIUM BROMIDE AND ALBUTEROL SULFATE 3 ML: .5; 3 SOLUTION RESPIRATORY (INHALATION) at 20:35

## 2019-01-01 RX ADMIN — GUAIFENESIN 1200 MG: 600 TABLET, EXTENDED RELEASE ORAL at 08:06

## 2019-01-01 RX ADMIN — PROPOFOL 50 MCG/KG/MIN: 10 INJECTION, EMULSION INTRAVENOUS at 16:30

## 2019-01-01 RX ADMIN — HEPARIN SODIUM AND DEXTROSE 18 UNITS/KG/HR: 10000; 5 INJECTION INTRAVENOUS at 19:15

## 2019-01-01 RX ADMIN — SODIUM CHLORIDE 1000 ML: 900 INJECTION, SOLUTION INTRAVENOUS at 18:55

## 2019-01-01 RX ADMIN — FENTANYL CITRATE 12.5 MCG: 50 INJECTION, SOLUTION INTRAMUSCULAR; INTRAVENOUS at 11:29

## 2019-01-01 RX ADMIN — BUDESONIDE 500 MCG: 0.5 INHALANT RESPIRATORY (INHALATION) at 20:35

## 2019-01-01 RX ADMIN — IPRATROPIUM BROMIDE AND ALBUTEROL SULFATE 3 ML: .5; 3 SOLUTION RESPIRATORY (INHALATION) at 03:52

## 2019-01-01 RX ADMIN — INSULIN LISPRO 6 UNITS: 100 INJECTION, SOLUTION INTRAVENOUS; SUBCUTANEOUS at 11:56

## 2019-01-01 RX ADMIN — CLOPIDOGREL BISULFATE 75 MG: 75 TABLET, FILM COATED ORAL at 11:18

## 2019-01-01 RX ADMIN — INSULIN LISPRO 2 UNITS: 100 INJECTION, SOLUTION INTRAVENOUS; SUBCUTANEOUS at 21:33

## 2019-01-01 RX ADMIN — DEXAMETHASONE SODIUM PHOSPHATE 4 MG: 4 INJECTION, SOLUTION INTRA-ARTICULAR; INTRALESIONAL; INTRAMUSCULAR; INTRAVENOUS; SOFT TISSUE at 09:37

## 2019-01-01 RX ADMIN — SODIUM CHLORIDE 75 ML/HR: 900 INJECTION, SOLUTION INTRAVENOUS at 13:05

## 2019-01-01 RX ADMIN — CLOPIDOGREL BISULFATE 75 MG: 75 TABLET, FILM COATED ORAL at 08:06

## 2019-01-01 RX ADMIN — Medication 10 ML: at 03:08

## 2019-01-01 RX ADMIN — CALCIUM CARBONATE (ANTACID) CHEW TAB 500 MG 200 MG: 500 CHEW TAB at 08:07

## 2019-01-01 RX ADMIN — PIPERACILLIN SODIUM,TAZOBACTAM SODIUM 3.38 G: 3; .375 INJECTION, POWDER, FOR SOLUTION INTRAVENOUS at 03:18

## 2019-01-01 RX ADMIN — LEVOFLOXACIN 750 MG: 250 TABLET, FILM COATED ORAL at 19:27

## 2019-01-01 RX ADMIN — PANTOPRAZOLE SODIUM 40 MG: 40 TABLET, DELAYED RELEASE ORAL at 08:51

## 2019-01-01 RX ADMIN — BUDESONIDE 500 MCG: 0.5 INHALANT RESPIRATORY (INHALATION) at 19:54

## 2019-01-01 RX ADMIN — CEFEPIME 2 G: 2 INJECTION, POWDER, FOR SOLUTION INTRAVENOUS at 22:05

## 2019-01-01 RX ADMIN — CALCIUM CARBONATE (ANTACID) CHEW TAB 500 MG 200 MG: 500 CHEW TAB at 06:51

## 2019-01-01 RX ADMIN — IPRATROPIUM BROMIDE AND ALBUTEROL SULFATE 3 ML: .5; 3 SOLUTION RESPIRATORY (INHALATION) at 21:22

## 2019-01-01 RX ADMIN — Medication 10 ML: at 22:14

## 2019-01-01 RX ADMIN — ROSUVASTATIN CALCIUM 20 MG: 10 TABLET, FILM COATED ORAL at 21:00

## 2019-01-01 RX ADMIN — DULOXETINE HYDROCHLORIDE 30 MG: 30 CAPSULE, DELAYED RELEASE ORAL at 09:01

## 2019-01-01 RX ADMIN — INSULIN LISPRO 7 UNITS: 100 INJECTION, SOLUTION INTRAVENOUS; SUBCUTANEOUS at 06:12

## 2019-01-01 RX ADMIN — CEFEPIME 2 G: 2 INJECTION, POWDER, FOR SOLUTION INTRAVENOUS at 09:07

## 2019-01-01 RX ADMIN — GUAIFENESIN 1200 MG: 600 TABLET, EXTENDED RELEASE ORAL at 20:55

## 2019-01-01 RX ADMIN — INSULIN LISPRO 4 UNITS: 100 INJECTION, SOLUTION INTRAVENOUS; SUBCUTANEOUS at 23:22

## 2019-01-01 RX ADMIN — Medication 10 ML: at 05:31

## 2019-01-01 RX ADMIN — DEXTROSE MONOHYDRATE 150 MG: 5 INJECTION, SOLUTION INTRAVENOUS at 06:58

## 2019-01-01 RX ADMIN — PANTOPRAZOLE SODIUM 40 MG: 40 TABLET, DELAYED RELEASE ORAL at 17:20

## 2019-01-01 RX ADMIN — INSULIN LISPRO 6 UNITS: 100 INJECTION, SOLUTION INTRAVENOUS; SUBCUTANEOUS at 13:09

## 2019-01-01 RX ADMIN — SODIUM CHLORIDE: 450 INJECTION, SOLUTION INTRAVENOUS at 20:51

## 2019-01-01 RX ADMIN — INSULIN LISPRO 4 UNITS: 100 INJECTION, SOLUTION INTRAVENOUS; SUBCUTANEOUS at 07:38

## 2019-01-01 RX ADMIN — CALCIUM CARBONATE (ANTACID) CHEW TAB 500 MG 200 MG: 500 CHEW TAB at 18:12

## 2019-01-01 RX ADMIN — INSULIN LISPRO 6 UNITS: 100 INJECTION, SOLUTION INTRAVENOUS; SUBCUTANEOUS at 11:49

## 2019-01-01 RX ADMIN — SODIUM CHLORIDE 40 MG: 9 INJECTION INTRAMUSCULAR; INTRAVENOUS; SUBCUTANEOUS at 01:00

## 2019-01-01 RX ADMIN — FENTANYL CITRATE 12.5 MCG: 50 INJECTION, SOLUTION INTRAMUSCULAR; INTRAVENOUS at 01:02

## 2019-01-01 RX ADMIN — ASPIRIN 81 MG CHEWABLE TABLET 81 MG: 81 TABLET CHEWABLE at 09:01

## 2019-01-01 RX ADMIN — METHYLPREDNISOLONE SODIUM SUCCINATE 60 MG: 40 INJECTION, POWDER, FOR SOLUTION INTRAMUSCULAR; INTRAVENOUS at 18:36

## 2019-01-01 RX ADMIN — SODIUM CHLORIDE: 450 INJECTION, SOLUTION INTRAVENOUS at 11:39

## 2019-01-01 RX ADMIN — CLOPIDOGREL BISULFATE 75 MG: 75 TABLET, FILM COATED ORAL at 08:14

## 2019-01-01 RX ADMIN — PIPERACILLIN SODIUM,TAZOBACTAM SODIUM 3.38 G: 3; .375 INJECTION, POWDER, FOR SOLUTION INTRAVENOUS at 03:13

## 2019-01-01 RX ADMIN — VANCOMYCIN HYDROCHLORIDE 1000 MG: 1 INJECTION, POWDER, LYOPHILIZED, FOR SOLUTION INTRAVENOUS at 22:05

## 2019-01-01 RX ADMIN — SUCCINYLCHOLINE CHLORIDE 140 MG: 20 INJECTION, SOLUTION INTRAMUSCULAR; INTRAVENOUS at 16:10

## 2019-01-01 RX ADMIN — IPRATROPIUM BROMIDE AND ALBUTEROL SULFATE 3 ML: .5; 3 SOLUTION RESPIRATORY (INHALATION) at 08:22

## 2019-01-01 RX ADMIN — GUAIFENESIN 1200 MG: 600 TABLET, EXTENDED RELEASE ORAL at 21:07

## 2019-01-01 RX ADMIN — ALBUTEROL SULFATE 2.5 MG: 2.5 SOLUTION RESPIRATORY (INHALATION) at 21:00

## 2019-01-01 RX ADMIN — MONTELUKAST SODIUM 10 MG: 10 TABLET, FILM COATED ORAL at 11:17

## 2019-01-01 RX ADMIN — METHYLPREDNISOLONE SODIUM SUCCINATE 60 MG: 40 INJECTION, POWDER, FOR SOLUTION INTRAMUSCULAR; INTRAVENOUS at 00:20

## 2019-01-01 RX ADMIN — BUDESONIDE 500 MCG: 0.5 INHALANT RESPIRATORY (INHALATION) at 08:16

## 2019-01-01 RX ADMIN — MONTELUKAST SODIUM 10 MG: 10 TABLET, FILM COATED ORAL at 08:51

## 2019-01-01 RX ADMIN — BUDESONIDE 500 MCG: 0.5 INHALANT RESPIRATORY (INHALATION) at 07:53

## 2019-01-01 RX ADMIN — INSULIN LISPRO 6 UNITS: 100 INJECTION, SOLUTION INTRAVENOUS; SUBCUTANEOUS at 16:34

## 2019-01-01 RX ADMIN — GUAIFENESIN 1200 MG: 600 TABLET, EXTENDED RELEASE ORAL at 08:44

## 2019-01-01 RX ADMIN — INSULIN LISPRO 3 UNITS: 100 INJECTION, SOLUTION INTRAVENOUS; SUBCUTANEOUS at 11:59

## 2019-01-01 RX ADMIN — Medication 10 ML: at 23:23

## 2019-01-01 RX ADMIN — METHYLPREDNISOLONE SODIUM SUCCINATE 40 MG: 40 INJECTION, POWDER, FOR SOLUTION INTRAMUSCULAR; INTRAVENOUS at 13:44

## 2019-01-01 RX ADMIN — IPRATROPIUM BROMIDE AND ALBUTEROL SULFATE 3 ML: .5; 3 SOLUTION RESPIRATORY (INHALATION) at 23:42

## 2019-01-01 RX ADMIN — CALCIUM GLUCONATE 1 G: 98 INJECTION, SOLUTION INTRAVENOUS at 19:12

## 2019-01-01 RX ADMIN — ROSUVASTATIN CALCIUM 20 MG: 10 TABLET, FILM COATED ORAL at 21:33

## 2019-01-01 RX ADMIN — BUDESONIDE 500 MCG: 0.5 INHALANT RESPIRATORY (INHALATION) at 08:52

## 2019-01-01 RX ADMIN — APIXABAN 10 MG: 5 TABLET, FILM COATED ORAL at 09:01

## 2019-01-01 RX ADMIN — SODIUM CHLORIDE 40 MG: 9 INJECTION INTRAMUSCULAR; INTRAVENOUS; SUBCUTANEOUS at 00:13

## 2019-01-01 RX ADMIN — IPRATROPIUM BROMIDE AND ALBUTEROL SULFATE 3 ML: .5; 3 SOLUTION RESPIRATORY (INHALATION) at 09:42

## 2019-01-01 RX ADMIN — HEPARIN SODIUM 4950 UNITS: 5000 INJECTION INTRAVENOUS; SUBCUTANEOUS at 19:15

## 2019-01-01 RX ADMIN — METHYLPREDNISOLONE SODIUM SUCCINATE 40 MG: 40 INJECTION, POWDER, FOR SOLUTION INTRAMUSCULAR; INTRAVENOUS at 11:54

## 2019-01-01 RX ADMIN — DULOXETINE HYDROCHLORIDE 30 MG: 30 CAPSULE, DELAYED RELEASE ORAL at 09:03

## 2019-01-01 RX ADMIN — BUDESONIDE 500 MCG: 0.5 INHALANT RESPIRATORY (INHALATION) at 23:14

## 2019-01-01 RX ADMIN — VANCOMYCIN HYDROCHLORIDE 1500 MG: 10 INJECTION, POWDER, LYOPHILIZED, FOR SOLUTION INTRAVENOUS at 22:38

## 2019-01-01 RX ADMIN — GUAIFENESIN 1200 MG: 600 TABLET, EXTENDED RELEASE ORAL at 21:33

## 2019-01-01 RX ADMIN — ENOXAPARIN SODIUM 40 MG: 40 INJECTION SUBCUTANEOUS at 22:18

## 2019-01-01 RX ADMIN — ENOXAPARIN SODIUM 40 MG: 40 INJECTION SUBCUTANEOUS at 23:09

## 2019-01-01 RX ADMIN — IPRATROPIUM BROMIDE AND ALBUTEROL SULFATE 3 ML: .5; 3 SOLUTION RESPIRATORY (INHALATION) at 19:33

## 2019-01-01 RX ADMIN — Medication 10 ML: at 06:26

## 2019-01-01 RX ADMIN — Medication 200 MCG/HR: at 09:05

## 2019-01-01 RX ADMIN — MONTELUKAST SODIUM 10 MG: 10 TABLET, FILM COATED ORAL at 09:03

## 2019-01-01 RX ADMIN — INSULIN LISPRO 6 UNITS: 100 INJECTION, SOLUTION INTRAVENOUS; SUBCUTANEOUS at 18:17

## 2019-01-01 RX ADMIN — GUAIFENESIN 600 MG: 600 TABLET, EXTENDED RELEASE ORAL at 08:41

## 2019-01-01 RX ADMIN — GUAIFENESIN 600 MG: 600 TABLET, EXTENDED RELEASE ORAL at 23:08

## 2019-01-01 RX ADMIN — IPRATROPIUM BROMIDE AND ALBUTEROL SULFATE 3 ML: .5; 3 SOLUTION RESPIRATORY (INHALATION) at 07:33

## 2019-01-01 RX ADMIN — APIXABAN 10 MG: 5 TABLET, FILM COATED ORAL at 18:33

## 2019-01-01 RX ADMIN — CALCIUM GLUCONATE 2 G: 98 INJECTION, SOLUTION INTRAVENOUS at 10:00

## 2019-01-01 RX ADMIN — CLOPIDOGREL BISULFATE 75 MG: 75 TABLET, FILM COATED ORAL at 08:42

## 2019-01-01 RX ADMIN — ARFORMOTEROL TARTRATE 15 MCG: 15 SOLUTION RESPIRATORY (INHALATION) at 08:52

## 2019-01-01 RX ADMIN — ROSUVASTATIN CALCIUM 20 MG: 10 TABLET, FILM COATED ORAL at 21:19

## 2019-01-01 RX ADMIN — METHYLPREDNISOLONE SODIUM SUCCINATE 60 MG: 40 INJECTION, POWDER, FOR SOLUTION INTRAMUSCULAR; INTRAVENOUS at 17:59

## 2019-01-01 RX ADMIN — ROSUVASTATIN CALCIUM 20 MG: 10 TABLET, FILM COATED ORAL at 23:04

## 2019-01-01 RX ADMIN — BUDESONIDE 500 MCG: 0.5 INHALANT RESPIRATORY (INHALATION) at 12:25

## 2019-01-01 RX ADMIN — PANTOPRAZOLE SODIUM 40 MG: 40 TABLET, DELAYED RELEASE ORAL at 17:03

## 2019-01-01 RX ADMIN — BUDESONIDE 500 MCG: 0.5 INHALANT RESPIRATORY (INHALATION) at 09:02

## 2019-01-01 RX ADMIN — BUDESONIDE 500 MCG: 0.5 INHALANT RESPIRATORY (INHALATION) at 09:43

## 2019-01-01 RX ADMIN — Medication 10 ML: at 13:04

## 2019-01-01 RX ADMIN — CEFEPIME 2 G: 2 INJECTION, POWDER, FOR SOLUTION INTRAVENOUS at 08:43

## 2019-01-01 RX ADMIN — METHYLPREDNISOLONE SODIUM SUCCINATE 60 MG: 40 INJECTION, POWDER, FOR SOLUTION INTRAMUSCULAR; INTRAVENOUS at 11:49

## 2019-01-01 RX ADMIN — INSULIN LISPRO 5 UNITS: 100 INJECTION, SOLUTION INTRAVENOUS; SUBCUTANEOUS at 06:51

## 2019-01-01 RX ADMIN — VITAMIN D, TAB 1000IU (100/BT) 1000 UNITS: 25 TAB at 08:41

## 2019-01-01 RX ADMIN — METHYLPREDNISOLONE SODIUM SUCCINATE 40 MG: 40 INJECTION, POWDER, FOR SOLUTION INTRAMUSCULAR; INTRAVENOUS at 13:01

## 2019-01-01 RX ADMIN — ASPIRIN 81 MG CHEWABLE TABLET 81 MG: 81 TABLET CHEWABLE at 08:58

## 2019-01-01 RX ADMIN — PROPOFOL 5 MCG/KG/MIN: 10 INJECTION, EMULSION INTRAVENOUS at 09:15

## 2019-01-01 RX ADMIN — METHYLPREDNISOLONE SODIUM SUCCINATE 125 MG: 125 INJECTION, POWDER, FOR SOLUTION INTRAMUSCULAR; INTRAVENOUS at 18:41

## 2019-01-01 RX ADMIN — IPRATROPIUM BROMIDE AND ALBUTEROL SULFATE 3 ML: .5; 3 SOLUTION RESPIRATORY (INHALATION) at 23:43

## 2019-01-01 RX ADMIN — INSULIN LISPRO 6 UNITS: 100 INJECTION, SOLUTION INTRAVENOUS; SUBCUTANEOUS at 08:17

## 2019-01-01 RX ADMIN — DEXTROSE 50 % IN WATER (D50W) INTRAVENOUS SYRINGE 25 G: at 19:29

## 2019-01-01 RX ADMIN — SODIUM CHLORIDE 40 MG: 9 INJECTION INTRAMUSCULAR; INTRAVENOUS; SUBCUTANEOUS at 00:50

## 2019-01-01 RX ADMIN — BUDESONIDE 500 MCG: 0.5 INHALANT RESPIRATORY (INHALATION) at 21:36

## 2019-01-01 RX ADMIN — METHYLPREDNISOLONE SODIUM SUCCINATE 40 MG: 40 INJECTION, POWDER, FOR SOLUTION INTRAMUSCULAR; INTRAVENOUS at 11:09

## 2019-01-01 RX ADMIN — Medication 10 ML: at 22:26

## 2019-01-01 RX ADMIN — INSULIN LISPRO 2 UNITS: 100 INJECTION, SOLUTION INTRAVENOUS; SUBCUTANEOUS at 00:12

## 2019-01-01 RX ADMIN — DOCUSATE SODIUM 100 MG: 100 CAPSULE, LIQUID FILLED ORAL at 08:42

## 2019-01-01 RX ADMIN — IPRATROPIUM BROMIDE AND ALBUTEROL SULFATE 3 ML: .5; 3 SOLUTION RESPIRATORY (INHALATION) at 03:10

## 2019-01-01 RX ADMIN — INSULIN LISPRO 12 UNITS: 100 INJECTION, SOLUTION INTRAVENOUS; SUBCUTANEOUS at 00:00

## 2019-01-01 RX ADMIN — GUAIFENESIN 1200 MG: 600 TABLET, EXTENDED RELEASE ORAL at 08:14

## 2019-01-01 RX ADMIN — PANTOPRAZOLE SODIUM 40 MG: 40 TABLET, DELAYED RELEASE ORAL at 17:39

## 2019-01-01 RX ADMIN — MAGNESIUM SULFATE HEPTAHYDRATE 2 G: 40 INJECTION, SOLUTION INTRAVENOUS at 08:41

## 2019-01-01 RX ADMIN — CEFTRIAXONE SODIUM 2 G: 2 INJECTION, POWDER, FOR SOLUTION INTRAMUSCULAR; INTRAVENOUS at 19:57

## 2019-01-01 RX ADMIN — METHYLPREDNISOLONE SODIUM SUCCINATE 60 MG: 40 INJECTION, POWDER, FOR SOLUTION INTRAMUSCULAR; INTRAVENOUS at 11:46

## 2019-01-01 RX ADMIN — PREDNISONE 40 MG: 20 TABLET ORAL at 08:06

## 2019-01-01 RX ADMIN — INSULIN LISPRO 6 UNITS: 100 INJECTION, SOLUTION INTRAVENOUS; SUBCUTANEOUS at 08:14

## 2019-01-01 RX ADMIN — DILTIAZEM HYDROCHLORIDE 10 MG/HR: 5 INJECTION, SOLUTION INTRAVENOUS at 06:32

## 2019-01-01 RX ADMIN — SODIUM CHLORIDE 100 ML/HR: 900 INJECTION, SOLUTION INTRAVENOUS at 22:32

## 2019-01-01 RX ADMIN — INSULIN LISPRO 2 UNITS: 100 INJECTION, SOLUTION INTRAVENOUS; SUBCUTANEOUS at 05:44

## 2019-01-01 RX ADMIN — DOCUSATE SODIUM 100 MG: 100 CAPSULE, LIQUID FILLED ORAL at 17:00

## 2019-01-01 RX ADMIN — INSULIN LISPRO 6 UNITS: 100 INJECTION, SOLUTION INTRAVENOUS; SUBCUTANEOUS at 11:59

## 2019-01-01 RX ADMIN — METHYLPREDNISOLONE SODIUM SUCCINATE 40 MG: 40 INJECTION, POWDER, FOR SOLUTION INTRAMUSCULAR; INTRAVENOUS at 21:33

## 2019-01-01 RX ADMIN — Medication 10 ML: at 13:09

## 2019-01-01 RX ADMIN — ARFORMOTEROL TARTRATE 15 MCG: 15 SOLUTION RESPIRATORY (INHALATION) at 07:53

## 2019-01-01 RX ADMIN — BUDESONIDE 500 MCG: 0.5 INHALANT RESPIRATORY (INHALATION) at 19:25

## 2019-01-01 RX ADMIN — PANTOPRAZOLE SODIUM 40 MG: 40 TABLET, DELAYED RELEASE ORAL at 17:14

## 2019-01-01 RX ADMIN — BUDESONIDE 500 MCG: 0.5 INHALANT RESPIRATORY (INHALATION) at 00:00

## 2019-01-01 RX ADMIN — PIPERACILLIN SODIUM,TAZOBACTAM SODIUM 3.38 G: 3; .375 INJECTION, POWDER, FOR SOLUTION INTRAVENOUS at 18:13

## 2019-01-01 RX ADMIN — MONTELUKAST SODIUM 10 MG: 10 TABLET, FILM COATED ORAL at 09:01

## 2019-01-01 RX ADMIN — INSULIN LISPRO 3 UNITS: 100 INJECTION, SOLUTION INTRAVENOUS; SUBCUTANEOUS at 18:17

## 2019-01-01 RX ADMIN — INSULIN LISPRO 2 UNITS: 100 INJECTION, SOLUTION INTRAVENOUS; SUBCUTANEOUS at 04:19

## 2019-01-01 RX ADMIN — ARFORMOTEROL TARTRATE 15 MCG: 15 SOLUTION RESPIRATORY (INHALATION) at 23:14

## 2019-01-01 RX ADMIN — CALCIUM CARBONATE (ANTACID) CHEW TAB 500 MG 200 MG: 500 CHEW TAB at 08:15

## 2019-01-01 RX ADMIN — Medication 10 ML: at 07:17

## 2019-01-01 RX ADMIN — VITAMIN D, TAB 1000IU (100/BT) 1000 UNITS: 25 TAB at 08:06

## 2019-01-01 RX ADMIN — DILTIAZEM HYDROCHLORIDE 2.5 MG/HR: 5 INJECTION, SOLUTION INTRAVENOUS at 12:00

## 2019-01-01 RX ADMIN — CASTOR OIL AND BALSAM, PERU: 788; 87 OINTMENT TOPICAL at 05:08

## 2019-01-01 RX ADMIN — SODIUM CHLORIDE 0.4 MCG/KG/HR: 900 INJECTION, SOLUTION INTRAVENOUS at 13:41

## 2019-01-01 RX ADMIN — Medication 10 ML: at 20:18

## 2019-01-01 RX ADMIN — INSULIN LISPRO 3 UNITS: 100 INJECTION, SOLUTION INTRAVENOUS; SUBCUTANEOUS at 16:30

## 2019-01-01 RX ADMIN — BUDESONIDE 500 MCG: 0.5 INHALANT RESPIRATORY (INHALATION) at 08:04

## 2019-01-01 RX ADMIN — PREDNISONE 40 MG: 20 TABLET ORAL at 06:50

## 2019-01-01 RX ADMIN — VANCOMYCIN HYDROCHLORIDE 1500 MG: 10 INJECTION, POWDER, LYOPHILIZED, FOR SOLUTION INTRAVENOUS at 12:39

## 2019-01-01 RX ADMIN — IPRATROPIUM BROMIDE AND ALBUTEROL SULFATE 3 ML: .5; 3 SOLUTION RESPIRATORY (INHALATION) at 00:00

## 2019-01-01 RX ADMIN — LEVOFLOXACIN 750 MG: 250 TABLET, FILM COATED ORAL at 18:43

## 2019-01-01 RX ADMIN — INSULIN LISPRO 3 UNITS: 100 INJECTION, SOLUTION INTRAVENOUS; SUBCUTANEOUS at 06:51

## 2019-01-01 RX ADMIN — CALCIUM CARBONATE (ANTACID) CHEW TAB 500 MG 200 MG: 500 CHEW TAB at 17:02

## 2019-01-01 RX ADMIN — ROSUVASTATIN CALCIUM 20 MG: 10 TABLET, FILM COATED ORAL at 21:29

## 2019-01-01 RX ADMIN — DEXTROSE MONOHYDRATE 25 G: 500 INJECTION PARENTERAL at 19:29

## 2019-01-01 RX ADMIN — CALCIUM GLUCONATE 1 G: 98 INJECTION, SOLUTION INTRAVENOUS at 10:01

## 2019-01-01 RX ADMIN — IPRATROPIUM BROMIDE AND ALBUTEROL SULFATE 3 ML: .5; 3 SOLUTION RESPIRATORY (INHALATION) at 07:47

## 2019-01-01 RX ADMIN — LEVOFLOXACIN 250 MG: 250 TABLET, FILM COATED ORAL at 21:56

## 2019-01-01 RX ADMIN — HEPARIN SODIUM 5000 UNITS: 5000 INJECTION INTRAVENOUS; SUBCUTANEOUS at 15:20

## 2019-01-01 RX ADMIN — Medication 10 ML: at 21:56

## 2019-01-01 RX ADMIN — METHYLPREDNISOLONE SODIUM SUCCINATE 40 MG: 40 INJECTION, POWDER, FOR SOLUTION INTRAMUSCULAR; INTRAVENOUS at 03:08

## 2019-01-01 RX ADMIN — IPRATROPIUM BROMIDE AND ALBUTEROL SULFATE 3 ML: .5; 3 SOLUTION RESPIRATORY (INHALATION) at 12:25

## 2019-01-01 RX ADMIN — ASPIRIN 81 MG CHEWABLE TABLET 81 MG: 81 TABLET CHEWABLE at 08:51

## 2019-01-01 RX ADMIN — Medication 10 ML: at 07:10

## 2019-01-01 RX ADMIN — INSULIN LISPRO 3 UNITS: 100 INJECTION, SOLUTION INTRAVENOUS; SUBCUTANEOUS at 07:16

## 2019-01-01 RX ADMIN — METHYLPREDNISOLONE SODIUM SUCCINATE 40 MG: 40 INJECTION, POWDER, FOR SOLUTION INTRAMUSCULAR; INTRAVENOUS at 05:09

## 2019-01-01 RX ADMIN — METHYLPREDNISOLONE SODIUM SUCCINATE 40 MG: 40 INJECTION, POWDER, FOR SOLUTION INTRAMUSCULAR; INTRAVENOUS at 17:36

## 2019-01-01 RX ADMIN — METHYLPREDNISOLONE SODIUM SUCCINATE 60 MG: 40 INJECTION, POWDER, FOR SOLUTION INTRAMUSCULAR; INTRAVENOUS at 16:59

## 2019-01-01 RX ADMIN — IPRATROPIUM BROMIDE AND ALBUTEROL SULFATE 3 ML: .5; 3 SOLUTION RESPIRATORY (INHALATION) at 11:44

## 2019-01-01 RX ADMIN — METHYLPREDNISOLONE SODIUM SUCCINATE 40 MG: 40 INJECTION, POWDER, FOR SOLUTION INTRAMUSCULAR; INTRAVENOUS at 19:01

## 2019-01-01 RX ADMIN — LEVOFLOXACIN 500 MG: 5 INJECTION, SOLUTION INTRAVENOUS at 23:21

## 2019-01-01 RX ADMIN — CALCIUM GLUCONATE 2 G: 98 INJECTION, SOLUTION INTRAVENOUS at 12:26

## 2019-01-01 RX ADMIN — DOCUSATE SODIUM 100 MG: 100 CAPSULE, LIQUID FILLED ORAL at 08:52

## 2019-01-01 RX ADMIN — PANTOPRAZOLE SODIUM 40 MG: 40 TABLET, DELAYED RELEASE ORAL at 08:42

## 2019-01-01 RX ADMIN — INSULIN LISPRO 7 UNITS: 100 INJECTION, SOLUTION INTRAVENOUS; SUBCUTANEOUS at 06:59

## 2019-01-01 RX ADMIN — IPRATROPIUM BROMIDE AND ALBUTEROL SULFATE 3 ML: .5; 3 SOLUTION RESPIRATORY (INHALATION) at 00:34

## 2019-01-01 RX ADMIN — GUAIFENESIN 600 MG: 600 TABLET, EXTENDED RELEASE ORAL at 08:58

## 2019-01-01 RX ADMIN — ROSUVASTATIN CALCIUM 20 MG: 10 TABLET, FILM COATED ORAL at 22:12

## 2019-01-01 RX ADMIN — INSULIN LISPRO 2 UNITS: 100 INJECTION, SOLUTION INTRAVENOUS; SUBCUTANEOUS at 18:44

## 2019-01-01 RX ADMIN — VANCOMYCIN HYDROCHLORIDE 750 MG: 750 INJECTION, POWDER, LYOPHILIZED, FOR SOLUTION INTRAVENOUS at 06:59

## 2019-01-01 RX ADMIN — Medication 10 ML: at 21:09

## 2019-01-01 RX ADMIN — DOXYCYCLINE 100 MG: 100 INJECTION, POWDER, LYOPHILIZED, FOR SOLUTION INTRAVENOUS at 00:29

## 2019-01-01 RX ADMIN — INSULIN LISPRO 2 UNITS: 100 INJECTION, SOLUTION INTRAVENOUS; SUBCUTANEOUS at 08:50

## 2019-01-01 RX ADMIN — GUAIFENESIN 1200 MG: 600 TABLET, EXTENDED RELEASE ORAL at 08:51

## 2019-01-01 RX ADMIN — INSULIN LISPRO 6 UNITS: 100 INJECTION, SOLUTION INTRAVENOUS; SUBCUTANEOUS at 16:30

## 2019-01-01 RX ADMIN — INSULIN LISPRO 6 UNITS: 100 INJECTION, SOLUTION INTRAVENOUS; SUBCUTANEOUS at 17:02

## 2019-01-01 RX ADMIN — INSULIN LISPRO 6 UNITS: 100 INJECTION, SOLUTION INTRAVENOUS; SUBCUTANEOUS at 08:41

## 2019-01-01 RX ADMIN — ROSUVASTATIN CALCIUM 20 MG: 10 TABLET, FILM COATED ORAL at 23:08

## 2019-01-01 RX ADMIN — IPRATROPIUM BROMIDE AND ALBUTEROL SULFATE 3 ML: .5; 3 SOLUTION RESPIRATORY (INHALATION) at 20:13

## 2019-01-01 RX ADMIN — IPRATROPIUM BROMIDE AND ALBUTEROL SULFATE 3 ML: .5; 3 SOLUTION RESPIRATORY (INHALATION) at 07:49

## 2019-01-01 RX ADMIN — IPRATROPIUM BROMIDE AND ALBUTEROL SULFATE 3 ML: .5; 3 SOLUTION RESPIRATORY (INHALATION) at 03:23

## 2019-01-01 RX ADMIN — IPRATROPIUM BROMIDE AND ALBUTEROL SULFATE 3 ML: .5; 3 SOLUTION RESPIRATORY (INHALATION) at 23:16

## 2019-01-01 RX ADMIN — SODIUM CHLORIDE 0.5 MCG/KG/HR: 900 INJECTION, SOLUTION INTRAVENOUS at 03:30

## 2019-01-01 RX ADMIN — HYDROMORPHONE HYDROCHLORIDE 2 MG: 2 INJECTION, SOLUTION INTRAMUSCULAR; INTRAVENOUS; SUBCUTANEOUS at 09:38

## 2019-01-01 RX ADMIN — AZITHROMYCIN MONOHYDRATE 500 MG: 500 INJECTION, POWDER, LYOPHILIZED, FOR SOLUTION INTRAVENOUS at 20:01

## 2019-01-01 RX ADMIN — VANCOMYCIN HYDROCHLORIDE 1000 MG: 1 INJECTION, POWDER, LYOPHILIZED, FOR SOLUTION INTRAVENOUS at 22:08

## 2019-01-01 RX ADMIN — CLOPIDOGREL BISULFATE 75 MG: 75 TABLET, FILM COATED ORAL at 08:52

## 2019-01-01 RX ADMIN — INSULIN LISPRO 6 UNITS: 100 INJECTION, SOLUTION INTRAVENOUS; SUBCUTANEOUS at 16:57

## 2019-01-01 RX ADMIN — INSULIN LISPRO 10 UNITS: 100 INJECTION, SOLUTION INTRAVENOUS; SUBCUTANEOUS at 17:36

## 2019-01-01 RX ADMIN — CEFEPIME 2 G: 2 INJECTION, POWDER, FOR SOLUTION INTRAVENOUS at 22:55

## 2019-01-01 RX ADMIN — INSULIN GLARGINE 25 UNITS: 100 INJECTION, SOLUTION SUBCUTANEOUS at 22:13

## 2019-01-01 RX ADMIN — INSULIN LISPRO 4 UNITS: 100 INJECTION, SOLUTION INTRAVENOUS; SUBCUTANEOUS at 11:39

## 2019-01-01 RX ADMIN — INSULIN LISPRO 5 UNITS: 100 INJECTION, SOLUTION INTRAVENOUS; SUBCUTANEOUS at 00:02

## 2019-01-01 RX ADMIN — METHYLPREDNISOLONE SODIUM SUCCINATE 40 MG: 40 INJECTION, POWDER, FOR SOLUTION INTRAMUSCULAR; INTRAVENOUS at 03:22

## 2019-01-01 RX ADMIN — IPRATROPIUM BROMIDE AND ALBUTEROL SULFATE 3 ML: .5; 3 SOLUTION RESPIRATORY (INHALATION) at 03:21

## 2019-01-01 RX ADMIN — INSULIN LISPRO 6 UNITS: 100 INJECTION, SOLUTION INTRAVENOUS; SUBCUTANEOUS at 07:05

## 2019-01-01 RX ADMIN — CLOPIDOGREL BISULFATE 75 MG: 75 TABLET, FILM COATED ORAL at 08:57

## 2019-01-27 PROBLEM — A41.9 SEPSIS (HCC): Status: ACTIVE | Noted: 2019-01-01

## 2019-01-27 PROBLEM — J44.1 COPD EXACERBATION (HCC): Status: ACTIVE | Noted: 2019-01-01

## 2019-01-27 PROBLEM — J18.9 PNEUMONIA: Status: ACTIVE | Noted: 2019-01-01

## 2019-01-27 NOTE — ED TRIAGE NOTES
Pt arrives via EMS from home with son. Pt reports increased SOB over the last few days, worsened today. +lethargic, +productive cough, +generalized weakness. Baseline room air. 1 duo neb given en route. VSS per ems

## 2019-01-27 NOTE — ED PROVIDER NOTES
66 y.o. male with past medical history significant for asthma, COPD, ruptured esophagus, DM, HTN, AAA, kidney CA who presents via EMS with chief complaint of shortness of breath. Pt c/o worsening fatigue, productive cough, SOB, wheezing, rhinorrhea, decreased PO intake for the past few days. Pt reports coughing up yellow phlegm. Pt is currently on chemo pills for bone CA. Pt is followed by Dr. Leland Jerry for his cancer. There are no other acute medical concerns at this time. Social hx: +tobacco smoker (0.5 packs/day), +occasional EtOH consumption Note written by Eugenio Gonzales, as dictated by [place] MD 6:07 PM 
 
 
 
 
  
 
Past Medical History:  
Diagnosis Date  AAA (abdominal aortic aneurysm) ([facility] [address]  Asthma  Cancer ([facility] [address]   
 kidney ca  COPD  Diabetes ([facility] [address]  Gastrointestinal disorder   
 ruptured esphogus  Hypertension  Other ill-defined conditions(799.89) Past Surgical History:  
Procedure Laterality Date  ABDOMEN SURGERY PROC UNLISTED    
 hernia  ABDOMEN SURGERY PROC UNLISTED    
 colon resection  HX GI    
 part of colon removed, ruptured esophagus  HX OTHER SURGICAL    
 kidney removed [place] Family History:  
Problem Relation Age of Onset  Hypertension Mother Social History Socioeconomic History  Marital status:  Spouse name: Not on file  Number of children: Not on file  Years of education: Not on file  Highest education level: Not on file Social Needs  Financial resource strain: Not on file  Food insecurity - worry: Not on file  Food insecurity - inability: Not on file  Transportation needs - medical: Not on file  Transportation needs - non-medical: Not on file Occupational History  Not on file Tobacco Use  Smoking status: Current Every Day Smoker Packs/day: 0.50 Years: 60.00 Pack years: 30.00  Smokeless tobacco: Never Used Substance and Sexual Activity  Alcohol use: Yes Comment: 2 drinks a month  Drug use: No  
 Sexual activity: Not on file Other Topics Concern  Not on file Social History Narrative ** Merged History Encounter ** ALLERGIES: Niacin and Niacin Review of Systems Constitutional: Positive for appetite change and fatigue. Negative for activity change and fever. HENT: Positive for rhinorrhea. Eyes: Negative for pain. Respiratory: Positive for cough, shortness of breath and wheezing. Cardiovascular: Negative for chest pain and leg swelling. Gastrointestinal: Negative for abdominal pain. Genitourinary: Negative for flank pain and hematuria. Musculoskeletal: Negative for gait problem, neck pain and neck stiffness. Skin: Negative for color change. Neurological: Negative for speech difficulty and headaches. Hematological: Does not bruise/bleed easily. Psychiatric/Behavioral: Negative for confusion. All other systems reviewed and are negative. There were no vitals filed for this visit. Physical Exam  
Constitutional: He is oriented to person, place, and time. He appears well-developed and well-nourished. No distress. HENT:  
Head: Normocephalic and atraumatic. Nose: Nose normal.  
Mouth/Throat: Oropharynx is clear and moist. No oropharyngeal exudate. Eyes: Conjunctivae and EOM are normal. Right eye exhibits no discharge. Left eye exhibits no discharge. No scleral icterus. Neck: Normal range of motion. Neck supple. No JVD present. No tracheal deviation present. No thyromegaly present. Cardiovascular: Regular rhythm, normal heart sounds and intact distal pulses. Tachycardia present. Exam reveals no gallop and no friction rub. No murmur heard. Pulmonary/Chest: He exhibits no tenderness. Productive cough yellow sputum. Abdominal: Bowel sounds are normal. He exhibits no distension and no mass. There is no tenderness. There is no rebound. Musculoskeletal: Normal range of motion. He exhibits no edema or tenderness. Lymphadenopathy:  
  He has no cervical adenopathy. Neurological: He is alert and oriented to person, place, and time. No cranial nerve deficit. Coordination normal.  
Skin: Skin is warm and dry. No rash noted. He is not diaphoretic. No erythema. No pallor. Psychiatric: He has a normal mood and affect. His behavior is normal. Judgment and thought content normal.  
Nursing note and vitals reviewed. Note written by Eugenio Brenner, as dictated by Sam Cristina MD 6:36 PM 
 
 
 
MDM Number of Diagnoses or Management Options Sepsis, due to unspecified organism [facility]):  
Diagnosis management comments: A/P:  Sepsis. Sepsis orderset, likely source respiratory. emperically start Abx. Pt to be admitted. Amount and/or Complexity of Data Reviewed Clinical lab tests: ordered and reviewed Tests in the radiology section of CPT®: reviewed and ordered Review and summarize past medical records: yes Discuss the patient with other providers: yes Independent visualization of images, tracings, or specimens: yes Risk of Complications, Morbidity, and/or Mortality Presenting problems: moderate Diagnostic procedures: moderate Management options: moderate Critical Care Total time providing critical care: 30-74 minutes (Total critical care time spent exclusive of procedures:  32 min.) Patient Progress Patient progress: stable Procedures CONSULT NOTE: 
7:57 PM Sam Cristina MD spoke with Dr. Bogdan Islas for Hospitalist.  Discussed available diagnostic tests and clinical findings. Dr. Danyell Howell will see and admit the pt. ED EKG interpretation: 
Rhythm: sinus tachycardia with PACs; and regular . Rate (approx.): 108; Axis: normal; ST/T wave: normal.  
Note written by Eugenio Brenner, as dictated by Sam Cristina MD [id number]

## 2019-01-28 NOTE — PROGRESS NOTES
NUTRITION COMPLETE ASSESSMENT 
 
RECOMMENDATIONS:  
1. Continue current diet, Supplements (specify) 2. Please document po intake in flow sheets 3. Weekly weights Interventions/Plan:  
Food/Nutrient Delivery:           RD to add supplements Assessment:  
Reason for Assessment:  
[x]BPA/MST Referral  
 
Diet: Consistent carb Supplements: None Nutritionally Significant Medications: [x] Reviewed & Includes: Vit D3, Colace, Lantus, Humalog, Mag sulfate, Methylprednisone, Protonix, NS @100mL/hr 
Meal Intake: No data found. Pre-Hospitalization: 
Usual Appetite: Good Current Hospitalization:  
Fluid Restriction:   none Appetite: Good PO Ability: Independent Average po intake:50-75% Average supplements intake:  n/a Subjective: \"I have a good appetite. I hope you all do better than the other hospital I was at. \" 
 
Objective: 
Pt seen for MST. Pt admitted with pneumonia. PMHx: asthma, COPD, ruptured esophagus, DM, HTN, AAA, kidney CA s/p rt nephrectomy, MM. Reviewed chart, spoke with pt. Pt with good appetite, typically (not for several days PTA); noted 35# wt loss over past year, which is severe in nature; temporal/clavicular wasting. Patient meets criteria for Severe Protein Calorie Malnutrition as evidenced by:  
ASPEN Malnutrition Criteria Acute Illness, Chronic Illness, or Social/Enviornmental: Chronic illness Weight Loss: Greater than 20% x 1 yr Body Fat: Severe Muscle Mass: Severe ASPEN Malnutrition Score - Chronic Illness: 18 Chronic Illness - Malnutrition Diagnosis: Severe malnutrition. Offered supplements, pt accepted. Boost TID provides 750 mariely, 42g protein meeting 37% caloric, 58% protein needs if 100% consumed. RD to follow po intake, wt status. Estimated Nutrition Needs:  
Kcals/day: 2035 Kcals/day(2859-0660 kcal/day (HB x 1.6-1.8)) Protein: 72 g(1.2g/kg) Fluid:  2100 mL/day (1mL/kcal) Based On: [facility] Weight Used: Actual wt Pt expected to meet estimated nutrient needs:  []   Yes     []  No [x] Unable to predict at this time Nutrition Diagnosis:  
1. Unintended weight loss related to cancer dianosis as evidenced by pt with 35# wt loss over past year 2. Increased nutrient needs (protein) related to cancer diagnosis as evidenced by pt with multiple myeloma on chemo pills Goals: Pt will consume 75% meals, supplements over next 5-7 days; weight maintenance Monitoring & Evaluation: - Total energy intake, Liquid meal replacement - Weight/weight change -   
 
Previous Nutrition Goals Met:   N/A Previous Recommendations:    N/A Education & Discharge Needs: 
 [x] None Identified 
 [] Identified and addressed  
 [] Participated in care plan, discharge planning, and/or interdisciplinary rounds Cultural, Religion and ethnic food preferences identified: None Skin Integrity: [x]Intact  []Other Edema: [x]None []Other Last BM: 1/27/2019 Food Allergies: [x]None []Other Diet Restrictions: Cultural/Orthodoxy Preference(s): None Anthropometrics:   
Weight Loss Metrics 1/28/2019 3/9/2018 8/10/2017 9/13/2016 9/9/2016 6/28/2016 6/27/2016 Today's Wt 132 lb 0.9 oz 167 lb 8.8 oz 179 lb 11.2 oz 176 lb 3 oz 173 lb 11.6 oz 183 lb 183 lb BMI 17.91 kg/m2 22.72 kg/m2 24.37 kg/m2 23.9 kg/m2 23.56 kg/m2 24.81 kg/m2 24.81 kg/m2 Weight Source: Standing scale (comment) Height: 6' (182.9 cm), Body mass index is 17.91 kg/m². ,   
 ,   
 
Labs:   
Lab Results Component Value Date/Time Sodium 142 01/28/2019 04:44 AM  
 Potassium 4.6 01/28/2019 04:44 AM  
 Chloride 115 (H) 01/28/2019 04:44 AM  
 CO2 15 (LL) 01/28/2019 04:44 AM  
 Glucose 185 (H) 01/28/2019 04:44 AM  
 BUN 16 01/28/2019 04:44 AM  
 Creatinine 1.06 01/28/2019 04:44 AM  
 Calcium 6.7 (L) 01/28/2019 04:44 AM  
 Magnesium 1.4 (L) 01/28/2019 04:44 AM  
 Phosphorus 2.5 (L) 01/28/2019 04:44 AM  
 Albumin 2.1 (L) 01/28/2019 04:44 AM  
 
Lab Results Component Value Date/Time  Hemoglobin A1c 7.4 (H) 01/28/2019 04:44 AM  
 
 
 
Cecilia Chapman RD

## 2019-01-28 NOTE — PROGRESS NOTES
Hospitalist Progress Note Crow Fajardo MD 
Office: [phone] Date of Service:  2019 NAME:  Nova Guillen :  1940 MRN:  [id number] Pt seen and examined discussed care plan Please see the more detailed note from the NP. I agree with NP's assessment and plan. Details in NP note Hospital Problems  Date Reviewed: 2018 Codes Class Noted POA Pneumonia ICD-10-CM: J18.9 ICD-9-CM: 886  2019 Unknown COPD exacerbation ([facility] ICD-10-CM: J44.1 ICD-9-CM: 491.21  2019 Unknown Sepsis ([facility] ICD-10-CM: A41.9 ICD-9-CM: 038.9, 995.91  2019 Unknown Review of Systems: A comprehensive review of systems was negative. Vital Signs:  
 Last 24hrs VS reviewed since prior progress note. Most recent are: 
Visit Vitals /50 (BP 1 Location: Right arm, BP Patient Position: At rest;Supine) Pulse (!) 102 Temp 97.7 °F (36.5 °C) Resp 20 Ht 6' (1.829 m) Wt 59.9 kg (132 lb 0.9 oz) SpO2 100% BMI 17.91 kg/m² Physical Examination:  
 
 
     
   
   
Resp:  coarse breathe sound poor air entry CV:  Regular rhythm, tachy on o2 GI:  Soft, non distended, non tender. bs+ Musculoskeletal:  No edema, Neurologic:  Moves all extremities. AAOx3, CN II-XII reviewed PLAN:  
 
1. Cont COPD treatment 2. Con pall care for documentation for long term care 3. Recent oral chemo with Dr. Chris Pena for MM next chemo Thursday 4. Broaden coverage for immunocompromised state 
______________________________________________________________________ EXPECTED LENGTH OF STAY: 4d 19h ACTUAL LENGTH OF STAY:          1 Crow Fajardo MD

## 2019-01-28 NOTE — H&P
History & Physical 
 
Primary Care Provider: Other, MD Roopa 
Source of Information: Patient History of Presenting Illness:  
Fili Walters is a 66 y.o. male who presents with productive cough and sob 66 y.o. male with past medical history significant for asthma, COPD, ruptured esophagus, DM, HTN, AAA, kidney CA s/p rt nephrectomy, MM who presents via EMS with chief complaint of  shortness of breath. Pt c/o worsening fatigue, productive cough, SOB, wheezing, rhinorrhea. He is having cough for about 1 month, not seeing his doctor for this and not taking any medicine. Patient continues smoking. decreased PO intake for the past few days. Pt reports coughing up yellow phlegm, and increasing SOB last several days. No fever noticed. Pt is currently on chemo pills for his MM. Pt is followed by Dr. Dianne Gregg for his cancer. Review of Systems: 
General: as above, no fever noticed, no changes of weight and sleep HEENT: no headache, no vision changes, +nose discharge, no hearing changes RES: HPI  
CVS: no cp, no palpitation. Muscular: no joint swelling, + back pain. Skin: no rash, no itching GI: no vomiting, no diarrhea : no dysuria, no hematuria Hemo: no gum bleeding, no petechial  
Neuro: no sensation changes, no focal weakness Endo: no polydipsia Psych: denied depression Past Medical History:  
Diagnosis Date  AAA (abdominal aortic aneurysm) ([facility]  Asthma  Cancer ([facility]   
 kidney ca  COPD  Diabetes ([facility]  Gastrointestinal disorder   
 ruptured esphogus  Hypertension  Other ill-defined conditions(799.89) Past Surgical History:  
Procedure Laterality Date  ABDOMEN SURGERY PROC UNLISTED    
 hernia  ABDOMEN SURGERY PROC UNLISTED    
 colon resection  HX GI    
 part of colon removed, ruptured esophagus  HX OTHER SURGICAL    
 kidney removed [place] Prior to Admission medications Medication Sig Start Date End Date Taking? Authorizing Provider DULoxetine (CYMBALTA) 30 mg capsule Take 30 mg by mouth daily. Yes Provider, Historical  
pantoprazole (PROTONIX) 40 mg tablet Take 40 mg by mouth two (2) times a day. Yes Provider, Historical  
dexamethasone (DECADRON) 4 mg tablet Take 20 mg by mouth Every Thursday. Yes Provider, Historical  
lenalidomide (REVLIMID) 25 mg cap Take 25 mg by mouth. DAILY ON DAYS 1 TO 21 FOR A 28 DAY CYCLE (OFF MED 7 DAYS BEFORE STARTING AGAIN) NEXT DOSE DUE Thursday 1/31/19 TO START NEXT ROUND   Yes Provider, Historical  
insulin glargine (LANTUS) 100 unit/mL injection 30 Units by SubCUTAneous route every morning. Yes Provider, Historical  
insulin aspart U-100 (NOVOLOG FLEXPEN U-100 INSULIN) 100 unit/mL inpn 6 Units by SubCUTAneous route daily (with breakfast). Yes Provider, Historical  
insulin aspart U-100 (NOVOLOG FLEXPEN U-100 INSULIN) 100 unit/mL inpn 6 Units by SubCUTAneous route daily (with lunch). Yes Provider, Historical  
insulin aspart U-100 (NOVOLOG FLEXPEN U-100 INSULIN) 100 unit/mL inpn 10 Units by SubCUTAneous route daily (with dinner). Yes Provider, Historical  
albuterol-ipratropium (DUO-NEB) 2.5 mg-0.5 mg/3 ml nebu 3 mL by Nebulization route every four (4) hours as needed. 3/9/18  Yes Jessa Mars MD  
aspirin 81 mg chewable tablet Take 1 Tab by mouth daily. 9/12/16  Yes Hai Howell MD  
clopidogrel (PLAVIX) 75 mg tablet Take 1 Tab by mouth daily. 9/12/16  Yes Hai Howell MD  
cholecalciferol (VITAMIN D3) 1,000 unit cap Take 1,000 Units by mouth daily. Yes Other, MD Roopa  
rosuvastatin (CRESTOR) 40 mg tablet Take 20 mg by mouth nightly. Yes Other, MD Roopa  
tiotropium (SPIRIVA WITH HANDIHALER) 18 mcg inhalation capsule Take 1 Cap by inhalation daily.    Yes Provider, Historical  
budesonide-formoterol (SYMBICORT) 160-4.5 mcg/actuation HFA inhaler Take 2 Puffs by inhalation daily. Indications: COPD ASSOCIATED WITH CHRONIC BRONCHITIS   Yes Other, MD Roopa  
albuterol (PROVENTIL HFA, VENTOLIN HFA) 90 mcg/Actuation inhaler Take 2 Puffs by inhalation every six (6) hours as needed for Wheezing and Shortness of Breath. 4/28/11  Yes Luc Spence MD  
acetaminophen (TYLENOL) 325 mg tablet Take 2 Tabs by mouth every four (4) hours as needed for Fever (For temp greater than or equal to 38.5 C or 101.3 F (Unless hepatic failure or contrindicated). Give first line for fever. ). 9/12/16   Joann Schwartz MD  
montelukast (SINGULAIR) 10 mg tablet Take 10 mg by mouth daily. Other, MD Roopa  
 
Allergies Allergen Reactions  Niacin Rash  Niacin Unknown (comments) Hot flashes Family History Problem Relation Age of Onset  Hypertension Mother SOCIAL HISTORY: 
Patient resides: 
Independently x [facility]isted [facility] SN[facility] With family care Smoking history:  
None Former Chronic x Alcohol history:  
None Social x Chronic Ambulates:  
Independently x  
w/cane   
w/walker   
w/wc CODE STATUS: 
DNR Full x Other Objective:  
 
Physical Exam:  
 
Visit Vitals /54 Pulse (!) 107 Temp 97.9 °F (36.6 °C) Resp 24 Ht 5' 10\" (1.778 m) Wt 66.3 kg (146 lb 2.6 oz) SpO2 98% BMI 20.97 kg/m² O2 Device: Room air General:  Alert, cooperative, overall weak , appears stated age. Head:  Normocephalic, without obvious abnormality, atraumatic. Eyes:  Conjunctivae/corneas clear. PERRL, EOMs intact. Nose: Nares normal. Septum midline. Mucosa normal. No drainage or sinus tenderness. Throat: Lips, mucosa, and tongue normal. Teeth and gums normal.  
Neck: Supple, symmetrical, trachea midline, no adenopathy, thyroid: no enlargement/tenderness/nodules, no carotid bruit and no JVD. Back:   Symmetric, no curvature. ROM normal. No CVA tenderness. Lungs: Diffuse rhonchi, ins wheezing, basilar crackles, no dullness on percussion Chest wall:  No tenderness or deformity. Heart:  Regular rate and rhythm, S1, S2 normal, no murmur, click, rub or gallop. Abdomen:   Soft, non-tender. Bowel sounds normal. No masses,  No organomegaly. Extremities: Extremities normal, atraumatic, no cyanosis or edema. Pulses: 2+ and symmetric all extremities. Skin: Skin color, texture, turgor normal. No rashes or lesions Neurologic: CNII-XII intact. Normal strength. Data Review:  
 
Recent Days: 
Recent Labs  
  01/27/19 1853 WBC 3.3* HGB 9.8* HCT 32.6*  
 Recent Labs  
  01/27/19 1853   
K 4.6 * CO2 23 GLU 74 BUN 19  
CREA 1.14  
CA 7.6* ALB 2.5* SGOT 8* ALT 17 No results for input(s): PH, PCO2, PO2, HCO3, FIO2 in the last 72 hours. 24 Hour Results: 
Recent Results (from the past 24 hour(s)) POC LACTIC ACID Collection Time: 01/27/19  6:37 PM  
Result Value Ref Range Lactic Acid (POC) 3.24 (HH) 0.40 - 2.00 mmol/L METABOLIC PANEL, COMPREHENSIVE Collection Time: 01/27/19  6:53 PM  
Result Value Ref Range Sodium 143 136 - 145 mmol/L Potassium 4.6 3.5 - 5.1 mmol/L Chloride 114 (H) 97 - 108 mmol/L  
 CO2 23 21 - 32 mmol/L Anion gap 6 5 - 15 mmol/L Glucose 74 65 - 100 mg/dL BUN 19 6 - 20 MG/DL Creatinine 1.14 0.70 - 1.30 MG/DL  
 BUN/Creatinine ratio 17 12 - 20 GFR est AA >60 >60 ml/min/1.73m2 GFR est non-AA >60 >60 ml/min/1.73m2 Calcium 7.6 (L) 8.5 - 10.1 MG/DL Bilirubin, total 0.5 0.2 - 1.0 MG/DL  
 ALT (SGPT) 17 12 - 78 U/L  
 AST (SGOT) 8 (L) 15 - 37 U/L Alk. phosphatase 58 45 - 117 U/L Protein, total 6.1 (L) 6.4 - 8.2 g/dL Albumin 2.5 (L) 3.5 - 5.0 g/dL Globulin 3.6 2.0 - 4.0 g/dL A-G Ratio 0.7 (L) 1.1 - 2.2    
CBC WITH AUTOMATED DIFF Collection Time: 01/27/19  6:53 PM  
Result Value Ref Range WBC 3.3 (L) 4.1 - 11.1 K/uL RBC 3.41 (L) 4.10 - 5.70 M/uL HGB 9.8 (L) 12.1 - 17.0 g/dL HCT 32.6 (L) 36.6 - 50.3 % MCV 95.6 80.0 - 99.0 FL  
 MCH 28.7 26.0 - 34.0 PG  
 MCHC 30.1 30.0 - 36.5 g/dL  
 RDW 17.4 (H) 11.5 - 14.5 % PLATELET 531 250 - 388 K/uL MPV 11.4 8.9 - 12.9 FL  
 NRBC 0.0 0  WBC ABSOLUTE NRBC 0.00 0.00 - 0.01 K/uL NEUTROPHILS 47 32 - 75 % BAND NEUTROPHILS 10 (H) 0 - 6 % LYMPHOCYTES 21 12 - 49 % MONOCYTES 20 (H) 5 - 13 % EOSINOPHILS 2 0 - 7 % BASOPHILS 0 0 - 1 % IMMATURE GRANULOCYTES 0 %  
 ABS. NEUTROPHILS 1.8 1.8 - 8.0 K/UL  
 ABS. LYMPHOCYTES 0.7 (L) 0.8 - 3.5 K/UL  
 ABS. MONOCYTES 0.7 0.0 - 1.0 K/UL  
 ABS. EOSINOPHILS 0.1 0.0 - 0.4 K/UL  
 ABS. BASOPHILS 0.0 0.0 - 0.1 K/UL  
 ABS. IMM. GRANS. 0.0 K/UL  
 DF MANUAL PLATELET COMMENTS Large Platelets RBC COMMENTS MCHUGH JOLLY BODIES 1+ RBC COMMENTS OVALOCYTES PRESENT 
    
SAMPLES BEING HELD Collection Time: 01/27/19  6:53 PM  
Result Value Ref Range SAMPLES BEING HELD 1red COMMENT Add-on orders for these samples will be processed based on acceptable specimen integrity and analyte stability, which may vary by analyte. URINALYSIS W/ RFLX MICROSCOPIC Collection Time: 01/27/19  7:45 PM  
Result Value Ref Range Color YELLOW/STRAW Appearance CLEAR CLEAR Specific gravity 1.018 1.003 - 1.030    
 pH (UA) 7.0 5.0 - 8.0 Protein 100 (A) NEG mg/dL Glucose 250 (A) NEG mg/dL Ketone TRACE (A) NEG mg/dL Bilirubin NEGATIVE  NEG Blood NEGATIVE  NEG Urobilinogen 1.0 0.2 - 1.0 EU/dL Nitrites NEGATIVE  NEG Leukocyte Esterase NEGATIVE  NEG    
 WBC 0-4 0 - 4 /hpf  
 RBC 0-5 0 - 5 /hpf Epithelial cells FEW FEW /lpf Bacteria NEGATIVE  NEG /hpf Hyaline cast 0-2 0 - 5 /lpf Imaging: cxr[de-identified] There is a vague focus of airspace disease in the right upper lobe. There is minor basilar atelectasis. Follow-up study be helpful to assess for 
interval change. Assessment:  
 
Active Problems: 
  Pneumonia (1/27/2019) COPD exacerbation ([facility] (1/27/2019) Sepsis ([facility] (1/27/2019) Plan:  
 
1. COPD exacerbation: due to pna, start iv abx, nebs, solumedrol. 2. PNA: will have chest CT without contrast to further eval the lung, continue iv rocephin and azithromycin, pt is immunocompromise, may need consider pseudomonas coverage if CT indicates significant infiltration 2. Sepsis with mild leukopenia, elevated lactic acid level and tachycardia. Continue IVF, repeat lactic acid in 6 hours. 4. CKD stage 3: avoid nephrotoxin. 5. DM: expect BG high due to steroids, uncontrolled, premeal coverage with insulin and SSI, lantus, should adjust  
6. MM: follow oncologist, should notify his oncologist about this infection Signed By: Cordell Williamson MD   
 January 27, 2019

## 2019-01-28 NOTE — PROGRESS NOTES
Admission Medication Reconciliation: 
 
Information obtained from: rx query, patient, patient's son Significant PMH/Disease States:  
Past Medical History:  
Diagnosis Date  AAA (abdominal aortic aneurysm) ([facility]  Asthma  Cancer ([facility]   
 kidney ca  COPD  Diabetes ([facility]  Gastrointestinal disorder   
 ruptured esphogus  Hypertension  Other ill-defined conditions(799.89) Chief Complaint for this Admission:  shortness of breath Allergies:  Niacin and Niacin Prior to Admission Medications:  
Prior to Admission Medications Prescriptions Last Dose Informant Patient Reported? Taking? DULoxetine (CYMBALTA) 30 mg capsule   Yes Yes Sig: Take 30 mg by mouth daily. acetaminophen (TYLENOL) 325 mg tablet   No No  
Sig: Take 2 Tabs by mouth every four (4) hours as needed for Fever (For temp greater than or equal to 38.5 C or 101.3 F (Unless hepatic failure or contrindicated). Give first line for fever. ). albuterol (PROVENTIL HFA, VENTOLIN HFA) 90 mcg/Actuation inhaler   No Yes Sig: Take 2 Puffs by inhalation every six (6) hours as needed for Wheezing and Shortness of Breath. albuterol-ipratropium (DUO-NEB) 2.5 mg-0.5 mg/3 ml nebu   No Yes Sig: 3 mL by Nebulization route every four (4) hours as needed. aspirin 81 mg chewable tablet   No Yes Sig: Take 1 Tab by mouth daily. budesonide-formoterol (SYMBICORT) 160-4.5 mcg/actuation HFA inhaler   Yes Yes Sig: Take 2 Puffs by inhalation daily. Indications: COPD ASSOCIATED WITH CHRONIC BRONCHITIS  
cholecalciferol (VITAMIN D3) 1,000 unit cap   Yes Yes Sig: Take 1,000 Units by mouth daily. clopidogrel (PLAVIX) 75 mg tablet   No Yes Sig: Take 1 Tab by mouth daily. dexamethasone (DECADRON) 4 mg tablet 2019  Yes Yes Sig: Take 20 mg by mouth Every Thursday. insulin aspart U-100 (NOVOLOG FLEXPEN U-100 INSULIN) 100 unit/mL inpn   Yes Yes Si Units by SubCUTAneous route daily (with breakfast). insulin aspart U-100 (NOVOLOG FLEXPEN U-100 INSULIN) 100 unit/mL inpn   Yes Yes Si Units by SubCUTAneous route daily (with lunch). insulin aspart U-100 (NOVOLOG FLEXPEN U-100 INSULIN) 100 unit/mL inpn   Yes Yes Sig: 10 Units by SubCUTAneous route daily (with dinner). insulin glargine (LANTUS) 100 unit/mL injection   Yes Yes Si Units by SubCUTAneous route every morning. lenalidomide (REVLIMID) 25 mg cap 2019  Yes Yes Sig: Take 25 mg by mouth. DAILY ON DAYS 1 TO 21 FOR A 28 DAY CYCLE (OFF MED 7 DAYS BEFORE STARTING AGAIN) NEXT DOSE DUE 19 TO START NEXT ROUND  
montelukast (SINGULAIR) 10 mg tablet   Yes No  
Sig: Take 10 mg by mouth daily. pantoprazole (PROTONIX) 40 mg tablet   Yes Yes Sig: Take 40 mg by mouth two (2) times a day. rosuvastatin (CRESTOR) 40 mg tablet   Yes Yes Sig: Take 20 mg by mouth nightly. tiotropium (SPIRIVA WITH HANDIHALER) 18 mcg inhalation capsule   Yes Yes Sig: Take 1 Cap by inhalation daily. Facility-Administered Medications: None Comments/Recommendations: Removed diltiazem  mg daily and glipizide 5 mg daily. Added duloxetine, dexamethasone, revlimid, pantoprazole, glargine, and novolog. Patient startes a new 28 day cycle of revlimid on Thursday; currently on his \"off week. \" Patient manages his own medications at home.

## 2019-01-28 NOTE — PROGRESS NOTES
Pharmacist Note - Vancomycin Dosing Consult provided for this 66 y.o. male for indication of PNA/COPD exacerbation in immunocompromised patient . Antibiotic regimen(s): Vanc + Zosyn Recent Labs  
  19 
0444 19 
1853 WBC 2.4* 3.3*  
CREA 1.06 1.14 BUN 16 19 Frequency of BMP: Daily until  Height: 182.9 cm Weight: 59.9 kg Est CrCl: 48.7 ml/min Temp (24hrs), Av.8 °F (36.6 °C), Min:97.4 °F (36.3 °C), Max:98.2 °F (36.8 °C) Cultures: 
 Blood x 2- NGTD Goal trough = 15 - 20 mcg/mL Therapy will be initiated with a loading dose of 1500 mg IV x 1 to be followed by a maintenance dose of 750 mg IV every 16 hours. Pharmacy to follow patient daily and order levels / make dose adjustments as appropriate.  
 
Randell Dutton, PharmD, BCPS

## 2019-01-28 NOTE — ED NOTES
Pt assisted to bedside commode and had 1 episode of soft brown stool. Pt became dyspneic with exertion and breathing became labored during activity. Assisted back onto stretcher and placed on 2L after activity.

## 2019-01-28 NOTE — ACP (ADVANCE CARE PLANNING)
Advance Care Planning Note Name: Monique Islas YOB: 1940 MRN: [id number] Admission Date: 1/27/2019  6:02 PM 
 
Date of discussion: 1/28/2019 Active Diagnoses: 
 
Hospital Problems  Date Reviewed: 2/28/2018 Codes Class Noted POA Pneumonia ICD-10-CM: J18.9 ICD-9-CM: 388  1/27/2019 Unknown COPD exacerbation ([facility] ICD-10-CM: J44.1 ICD-9-CM: 491.21  1/27/2019 Unknown Sepsis ([facility] ICD-10-CM: A41.9 ICD-9-CM: 038.9, 995.91  1/27/2019 Unknown These active diagnoses are of sufficient risk that focused discussion on advance care planning is indicated in order to allow the patient to thoughtfully consider personal goals of care, and if situations arise that prevent the ability to personally give input, to ensure appropriate representation of their personal desires for different levels and aggressiveness of care. Discussion:  
 
Persons present and participating in discussion: Diogenes Green MD, Discussion: I discussed with pt his all clinical issue including MM diagnosis and treatment , currently on oral chemo. He came with AHRF due to COPD lung infection and requiring o2 at this time. Discussed possible deterioration and require for vent support. He agreed to talk about this in details for a more definitive long term plan. Will remain full code for now. Time Spent:  
 
Total time spent face-to-face in education and discussion: 20  minutes.   
 
Mya Munoz MD 
1/28/2019 
10:26 AM

## 2019-01-28 NOTE — ROUTINE PROCESS
TRANSFER - OUT REPORT: 
 
Verbal report given to [id number] [place] [address] (name) on Glory Arthur  being transferred to [facility] (unit) for routine progression of care Report consisted of patients Situation, Background, Assessment and  
Recommendations(SBAR). Information from the following report(s) SBAR, ED Summary, [facility] and Recent Results was reviewed with the receiving nurse. Lines:  
Peripheral IV 01/27/19 Left Forearm (Active) Site Assessment Clean, dry, & intact 1/27/2019  6:38 PM  
Phlebitis Assessment 0 1/27/2019  6:38 PM  
Infiltration Assessment 0 1/27/2019  6:38 PM  
Dressing Status Clean, dry, & intact 1/27/2019  6:38 PM  
Dressing Type Transparent 1/27/2019  6:38 PM  
Hub Color/Line Status Pink;Flushed 1/27/2019  6:38 PM  
Action Taken Blood drawn 1/27/2019  6:38 PM  
  
 
Opportunity for questions and clarification was provided. Patient transported with: 
 Monitor Registered Nurse

## 2019-01-28 NOTE — PROGRESS NOTES
Bedside shift change report given to Omkar RN (oncoming nurse) by Makayla Cho RN (offgoing nurse). Report included the following information SBAR, Kardex, Procedure Summary, Intake/Output, Recent Results and Cardiac Rhythm ST. Problem: Chronic Obstructive Pulmonary Disease (COPD) Goal: *Oxygen saturation during activity within specified parameters Outcome: Progressing Towards Goal 
Pt currently on 3L NC with O2 sats 96%. Will wean O2 as tolerated; Pt does not wear oxygen at home. Problem: Falls - Risk of 
Goal: *Absence of Falls Document [facility]  Fall Risk and appropriate interventions in the flowsheet. Outcome: Progressing Towards Goal 
Fall Risk Interventions: 
  
 
  
 
Medication Interventions: Patient to call before getting OOB

## 2019-01-28 NOTE — PROGRESS NOTES
Hospitalist Progress Note Rashard Silveira NP Answering service: [phone] OR [phone] from in house phone Cell: [phone] Date of Service:  2019 NAME:  Shelton Paniagua :  1940 MRN:  [id number] Admission Summary:  
66 y.o. male with past medical history significant for asthma, COPD, ruptured esophagus, DM, HTN, AAA, kidney CA s/p rt nephrectomy, MM who presents via EMS with chief complaint of  shortness of breath. Pt c/o worsening fatigue, productive cough, SOB, wheezing, rhinorrhea. Interval history / Subjective:  
  Feel a little better today. Still fatigued with shortness of breath and productive cough. Assessment & Plan:  
 
Acute COPD exacerbation 2/2 PNA: POA 
-IV steroids, abx, nebs  
-encourage IS Sepsis 2/2 Pneumonia POA 
-leukopenia, elevated lactic acid and HR on admission 
-CT chest showing Shifting pattern of pulmonary densities, overall improved from prior 
likely reflecting infectious process. Severe bullous emphysema. 
-abx Vanc (added today for pseudomonas coverage)  and Zosyn  
-blood cultures pending  
-lactic now wnl 
-IVF CKD stage 3:  
-at baseline 
-avoid nephrotoxin, monitor Type 2 DM with hyperglycemia and long term insulin use 
-hgb a1c 7.4 
-resume home Lantus 30 U daily, Humalog 6 U with meals -SSI-> changed to high sensitivity today d/t steroids, accuchecks 
-blood sugars elevated, will monitor and adjust as needed Multiple Myeloma  
-on oral chemo  
-follows Dr iMlena Dejesus OP Metabolic Acidosis  
-CO2 15 
-started on Bicarb drip  
-follow labs Hypomagnesemia  
-MG 1.4  
-replenished Code status: Full DVT prophylaxis: Lovenox Care Plan discussed with: Patient/Family and Nurse attending MD 
Disposition: TBD Hospital Problems  Date Reviewed: 2018 Codes Class Noted POA Pneumonia ICD-10-CM: J18.9 ICD-9-CM: 122  2019 Unknown COPD exacerbation ([facility].) ICD-10-CM: J44.1 ICD-9-CM: 491.21  1/27/2019 Unknown Sepsis ([facility] 75.) ICD-10-CM: A41.9 ICD-9-CM: 038.9, 995.91  1/27/2019 Unknown Review of Systems: A comprehensive review of systems was negative except for that written in the HPI. Vital Signs:  
 Last 24hrs VS reviewed since prior progress note. Most recent are: 
Visit Vitals /49 (BP 1 Location: Right arm, BP Patient Position: At rest) Pulse (!) 101 Temp 97.4 °F (36.3 °C) Resp 20 Ht 6' (1.829 m) Wt 59.9 kg (132 lb 0.9 oz) SpO2 100% BMI 17.91 kg/m² Intake/Output Summary (Last 24 hours) at 1/28/2019 1209 Last data filed at 1/28/2019 6426 Gross per 24 hour Intake  Output 275 ml Net -275 ml Physical Examination:  
 
 
     
Constitutional:  No acute distress, cooperative, pleasant   
ENT:  Oral mucous moist, oropharynx benign. Neck supple, Resp:  Course breath sounds throughout. No accessory muscle use. Productive cough with yellow/green sputum CV:  Regular rhythm, normal rate, no murmurs, gallops, rubs GI:  Soft, non distended, non tender. normoactive bowel sounds, Musculoskeletal:  No edema, warm, 2+ pulses throughout Neurologic:  Moves all extremities. AAOx3, follows commands Psych:  Good insight, Not anxious nor agitated. Data Review:  
 Review and/or order of clinical lab test 
Review and/or order of tests in the radiology section of [facility] Review and/or order of tests in the medicine section of [facility] Labs:  
 
Recent Labs  
  01/28/19 
0444 01/27/19 
1853 WBC 2.4* 3.3* HGB 8.7* 9.8* HCT 30.1* 32.6*  
 162 Recent Labs  
  01/28/19 
0444 01/27/19 
1853  143  
K 4.6 4.6 * 114* CO2 15* 23 BUN 16 19 CREA 1.06 1.14 * 74  
CA 6.7* 7.6*  
MG 1.4*  --   
PHOS 2.5*  --   
 
Recent Labs  
  01/28/19 
0444 01/27/19 
7573 SGOT 10* 8* ALT 15 17 AP 58 58 TBILI 0.3 0.5 TP 5.8* 6.1* ALB 2.1* 2.5*  
 GLOB 3.7 3.6 No results for input(s): INR, PTP, APTT in the last 72 hours. No lab exists for component: INREXT No results for input(s): FE, TIBC, PSAT, FERR in the last 72 hours. No results found for: FOL, RBCF No results for input(s): PH, PCO2, PO2 in the last 72 hours. No results for input(s): CPK, CKNDX, TROIQ in the last 72 hours. No lab exists for component: CPKMB Lab Results Component Value Date/Time Cholesterol, total 133 09/10/2016 02:07 AM  
 HDL Cholesterol 44 09/10/2016 02:07 AM  
 LDL, calculated 59.2 09/10/2016 02:07 AM  
 Triglyceride 149 09/10/2016 02:07 AM  
 CHOL/HDL Ratio 3.0 09/10/2016 02:07 AM  
 
Lab Results Component Value Date/Time Glucose (POC) 251 (H) 01/28/2019 11:39 AM  
 Glucose (POC) 202 (H) 01/28/2019 06:56 AM  
 Glucose (POC) 318 (H) 03/09/2018 07:52 AM  
 Glucose (POC) 359 (H) 03/08/2018 08:14 PM  
 Glucose (POC) 325 (H) 03/08/2018 04:37 PM  
 
Lab Results Component Value Date/Time Color YELLOW/STRAW 01/27/2019 07:45 PM  
 Appearance CLEAR 01/27/2019 07:45 PM  
 Specific gravity 1.018 01/27/2019 07:45 PM  
 pH (UA) 7.0 01/27/2019 07:45 PM  
 Protein 100 (A) 01/27/2019 07:45 PM  
 Glucose 250 (A) 01/27/2019 07:45 PM  
 Ketone TRACE (A) 01/27/2019 07:45 PM  
 Bilirubin NEGATIVE  01/27/2019 07:45 PM  
 Urobilinogen 1.0 01/27/2019 07:45 PM  
 Nitrites NEGATIVE  01/27/2019 07:45 PM  
 Leukocyte Esterase NEGATIVE  01/27/2019 07:45 PM  
 Epithelial cells FEW 01/27/2019 07:45 PM  
 Bacteria NEGATIVE  01/27/2019 07:45 PM  
 WBC 0-4 01/27/2019 07:45 PM  
 RBC 0-5 01/27/2019 07:45 PM  
 
 
 
Medications Reviewed:  
 
Current Facility-Administered Medications Medication Dose Route Frequency  piperacillin-tazobactam (ZOSYN) 3.375 g in 0.9% sodium chloride (MBP/ADV) 100 mL  3.375 g IntraVENous Q8H  
 0.45% sodium chloride 1,000 mL with sodium bicarbonate (8.4%) 75 mEq infusion   IntraVENous CONTINUOUS  
  vancomycin (VANCOCIN) 1500 mg in  ml infusion  1,500 mg IntraVENous ONCE  Vancomycin- Pharmacy Dosing   Other Rx Dosing/Monitoring  sodium chloride (NS) flush 5-10 mL  5-10 mL IntraVENous PRN  
 albuterol-ipratropium (DUO-NEB) 2.5 MG-0.5 MG/3 ML  3 mL Nebulization Q4H RT  
 arformoterol (BROVANA) neb solution 15 mcg  15 mcg Nebulization BID RT And  
 budesonide (PULMICORT) 500 mcg/2 ml nebulizer suspension  500 mcg Nebulization BID RT  
 methylPREDNISolone (PF) (SOLU-MEDROL) injection 60 mg  60 mg IntraVENous Q6H  
 guaiFENesin ER (MUCINEX) tablet 600 mg  600 mg Oral Q12H  aspirin chewable tablet 81 mg  81 mg Oral DAILY  cholecalciferol (VITAMIN D3) tablet 1,000 Units  1,000 Units Oral DAILY  clopidogrel (PLAVIX) tablet 75 mg  75 mg Oral DAILY  insulin lispro (HUMALOG) injection 6 Units  6 Units SubCUTAneous TIDAC  glucose chewable tablet 16 g  4 Tab Oral PRN  
 dextrose (D50W) injection syrg 12.5-25 g  12.5-25 g IntraVENous PRN  
 glucagon (GLUCAGEN) injection 1 mg  1 mg IntraMUSCular PRN  
 insulin glargine (LANTUS) injection 30 Units  30 Units SubCUTAneous DAILY  montelukast (SINGULAIR) tablet 10 mg  10 mg Oral DAILY  pantoprazole (PROTONIX) tablet 40 mg  40 mg Oral BID  rosuvastatin (CRESTOR) tablet 20 mg  20 mg Oral QHS  sodium chloride (NS) flush 5-40 mL  5-40 mL IntraVENous Q8H  
 sodium chloride (NS) flush 5-40 mL  5-40 mL IntraVENous PRN  
 enoxaparin (LOVENOX) injection 40 mg  40 mg SubCUTAneous Q24H  
 acetaminophen (TYLENOL) tablet 650 mg  650 mg Oral Q4H PRN  
 diphenhydrAMINE (BENADRYL) capsule 25 mg  25 mg Oral Q4H PRN  
 ondansetron (ZOFRAN) injection 4 mg  4 mg IntraVENous Q4H PRN  
 docusate sodium (COLACE) capsule 100 mg  100 mg Oral BID  
 glucose chewable tablet 16 g  4 Tab Oral PRN  
 dextrose (D50W) injection syrg 12.5-25 g  25-50 mL IntraVENous PRN  
 insulin lispro (HUMALOG) injection   SubCUTAneous AC&HS  
 
 ______________________________________________________________________ EXPECTED LENGTH OF STAY: 4d 19h ACTUAL LENGTH OF STAY:          1 
 
            
Ruth Quiñones NP

## 2019-01-28 NOTE — PROGRESS NOTES
Problem: Chronic Obstructive Pulmonary Disease (COPD) Goal: *Oxygen saturation during activity within specified parameters Outcome: Progressing Towards Goal 
Pt. Weaned to room air. Oxygen sats above 95% Problem: Falls - Risk of 
Goal: *Absence of Falls Document Verta Mohit Fall Risk and appropriate interventions in the flowsheet. Outcome: Progressing Towards Goal 
Fall Risk Interventions: 
 
Medication Interventions: Assess postural VS orthostatic hypotension, Evaluate medications/consider consulting pharmacy, Patient to call before getting OOB Elimination Interventions: Call light in reach, Elevated toilet seat, Patient to call for help with toileting needs Problem: Nutrition Deficit Goal: *Optimize nutritional status Outcome: Progressing Towards Goal 
Pt. Had 90 % of breakfast & lunch. Problem: Pressure Injury - Risk of 
Goal: *Prevention of pressure injury Document Sidney Scale and appropriate interventions in the flowsheet. Outcome: Progressing Towards Goal 
Pressure Injury Interventions: 
 
Nutrition Interventions: Document food/fluid/supplement intake Friction and Shear Interventions: Apply protective barrier, creams and emollients, Lift sheet, Minimize layers, Feet elevated on foot rest 
 
Bedside and Verbal shift change report given to TOI Tavarez (oncoming nurse) by TOI Deleon (offgoing nurse). Report included the following information SBAR, Kardex, Intake/Output, Recent Results and Cardiac Rhythm NSR. Visit Vitals /52 (BP 1 Location: Right arm, BP Patient Position: At rest) Pulse 99 Temp 97.5 °F (36.4 °C) Resp 20 Ht 6' (1.829 m) Wt 59.9 kg (132 lb 0.9 oz) SpO2 100% BMI 17.91 kg/m²

## 2019-01-29 NOTE — PROGRESS NOTES
TRANSFER - OUT REPORT: 
 
Verbal report given to John Salinas RN(name) on Charito   being transferred to (unit) for routine progression of care Report consisted of patients Situation, Background, Assessment and  
Recommendations(SBAR). Information from the following report(s) SBAR, Kardex, Intake/Output, MAR, Accordion and Recent Results was reviewed with the receiving nurse. Lines:  
Peripheral IV 01/27/19 Left Forearm (Active) Site Assessment Clean, dry, & intact 1/29/2019  3:33 PM  
Phlebitis Assessment 0 1/29/2019  3:33 PM  
Infiltration Assessment 0 1/29/2019  3:33 PM  
Dressing Status Clean, dry, & intact 1/29/2019  3:33 PM  
Dressing Type Transparent;Tape 1/29/2019  3:33 PM  
Hub Color/Line Status Pink;Capped 1/29/2019  3:33 PM  
Action Taken Open ports on tubing capped 1/29/2019  3:33 PM  
Alcohol Cap Used Yes 1/29/2019  3:33 PM  
   
Peripheral IV 01/27/19 Right Antecubital (Active) Site Assessment Clean, dry, & intact 1/29/2019  3:33 PM  
Phlebitis Assessment 0 1/29/2019  3:33 PM  
Infiltration Assessment 0 1/29/2019  3:33 PM  
Dressing Status Clean, dry, & intact 1/29/2019  3:33 PM  
Dressing Type Transparent;Tape 1/29/2019  3:33 PM  
Hub Color/Line Status Pink;Capped 1/29/2019  3:33 PM  
Action Taken Open ports on tubing capped 1/29/2019  3:33 PM  
Alcohol Cap Used Yes 1/29/2019  3:33 PM  
  
 
Opportunity for questions and clarification was provided. Patient transported with: 
 Tech 
 
 
 
 
 
 
 
Problem: Chronic Obstructive Pulmonary Disease (COPD) Goal: *Oxygen saturation during activity within specified parameters Outcome: Progressing Towards Goal 
Patient on room air. Tolerating well. Encouraged to cough and deep breath. Problem: Falls - Risk of 
Goal: *Absence of Falls Document Calista Womack Fall Risk and appropriate interventions in the flowsheet. Outcome: Progressing Towards Goal 
Fall Risk Interventions: Medication Interventions: Patient to call before getting OOB, Teach patient to arise slowly Elimination Interventions: Call light in reach, Patient to call for help with toileting needs, Toileting schedule/hourly rounds, Urinal in reach Problem: Pressure Injury - Risk of 
Goal: *Prevention of pressure injury Document Sidney Scale and appropriate interventions in the flowsheet. Outcome: Progressing Towards Goal 
Pressure Injury Interventions: Activity Interventions: Increase time out of bed Nutrition Interventions: Document food/fluid/supplement intake, Offer support with meals,snacks and hydration Friction and Shear Interventions: HOB 30 degrees or less, Lift sheet, Minimize layers

## 2019-01-29 NOTE — PROGRESS NOTES
Hospitalist Progress Note Audrey Jimenez NP Answering service: [phone] OR [phone] from in house phone Cell: [phone] Date of Service:  2019 NAME:  Lalito Holden :  1940 MRN:  [id number] Admission Summary:  
66 y.o. male with past medical history significant for asthma, COPD, ruptured esophagus, DM, HTN, AAA, kidney CA s/p rt nephrectomy, MM who presents via EMS with chief complaint of  shortness of breath. Pt c/o worsening fatigue, productive cough, SOB, wheezing, rhinorrhea. Interval history / Subjective:  
Continues to feel a little better each day. Still with productive cough. Asking about chemo meds, is due to start next round on , discussed we will need to hold for now given active infection. Seen with RN at bedside and updated on plan of care. Assessment & Plan:  
 
Acute COPD exacerbation 2/2 PNA: POA 
-continue abx and nebs 
-feels breathing is improved, now on room air will decrease IV steroids today to 40mg every 8 hours  
-encourage IS Sepsis 2/2 Pneumonia POA 
-leukopenia, elevated lactic acid and HR on admission 
-CT chest showing Shifting pattern of pulmonary densities, overall improved from prior 
likely reflecting infectious process. Severe bullous emphysema. 
-abx Vanc (added  for pseudomonas coverage)  and Zosyn. Check MRSA swab  
-blood cultures NGTD x2 days 
-lactic now wnl 
-IVF CKD stage 3:  
-at baseline 
-avoid nephrotoxins, monitor Type 2 DM with hyperglycemia and long term insulin use 
-hgb a1c 7.4 -home Lantus 30 U daily, Humalog 6 U with meals 
-SSI high sensitivity, continue home mealtime insulin,  increase Lantus to 35 U daily 
-blood sugars elevated (232-298), suspect elevation from steroids, weaning steroids down today.   
 
Multiple Myeloma  
-on oral chemo, scheduled to start next round on  discussed holding for active infection  
-follows Dr Maggie Espinosa OP  
 Metabolic Acidosis  
-CO2 15 yesterday, now 20 
-1/28 Bicarb drip-> improved can dc drip  
-follow labs Hypomagnesemia  
-MG 1.4  
-replenished Hypocalcemia 
-ca 6.3 on BMP however corrected Ca level wnl 
-hold on further Calcium  
-monitor Code status: Full DVT prophylaxis: Lovenox Care Plan discussed with: Patient/Family and Nurse attending MD 
Disposition: TBD Hospital Problems  Date Reviewed: 2/28/2018 Codes Class Noted POA Pneumonia ICD-10-CM: J18.9 ICD-9-CM: 993  1/27/2019 Unknown COPD exacerbation ([facility] 75.) ICD-10-CM: J44.1 ICD-9-CM: 491.21  1/27/2019 Unknown Sepsis ([facility] 75.) ICD-10-CM: A41.9 ICD-9-CM: 038.9, 995.91  1/27/2019 Unknown Review of Systems: A comprehensive review of systems was negative except for that written in the HPI. Last BM today Vital Signs:  
 Last 24hrs VS reviewed since prior progress note. Most recent are: 
Visit Vitals /58 (BP 1 Location: Right arm, BP Patient Position: At rest;Supine) Pulse 100 Temp 97.8 °F (36.6 °C) Resp 18 Ht 6' (1.829 m) Wt 66 kg (145 lb 9.1 oz) SpO2 99% BMI 19.74 kg/m² Intake/Output Summary (Last 24 hours) at 1/29/2019 1202 Last data filed at 1/28/2019 4627 Gross per 24 hour Intake  Output 500 ml Net -500 ml Physical Examination:  
 
 
     
Constitutional:  No acute distress, cooperative, pleasant   
ENT:  Oral mucous moist, oropharynx benign. Neck supple, Resp:  Rhonchi throughout. No accessory muscle use. Productive cough with yellow/green sputum. RA  
CV:  Regular rhythm, normal rate, no murmurs, gallops, rubs GI:  Soft, non distended, non tender. normoactive bowel sounds, Musculoskeletal:  No edema, warm, 2+ pulses throughout Neurologic:  Moves all extremities. AAOx3, follows commands Psych:  Good insight, Not anxious nor agitated.  
  
 
Data Review:  
 Review and/or order of clinical lab test 
 Review and/or order of tests in the radiology section of [facility]PT Review and/or order of tests in the medicine section of [facility] Labs:  
 
Recent Labs  
  01/29/19 0321 01/28/19 0444 WBC 1.1* 2.4* HGB 7.4* 8.7* HCT 24.7* 30.1*  
* 155 Recent Labs  
  01/29/19 0321 01/28/19 0444 01/27/19 
1853  142 143  
K 3.9 4.6 4.6 * 115* 114* CO2 20* 15* 23 BUN 24* 16 19 CREA 1.19 1.06 1.14  
* 185* 74  
CA 6.3* 6.7* 7.6*  
MG  --  1.4*  --   
PHOS  --  2.5*  --   
 
Recent Labs  
  01/28/19 0444 01/27/19 
1853 SGOT 10* 8* ALT 15 17 AP 58 58 TBILI 0.3 0.5 TP 5.8* 6.1* ALB 2.1* 2.5*  
GLOB 3.7 3.6 No results for input(s): INR, PTP, APTT in the last 72 hours. No lab exists for component: INREXT, INREXT No results for input(s): FE, TIBC, PSAT, FERR in the last 72 hours. No results found for: FOL, RBCF No results for input(s): PH, PCO2, PO2 in the last 72 hours. No results for input(s): CPK, CKNDX, TROIQ in the last 72 hours. No lab exists for component: CPKMB Lab Results Component Value Date/Time Cholesterol, total 133 09/10/2016 02:07 AM  
 HDL Cholesterol 44 09/10/2016 02:07 AM  
 LDL, calculated 59.2 09/10/2016 02:07 AM  
 Triglyceride 149 09/10/2016 02:07 AM  
 CHOL/HDL Ratio 3.0 09/10/2016 02:07 AM  
 
Lab Results Component Value Date/Time Glucose (POC) 232 (H) 01/29/2019 11:26 AM  
 Glucose (POC) 298 (H) 01/29/2019 06:27 AM  
 Glucose (POC) 283 (H) 01/28/2019 09:28 PM  
 Glucose (POC) 234 (H) 01/28/2019 04:34 PM  
 Glucose (POC) 251 (H) 01/28/2019 11:39 AM  
 
Lab Results Component Value Date/Time  Color YELLOW/STRAW 01/27/2019 07:45 PM  
 Appearance CLEAR 01/27/2019 07:45 PM  
 Specific gravity 1.018 01/27/2019 07:45 PM  
 pH (UA) 7.0 01/27/2019 07:45 PM  
 Protein 100 (A) 01/27/2019 07:45 PM  
 Glucose 250 (A) 01/27/2019 07:45 PM  
 Ketone TRACE (A) 01/27/2019 07:45 PM  
 Bilirubin NEGATIVE  01/27/2019 07:45 PM  
 Urobilinogen 1.0 01/27/2019 07:45 PM  
 Nitrites NEGATIVE  01/27/2019 07:45 PM  
 Leukocyte Esterase NEGATIVE  01/27/2019 07:45 PM  
 Epithelial cells FEW 01/27/2019 07:45 PM  
 Bacteria NEGATIVE  01/27/2019 07:45 PM  
 WBC 0-4 01/27/2019 07:45 PM  
 RBC 0-5 01/27/2019 07:45 PM  
 
 
 
Medications Reviewed:  
 
Current Facility-Administered Medications Medication Dose Route Frequency  insulin glargine (LANTUS) injection 35 Units  35 Units SubCUTAneous DAILY  albuterol-ipratropium (DUO-NEB) 2.5 MG-0.5 MG/3 ML  3 mL Nebulization BID RT  
 piperacillin-tazobactam (ZOSYN) 3.375 g in 0.9% sodium chloride (MBP/ADV) 100 mL  3.375 g IntraVENous Q8H  
 0.45% sodium chloride 1,000 mL with sodium bicarbonate (8.4%) 75 mEq infusion   IntraVENous CONTINUOUS  Vancomycin- Pharmacy Dosing   Other Rx Dosing/Monitoring  glucose chewable tablet 16 g  4 Tab Oral PRN  
 glucagon (GLUCAGEN) injection 1 mg  1 mg IntraMUSCular PRN  
 insulin lispro (HUMALOG) injection   SubCUTAneous AC&HS  insulin lispro (HUMALOG) injection 6 Units  6 Units SubCUTAneous TIDAC  vancomycin (VANCOCIN) 750 mg in 0.9% sodium chloride (MBP/ADV) 250 mL  750 mg IntraVENous Q16H  
 sodium chloride (NS) flush 5-10 mL  5-10 mL IntraVENous PRN  
 arformoterol (BROVANA) neb solution 15 mcg  15 mcg Nebulization BID RT And  
 budesonide (PULMICORT) 500 mcg/2 ml nebulizer suspension  500 mcg Nebulization BID RT  
 methylPREDNISolone (PF) (SOLU-MEDROL) injection 60 mg  60 mg IntraVENous Q6H  
 guaiFENesin ER (MUCINEX) tablet 600 mg  600 mg Oral Q12H  aspirin chewable tablet 81 mg  81 mg Oral DAILY  cholecalciferol (VITAMIN D3) tablet 1,000 Units  1,000 Units Oral DAILY  clopidogrel (PLAVIX) tablet 75 mg  75 mg Oral DAILY  montelukast (SINGULAIR) tablet 10 mg  10 mg Oral DAILY  pantoprazole (PROTONIX) tablet 40 mg  40 mg Oral BID  rosuvastatin (CRESTOR) tablet 20 mg  20 mg Oral QHS  sodium chloride (NS) flush 5-40 mL  5-40 mL IntraVENous Q8H  
 sodium chloride (NS) flush 5-40 mL  5-40 mL IntraVENous PRN  
 enoxaparin (LOVENOX) injection 40 mg  40 mg SubCUTAneous Q24H  
 acetaminophen (TYLENOL) tablet 650 mg  650 mg Oral Q4H PRN  
 diphenhydrAMINE (BENADRYL) capsule 25 mg  25 mg Oral Q4H PRN  
 ondansetron (ZOFRAN) injection 4 mg  4 mg IntraVENous Q4H PRN  
 docusate sodium (COLACE) capsule 100 mg  100 mg Oral BID  dextrose (D50W) injection syrg 12.5-25 g  25-50 mL IntraVENous PRN  
 
______________________________________________________________________ EXPECTED LENGTH OF STAY: 4d 19h ACTUAL LENGTH OF STAY:          2 
 
            
Rey Clemente NP

## 2019-01-29 NOTE — PROGRESS NOTES
Spiritual Care Partner Volunteer visited patient in [facility] on 1/29/19. Documented by: Chaplain Proctor MDiv, MACE 
[phone] PRAY ([id number])

## 2019-01-29 NOTE — PROGRESS NOTES
Problem: Chronic Obstructive Pulmonary Disease (COPD) Goal: *Absence of hypoxia Outcome: Progressing Towards Goal 
Pt on RA sating mid-90s to 100.

## 2019-01-30 NOTE — PROGRESS NOTES
Hospitalist Progress Note [facility], DO Answering service: [phone] OR [phone] from in house phone Date of Service:  2019 NAME:  Aniya Hughes :  1940 MRN:  [id number] Admission Summary:  
66 y.o. male with past medical history significant for asthma, COPD, ruptured esophagus, DM, HTN, AAA, kidney CA s/p rt nephrectomy, MM who presents via EMS with chief complaint of  shortness of breath. Pt c/o worsening fatigue, productive cough, SOB, wheezing, rhinorrhea. Interval history / Subjective:  
Patient seen and examined. Continues to have shortness of breath. Continue steroids at current dose. Replace Ca- recheck in AM. Resume bicarb drip- will possible need oral sodium bicarb. Discussed with patient that Assessment & Plan:  
 
Acute COPD exacerbation 2/2 PNA: POA 
-aa nebs, brovana/pulmicort 
-methylpred 40 mg IV q8 hours, deescalate with improvement 
-encourage IS  
-guaifenesin Sepsis 2/2 Pneumonia POA: (leukopenia, elevated lactic acid, tachycardia) -CT chest showing Shifting pattern of pulmonary densities, overall improved from prior 
likely reflecting infectious process. Severe bullous emphysema. 
-Discontinue vanc due to negative MRSA swab. Okay to continue zosyn. Transition to levaquin  Metabolic Acidosis: 
-previously required bicarb drip, will restart 
-unclear etiology, may need oral NaBicarb on discharge Type 2 DM with hyperglycemia and long term insulin use 
-hgb a1c 7.4 
-SSI high sensitivity, continue home mealtime insulin, Lantus to 35 U daily Multiple Myeloma  
-follows Dr Willow Pack OP Hypomagnesemia: replace as needed Hypocalcemia:  
-previously normal with albumin conversion 
-will give IV Ca and recheck in AM 
 
Previously documented CKD stage III- patient with normal GFR Code status: Full DVT prophylaxis: Lovenox Care Plan discussed with: Patient/Family and Nurse attending MD 
Disposition: TBD goal discharge home when medically ready Hospital Problems  Date Reviewed: 2/28/2018 Codes Class Noted POA Pneumonia ICD-10-CM: J18.9 ICD-9-CM: 064  1/27/2019 Unknown COPD exacerbation ([facility].) ICD-10-CM: J44.1 ICD-9-CM: 491.21  1/27/2019 Unknown Sepsis ([facility].) ICD-10-CM: A41.9 ICD-9-CM: 038.9, 995.91  1/27/2019 Unknown Review of Systems:  
Negative unless stated above Vital Signs:  
 Last 24hrs VS reviewed since prior progress note. Most recent are: 
Visit Vitals /79 (BP 1 Location: Right arm, BP Patient Position: At rest) Pulse 80 Temp 98 °F (36.7 °C) Resp 16 Ht 6' (1.829 m) Wt 63.8 kg (140 lb 10.5 oz) SpO2 99% BMI 19.08 kg/m² Intake/Output Summary (Last 24 hours) at 1/30/2019 1856 Last data filed at 1/30/2019 6624 Gross per 24 hour Intake  Output 550 ml Net -550 ml Physical Examination:  
 
 
     
Constitutional:  No acute distress, cooperative, pleasant   
ENT:  Oral mucous moist, oropharynx benign. Neck supple, Resp:  Rhonchi throughout. No accessory muscle use. CV:  Regular rhythm, normal rate, no murmurs, gallops, rubs GI:  Soft, non distended, non tender. normoactive bowel sounds, Musculoskeletal:  No edema, warm, 2+ pulses throughout Neurologic:  Moves all extremities. Psych:  Good insight, Not anxious nor agitated. Data Review:  
 Review and/or order of clinical lab test 
Review and/or order of tests in the radiology section of CPT Review and/or order of tests in the medicine section of CPT Labs:  
 
Recent Labs  
  01/30/19 
0320 01/29/19 
0321 WBC 1.7* 1.1* HGB 7.6* 7.4* HCT 25.6* 24.7*  
 147* Recent Labs  
  01/30/19 
0320 01/29/19 
0321 01/28/19 
0444  141 142  
K 3.6 3.9 4.6 * 112* 115* CO2 15* 20* 15*  
BUN 23* 24* 16  
CREA 0.98 1.19 1.06  
 * 317* 185* CA 5.9* 6.3* 6.7* MG 1.6  --  1.4* PHOS  --   --  2.5* Recent Labs  
  01/29/19 
0321 01/28/19 
0444 SGOT 12* 10* ALT 16 15 AP 50 58 TBILI 0.3 0.3 TP 5.2* 5.8* ALB 2.0* 2.1*  
GLOB 3.2 3.7 No results for input(s): INR, PTP, APTT in the last 72 hours. No lab exists for component: INREXT, INREXT No results for input(s): FE, TIBC, PSAT, FERR in the last 72 hours. No results found for: FOL, RBCF No results for input(s): PH, PCO2, PO2 in the last 72 hours. No results for input(s): CPK, CKNDX, TROIQ in the last 72 hours. No lab exists for component: CPKMB Lab Results Component Value Date/Time Cholesterol, total 133 09/10/2016 02:07 AM  
 HDL Cholesterol 44 09/10/2016 02:07 AM  
 LDL, calculated 59.2 09/10/2016 02:07 AM  
 Triglyceride 149 09/10/2016 02:07 AM  
 CHOL/HDL Ratio 3.0 09/10/2016 02:07 AM  
 
Lab Results Component Value Date/Time Glucose (POC) 116 (H) 01/30/2019 04:27 PM  
 Glucose (POC) 242 (H) 01/30/2019 11:24 AM  
 Glucose (POC) 206 (H) 01/30/2019 06:05 AM  
 Glucose (POC) 127 (H) 01/29/2019 09:05 PM  
 Glucose (POC) 136 (H) 01/29/2019 04:38 PM  
 
Lab Results Component Value Date/Time Color YELLOW/STRAW 01/27/2019 07:45 PM  
 Appearance CLEAR 01/27/2019 07:45 PM  
 Specific gravity 1.018 01/27/2019 07:45 PM  
 pH (UA) 7.0 01/27/2019 07:45 PM  
 Protein 100 (A) 01/27/2019 07:45 PM  
 Glucose 250 (A) 01/27/2019 07:45 PM  
 Ketone TRACE (A) 01/27/2019 07:45 PM  
 Bilirubin NEGATIVE  01/27/2019 07:45 PM  
 Urobilinogen 1.0 01/27/2019 07:45 PM  
 Nitrites NEGATIVE  01/27/2019 07:45 PM  
 Leukocyte Esterase NEGATIVE  01/27/2019 07:45 PM  
 Epithelial cells FEW 01/27/2019 07:45 PM  
 Bacteria NEGATIVE  01/27/2019 07:45 PM  
 WBC 0-4 01/27/2019 07:45 PM  
 RBC 0-5 01/27/2019 07:45 PM  
 
 
 
Medications Reviewed:  
 
Current Facility-Administered Medications Medication Dose Route Frequency  calcium gluconate 1 g in 0.9% sodium chloride 100 mL IVPB  1 g IntraVENous ONCE  
 insulin glargine (LANTUS) injection 35 Units  35 Units SubCUTAneous DAILY  albuterol-ipratropium (DUO-NEB) 2.5 MG-0.5 MG/3 ML  3 mL Nebulization BID RT  
 methylPREDNISolone (PF) (SOLU-MEDROL) injection 40 mg  40 mg IntraVENous Q8H  
 guaiFENesin ER (MUCINEX) tablet 1,200 mg  1,200 mg Oral Q12H  piperacillin-tazobactam (ZOSYN) 3.375 g in 0.9% sodium chloride (MBP/ADV) 100 mL  3.375 g IntraVENous Q8H  
 glucose chewable tablet 16 g  4 Tab Oral PRN  
 glucagon (GLUCAGEN) injection 1 mg  1 mg IntraMUSCular PRN  
 insulin lispro (HUMALOG) injection   SubCUTAneous AC&HS  insulin lispro (HUMALOG) injection 6 Units  6 Units SubCUTAneous TIDAC  sodium chloride (NS) flush 5-10 mL  5-10 mL IntraVENous PRN  
 arformoterol (BROVANA) neb solution 15 mcg  15 mcg Nebulization BID RT And  
 budesonide (PULMICORT) 500 mcg/2 ml nebulizer suspension  500 mcg Nebulization BID RT  
 aspirin chewable tablet 81 mg  81 mg Oral DAILY  cholecalciferol (VITAMIN D3) tablet 1,000 Units  1,000 Units Oral DAILY  clopidogrel (PLAVIX) tablet 75 mg  75 mg Oral DAILY  montelukast (SINGULAIR) tablet 10 mg  10 mg Oral DAILY  pantoprazole (PROTONIX) tablet 40 mg  40 mg Oral BID  rosuvastatin (CRESTOR) tablet 20 mg  20 mg Oral QHS  sodium chloride (NS) flush 5-40 mL  5-40 mL IntraVENous Q8H  
 sodium chloride (NS) flush 5-40 mL  5-40 mL IntraVENous PRN  
 enoxaparin (LOVENOX) injection 40 mg  40 mg SubCUTAneous Q24H  
 acetaminophen (TYLENOL) tablet 650 mg  650 mg Oral Q4H PRN  
 diphenhydrAMINE (BENADRYL) capsule 25 mg  25 mg Oral Q4H PRN  
 ondansetron (ZOFRAN) injection 4 mg  4 mg IntraVENous Q4H PRN  
 docusate sodium (COLACE) capsule 100 mg  100 mg Oral BID  dextrose (D50W) injection syrg 12.5-25 g  25-50 mL IntraVENous PRN  
 
______________________________________________________________________ EXPECTED LENGTH OF STAY: 4d 19h ACTUAL LENGTH OF STAY:          3 [address] [facility], DO

## 2019-01-30 NOTE — PROGRESS NOTES
11:52 AM 
 
 
Reason for Admission:   COPD exacerbation, pneumonia, sepsis RRAT Score:   31 
 
Resources/supports (as identified by patient/family):   Medicare insurance, family support, patient stated he is independent much of the time, has access to his own transportation. Top challenges facing patient (as identified by patient/family/CM): Finances/medication costs: patient was slightly concerned about the costs of one of his medications. Transportation: No concerns Support system ir lack thereof: No concerns Living arrangements: No concerns Self-care/ADLS/Cognitions: No concerns Current Advanced Directive/ Advance Care Plan:  
Not on file Plan for utilizing home health:    TBD. Patient stated that he received home health via [facility] but could not remember the name of the agency. Likelihood of readmission? Moderate Transition of Care Plan:      TBD. CM met with patient at bedside. Patient resides with his wife who assists him when it is needed. Patient stated that he has been independent for the most part. Patient's children also live next door to his home. Patient stated that he has access to his own vehicle and is able to drive himself. Patient receives primary care via the [facility] and sees Dr. Monique Guevara. He stated he saw his PCP 6 months ago. Patient stated that he had a stroke about four or five years ago. He stated he received home health during this time but was unable to provide specific details. Patient also sees an oncologist at the cancer institute. Patient stated that he uses a walker and a cane at home. CM will monitor for any needs.  
 
 
Luan 9, TWIN Dempsey [facility]

## 2019-01-30 NOTE — ROUTINE PROCESS
Bedside shift change report given to Lori Ren (oncoming nurse) by Rebeca Ferreira (offgoing nurse). Report included the following information SBAR.

## 2019-01-31 NOTE — PROGRESS NOTES
433: Paged Dr. Krysta Lezama about this patient's critical calcium level of 5.8. Order received for 1 g of calcium gluconate IVPB. Pharmacy verified that this is compatible to run with this patient's Zosyn.

## 2019-01-31 NOTE — PROGRESS NOTES
Bedside verbal shift change report given to oncoming nurse BISI [employer] for questions and clarifications provided.

## 2019-01-31 NOTE — PROGRESS NOTES
Hospitalist Progress Note [address] [facility],  Answering service: [phone] OR [phone] from in house phone Date of Service:  2019 NAME:  Florinda Singleton YOB: 1940 MRN:  [id number] Admission Summary:  
66 y.o. male with past medical history significant for asthma, COPD, ruptured esophagus, DM, HTN, AAA, kidney CA s/p rt nephrectomy, MM who presents via EMS with chief complaint of  shortness of breath. Pt c/o worsening fatigue, productive cough, SOB, wheezing, rhinorrhea. Interval history / Subjective:  
Patient seen and examined. Overall better, but dyspneic with minimal exertion. Patient desires to go home- potentially 2/2. Calcium not critically low when adjusted for albumin, replaced. Discontinue bicarb drip. Assessment & Plan:  
 
Acute COPD exacerbation 2/2 PNA: POA 
-aa nebs, brovana/pulmicort 
-methylpred 40 mg IV q12 hours, deescalate with improvement 
-encourage IS  
-guaifenesin Sepsis 2/2 Pneumonia POA: (leukopenia, elevated lactic acid, tachycardia) -CT chest showing Shifting pattern of pulmonary densities, overall improved from prior 
likely reflecting infectious process. Severe bullous emphysema. 
-Levaquin monotherapy. Previously on zosyn, vancomycin Metabolic Acidosis: 
-discontinue bicarb drip, BMP in AM 
 
Type 2 DM with hyperglycemia and long term insulin use 
-hgb a1c 7.4 
-SSI high sensitivity, continue home mealtime insulin, Lantus to 35 U daily Multiple Myeloma  
-follows Dr Guero Shepherd OP Hypomagnesemia: replace as needed Hypocalcemia:  
-replace as needed Previously documented CKD stage III- patient with normal GFR Code status: Full DVT prophylaxis: Lovenox Care Plan discussed with: Patient/Family and Nurse attending MD 
Disposition: TBD goal discharge home when medically ready Hospital Problems  Date Reviewed: 2018 Codes Class Noted POA Pneumonia ICD-10-CM: J18.9 ICD-9-CM: 329  1/27/2019 Unknown COPD exacerbation ([facility].) ICD-10-CM: J44.1 ICD-9-CM: 491.21  1/27/2019 Unknown Sepsis ([facility] 75.) ICD-10-CM: A41.9 ICD-9-CM: 038.9, 995.91  1/27/2019 Unknown Review of Systems:  
Negative unless stated above Vital Signs:  
 Last 24hrs VS reviewed since prior progress note. Most recent are: 
Visit Vitals /70 (BP 1 Location: Right arm, BP Patient Position: At rest) Pulse 86 Temp 98 °F (36.7 °C) Resp 18 Ht 6' (1.829 m) Wt 63.8 kg (140 lb 10.5 oz) SpO2 97% BMI 19.08 kg/m² Intake/Output Summary (Last 24 hours) at 1/31/2019 1818 Last data filed at 1/31/2019 1756 Gross per 24 hour Intake 1933.75 ml Output 2275 ml Net -341.25 ml Physical Examination:  
 
 
     
Constitutional:  No acute distress, cooperative, pleasant   
ENT:  Oral mucous moist, oropharynx benign. Neck supple, Resp:  Rhonchi throughout with increased airway improvement. No accessory muscle use. CV:  Regular rhythm, normal rate, no murmurs, gallops, rubs GI:  Soft, non distended, non tender. normoactive bowel sounds, Musculoskeletal:  No edema, warm, 2+ pulses throughout Neurologic:  Moves all extremities. Psych:  Good insight, Not anxious nor agitated. Data Review:  
 Review and/or order of clinical lab test 
Review and/or order of tests in the radiology section of CPT Review and/or order of tests in the medicine section of CPT Labs:  
 
Recent Labs  
  01/31/19 0321 01/30/19 0320 WBC 2.2* 1.7* HGB 7.6* 7.6* HCT 25.2* 25.6*  
* 150 Recent Labs  
  01/31/19 
0321 01/30/19 
0320 01/29/19 
0321 * 142 141  
K 3.3* 3.6 3.9 * 116* 112* CO2 23 15* 20* BUN 27* 23* 24* CREA 0.91 0.98 1.19 * 222* 317* CA 5.8* 5.9* 6.3*  
MG  --  1.6  --   
 
Recent Labs  
  01/31/19 
0321 01/29/19 
0321 SGOT 12* 12* ALT 14 16 AP 49 50 TBILI 0.3 0.3 TP 4.9* 5.2* ALB 2.0* 2.0*  
GLOB 2.9 3.2 No results for input(s): INR, PTP, APTT in the last 72 hours. No lab exists for component: INREXT, INREXT No results for input(s): FE, TIBC, PSAT, FERR in the last 72 hours. No results found for: FOL, RBCF No results for input(s): PH, PCO2, PO2 in the last 72 hours. No results for input(s): CPK, CKNDX, TROIQ in the last 72 hours. No lab exists for component: CPKMB Lab Results Component Value Date/Time Cholesterol, total 133 09/10/2016 02:07 AM  
 HDL Cholesterol 44 09/10/2016 02:07 AM  
 LDL, calculated 59.2 09/10/2016 02:07 AM  
 Triglyceride 149 09/10/2016 02:07 AM  
 CHOL/HDL Ratio 3.0 09/10/2016 02:07 AM  
 
Lab Results Component Value Date/Time Glucose (POC) 118 (H) 01/31/2019 03:58 PM  
 Glucose (POC) 203 (H) 01/31/2019 11:25 AM  
 Glucose (POC) 114 (H) 01/31/2019 06:21 AM  
 Glucose (POC) 155 (H) 01/30/2019 09:23 PM  
 Glucose (POC) 116 (H) 01/30/2019 04:27 PM  
 
Lab Results Component Value Date/Time Color YELLOW/STRAW 01/27/2019 07:45 PM  
 Appearance CLEAR 01/27/2019 07:45 PM  
 Specific gravity 1.018 01/27/2019 07:45 PM  
 pH (UA) 7.0 01/27/2019 07:45 PM  
 Protein 100 (A) 01/27/2019 07:45 PM  
 Glucose 250 (A) 01/27/2019 07:45 PM  
 Ketone TRACE (A) 01/27/2019 07:45 PM  
 Bilirubin NEGATIVE  01/27/2019 07:45 PM  
 Urobilinogen 1.0 01/27/2019 07:45 PM  
 Nitrites NEGATIVE  01/27/2019 07:45 PM  
 Leukocyte Esterase NEGATIVE  01/27/2019 07:45 PM  
 Epithelial cells FEW 01/27/2019 07:45 PM  
 Bacteria NEGATIVE  01/27/2019 07:45 PM  
 WBC 0-4 01/27/2019 07:45 PM  
 RBC 0-5 01/27/2019 07:45 PM  
 
 
 
Medications Reviewed:  
 
Current Facility-Administered Medications Medication Dose Route Frequency  methylPREDNISolone (PF) (SOLU-MEDROL) injection 40 mg  40 mg IntraVENous Q12H  
 insulin glargine (LANTUS) injection 35 Units  35 Units SubCUTAneous DAILY  albuterol-ipratropium (DUO-NEB) 2.5 MG-0.5 MG/3 ML  3 mL Nebulization BID RT  
 guaiFENesin ER (MUCINEX) tablet 1,200 mg  1,200 mg Oral Q12H  piperacillin-tazobactam (ZOSYN) 3.375 g in 0.9% sodium chloride (MBP/ADV) 100 mL  3.375 g IntraVENous Q8H  
 glucose chewable tablet 16 g  4 Tab Oral PRN  
 glucagon (GLUCAGEN) injection 1 mg  1 mg IntraMUSCular PRN  
 insulin lispro (HUMALOG) injection   SubCUTAneous AC&HS  insulin lispro (HUMALOG) injection 6 Units  6 Units SubCUTAneous TIDAC  sodium chloride (NS) flush 5-10 mL  5-10 mL IntraVENous PRN  
 arformoterol (BROVANA) neb solution 15 mcg  15 mcg Nebulization BID RT And  
 budesonide (PULMICORT) 500 mcg/2 ml nebulizer suspension  500 mcg Nebulization BID RT  
 aspirin chewable tablet 81 mg  81 mg Oral DAILY  cholecalciferol (VITAMIN D3) tablet 1,000 Units  1,000 Units Oral DAILY  clopidogrel (PLAVIX) tablet 75 mg  75 mg Oral DAILY  montelukast (SINGULAIR) tablet 10 mg  10 mg Oral DAILY  pantoprazole (PROTONIX) tablet 40 mg  40 mg Oral BID  rosuvastatin (CRESTOR) tablet 20 mg  20 mg Oral QHS  sodium chloride (NS) flush 5-40 mL  5-40 mL IntraVENous Q8H  
 sodium chloride (NS) flush 5-40 mL  5-40 mL IntraVENous PRN  
 enoxaparin (LOVENOX) injection 40 mg  40 mg SubCUTAneous Q24H  
 acetaminophen (TYLENOL) tablet 650 mg  650 mg Oral Q4H PRN  
 diphenhydrAMINE (BENADRYL) capsule 25 mg  25 mg Oral Q4H PRN  
 ondansetron (ZOFRAN) injection 4 mg  4 mg IntraVENous Q4H PRN  
 docusate sodium (COLACE) capsule 100 mg  100 mg Oral BID  dextrose (D50W) injection syrg 12.5-25 g  25-50 mL IntraVENous PRN  
 
______________________________________________________________________ EXPECTED LENGTH OF STAY: 4d 19h ACTUAL LENGTH OF STAY:          4 [address] [facility]

## 2019-01-31 NOTE — PROGRESS NOTES
Bedside shift change report given to Zechariah Haskins (oncoming nurse) by Shawn Orona (offgoing nurse). Report included the following information SBAR, Kardex, Intake/Output, MAR and Recent Results.

## 2019-01-31 NOTE — PROGRESS NOTES
Bedside shift change report given to Derick Hartley RN (oncoming nurse) by Luis Armando Lovell RN (offgoing nurse). Report included the following information SBAR, Kardex and Recent Results.

## 2019-01-31 NOTE — PROGRESS NOTES
Problem: Falls - Risk of 
Goal: *Absence of Falls Document Janine Primes Fall Risk and appropriate interventions in the flowsheet. Outcome: Progressing Towards Goal 
Fall Risk Interventions: 
Mobility Interventions: Communicate number of staff needed for ambulation/transfer, Patient to call before getting OOB Medication Interventions: Patient to call before getting OOB, Teach patient to arise slowly Elimination Interventions: Call light in reach, Patient to call for help with toileting needs, Toilet paper/wipes in reach, Toileting schedule/hourly rounds, Urinal in reach

## 2019-02-01 NOTE — PROGRESS NOTES
Hospitalist Progress Note Joseph Ribeiro DO Answering service: [phone] OR [phone] from in house phone Date of Service:  2019 NAME:  Bubba King :  1940 MRN:  [id number] Admission Summary:  
66 y.o. male with past medical history significant for asthma, COPD, ruptured esophagus, DM, HTN, AAA, kidney CA s/p rt nephrectomy, MM who presents via EMS with chief complaint of  shortness of breath. Pt c/o worsening fatigue, productive cough, SOB, wheezing, rhinorrhea. Interval history / Subjective:  
Patient seen and examined. Continues to improve. Does have persistent cough and shortness of breath with exertion. Plans to discharge home 2/2. Assessment & Plan:  
 
Acute COPD exacerbation 2/2 PNA: POA 
-aa nebs, brovana/pulmicort 
-methylpred 40 mg IV q12 hours, discharge on oral prednisone 
-encourage IS  
-guaifenesin Sepsis 2/2 Pneumonia POA: (leukopenia, elevated lactic acid, tachycardia) -CT chest showing Shifting pattern of pulmonary densities, overall improved from prior 
likely reflecting infectious process. Severe bullous emphysema. 
-continue oral levauqin Metabolic Acidosis: 
-resolved Type 2 DM with hyperglycemia and long term insulin use 
-hgb a1c 7.4 
-SSI high sensitivity, continue home mealtime insulin, Lantus to 35 U daily Multiple Myeloma  
-follows Dr Mindi Hutchinson OP, discussed with oncology office- okay to restart outpatient regimen on discharge Hypomagnesemia: replace as needed Hypocalcemia:  
-previously normal with albumin conversion 
-start supplements Previously documented CKD stage III- patient with normal GFR Code status: Full DVT prophylaxis: Lovenox Care Plan discussed with: Patient/Family and Nurse attending MD 
Disposition: TBD goal discharge home when medically ready Hospital Problems  Date Reviewed: 2018 Codes Class Noted POA Pneumonia ICD-10-CM: J18.9 ICD-9-CM: 343  1/27/2019 Unknown COPD exacerbation ([facility] 75.) ICD-10-CM: J44.1 ICD-9-CM: 491.21  1/27/2019 Unknown Sepsis ([facility] 75.) ICD-10-CM: A41.9 ICD-9-CM: 038.9, 995.91  1/27/2019 Unknown Review of Systems:  
Negative unless stated above Vital Signs:  
 Last 24hrs VS reviewed since prior progress note. Most recent are: 
Visit Vitals /72 (BP 1 Location: Left arm, BP Patient Position: At rest;Sitting) Pulse 87 Temp 98.5 °F (36.9 °C) Resp 16 Ht 6' (1.829 m) Wt 63.8 kg (140 lb 10.5 oz) SpO2 99% BMI 19.08 kg/m² Intake/Output Summary (Last 24 hours) at 2/1/2019 1348 Last data filed at 2/1/2019 2504 Gross per 24 hour Intake 1083.75 ml Output 1550 ml Net -466.25 ml Physical Examination:  
 
 
     
Constitutional:  No acute distress, cooperative, pleasant   
ENT:  Oral mucous moist, oropharynx benign. Neck supple, Resp:  Rhonchi throughout with wheeze. No accessory muscle use. CV:  Regular rhythm, normal rate, no murmurs, gallops, rubs GI:  Soft, non distended, non tender. normoactive bowel sounds, Musculoskeletal:  No edema, warm, 2+ pulses throughout Neurologic:  Moves all extremities. Psych:  Good insight, Not anxious nor agitated. Data Review:  
 Review and/or order of clinical lab test 
Review and/or order of tests in the radiology section of CPT Review and/or order of tests in the medicine section of CPT Labs:  
 
Recent Labs  
  01/31/19 
0321 01/30/19 
0320 WBC 2.2* 1.7* HGB 7.6* 7.6* HCT 25.2* 25.6*  
* 150 Recent Labs 02/01/19 
2107 01/31/19 
0321 01/30/19 
0320 * 149* 142  
K 4.3 3.3* 3.6 * 117* 116* CO2 23 23 15* BUN 22* 27* 23* CREA 0.85 0.91 0.98  
* 123* 222* CA 6.3* 5.8* 5.9*  
MG  --   --  1.6 Recent Labs 02/01/19 
1216 01/31/19 
0321 SGOT 7* 12* ALT 15 14 AP 47 49 TBILI 0.2 0.3 TP 5.0* 4.9*  
 ALB 2.1* 2.0*  
GLOB 2.9 2.9 No results for input(s): INR, PTP, APTT in the last 72 hours. No lab exists for component: INREXT, INREXT No results for input(s): FE, TIBC, PSAT, FERR in the last 72 hours. No results found for: FOL, RBCF No results for input(s): PH, PCO2, PO2 in the last 72 hours. No results for input(s): CPK, CKNDX, TROIQ in the last 72 hours. No lab exists for component: CPKMB Lab Results Component Value Date/Time Cholesterol, total 133 09/10/2016 02:07 AM  
 HDL Cholesterol 44 09/10/2016 02:07 AM  
 LDL, calculated 59.2 09/10/2016 02:07 AM  
 Triglyceride 149 09/10/2016 02:07 AM  
 CHOL/HDL Ratio 3.0 09/10/2016 02:07 AM  
 
Lab Results Component Value Date/Time Glucose (POC) 212 (H) 02/01/2019 10:59 AM  
 Glucose (POC) 161 (H) 02/01/2019 05:59 AM  
 Glucose (POC) 208 (H) 01/31/2019 09:14 PM  
 Glucose (POC) 118 (H) 01/31/2019 03:58 PM  
 Glucose (POC) 203 (H) 01/31/2019 11:25 AM  
 
Lab Results Component Value Date/Time Color YELLOW/STRAW 01/27/2019 07:45 PM  
 Appearance CLEAR 01/27/2019 07:45 PM  
 Specific gravity 1.018 01/27/2019 07:45 PM  
 pH (UA) 7.0 01/27/2019 07:45 PM  
 Protein 100 (A) 01/27/2019 07:45 PM  
 Glucose 250 (A) 01/27/2019 07:45 PM  
 Ketone TRACE (A) 01/27/2019 07:45 PM  
 Bilirubin NEGATIVE  01/27/2019 07:45 PM  
 Urobilinogen 1.0 01/27/2019 07:45 PM  
 Nitrites NEGATIVE  01/27/2019 07:45 PM  
 Leukocyte Esterase NEGATIVE  01/27/2019 07:45 PM  
 Epithelial cells FEW 01/27/2019 07:45 PM  
 Bacteria NEGATIVE  01/27/2019 07:45 PM  
 WBC 0-4 01/27/2019 07:45 PM  
 RBC 0-5 01/27/2019 07:45 PM  
 
 
 
Medications Reviewed:  
 
Current Facility-Administered Medications Medication Dose Route Frequency  calcium carbonate (TUMS) chewable tablet 200 mg [elemental]  200 mg Oral TID WITH MEALS  methylPREDNISolone (PF) (SOLU-MEDROL) injection 40 mg  40 mg IntraVENous Q12H  levoFLOXacin (LEVAQUIN) tablet 750 mg  750 mg Oral Q24H  insulin glargine (LANTUS) injection 35 Units  35 Units SubCUTAneous DAILY  albuterol-ipratropium (DUO-NEB) 2.5 MG-0.5 MG/3 ML  3 mL Nebulization BID RT  
 guaiFENesin ER (MUCINEX) tablet 1,200 mg  1,200 mg Oral Q12H  
 glucose chewable tablet 16 g  4 Tab Oral PRN  
 glucagon (GLUCAGEN) injection 1 mg  1 mg IntraMUSCular PRN  
 insulin lispro (HUMALOG) injection   SubCUTAneous AC&HS  insulin lispro (HUMALOG) injection 6 Units  6 Units SubCUTAneous TIDAC  sodium chloride (NS) flush 5-10 mL  5-10 mL IntraVENous PRN  
 arformoterol (BROVANA) neb solution 15 mcg  15 mcg Nebulization BID RT And  
 budesonide (PULMICORT) 500 mcg/2 ml nebulizer suspension  500 mcg Nebulization BID RT  
 aspirin chewable tablet 81 mg  81 mg Oral DAILY  cholecalciferol (VITAMIN D3) tablet 1,000 Units  1,000 Units Oral DAILY  clopidogrel (PLAVIX) tablet 75 mg  75 mg Oral DAILY  montelukast (SINGULAIR) tablet 10 mg  10 mg Oral DAILY  pantoprazole (PROTONIX) tablet 40 mg  40 mg Oral BID  rosuvastatin (CRESTOR) tablet 20 mg  20 mg Oral QHS  sodium chloride (NS) flush 5-40 mL  5-40 mL IntraVENous Q8H  
 sodium chloride (NS) flush 5-40 mL  5-40 mL IntraVENous PRN  
 enoxaparin (LOVENOX) injection 40 mg  40 mg SubCUTAneous Q24H  
 acetaminophen (TYLENOL) tablet 650 mg  650 mg Oral Q4H PRN  
 diphenhydrAMINE (BENADRYL) capsule 25 mg  25 mg Oral Q4H PRN  
 ondansetron (ZOFRAN) injection 4 mg  4 mg IntraVENous Q4H PRN  
 docusate sodium (COLACE) capsule 100 mg  100 mg Oral BID  dextrose (D50W) injection syrg 12.5-25 g  25-50 mL IntraVENous PRN  
 
______________________________________________________________________ EXPECTED LENGTH OF STAY: 4d 19h ACTUAL LENGTH OF STAY:          5 [address] [facility]

## 2019-02-01 NOTE — PROGRESS NOTES
Bedside shift change report given to Wilman Martinez RN (oncoming nurse) by [employer], RN (offgoing nurse). Report included the following information SBAR, Kardex and Recent Results.

## 2019-02-01 NOTE — PROGRESS NOTES
Bedside shift change report given to Margarita (oncoming nurse) by Wilman Martinez (offgoing nurse). Report included the following information SBAR, Kardex, Intake/Output, MAR and Recent Results.

## 2019-02-01 NOTE — DIABETES MGMT
DTC Progress Note Recommendations/ Comments: Chart reviewed due to hyperglycemia. Noted FBG x 48 hours WNL, pre-prandial BG remains elevated. Pt remains on methylprednisolone 40 mg q 12 hours. If appropriate, please consider: 
Increase AC Lispro from 6 to 8 units TID while pt remains on steroids Current hospital DM medication:  
Lantus, 35 units, daily AC Lispro, 6 units, TID Lispro correction scale, resistant sensitivity Chart reviewed on [facility]. Patient is a 78 y.o. male with known DM on 30 units of Lantus and AC Novolog - 6 units before breakfast and lunch, 10 units before dinner at home. A1c:  
Lab Results Component Value Date/Time Hemoglobin A1c 7.4 (H) 01/28/2019 04:44 AM  
 Hemoglobin A1c 9.8 (H) 02/20/2018 05:14 AM  
 
 
Recent Glucose Results:  
Lab Results Component Value Date/Time  (H) 02/01/2019 05:51 AM  
 GLUCPOC 212 (H) 02/01/2019 10:59 AM  
 GLUCPOC 161 (H) 02/01/2019 05:59 AM  
 GLUCPOC 208 (H) 01/31/2019 09:14 PM  
  
 
Lab Results Component Value Date/Time Creatinine 0.85 02/01/2019 05:51 AM  
 
Estimated Creatinine Clearance: 63.6 mL/min (based on SCr of 0.85 mg/dL). Active Orders Diet DIET DIABETIC CONSISTENT CARB Regular; No Conc. Sweets PO intake:  
Patient Vitals for the past 72 hrs: 
 % Diet Eaten 01/31/19 1357 100 % 01/31/19 0814 75 % 01/30/19 1859 50 % Will continue to follow as needed. Thank you Anay Pastrana RD Time spent: 4 minutes

## 2019-02-02 NOTE — DISCHARGE INSTRUCTIONS
Discharge Instructions       PATIENT ID: Jay Jenkins  MRN: [id number]   YOB: 1940    DATE OF ADMISSION: 1/27/2019  6:02 PM    DATE OF DISCHARGE: 2/2/2019    PRIMARY CARE PROVIDER: Roopa Davis MD     ATTENDING PHYSICIAN: Jaimie Obrien DO  DISCHARGING [facility] [address] [facility]    To contact this individual call [phone] and ask the  to page. If unavailable ask to be transferred the Adult Hospitalist Department. DISCHARGE DIAGNOSES     COPD exacerbation   Pneumonia     CONSULTATIONS: None    PROCEDURES/SURGERIES: * No surgery found *    PENDING TEST RESULTS:   At the time of discharge the following test results are still pending: none    FOLLOW UP APPOINTMENTS:   Follow-up Information     Follow up With Specialties Details Why Contact Info    Roopa Davis MD    Patient can only remember the practice name and not the physician             ADDITIONAL CARE RECOMMENDATIONS:     New prescriptions:  1. Prednisone. Take two tablets once daily in the morning for 4 days, starting tomorrow, 2/3/2019-2/6/2019. Then 1 tablet once daily in the morning for 4 days, 2/7/2019-2/10/2019  2. Calcium. Take one tablet twice daily with meals    Follow up with primary care provider or oncologist within 1 week of discharge. Please have them check your calcium    Can use nebulizer treatments every 4 hours as needed. Over the counter cough medicine. STOP SMOKING    Limit activity at home while you continue to recover. Return to the hospital for any new or worsening symptoms. Resume other home medications         DIET: Diabetic Diet    ACTIVITY: Activity as tolerated    WOUND CARE: none    EQUIPMENT needed: none      DISCHARGE MEDICATIONS:   See Medication Reconciliation Form    · It is important that you take the medication exactly as they are prescribed.    · Keep your medication in the bottles provided by the pharmacist and keep a list of the medication names, dosages, and times to be taken in your wallet. · Do not take other medications without consulting your doctor. NOTIFY YOUR PHYSICIAN FOR ANY OF THE FOLLOWING:   Fever over 101 degrees for 24 hours. Chest pain, shortness of breath, fever, chills, nausea, vomiting, diarrhea, change in mentation, falling, weakness, bleeding. Severe pain or pain not relieved by medications. Or, any other signs or symptoms that you may have questions about.       DISPOSITION:  x  Home With:   OT  PT  HH  RN       SNF/Inpatient Rehab/LTAC    Independent/assisted living    Hospice    Other:     CDMP Checked:   Yes x     PROBLEM LIST Updated:  Yes x       Signed:   Glenis Form, DO  2/2/2019  9:02 AM

## 2019-02-02 NOTE — DISCHARGE SUMMARY
Discharge Summary PATIENT ID: Glory Arthur MRN: [id number] YOB: 1940 DATE OF ADMISSION: 1/27/2019  6:02 PM   
DATE OF DISCHARGE: 2/2/2019 PRIMARY CARE PROVIDER: Ryan, MD Roopa  
 
ATTENDING PHYSICIAN: Vince Reeec DO  
DISCHARGING PROVIDER: Vince Reece DO To contact this individual call [phone] and ask the  to page. If unavailable ask to be transferred the Adult Hospitalist Department. CONSULTATIONS: None PROCEDURES/SURGERIES: * No surgery found * [address] COURSE:  
 
66year old male with past medical history COPD, MM, presenting to [facility] with productive cough and shortness of breath. Patient reports worsening symptoms for 1 month, continuous smoking, and not seeing outpatient physician. Patient found to have acute COPD exacerbation, sepsis secondary to pneumonia. He was admitted and treated with steroids and antibiotics. He improved and was stable to discharge home with close outpatient follow up. He was counseled extensively to not smoke at home and risk for readmission if he continues to smoke. CT chest: 
IMPRESSION: Shifting pattern of pulmonary densities, overall improved from prior likely reflecting infectious process. Severe bullous emphysema. DISCHARGE DIAGNOSES / PLAN:   
 
Acute COPD exacerbation 2/2 PNA: POA 
-aa nebs, brovana/pulmicort. Home nebs and inhalers at home 
-IV steroids inpatient, steroid taper at home 
-encourage IS  
-guaifenesin  
  
Sepsis 2/2 Pneumonia POA: (leukopenia, elevated lactic acid, tachycardia) -CT chest showing Shifting pattern of pulmonary densities, overall improved from prior 
likely reflecting infectious process. Severe bullous emphysema. 
-finished oral levauqin  
  
Metabolic Acidosis: 
-resolved 
  
Type 2 DM with hyperglycemia and long term insulin use 
-hgb a1c 7.4, home insulin 
  
Multiple Myeloma -follows Dr Ashtyn Sosa OP, discussed with oncology office- okay to restart outpatient regimen on discharge 
  
Hypomagnesemia: replace as needed 
  
Hypocalcemia:  
-calcium supplements, recheck outpatient Previously documented CKD stage III- patient with normal GFR  
ADDITIONAL CARE RECOMMENDATIONS:  
New prescriptions: 1. Prednisone. Take two tablets once daily in the morning for 4 days, starting tomorrow, 2/3/2019-2/6/2019. Then 1 tablet once daily in the morning for 4 days, 2/7/2019-2/10/2019 2. Calcium. Take one tablet twice daily with meals 
  
Follow up with primary care provider or oncologist within 1 week of discharge. Please have them check your calcium 
  
Can use nebulizer treatments every 4 hours as needed. Over the counter cough medicine.  
  
STOP SMOKING 
  
Limit activity at home while you continue to recover.  
  
Return to the hospital for any new or worsening symptoms.  
  
Resume other home medications  
  PENDING TEST RESULTS:  
At the time of discharge the following test results are still pending: none FOLLOW UP APPOINTMENTS:   
Follow-up Information Follow up With Specialties Details Why Contact Info Other, MD Roopa    Patient can only remember the practice name and not the physician DIET: Diabetic Diet 
  
ACTIVITY: Activity as tolerated 
  
WOUND CARE: none 
  
EQUIPMENT needed: none 
  
 
DISCHARGE MEDICATIONS: 
Current Discharge Medication List  
  
START taking these medications Details  
calcium carbonate (TUMS) 200 mg calcium (500 mg) chew Take 1 Tab by mouth two (2) times daily (with meals). Qty: 20 Tab, Refills: 0  
  
predniSONE (DELTASONE) 20 mg tablet Take 40 mg (two tablets) twice daily for 4 days, then 20 mg (one tablet) once daily for 4 days Qty: 12 Tab, Refills: 0 CONTINUE these medications which have NOT CHANGED Details DULoxetine (CYMBALTA) 30 mg capsule Take 30 mg by mouth daily. pantoprazole (PROTONIX) 40 mg tablet Take 40 mg by mouth two (2) times a day. dexamethasone (DECADRON) 4 mg tablet Take 20 mg by mouth Every Thursday. lenalidomide (REVLIMID) 25 mg cap Take 25 mg by mouth. DAILY ON DAYS 1 TO 21 FOR A 28 DAY CYCLE (OFF MED 7 DAYS BEFORE STARTING AGAIN) NEXT DOSE DUE Thursday 1/31/19 TO START NEXT ROUND  
  
insulin glargine (LANTUS) 100 unit/mL injection 30 Units by SubCUTAneous route every morning. !! insulin aspart U-100 (NOVOLOG FLEXPEN U-100 INSULIN) 100 unit/mL inpn 6 Units by SubCUTAneous route daily (with breakfast). !! insulin aspart U-100 (NOVOLOG FLEXPEN U-100 INSULIN) 100 unit/mL inpn 6 Units by SubCUTAneous route daily (with lunch). !! insulin aspart U-100 (NOVOLOG FLEXPEN U-100 INSULIN) 100 unit/mL inpn 10 Units by SubCUTAneous route daily (with dinner). albuterol-ipratropium (DUO-NEB) 2.5 mg-0.5 mg/3 ml nebu 3 mL by Nebulization route every four (4) hours as needed. Qty: 30 Nebule, Refills: 1  
  
aspirin 81 mg chewable tablet Take 1 Tab by mouth daily. Qty: 30 Tab, Refills: 1  
  
clopidogrel (PLAVIX) 75 mg tablet Take 1 Tab by mouth daily. Qty: 30 Tab, Refills: 1 cholecalciferol (VITAMIN D3) 1,000 unit cap Take 1,000 Units by mouth daily. rosuvastatin (CRESTOR) 40 mg tablet Take 20 mg by mouth nightly. tiotropium (SPIRIVA WITH HANDIHALER) 18 mcg inhalation capsule Take 1 Cap by inhalation daily. budesonide-formoterol (SYMBICORT) 160-4.5 mcg/actuation HFA inhaler Take 2 Puffs by inhalation daily. Indications: COPD ASSOCIATED WITH CHRONIC BRONCHITIS  
  
albuterol (PROVENTIL HFA, VENTOLIN HFA) 90 mcg/Actuation inhaler Take 2 Puffs by inhalation every six (6) hours as needed for Wheezing and Shortness of Breath.  
Qty: 1 Inhaler, Refills: 2  
  
acetaminophen (TYLENOL) 325 mg tablet Take 2 Tabs by mouth every four (4) hours as needed for Fever (For temp greater than or equal to 38.5 C or 101.3 F (Unless hepatic failure or contrindicated). Give first line for fever. ). Qty: 40 Tab, Refills: 0  
  
montelukast (SINGULAIR) 10 mg tablet Take 10 mg by mouth daily. !! - Potential duplicate medications found. Please discuss with provider. NOTIFY YOUR PHYSICIAN FOR ANY OF THE FOLLOWING:  
Fever over 101 degrees for 24 hours. Chest pain, shortness of breath, fever, chills, nausea, vomiting, diarrhea, change in mentation, falling, weakness, bleeding. Severe pain or pain not relieved by medications. Or, any other signs or symptoms that you may have questions about. DISPOSITION: 
x  Home With: 
 OT  PT  HH  RN  
  
 Long term SNF/Inpatient Rehab Independent/assisted living Hospice Other:  
 
 
PATIENT CONDITION AT DISCHARGE:  
 
Functional status Poor Deconditioned   
x Independent Cognition  
x  Lucid Forgetful Dementia Catheters/lines (plus indication) Peoples PICC   
 PEG   
x None Code status  
x  Full code DNR   
 
PHYSICAL EXAMINATION AT DISCHARGE: 
Constitutional:  No acute distress, cooperative, pleasant   
ENT:  Oral mucous moist, oropharynx benign. Neck supple, Resp: diminished breath sounds, No accessory muscle use. CV:  Regular rhythm, normal rate, no murmurs, gallops, rubs GI:  Soft, non distended, non tender. normoactive bowel sounds, Musculoskeletal:  No edema, warm, 2+ pulses throughout Neurologic:  Moves all extremities. Psych:  Good insight, Not anxious nor agitated. CHRONIC MEDICAL DIAGNOSES: 
Problem List as of 2/2/2019 Date Reviewed: 2/28/2018 Codes Class Noted - Resolved Pneumonia ICD-10-CM: J18.9 ICD-9-CM: 660  1/27/2019 - [facility] ICD-10-CM: J44.1 ICD-9-CM: 491.21  1/27/2019 - Present Sepsis ([facility] ICD-10-CM: A41.9 ICD-9-CM: 038.9, 995.91  1/27/2019 - Present  Hypoxemia ICD-10-CM: R09.02 
 ICD-9-CM: 799.02  2/19/2018 - Present Stenosis of both internal carotid arteries ICD-10-CM: I65.23 ICD-9-CM: 433.10, 433.30  9/13/2016 - Present Diabetic peripheral neuropathy associated with type 2 diabetes mellitus ([facility] ICD-10-CM: E11.42 
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) ICD-10-CM: W79.528 ICD-9-CM: 434.01  9/12/2016 - Present Stroke (cerebrum) ([facility].) ICD-10-CM: I63.9 ICD-9-CM: 434.91  9/9/2016 - Present Multiple myeloma (HCC) ICD-10-CM: C90.00 ICD-9-CM: 203.00  10/26/2015 - Present Anemia ICD-10-CM: D64.9 ICD-9-CM: 285.9  5/13/2012 - Present Diarrhea ICD-10-CM: R19.7 ICD-9-CM: 787.91  5/12/2012 - Present DM (diabetes mellitus), type 2, uncontrolled ([facility].) ICD-10-CM: E11.65 ICD-9-CM: 250.02  5/12/2012 - Present S/p nephrectomy (Chronic) ICD-10-CM: Z90.5 ICD-9-CM: V45.73  5/12/2012 - Present Overview Signed 5/12/2012 10:35 AM by Alayna Mercer Right.  
  
  
   
 AAA (abdominal aortic aneurysm) (HCC) (Chronic) ICD-10-CM: I71.4 ICD-9-CM: 441.4  5/12/2012 - Present Gastrointestinal disorder ICD-10-CM: K92.9 ICD-9-CM: 569.9  Unknown - Present Overview Signed 5/12/2012 10:36 AM by Alayna Mercer  
  reptured [facility]) ICD-10-CM: C80.1 ICD-9-CM: 199.1  Unknown - Present Overview Signed 5/12/2012 10:36 AM by Alayna Mercer  
  kidney ca COPD (chronic obstructive pulmonary disease) (HCC) (Chronic) ICD-10-CM: J44.9 ICD-9-CM: 027  5/11/2012 - Present Cancer of kidney [facility]) ICD-10-CM: C64.9 ICD-9-CM: 189.0  5/11/2012 - Present Hypoglycemia, unspecified ICD-10-CM: E16.2 ICD-9-CM: 251.2  5/11/2012 - Present Acute renal failure (HCC) ICD-10-CM: N17.9 ICD-9-CM: 584.9  5/11/2012 - Present Hyperkalemia ICD-10-CM: E87.5 ICD-9-CM: 276.7  5/11/2012 - Present S/P partial colectomy ICD-10-CM: Z90.49 ICD-9-CM: V45.89  5/11/2012 - Present HTN (hypertension) ICD-10-CM: I10 
ICD-9-CM: 401.9  4/28/2011 - Present Acute respiratory failure ([facility].) ICD-10-CM: J96.00 
ICD-9-CM: 518.81  4/26/2011 - Present COPD with acute exacerbation ([facility].) ICD-10-CM: J44.1 ICD-9-CM: 491.21  4/26/2011 - Present HTN (hypertension) ICD-10-CM: I10 
ICD-9-CM: 401.9  4/26/2011 - Present Greater than 30 minutes were spent with the patient on counseling and coordination of care Signed:  
Mavis Horne DO 
2/2/2019 
9:08 AM

## 2019-02-02 NOTE — PROGRESS NOTES
Bedside and Verbal shift change report given to Ramiro Alexander RN (oncoming nurse) by Blanche Sams RN (offgoing nurse). Report included the following information SBAR, Kardex, Intake/Output, MAR and Recent Results.

## 2019-02-02 NOTE — PROGRESS NOTES
Reviewed prescriptions, signs and symptoms to report and gave opportunity for questions. Patient left via wheelchair with volunteer assistance and driven home by family member.

## 2019-02-02 NOTE — PROGRESS NOTES
Problem: Falls - Risk of 
Goal: *Absence of Falls Document Cy Delbarton Fall Risk and appropriate interventions in the flowsheet. Outcome: Resolved/Met Date Met: 02/02/19 Fall Risk Interventions: 
Mobility Interventions: Patient to call before getting OOB Medication Interventions: Patient to call before getting OOB Elimination Interventions: Call light in reach 
r 
  
 
 
 
Problem: Pressure Injury - Risk of 
Goal: *Prevention of pressure injury Document Sidney Scale and appropriate interventions in the flowsheet. Outcome: Resolved/Met Date Met: 02/02/19 Pressure Injury Interventions: Activity Interventions: Increase time out of bed Mobility Interventions: HOB 30 degrees or less Nutrition Interventions: Document food/fluid/supplement intake Friction and Shear Interventions: HOB 30 degrees or less

## 2019-02-02 NOTE — PROGRESS NOTES
Bedside shift change report given to Margarita (oncoming nurse) by Christopher Vasques (offgoing nurse). Report included the following information SBAR, Kardex, Intake/Output, MAR and Recent Results.

## 2019-03-10 NOTE — ED PROVIDER NOTES
78 y.o. male with past medical history significant for AAA, renal cancer, HTN, DM, COPD, and asthma who presents from home via personal vehicle with chief complaint of respiratory distress. Pt presents to the ED and reports dyspnea upon rest w/ an onset of 3/10/19. Pt also reports a dry cough, bilateral feet swelling, and regularly utilizing breathing treatments while @ home. Pt reports he has multiple myeloma and was also recently admitted into the hospital at the beginning of February due to pneumonia. Pt was started on Dexamethasone during stay and recently stopped the regimen on 3/7/19. Pt is also currently taking revlimid oral chemotherapy. Pt denies any fever, sore throat, rhinorrhea, or regular O2 regimens while @ home. Surgical hx - kidney removal, abdominal hernia repair, colon resection   Social hx - Tobacco use: daily smoker (0.5 pack/day), Alcohol Use: ~2 drinks/month     PCP: Roopa Davis MD  Oncologist: Katelin Morse MD    Note written by Eugenio Seaman, as dictated by Cecilia Clarke MD 6:55 PM.        The history is provided by the patient and a relative. No  was used.         Past Medical History:   Diagnosis Date    AAA (abdominal aortic aneurysm) (HCC)     Asthma     Cancer ([facility] [address]     kidney ca    COPD     Diabetes ([facility] [address] [facility]     Gastrointestinal disorder     ruptured esphogus    Hypertension     Other ill-defined conditions(799.89)        Past Surgical History:   Procedure Laterality Date    ABDOMEN SURGERY PROC UNLISTED      hernia    ABDOMEN SURGERY PROC UNLISTED      colon resection    HX GI      part of colon removed, ruptured esophagus    HX OTHER SURGICAL      kidney removed    REMV KIDNEY,COMPLICATED           Family History:   Problem Relation Age of Onset    Hypertension Mother        Social History     Socioeconomic History    Marital status:      Spouse name: Not on file    Number of children: Not on file    Years of education: Not on file    Highest education level: Not on file   Social Needs    Financial resource strain: Not on file    Food insecurity - worry: Not on file    Food insecurity - inability: Not on file    Transportation needs - medical: Not on file   [employer] needs - non-medical: Not on file   Occupational History    Not on file   Tobacco Use    Smoking status: Current Every Day Smoker     Packs/day: 0.50     Years: 60.00     Pack years: 30.00    Smokeless tobacco: Never Used   Substance and Sexual Activity    Alcohol use: Yes     Comment: 2 drinks a month    Drug use: No    Sexual activity: Not on file   Other Topics Concern    Not on file   Social History Narrative    ** Merged History Encounter **              ALLERGIES: Niacin and Niacin    Review of Systems   Constitutional: Negative for appetite change, chills and fever. HENT: Negative for rhinorrhea, sore throat and trouble swallowing. Eyes: Negative for photophobia. Respiratory: Positive for cough and shortness of breath. Cardiovascular: Negative for chest pain and palpitations. Gastrointestinal: Negative for abdominal pain, nausea and vomiting. Genitourinary: Negative for dysuria, frequency and hematuria. Neurological: Negative for dizziness and syncope. Psychiatric/Behavioral: Negative for behavioral problems. The patient is not nervous/anxious. All other systems reviewed and are negative. Vitals:    03/10/19 1844 03/10/19 1857   Pulse: (!) 127 (!) 122   Resp:  23   SpO2: (!) 89% 100%            Physical Exam   HENT:   Head: Normocephalic and atraumatic. Mouth/Throat: Oropharynx is clear and moist.   Eyes: EOM are normal. Pupils are equal, round, and reactive to light. Neck: Normal range of motion. Neck supple. Cardiovascular: Normal heart sounds and intact distal pulses. Exam reveals no gallop and no friction rub. No murmur heard. Pulse of 110 bpm.    Pulmonary/Chest:   Mild respiratory distress @ rest on O2. Mild diffuse expiratory wheezes. Abdominal: Soft. There is no tenderness. There is no rebound. Musculoskeletal: Normal range of motion. He exhibits no tenderness. Neurological: He is alert. No cranial nerve deficit. Motor; symmetric   Skin: No erythema. Psychiatric: He has a normal mood and affect. His behavior is normal.   Nursing note and vitals reviewed. Note written by Eugenio Lawrence, as dictated by Dinora Parra MD 6:53 PM     [facility]       Procedures      Hospitalist Nico for Admission  8:46 PM    ED Room Number: YX30/35  Patient Name and age:  Colin Leach 78 y.o.  male  Working Diagnosis:   1. COPD exacerbation ([address]    2. Hypoglycemia      Readmission: no  Isolation Requirements:  no  Recommended Level of Care:  telemetry  Code Status:  FULL  Other:         ED EKG interpretation:  Rhythm: sinus tachycardia and PAC's; and regular . Rate (approx.): 125; Axis: normal; P wave: normal; QRS interval: normal ; ST/T wave: non-specific changes; in  Lead: ; Other findings: . This EKG was interpreted by Juan Diego Otoole MD,ED Provider.  10:25 PM

## 2019-03-10 NOTE — ED NOTES
Bedside and Verbal shift change report given to Martine Orellana RN (oncoming nurse) by Guerda Sung RN (offgoing nurse). Report included the following information SBAR, Kardex, ED Summary and Recent Results.

## 2019-03-10 NOTE — ED TRIAGE NOTES
Pt comes in with c/c of SOB that began yesterday. Hx of COPD. Pt currently receiving oral chemo for Multiple Myeloma.  RR 25.  SpO2 100% on 1L NC

## 2019-03-11 NOTE — H&P
[address]  HISTORY AND PHYSICAL    Name:  Gerardo Orellana  MR#:  [id number]  :  1940  ACCOUNT #:  [de-identified]  ADMIT DATE:  03/10/2019      PRIMARY CARE PHYSICIAN:  Unknown. SOURCE OF INFORMATION:  Patient. CHIEF COMPLAINT:  Shortness of breath. HISTORY OF PRESENT ILLNESS:  This is a 68-year-old man with past medical history significant for COPD; type 2 diabetes; hypertension; dyslipidemia; multiple myeloma; and renal cancer, status post nephrectomy, who was in his usual state of health until the day of his presentation at the emergency room when the patient developed shortness of breath. The shortness of breath is with a little or no activity, occurring at rest, associated with cough which is nonproductive, and bilateral leg swelling. The patient has been using his breathing treatment at home more frequently because of the shortness of breath. No associated fever, rigors, or chills. No associated chest pain. The patient was last admitted to this hospital from 2019 to 2019. The patient was admitted and treated for COPD exacerbation as well as pneumonia. The patient is under the care of oncologist for his multiple myeloma. The patient came to the emergency room because of worsening symptoms. When the patient arrived at the emergency room, the patient received multiple breathing treatments without any significant relief. The patient was subsequently referred to the hospitalist service for evaluation for admission. The patient has type 2 diabetes, was also found to be hypoglycemic on arrival at the emergency room, the patient presented with a blood sugar of 34. The patient has no history of congestive heart failure. PAST MEDICAL HISTORY:  1.  Multiple myeloma. 2.  COPD. 3.  Type 2 diabetes. 4.  Dyslipidemia. 5.  Renal cancer status post nephrectomy. ALLERGIES:  THE PATIENT IS ALLERGIC TO NIACIN. MEDICATIONS:  1.   Tylenol 650 mg every 4 hours as needed for pain and fever. 2.  Albuterol 90 mcg 2 puffs by inhalation every 6 hours as needed for wheezing. 3.  DuoNeb every 4 hours as needed. 4.  Aspirin 81 mg daily. 5.  Symbicort 160/4.5 two puffs by inhalation daily. 6.  Calcium carbonate one tablet twice daily. 7.  Plavix 75 mg daily. 8.  Cymbalta 30 mg daily. 9.  Sliding scale with insulin coverage, Lantus insulin 30 units subcutaneously daily in the evening. 10.  Revlimid 25 mg daily. 11.  Singulair 10 mg daily. 12.  Protonix 40 mg daily. 13.  Crestor 40 mg daily. 14.  Spiriva inhalation daily. FAMILY HISTORY:  This was reviewed. His mother had hypertension. PAST SURGICAL HISTORY:  This is significant for:  1. Colon resection. 2.  Nephrectomy secondary to renal cancer. 3.  Abdominal hernia repair. SOCIAL HISTORY:  The patient smokes about a pack of cigarettes daily. Denies alcohol abuse. REVIEW OF SYSTEMS:  HEAD, EYES, EARS, NOSE, AND THROAT:  No headache, no dizziness, no blurring of vision, no photophobia. RESPIRATORY SYSTEM:  This is positive for cough and shortness of breath. No hemoptysis. CARDIOVASCULAR SYSTEM:  No chest pain, no orthopnea, no palpitations. GASTROINTESTINAL SYSTEM:  No nausea or vomiting. No diarrhea, no constipation. GENITOURINARY SYSTEM:  No dysuria, no urgency, no frequency. All other systems are reviewed and they are negative. PHYSICAL EXAMINATION:  GENERAL APPEARANCE:  The patient appeared ill, in moderate distress. VITAL SIGNS:  On arrival at the emergency room, temperature 98, pulse 127, respiratory rate 23, blood pressure 114/66, and oxygen saturation 89% on room air. HEAD:  Normocephalic, atraumatic. EYES:  Normal eye movement. No redness, no drainage, no discharge. EARS:  Normal external ears with no evidence of drainage. NOSE:  No deformity, no drainage. MOUTH AND THROAT:  No visible oral lesion. Dry oral mucosa. NECK:  Neck is supple. No JVD, no thyromegaly.   CHEST: Diffuse expiratory wheezing. No crackles. HEART:  Normal S1 and S2, regular. No clinically appreciable murmur. ABDOMEN:  Soft, nontender. Normal bowel sounds. CNS:  Alert and oriented x3. No gross focal neurological deficit. EXTREMITIES:  Now edema 2+. Pulses 2+ bilaterally. MUSCULOSKELETAL SYSTEM:  No obvious joint deformity or swelling. SKIN:  No active skin lesions seen in the exposed part of the body. PSYCHIATRY:  Normal mood and affect. LYMPHATIC SYSTEM:  No cervical lymphadenopathy. DIAGNOSTIC DATA:  EKG shows sinus tachycardia and nonspecific ST and T-waves abnormalities. Chest x-ray shows, no acute process. Stable findings of COPD. LABORATORY DATA:  Hematology:  WBC 8.6, hemoglobin 9.5, hematocrit 31.4, platelets 453. Magnesium 1.9. Chemistry:  Sodium 140, potassium 3.8, chloride 104, CO2 of 27, glucose 34, BUN 30, creatinine 1.60, calcium 9.6, total bilirubin 0.3, ALT 18, AST 11, alkaline phosphatase 64, total protein 6.4, albumin level 2.9, globulin 3.5. Cardiac profile:  Troponin less than 0.05.    ASSESSMENT:  1. Chronic obstructive pulmonary disease with acute exacerbation. 2.  Multiple myeloma. 3.  Type 2 diabetes with hypoglycemia. 4.  Anemia. 5.  Dyslipidemia. 6.  Acute kidney injury. 7.  Tobacco abuse. 8.  Bilateral lower extremity swelling. PLAN:  1. COPD with acute exacerbation. We will admit the patient for further evaluation and treatment. This is most likely cause of the patient's shortness of breath. We will start the patient on DuoNeb and short course of prednisone 40 mg daily for 5 days. The patient will also be started on Levaquin for any underlying chest infection. The patient will be evaluated for other possible causes of shortness of breath. We will check cardiac markers to rule out acute myocardial infarction. We will check BNP level.   We will also check a D-dimer to evaluate the patient for thromboembolism as a possible cause of shortness of breath.  2  Multiple myeloma: We will resume preadmission medication once the dosage of the preadmission medication has been clarified. 3. Type 2 diabetes with hypoglycemia: We will hold basal insulin. We will place the patient on sliding scale with insulin coverage if needed. Hemoglobin A1c level done in January was 7.4.  4.  Anemia: This is most likely due to the multiple myeloma. We will carry out anemia workup including checking a stool guaiac to rule out occult GI bleed. 5.  Dyslipidemia: We will continue with preadmission medication. 6.  Acute kidney injury: We will carry out fluid therapy and monitor the patient's renal function. This may be due to volume depletion. 7.  Tobacco abuse: The patient advised to quit smoking. We will place the patient on Nicoderm patch. 8.  Bilateral leg swelling: We will check ultrasound of the lower extremity for DVT. 9.  Other issues:  Code status: The patient is a full code. We will place the patient on heparin for DVT prophylaxis. FUNCTIONAL STATUS:  PRIOR TO ADMISSION, THE PATIENT IS AMBULATORY WITH A CANE.         Gus Perez MD      [id number]  D:  03/11/2019 5:50  T:  03/11/2019 7:20  JOB #:  [id number]

## 2019-03-11 NOTE — ROUTINE PROCESS
TRANSFER - OUT REPORT:    Verbal report given to Delfina CM(name) on Saranya Raza  being transferred to (unit) for routine progression of care       Report consisted of patients Situation, Background, Assessment and   Recommendations(SBAR). Information from the following report(s) SBAR, ED Summary, Intake/Output, MAR and Recent Results was reviewed with the receiving nurse. Lines:   Peripheral IV 03/10/19 Right Antecubital (Active)   Site Assessment Clean, dry, & intact 3/10/2019  6:59 PM   Phlebitis Assessment 0 3/10/2019  6:59 PM   Infiltration Assessment 0 3/10/2019  6:59 PM   Dressing Status Clean, dry, & intact 3/10/2019  6:59 PM        Opportunity for questions and clarification was provided.       Patient transported with:   [employer]

## 2019-03-11 NOTE — PROGRESS NOTES
Patient's platelets are 494. Order states to call MD if platelets are less than 150. Contacted Dr Carrie Davis. No new orders received. OK to proceed with heparin drip.

## 2019-03-11 NOTE — PROGRESS NOTES
Occupational Therapy Note:  Orders received and appreciated. Chart reviewed. Pt admitted with SOB and BLE edema. Noted elevated D. Dimer and orders to check duplex legs and V/Q scan. Will hold OT eval until after tests complete and pt cleared for mobility. Thank you for the consult.   [place], OTR/L, CBIS

## 2019-03-11 NOTE — PROGRESS NOTES
Day #1 of levofloxacin  Indication:  COPD with acute exacerbation  Current regimen:  500 mg Q24H  Abx regimen: levofloxacin  Recent Labs     19  0117 03/10/19  1900   WBC 6.2 8.6   CREA 1.44* 1.60*   BUN 30* 30*     Est CrCl: ~40 ml/min; UO: unmeasured  Temp (24hrs), Av.9 °F (36.6 °C), Min:97.4 °F (36.3 °C), Max:98.7 °F (37.1 °C)    Cultures: NA    Plan: Change to levofloxacin 500 mg x 1, then 250 mg Q24H for crcl 20-49 ml/min and COPD exacerbation

## 2019-03-11 NOTE — PROGRESS NOTES
Nurse called to make make me aware that patient has right Popliteal DVT on NIV studies  I went to see the patient and discussed the findings. Patient denies prior DVT episodes. He denies easy bruising on bleeding and is agreeable to anticoagulation. I will start him on heparin GTT given CKD, can be switched to PO eliquis if no bleeding with Heparin/tolerates it well.

## 2019-03-11 NOTE — DIABETES MGMT
DTC Progress Note    Recommendations/ Comments: Chart reviewed due to hyperglycemia likely due to steroids. Pt is on Prednisone 40 mg at breakfast. Noted Lantus 25 units ordered today and Humalog 6 unit ac tid ordered. DTC to continue to follow. Current hospital DM medication: correction scale Humalog with normal sensitivity, Lantus 25 units and  Humalog 6 units at meals     Chart reviewed on Sandeep Myers. Patient is a 78 y.o. male with known diabetes on Lantus 30 units in the morning and Novolog 6 units at breakfast and lunch and 10 units at dinner at home. A1c:   Lab Results   Component Value Date/Time    Hemoglobin A1c 7.4 (H) 01/28/2019 04:44 AM    Hemoglobin A1c 9.8 (H) 02/20/2018 05:14 AM       Recent Glucose Results:   Lab Results   Component Value Date/Time     (H) 03/11/2019 01:17 AM    GLU 34 (LL) 03/10/2019 07:00 PM    GLUCPOC 224 (H) 03/11/2019 11:15 AM    GLUCPOC 254 (H) 03/11/2019 06:12 AM    GLUCPOC 256 (H) 03/11/2019 04:08 AM        Lab Results   Component Value Date/Time    Creatinine 1.44 (H) 03/11/2019 01:17 AM     CrCl cannot be calculated (Unknown ideal weight. ). Active Orders   Diet    DIET DIABETIC CONSISTENT CARB Regular; 2 GM NA (House Low NA)        PO intake: No data found. Will continue to follow as needed.     Thank you  Sil Recio RD, CDE      Time spent: 5 minutes

## 2019-03-11 NOTE — PROGRESS NOTES
Problem: Falls - Risk of  Goal: *Absence of Falls  Document Conrad Fall Risk and appropriate interventions in the flowsheet.   Outcome: Progressing Towards Goal  Fall Risk Interventions:  Mobility Interventions: Assess mobility with egress test, Communicate number of staff needed for ambulation/transfer, Patient to call before getting OOB, Utilize walker, cane, or other assistive device              Elimination Interventions: Call light in reach, Patient to call for help with toileting needs, Toilet paper/wipes in reach, Toileting schedule/hourly rounds, Urinal in reach

## 2019-03-11 NOTE — PROGRESS NOTES
NUTRITION COMPLETE ASSESSMENT    RECOMMENDATIONS:   1. Continue diet as ordered and encourage ONS  2. Weekly weights  3. Consider daily MVI     Interventions/Plan:   Food/Nutrient Delivery: Boost Glucose Control TID    Assessment:   Reason for Assessment:   [x]BPA/MST Referral     Diet:  Diabetic Consistent Carb; 2gm Sodium  Nutritionally Significant Medications: [x] Reviewed & Includes: tums 200 mg twice/day; humalog correction scale; humalog 6 units 3x/day; zofran q4 hours prn; protonix twice/day; prednisone daily; crestor; lantus 25 units q bedtime     Meal Intake: No data found. Pre-Hospitalization:  Diet at Home: Regular  Vitamins/Supplements: No    Current Hospitalization:   Fluid Restriction:  NA  Appetite:  Fair  PO Ability: Independent      Subjective:  \"I gained up to 141# from 138#, so I thought that was good. I used to weigh 192#. \"  Weight obtained during visit via bedscale. Objective:  Chart reviewed, discussed with RN. Pt admitted with COPD exacerbation. PMHx: COPD, DM2, HTN, dyslipidemia, multiple myeloma, renal cancer s/p nephrectomy, others noted. Pt with almost 30% weight loss over the past year. Pt willing to drink ONS, so Boost Glucose Control ordered TID (750 kcal, 42 g protein per day). Patient meets criteria for Moderate, Chronic Protein Calorie Malnutrition as evidenced by:   ASPEN Malnutrition Criteria  Acute Illness, Chronic Illness, or Social/Enviornmental: Chronic illness  Energy Intake: <75% est energy req for greater than/equal to 1 month  Weight Loss: Greater than 20% x 1 yr  Muscle Mass: Severe  ASPEN Malnutrition Score - Chronic Illness: 13  Chronic Illness - Malnutrition Diagnosis: Moderate malnutrition. Estimated Nutrition Needs:   Kcals/day: 5310 Kcals/day(7125-0423 kcal/day (MSJ x 1.3-1.4))  Protein: 62 g(1 g/kg)  Fluid: 1800 ml(1 mL/kcal)  Based On:  [place] [address] [place]  Weight Used: Actual wt(62.1 kg)    Pt expected to meet estimated nutrient needs:  []   Yes []  No [x] Unable to predict at this time    Nutrition Diagnosis:   1. Inadequate protein-energy intake related to increased energy expenditure and decreased appetite as evidenced by >50# weight loss x 1 year and fat wasting    Goals:     Pt to consume at least 75% of meals and snacks over the next 5-7 days     Monitoring & Evaluation:    - Total energy intake   - Weight/weight change     Previous Nutrition Goals Met:   N/A  Previous Recommendations:    N/A    Education & Discharge Needs:   [x] None Identified   [] Identified and addressed    [x] Participated in care plan, discharge planning, and/or interdisciplinary rounds        Cultural, Denominational and ethnic food preferences identified: None    Skin Integrity: [x]Intact  []Other  Edema: []None [x]Other: 2+ pitting  Last BM: 3/10/19  Food Allergies: [x]None []Other  Diet Restrictions: Cultural/Restoration Preference(s): None     Anthropometrics:    Weight Loss Metrics 3/11/2019 2/2/2019 3/9/2018 8/10/2017 9/13/2016 9/9/2016 6/28/2016   Today's Wt 136 lb 12.8 oz 142 lb 3.2 oz 167 lb 8.8 oz 179 lb 11.2 oz 176 lb 3 oz 173 lb 11.6 oz 183 lb   BMI 18.55 kg/m2 19.29 kg/m2 22.72 kg/m2 24.37 kg/m2 23.9 kg/m2 23.56 kg/m2 24.81 kg/m2      Weight Source: Bed  Height: 6' (182.9 cm),    Body mass index is 18.55 kg/m².   IBW : 80.7 kg (178 lb), % IBW (Calculated): 76.85 %  Usual Body Weight: 87.1 kg (192 lb)(1 year ago),      Labs:    Lab Results   Component Value Date/Time    Sodium 135 (L) 03/11/2019 01:17 AM    Potassium 4.5 03/11/2019 01:17 AM    Chloride 102 03/11/2019 01:17 AM    CO2 23 03/11/2019 01:17 AM    Glucose 249 (H) 03/11/2019 01:17 AM    BUN 30 (H) 03/11/2019 01:17 AM    Creatinine 1.44 (H) 03/11/2019 01:17 AM    Calcium 8.8 03/11/2019 01:17 AM    Magnesium 1.5 (L) 03/11/2019 01:17 AM    Phosphorus 3.2 03/11/2019 01:17 AM    Albumin 2.6 (L) 03/11/2019 01:17 AM     Lab Results   Component Value Date/Time    Hemoglobin A1c 7.4 (H) 01/28/2019 04:44 AM     Sena GONZALEZ Ivania Haney, [address] [facility]

## 2019-03-11 NOTE — PROGRESS NOTES
Hospitalist Progress Note  Costa Forman MD  Answering service: [phone] from in house phone      Date of Service:  3/11/2019  NAME:  Shelton Paniagua  :  1940  MRN:  [id number]    Admission Summary:   72M lives with wife, independent, COPD, MM on chemo p/w SOB 1 day hx. Some congestion. Interval history / Subjective:   Patient seen and examined at bedside, feels better, still having wet cough. D.dimer elevated      Assessment & Plan:     #. COPD exacerbation  - Home inhalers, DuoNebs scheduled and PRN, Prednisone short course, Abx course, monitor  - PT/OT eval    #. Elevated D. Dimer: check duplex legs and V/Q scan  #. DIANA: likely preRenal, hydrate, monitor renal function, avoid nephrotoxics  #. MM: home regimen, op f/u  #. DM2: with hyeprglycemia, POA: A1c 7.4, SSI, Lantus, monitor sugars 'on prednisone'  #. Anemia, chronic: likely due to MM, monitor  #. Thrombocytopenia: mild, likely due to MM, monitor  #. HypoNatremia: mild, monitor  #. HypoMagnesemia: mild, replace, monitor    Code status: full  DVT prophylaxis: Heparin SQ  Care Plan discussed with: Patient/Family and Nurse  Disposition: TBD     Hospital Problems  Date Reviewed: 3/10/2019          Codes Class Noted POA    * (Principal) COPD with acute exacerbation [facility]) ICD-10-CM: J44.1  ICD-9-CM: 491.21  2011 Yes            Review of Systems:   Pertinent items are mentioned in interval history. Vital Signs:    Last 24hrs VS reviewed since prior progress note.  Most recent are:  Visit Vitals  /63 (BP 1 Location: Left arm, BP Patient Position: At rest)   Pulse (!) 101   Temp 97.9 °F (36.6 °C)   Resp 19   Ht 6' (1.829 m)   SpO2 93%   BMI 19.29 kg/m²         Intake/Output Summary (Last 24 hours) at 3/11/2019 1038  Last data filed at 3/11/2019 5968  Gross per 24 hour   Intake    Output 250 ml   Net -250 ml        Physical Examination:   General:  Alert, oriented, No acute distress, WM  Card:  S1, S2 without murmurs, good peripheral perfusion  Resp:  No accessory muscle use, Good AE, scattered mild wheeze and fine rhonchi  Abd:  Soft, non-tender, non-distended, BS+  Extremities:  No cyanosis or clubbing, no significant edema  Neuro:  Grossly normal, no focal neuro deficits, follows commands   Psych:  Good insight, AAOx3, not agitated. Data Review:    Review and/or order of clinical lab test  Review and/or order of tests in the radiology section of CPT  Review and/or order of tests in the medicine section of [facility]  Labs:     Recent Labs     03/11/19  0117 03/10/19  1900   WBC 6.2 8.6   HGB 8.6* 9.5*   HCT 27.9* 31.4*   * 155     Recent Labs     03/11/19  0117 03/10/19  1900   * 140   K 4.5 3.8    104   CO2 23 27   BUN 30* 30*   CREA 1.44* 1.60*   * 34*   CA 8.8 9.6   MG 1.5* 1.9   PHOS 3.2  --      Recent Labs     03/11/19  0117 03/10/19  1900   SGOT 9* 11*   ALT 17 18   AP 57 64   TBILI 0.4 0.3   TP 5.9* 6.4   ALB 2.6* 2.9*   GLOB 3.3 3.5     No results for input(s): INR, PTP, APTT in the last 72 hours. No lab exists for component: INREXT   Recent Labs     03/11/19 0116   TIBC 227*   PSAT 20   FERR 125      Lab Results   Component Value Date/Time    Folate 14.1 03/11/2019 01:16 AM      No results for input(s): PH, PCO2, PO2 in the last 72 hours.   Recent Labs     03/11/19  0117 03/10/19  1900   CPK 70  --    CKNDX 9.1*  --    TROIQ <0.05 <0.05     Lab Results   Component Value Date/Time    Cholesterol, total 101 03/11/2019 01:17 AM    HDL Cholesterol 59 03/11/2019 01:17 AM    LDL, calculated 31.6 03/11/2019 01:17 AM    Triglyceride 52 03/11/2019 01:17 AM    CHOL/HDL Ratio 1.7 03/11/2019 01:17 AM     Lab Results   Component Value Date/Time    Glucose (POC) 254 (H) 03/11/2019 06:12 AM    Glucose (POC) 256 (H) 03/11/2019 04:08 AM    Glucose (POC) 224 (H) 03/10/2019 08:45 PM    Glucose (POC) 181 (H) 02/02/2019 06:38 AM    Glucose (POC) 97 02/01/2019 08:53 PM Lab Results   Component Value Date/Time    Color YELLOW/STRAW 01/27/2019 07:45 PM    Appearance CLEAR 01/27/2019 07:45 PM    Specific gravity 1.018 01/27/2019 07:45 PM    pH (UA) 7.0 01/27/2019 07:45 PM    Protein 100 (A) 01/27/2019 07:45 PM    Glucose 250 (A) 01/27/2019 07:45 PM    Ketone TRACE (A) 01/27/2019 07:45 PM    Bilirubin NEGATIVE  01/27/2019 07:45 PM    Urobilinogen 1.0 01/27/2019 07:45 PM    Nitrites NEGATIVE  01/27/2019 07:45 PM    Leukocyte Esterase NEGATIVE  01/27/2019 07:45 PM    Epithelial cells FEW 01/27/2019 07:45 PM    Bacteria NEGATIVE  01/27/2019 07:45 PM    WBC 0-4 01/27/2019 07:45 PM    RBC 0-5 01/27/2019 07:45 PM     Medications Reviewed:     Current Facility-Administered Medications   Medication Dose Route Frequency    acetaminophen (TYLENOL) tablet 650 mg  650 mg Oral Q4H PRN    albuterol-ipratropium (DUO-NEB) 2.5 MG-0.5 MG/3 ML  3 mL Nebulization Q4H PRN    aspirin chewable tablet 81 mg  81 mg Oral DAILY    calcium carbonate (TUMS) chewable tablet 200 mg [elemental]  200 mg Oral BID WITH MEALS    clopidogrel (PLAVIX) tablet 75 mg  75 mg Oral DAILY    DULoxetine (CYMBALTA) capsule 30 mg  30 mg Oral DAILY    . PHARMACY TO SUBSTITUTE PER PROTOCOL (Reordered from: lenalidomide (REVLIMID) 25 mg cap)    Per Protocol    montelukast (SINGULAIR) tablet 10 mg  10 mg Oral DAILY    pantoprazole (PROTONIX) tablet 40 mg  40 mg Oral BID    rosuvastatin (CRESTOR) tablet 20 mg  20 mg Oral QHS    sodium chloride (NS) flush 5-40 mL  5-40 mL IntraVENous Q8H    sodium chloride (NS) flush 5-40 mL  5-40 mL IntraVENous PRN    morphine injection 2 mg  2 mg IntraVENous Q4H PRN    ondansetron (ZOFRAN) injection 4 mg  4 mg IntraVENous Q4H PRN    bisacodyl (DULCOLAX) tablet 5 mg  5 mg Oral DAILY PRN    nicotine (NICODERM CQ) 21 mg/24 hr patch 1 Patch  1 Patch TransDERmal Q24H    heparin (porcine) injection 5,000 Units  5,000 Units SubCUTAneous Q8H    predniSONE (DELTASONE) tablet 40 mg  40 mg Oral DAILY WITH BREAKFAST    levoFLOXacin (LEVAQUIN) 250 mg in D5W IVPB  250 mg IntraVENous Q24H    insulin lispro (HUMALOG) injection   SubCUTAneous AC&HS    glucose chewable tablet 16 g  4 Tab Oral PRN    dextrose (D50W) injection syrg 12.5-25 g  12.5-25 g IntraVENous PRN    glucagon (GLUCAGEN) injection 1 mg  1 mg IntraMUSCular PRN    arformoterol (BROVANA) neb solution 15 mcg  15 mcg Nebulization BID RT    And    budesonide (PULMICORT) 500 mcg/2 ml nebulizer suspension  500 mcg Nebulization BID RT   ______________________________________________________________________  EXPECTED LENGTH OF STAY: 3d 2h  ACTUAL LENGTH OF STAY:          1               Rachelle Swenson MD

## 2019-03-11 NOTE — PROGRESS NOTES
TRANSFER - IN REPORT:    Verbal report received from [address] RN(name) on Aniya Hughes  being received from ED(unit) for routine progression of care      Report consisted of patients Situation, Background, Assessment and   Recommendations(SBAR). Information from the following report(s) SBAR, Kardex, ED Summary, Intake/Output, MAR, Recent Results and Med Rec Status was reviewed with the receiving nurse. Opportunity for questions and clarification was provided. Assessment completed upon patients arrival to unit and care assumed.

## 2019-03-12 NOTE — PROGRESS NOTES
CC:  Alfonso Gonzalez is a 78 y.o patient of Dr. Garrett Hillman with multiple myeloma referred for evaluation and management of DVT. HPI:  Alfonso Gonzalez believes he had a blood clot many years go but does not remember the details. He has had plasma cell neoplasm  since around 2012.     3/2019  He has been on single agent lenalinamide for multiple myeloma most recently. He was previously on CyBORD. He was previously  lenalinamide/dexamethasone but dexamethasone was discontinued because of poor tolerance. He has been on ASA 81 mg along with lenalinamide. 3/2019  He was admitted to [facility] for pneumonia. He subsequently developed a popliteal DVT. Hematology is consulted for management of DVT    Interval history:  Breathing is better. Still making sputum. Denies chest pain/leg pain  He has had minimal pain or swelling in the leg. PAST MEDICAL HISTORY:  1.  Multiple myeloma. 2.  COPD. 3.  Type 2 diabetes. 4.  Dyslipidemia. 5.  Renal cancer status post nephrectomy.     ALLERGIES:  THE PATIENT IS ALLERGIC TO NIACIN.     MEDICATIONS:  1. Tylenol 650 mg every 4 hours as needed for pain and fever. 2.  Albuterol 90 mcg 2 puffs by inhalation every 6 hours as needed for wheezing. 3.  DuoNeb every 4 hours as needed. 4.  Aspirin 81 mg daily. 5.  Symbicort 160/4.5 two puffs by inhalation daily. 6.  Calcium carbonate one tablet twice daily. 7.  Plavix 75 mg daily. 8.  Cymbalta 30 mg daily. 9.  Sliding scale with insulin coverage, Lantus insulin 30 units subcutaneously daily in the evening. 10.  Revlimid 25 mg daily. 11.  Singulair 10 mg daily. 12.  Protonix 40 mg daily. 13.  Crestor 40 mg daily. 14.  Spiriva inhalation daily.     FAMILY HISTORY:  No cancer/thrombosis. . Mother had HTN     PAST SURGICAL HISTORY:  This is significant for:  1. Colon resection. 2.  Nephrectomy secondary to renal cancer. 3.  Abdominal hernia repair.     SOCIAL HISTORY:  Retired [employer]. Smokes a pack of cigarettes daily. Denies alcohol abuse.     REVIEW OF SYSTEMS:  HEAD, EYES, EARS, NOSE, AND THROAT:  No headache, no dizziness, no blurring of vision, no photophobia. RESPIRATORY SYSTEM:  This is positive for cough and shortness of breath. No hemoptysis. Minimal sptum  CARDIOVASCULAR SYSTEM:  No chest pain, no orthopnea, no palpitations. GASTROINTESTINAL SYSTEM:  No nausea or vomiting. No diarrhea, no constipation. GENITOURINARY SYSTEM:  No dysuria, no urgency, no frequency.     All other systems are reviewed and they are negative.     PHYSICAL EXAMINATION:  GENERAL APPEARANCE: chronically ill appearing  NAD  HEAD:  Normocephalic, atraumatic. MOUTH AND THROAT:  No visible oral lesions Dry oral mucosa. NECK:  Neck is supple. No JVD/adenopathy  CHEST:  Poor airflow No overt wheeze/rales  HEART:  RRR  ABDOMEN:  Soft, nontender. Normal bowel sounds. CNS:  Alert and oriented x3. No gross focal neurological deficit. EXTREMITIES:  edema 1+ right> left No [palpable thrombosis  SKIN: no [pigmented lesions visable  PSYCHIATRY:  Normal mood and affect. LYMPHATIC SYSTEM:  No palapable cervical/axilliary LN. Fullness right groin ? ?           Dx:  1. Chronic obstructive pulmonary disease with acute exacerbation  --improving  2. Multiple myeloma  --no new data on single agent lenalinamide  3. Type 2 diabetes  --improved off dexamthasone for myeloma  4. Anemia  --presumed myeloma  5. Acute kidney injury  --myeloma + ??  6. Tobacco abuse  --chronic  7. DVT  --common in patients on lenalinamide with increased risk from immobilization/malignancy        Rx:  D/C Heparin IV  Apixiban 10 mg BID x 7 days followed by 5 mg BID ( he will need prescriptions for outpatient)  Heme test stools  Transfuse PRBC prn  Check ferritin /erythropoietin/B12/IGG level  D/C tobacco  Hold lenalinamide pending reassessment of renal function   Hydration as tolerated  F/U Dr. Gokul Bell        Disc:  Ross Jolly has pneumonia/COPD complicating multiple myeloma. He developed a DVT during recent illness/hospitalization/immobilization. He is at increased risk of thrombosis from myeloma and myeloma therapy such as lenolinamide/glucocrticoids. The optimal regimen for anticoagulation in this circumstance is unknown but therapeutic anticoagulation is recommended. Dr. Stephon Mari recommends Rachell Linear. We reviewed risk of bleeding with Jaclyn Quinn. His renal function is concerning but renal clearance is adequate for standard apixiban dosing. Renal function is inadequate for his current lenalinamide dose of 25 mg 21 days on/7 days off. We will hold lenalinamide temporarily until renal function improves and then redose lenalinamide. He may need a permanent lenalinamide dose reduction if renal fuunction does not recover to normal.      Our thanks to hospitalist service for care of Jaclyn Quinn. Please call as needed   Cell [phone]            [facility] records appended below. Patient Name:  Ubaldo Meckel Date: 3/7/2019  Patient Number: [id number] YOB: 1940  FOLLOW UP VISIT  Assessment and Plan  multiple myeloma  He has multiple myeloma and is currently on CyBorD given his earlier renal failure requiring dialysis and he tolerated the regimen fairly well. He is not on  a  maintenance regimen such as Revlimid + dexamethasone. He is not tolerating the dexamethasone so I will drop this and continue with single agent  revlimid. For his swollen legs, I have started maxzide. I need to check his BMP in about two weeks. He tends to run high K's and it looks like this was  due to gallons of V8 juice that he is drinking. He will stop this. When he returns, his peripherla edema will be reassessed. He has been instructed to  contact the office immediately if any worrisome side effects or unexplained symptoms occur.   Primary Diagnosis  Date Type ICD9 ICD10 Description Disease Status Status Date  2/22/2018 Primary 203.00 C90.00 Multiple myeloma not having achieved remission  3/29/2018 Primary V58.11 Z51.11 Encounter for antineoplastic chemotherapy  Hematology/Oncology Summary  Michael Mclean is a  78year old gentleman who was admitted to [address] [place] [address] with hypoglycemia associated with profound diarrhea. He does have a history of  diabetes mellitus. He was noted to not only being hypoglycemic at the time of admission, but dehydrated with acute renal failure He has a history of  diarrhea off and on for the last 15 years or so for which he has undergone multiple procedures including colonoscopy without ever a clear diagnosis  being made. When he presented to the hospital, he was noted, not only of having a creatinine of 4.9. a BUIN of 79, his glucose has risen to 73, his  potassium was 5.2. He was started on kayexalate, an amp of D50, 5 units of IV insulin, which was about half a dose since he was only mildly  hypoglycemic at this time. He was seen in consultation by Dr. Viktoriya Polanco from the nephrology service. He had been on ACE inhibitor. This was held  because of his renal failure & hyperkalemia. He has been gently hydrated and his lab tests improved. His potassium is down to 3.6. However his  creatinine is still 4.8 and his BUN is 64. ferritin 585. His albumin was rather low at 2.3 and 3.3 with a total protein of 8.5, which is high. He was seen by Dr. Ronny Cristina   and his creatinine had dropped to 1.26. His bone marrow results finalized 5/24/12 and revealed atypical strongly CD56+  monoclonal plasmacytosis (roughly 10%) with focal cytologic plasma cell atypia worrisome for aggressive behavior. There was small amounts of storage  iron present without pathologic erythroid  iron. His skeletal survey on 5/29/12 revealed no obvious bony lesions. He returned to the office  and felt  well. He had no bony symptoms. He was pretty discouraged with his [place] doctors and staff.  Skeletal survey on 5/29/12  revealed no suspicious lesions and an abdominal aortic aneurysm with 5.5 cm AP dimension. He was worked up at the [facility] for cancer cells in his urine, he reports but the workup turned up negative. He had a CT scan on 4/10/14 which  revealed a right nephrectomy, no evidence of recurrence in the nephrectomy bed, no evidence of left kidney or ureter mass, no evidence of bladder  mass, questionable 9 mm RLL nodule but in the setting of adjacent atelectasis, decreased diameter of AAA s/p EVAR, right lateral abdominal wall  hernia containing a nonobstructed loop of distal small bowel, subtotal colectomy with ileocolonic anastomosis and enlarged prostate gland. He was hospitalized in November 2015 for pneumonia. He has had cataracts removed bilaterally in February and his vision much improved. 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 He has since sold  and has a contract  pending on an 8th. 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 He came to the office with word that he had a stroke on 9/9/16 and ended up in [facility]. He was going out to breakfast and while driving became  extremely  dizzy. He pulled over and then became limp on the left side. EMS got there within 23 minutes and he was given 4 baby ASA to chew. He  was \"Out of it\" so he did not remember all that happened. He went to rehab and got most of his function back. He gave up smoking. He had a throbbing  behind his left eye earlier. He returned to the office  and reported that tax day was depressing. He sold 8 houses last year  the profits from 4 houses ended up going to taxes. He  tapped into his RAIN to pay his bills. He  returned to the office 11/15/17 and reported that he was doing well. He was going to look at a house that day  to consider buying it. 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 His hgb had dropped just  abit to 12.2 with a wbc of 9200 and plt count of 347,000. His creatinine  was 1.22 with a K of 5.5 and Ca of 10.3. His B2M had climbed to 4.0 from 2.8  and his free lambda light chains had jumped from 1219 to  with a Fidencio Shepherd : 1940 ([id number]) Page 1 of 6   spike of 2.2. He was contacted and asked about his oral calcium consumption and asked to consider a repeat BM asp/bx. Unfortunately this was not  scheduled until he present to [facility] with the flu and was noted while in the hospital to have a rapidly climbing creatinine. He required 3 sessions with  dialysis. BM aspirate and biopsy on 18 revealed 50% involvement with a monoclonal plasmacytosis with atypical/immature morphology (focally  plasmoblastic). He was started on CyBorD. He came back to the office 18 and reported that he saw his PCP at the [place] earlier today. His insulin dose was being decreased. He was very  unhappy with his experience at the [facility]. He has followup with his nephrologist soon on [facility].    He returned to the office 5/3/18 and reported that he was dong fairly well but he had noticed pain in his knees and right shoulder. He missed his  skeletal survey so he needs to have this rescheduled. Balance was a bit off and he was using a cane. He had an appointment with his PCP at the end  of the month. He was discharged from the hospital with a bottle of regular insulin but no instructions on how to use it. Skeletal survey on 18 revealed no significant change in multiple small lytic foci. He came back to the office 18 (cycle 5, day 8) and reported that he does not sleep the day of chemo and since adding an antiemetic to the  regimen, he does not have nausea. He was mostly doing well. He returned to the office 18 and reported some mild fatigue but on the whole, he was doing well with his treatment. He used his walker if he has to  walk any distance down the street but inside he walks without assistive devices.       He came back to the office 18 and reported that he was very depressed and he was dealing with pain in his knees, an occasional electric shock  sensation below his knees and tingling running down his right arm on occasion. He returned to the office 18 and reported that he was doing better but still dealing neuropathy in his hands and feet, more left side than right. He  felt weak and fatigued compared with preillness but better overall. CT scan of the chest on 19 reported shifting pattern of pulmonary densities, overall improved from prior, likely reflecting infectious process. severe  bullous emphysema. He comes back to [address] [place] [address] 3/7/19 and reports that he is having more issues with leg swelling. His vision is affected the days that he takes the steroid pills. HIs appetite is better. Disease Features  Multiple myeloma, diagnosed 12  CD56+ monoclonal plasmacytosis (roughly 10%) with focal cytologic plasma cell atypia worrisome for aggressive behavior. Small amounts of storage  iron present without pathologic erythroid iron.    Treatment History  CyBorD  Current Treatment  rev +/ dex  Treatment Goal: palliation  Disease Status: Stable  Chief Complaint  followup for abnormal free light chain ratio  Active Problems  multiple myeloma  diabetes  hypertension  Past Medical History  asthma  COPD  depressed  diabetes  hearing loss  hypertension  influenza vaccine 2014  pneumonia [facility] 2015  AAA repair endovascular graft Nils Matthew)  bilateral cataract removal 2016 & 2016  blood transfusion  colon resection  hernia repair  kidney removed  Nata Lowe : 1940 ([id number]) Page 2 of 6   ruptured esophagus  ROS  Except as noted above or in the interim history, a 10 point ROS is negative  Allergies  Allergy Reaction (Severity)  No Known Allergies  Medications  Inside Drug Script Date Qty Rfls Instructions  acetaminophen 500 mg tablet 3/22/2018 0 1 p.o. q. day  Albuterol Sulfate HFA IN Aerosol, solution 1980 0 Aerosol,  solution 1  Inhalation  daily PRN  Aspirin 81 mg Tablet, chewable 1980 0 81 mg  Take  1 Tablet,  chewable Oral  daily  Calcium 500 500 mg calcium (1,250 mg) tablet 2019 0 1 p.o. twice a  day (BID)  Cardizem 120 mg Tablet 1980 0 120 mg   Take 2 Tablet  Oral daily  cholecalciferol (vitamin D3) 1,000 unit tablet 3/22/2018 0 1 p.o. q. day  Crestor 40 mg Tablet 1980 0 40 mg  Take  0.5 Tablet  Oral daily  Y Cymbalta 30 mg capsule,delayed release 2018 30 11 1 po qhs  Y dexamethasone 4 mg tablet 2018 20 11 Take 5 tabs  po once a  week with  food  docusate sodium 100 mg tablet 3/22/2018 0 1 p.o. twice a  day (BID)  constipation  emollient topical 3/22/2018 0 apply to all  over topically  every day  shift of skin  care  GlipiZIDE 10 mg Tablet 1980 0 10 mg  Take  2 Tablet Oral  daily  Insulin 8 Units Injectable 1980 0 8 Units  1  Subcutaneous  at bedtime  ipratropiumalbuterol 0.5 mg3 mg(2.5 mg base)/3 mL nebulization soln 3/22/2018 0 every 4 hours  for SOB.   loperamide 2 mg capsule 3/22/2018 0 1 p.o. q. day  every 6 hours  as needed for  Grover Merlin : 1940 ([id number]) Page 3 of 6   diarrhea  Y [id number] 37.5 mg25 mg tablet 3/7/2019 30 6 1 p.o. q. day  prn swelling  melatonin 10 mg capsule 3/22/2018 0 1 p.o. q. day  pantoprazole 40 mg tablet,delayed release 3/22/2018 0 1 p.o. q. day  Plavix 75 mg tablet 3/22/2018 0 1 p.o. q. day  Proventil IN Aerosol, solution 1980 0 Aerosol,  solution  Inhalation  PRN  Y Revlimid 25 mg capsule 2019 21 0 1 p.o. q. day  for 21 days,  off 7 days  AUTH  [id number]  Singulair 10 mg Tablet 1980 0 10 mg  Take  1 Tablet Oral  daily  Y sodium polystyrene sulfonate (sorbitol free) 15 gram/60 mL oral susp 2018 120 0 Take 120 ml  (30 G) po  once today  Spiriva HandiHaler IN Capsule 1980 0 1 Capsule  Inhalation  daily  sucralfate 1 gram tablet 3/22/2018 0 Give 10ml by  mouth before  meals at hs  Symbicort 1604.5 mcg/act Aerosol 1980 0 Aerosol  Inhalation  tuberculin PPD 5 tub. unit/0.1 mL intradermal injection solution 3/22/2018 0 every evening  7 days  FH/SH  Family History:   mother    father    no new FHx  Social History:      quit smoking 2016 ,  smoked  . 5 pack/day for 60   years  retired  Vital Signs  Vitals on 3/7/2019 10:55:00 AM: Height=72.00in, Nyfttg=786.4lb, Temp=97.5f, ABNJQ=32, WEFQ=77, RAIOWSKEPJ=989, DNQCICERNFJ=52, Pulse QR=33%  Plan of Care for Pain  Treatment Recommendations:  Continue current pain regimen  No action needed  Exam  : Fully active, able to carry on all predisease performance without restriction ECOG Scale 1: Restricted in physically strenuous activity but ambulatory  and able to carry out work of a light or sedentary nature, e.g., light house work, office work Constitutional: Alert, cooperative, oriented. Mood and affect  Mervin Segovia : 1940 ([id number]) Page 4 of 6   appropriate. Appears close to chronological age. Well nourished. Well developed. Head: Normocephalic? no scars  Eyes: Conjunctivae and sclerae are clear and without icterus. Pupils are round  ENMT: Sinuses are nontender. No oral exudates, ulcers, masses, thrush or mucositis. Oropharynx clear. Tongue normal.  Neck: Supple without masses or thyromegaly. No jugular venous distension. Hematologic/Lymphatic: No petechiae or purpura. No tender or palpable lymph nodes in the cervical, supraclavicular, axillary or inguinal area. Respiratory: clear today. Cardiovascular: Regular rate and rhythm of heart without murmurs, gallops or rubs. Chest/Line Site: Chest is symmetric with no chest wall deformities. Breasts: N/A  Abdomen: Nontender, nondistended, no masses, ascites or hepatosplenomegaly. Good bowel sounds. Musculoskeletal: No tenderness or swelling, normal range of motion without obvious weakness. Extremities: No visible deformities, no cyanosis, clubbing. 2+ edema.   Skin: No rashes, scars, or lesions suggestive of malignancy. Neurologic: No sensory or motor deficits noted but not specifically tested. Psychiatric: Alert and oriented. Coherent speech. Verbalizes understanding of our discussions today. Time Spent with Patient  CC min/Total min 20  Lab Results Table  Lab results for 3/7/2019  Result Value Units Range Comment  WBC 4.0 K/uL 3.410.8  RBC 2.9 M/uL 4.15.8  HGB 8.5 g/dL 1317.7  HCT 27.8 % 37.551  MCV 95.9 fL 7997  MCH 29.3 pg 26.633  MCHC 30.6 g/dL 31.535.7  Plat 136 K/uL 911812  ANC 2.9 K/uL 1.47  Lymph# 0.6 K/uL 0.73.1  MONO# 0.3 K/uL 0.10.9  BASO# 0.1 K/uL 00.2  EOS# 0.2 K/uL 00.4  Neut% 71.1 % 4074  Lymph% 15.1 % 1446  MONO% 7.4 % 412  BASO% 1.2 % 03  EOS% 5.2 % 05  Imaging Results  Other Physicians:  Domenic Bro [phone]? Norbert Parr? Provider Name Contact Information  Physician Pablo Roger MD [facility] Provider [address] Fax: [phone], Office: [phone]  Work: [address] [place] [address],   [facility] : 1940 ([id number]) Page 5 of 6   Primary Care Provider Tiffany Morrow MD Fax: [phone], Work: [phone]  Work: [address]  [address] [facility],  [address]  Primary Care Provider Domenic Bro MD Fax: [phone], Work: [phone]  Work: [facility],  [address]  Referring Provider Sera Santana M.D.  Fax: [phone], Work: [phone]  Work: [facility] [facility] [place],  [address]  Signed By: ___________________________  Pablo Roger MD [address], NPI: [id number], on 3/7/2019

## 2019-03-12 NOTE — PROGRESS NOTES
Hospitalist Progress Note  Claudia Fenton MD  Answering service: [phone] from in house phone  Cell: [phone]      Date of Service:  3/12/2019  NAME:  Michael Salas  :  1940  MRN:  [id number]      Admission Summary:   A 78 M lives with wife, independent, COPD, MM on chemo p/w SOB 1 day hx. Some congestion. Interval history / Subjective:   He said pain and swelling on right leg and breathing improving     Assessment & Plan:     Acute COPD exacerbation  -continue prednisone, pulmicort, levaquin, brovana, duo neb, oxygen support  - PT/OT eval     Acute right popliteal DVT on doppler study on 3/11  -on heparin gtt  -V/Q scan obstructive pulmonary disease, there are a few small peripheral subsegmental defects and examination is indeterminate for pulmonary emboli.  -patient with multiple comorbidity,   -patient with low platelet, plavix for stroke, anemia of chronic disease, consult to hem/onc for anticoagulation recomendation    DIANA likely preRenal,   -creatinine improving  -monitor renal function, avoid nephrotoxics    MM  -on lenalidomide    T2DM with hyeprglycemia,   -A1c 7.4, SSI, continue Lantus, monitor sugars 'on prednisone'    Anemia of chronic disease due to MM,  -low but stable, monitor    Thrombocytopenia likely due to MM,   -improving, monitor    Mild Hyponatremia  -repeat bmp in am    Mild Hypomagnesemia  -repeat mg level    Hx of stroke  -on plavix    Hx of AAA repair  -stable       Code status: Full Code  DVT prophylaxis: on heparin gtt    Care Plan discussed with: Patient/Family and Nurse  Disposition: TBD     Hospital Problems  Date Reviewed: 3/10/2019          Codes Class Noted POA    * (Principal) COPD with acute exacerbation ([facility] [address] ICD-10-CM: J44.1  ICD-9-CM: 491.21  2011 Yes                Vital Signs:    Last 24hrs VS reviewed since prior progress note.  Most recent are:  Visit Vitals  /55 (BP 1 Location: Left arm, BP Patient Position: At rest)   Pulse 90   Temp 97.7 °F (36.5 °C)   Resp 18   Ht 6' (1.829 m)   Wt 62.1 kg (136 lb 12.8 oz)   SpO2 95%   BMI 18.55 kg/m²       No intake or output data in the 24 hours ending 03/12/19 1252     Physical Examination:             Constitutional:  No acute distress, cooperative, pleasant    ENT:  Oral mucous moist, oropharynx benign. Neck supple,    Resp:  Decrease bronchial breath sound bilaterally. No wheezing/rhonchi/rales. No accessory muscle use   CV:  Regular rhythm, normal rate, no murmurs, gallops, rubs    GI:  Soft, non distended, non tender. normoactive bowel sounds, no hepatosplenomegaly     Musculoskeletal:  No edema,    Neurologic:  Moves all extremities. AAOx3, CN II-XII reviewed     Skin:  Good turgor, no rashes or ulcers       Data Review:    Review and/or order of clinical lab test      Labs:     Recent Labs     03/11/19 1803 03/11/19 0117   WBC 5.5 6.2   HGB 8.3* 8.6*   HCT 26.7* 27.9*   * 121*     Recent Labs     03/11/19  0117 03/10/19  1900   * 140   K 4.5 3.8    104   CO2 23 27   BUN 30* 30*   CREA 1.44* 1.60*   * 34*   CA 8.8 9.6   MG 1.5* 1.9   PHOS 3.2  --      Recent Labs     03/11/19  0117 03/10/19  1900   SGOT 9* 11*   ALT 17 18   AP 57 64   TBILI 0.4 0.3   TP 5.9* 6.4   ALB 2.6* 2.9*   GLOB 3.3 3.5     Recent Labs     03/12/19  0521 03/12/19  0205 03/11/19  1803   APTT 51.0* 121.5* 25.6      Recent Labs     03/11/19 0116   TIBC 227*   PSAT 20   FERR 125      Lab Results   Component Value Date/Time    Folate 14.1 03/11/2019 01:16 AM      No results for input(s): PH, PCO2, PO2 in the last 72 hours.   Recent Labs     03/11/19  0117 03/10/19  1900   CPK 70  --    CKNDX 9.1*  --    TROIQ <0.05 <0.05     Lab Results   Component Value Date/Time    Cholesterol, total 101 03/11/2019 01:17 AM    HDL Cholesterol 59 03/11/2019 01:17 AM    LDL, calculated 31.6 03/11/2019 01:17 AM    Triglyceride 52 03/11/2019 01:17 AM CHOL/HDL Ratio 1.7 03/11/2019 01:17 AM     Lab Results   Component Value Date/Time    Glucose (POC) 119 (H) 03/12/2019 06:40 AM    Glucose (POC) 183 (H) 03/11/2019 09:55 PM    Glucose (POC) 237 (H) 03/11/2019 04:42 PM    Glucose (POC) 224 (H) 03/11/2019 11:15 AM    Glucose (POC) 254 (H) 03/11/2019 06:12 AM     Lab Results   Component Value Date/Time    Color YELLOW/STRAW 01/27/2019 07:45 PM    Appearance CLEAR 01/27/2019 07:45 PM    Specific gravity 1.018 01/27/2019 07:45 PM    pH (UA) 7.0 01/27/2019 07:45 PM    Protein 100 (A) 01/27/2019 07:45 PM    Glucose 250 (A) 01/27/2019 07:45 PM    Ketone TRACE (A) 01/27/2019 07:45 PM    Bilirubin NEGATIVE  01/27/2019 07:45 PM    Urobilinogen 1.0 01/27/2019 07:45 PM    Nitrites NEGATIVE  01/27/2019 07:45 PM    Leukocyte Esterase NEGATIVE  01/27/2019 07:45 PM    Epithelial cells FEW 01/27/2019 07:45 PM    Bacteria NEGATIVE  01/27/2019 07:45 PM    WBC 0-4 01/27/2019 07:45 PM    RBC 0-5 01/27/2019 07:45 PM         Medications Reviewed:     Current Facility-Administered Medications   Medication Dose Route Frequency    levoFLOXacin (LEVAQUIN) tablet 250 mg  250 mg Oral Q24H    dextrose (D50W) injection syrg 12.5-25 g  25-50 mL IntraVENous PRN    insulin glargine (LANTUS) injection 25 Units  25 Units SubCUTAneous QHS    insulin lispro (HUMALOG) injection 6 Units  6 Units SubCUTAneous TID WITH MEALS    heparin 25,000 units in D5W 250 ml infusion  18-36 Units/kg/hr IntraVENous TITRATE    acetaminophen (TYLENOL) tablet 650 mg  650 mg Oral Q4H PRN    albuterol-ipratropium (DUO-NEB) 2.5 MG-0.5 MG/3 ML  3 mL Nebulization Q4H PRN    aspirin chewable tablet 81 mg  81 mg Oral DAILY    calcium carbonate (TUMS) chewable tablet 200 mg [elemental]  200 mg Oral BID WITH MEALS    clopidogrel (PLAVIX) tablet 75 mg  75 mg Oral DAILY    DULoxetine (CYMBALTA) capsule 30 mg  30 mg Oral DAILY    lenalidomide cap 25 mg (Patient Supplied)  25 mg Oral DAILY    montelukast (SINGULAIR) tablet 10 mg  10 mg Oral DAILY    pantoprazole (PROTONIX) tablet 40 mg  40 mg Oral BID    rosuvastatin (CRESTOR) tablet 20 mg  20 mg Oral QHS    sodium chloride (NS) flush 5-40 mL  5-40 mL IntraVENous Q8H    sodium chloride (NS) flush 5-40 mL  5-40 mL IntraVENous PRN    morphine injection 2 mg  2 mg IntraVENous Q4H PRN    ondansetron (ZOFRAN) injection 4 mg  4 mg IntraVENous Q4H PRN    bisacodyl (DULCOLAX) tablet 5 mg  5 mg Oral DAILY PRN    nicotine (NICODERM CQ) 21 mg/24 hr patch 1 Patch  1 Patch TransDERmal Q24H    predniSONE (DELTASONE) tablet 40 mg  40 mg Oral DAILY WITH BREAKFAST    insulin lispro (HUMALOG) injection   SubCUTAneous AC&HS    glucose chewable tablet 16 g  4 Tab Oral PRN    dextrose (D50W) injection syrg 12.5-25 g  12.5-25 g IntraVENous PRN    glucagon (GLUCAGEN) injection 1 mg  1 mg IntraMUSCular PRN    arformoterol (BROVANA) neb solution 15 mcg  15 mcg Nebulization BID RT    And    budesonide (PULMICORT) 500 mcg/2 ml nebulizer suspension  500 mcg Nebulization BID RT     ______________________________________________________________________  EXPECTED LENGTH OF STAY: 3d 2h  ACTUAL LENGTH OF STAY:          2                 Popeye Mota MD

## 2019-03-12 NOTE — PROGRESS NOTES
Spiritual Care Assessment/Progress Note  [facility]      NAME: Stacey Tompkins      MRN: [id number]  AGE: 78 y.o.  SEX: male  Yazidism Affiliation: Synagogue   Language: English     3/12/2019     Total Time (in minutes): 30     Spiritual Assessment begun in [facility] through conversation with:         [x]Patient        [] Family    [x] Friend(s)        Reason for Consult: Palliative Care, Initial/Spiritual Assessment     Spiritual beliefs: (Please include comment if needed)     [x] Identifies with a javy tradition:         [] Supported by a javy community:            [] Claims no spiritual orientation:           [] Seeking spiritual identity:                [] Adheres to an individual form of spirituality:           [] Not able to assess:                           Identified resources for coping:      [x] Prayer                               [] Music                  [] Guided Imagery     [x] Family/friends                 [] Pet visits     [] Devotional reading                         [] Unknown     [] Other:                                           Interventions offered during this visit: (See comments for more details)    Patient Interventions: Affirmation of emotions/emotional suffering, Coping skills reviewed/reinforced, Guided imagery, Prayer (assurance of), Normalization of emotional/spiritual concerns           Plan of Care:     [x] Support spiritual and/or cultural needs    [] Support AMD and/or advance care planning process      [] Support grieving process   [] Coordinate Rites and/or Rituals    [] Coordination with community clergy   [] No spiritual needs identified at this time   [] Detailed Plan of Care below (See Comments)  [] Make referral to Music Therapy  [] Make referral to Pet Therapy     [] Make referral to Addiction services  [] Make referral to [facility]  [] Make referral to Spiritual Care Partner  [] No future visits requested        [x] Follow up visits as needed Comments: Initial spiritual assessment in Room 604/01. Patient was talking with a friend as I entered the room, patient spoke about his illness, and how he is looking forward to going home. Patient was explained  to his friend about what happen the first time with his illness, and how he ended up back in the hospital.  Provided care,comfort, and prayer to the patient. Advised of  Availability. Rev. Mame Bullock.  Eric [facility] Intern [facility]

## 2019-03-12 NOTE — PALLIATIVE CARE
Mr. Nila Hanley is a 78year old woman with a history significant for HTN, DM2, COPD, AAA, renal cancer s/p nephrectomy and Multiple Myeloma on chemotherapy. He was admitted on 3/10 with COPD exacerbation. His was admitted here 1/27-2/2 for PNA and steroids completed 3/7/19. Patient has no AMD on file. Palliative was consulted to assist with care goals. We will not be able to see today, but will follow-up tomorrow.

## 2019-03-12 NOTE — PROGRESS NOTES
Clinical Pharmacy Note: Re: IV to PO Automatic Conversion - Antibiotic    Please note: [facility] medication(s) (levofloxacin) has/have been changed from IV to PO based on the following criteria:    The patient:  1. Has received IV therapy for at least 48 hours   2. Has a functioning GI tract  - Taking scheduled oral medications  - Tolerating tube feeds at goal rate or a full liquid, soft, or regular diet         3. Is clinically stable        - Temperature < 100.4F for at least 24 hours        - WBC is trending down    This IV to PO conversion is based on the [employer] approved automatic conversion policy for eligible patients. Please call with questions.

## 2019-03-12 NOTE — PROGRESS NOTES
Occupational Therapy: defer    66 91 21: Chart reviewed, noted duplex positive for R popliteal DVT. Also noted heparin initiated, most recent aPTT of 51.0. OT will defer at this time until aPTT is in therapeutic range for mobilization (aPTT 58-92). 1600: Noted most recent aPTT at 14:33 is 34.8. Will continue to defer.       Manjinder Handley, OTR/L

## 2019-03-12 NOTE — PROGRESS NOTES
Problem: Falls - Risk of  Goal: *Absence of Falls  Document Conrad Fall Risk and appropriate interventions in the flowsheet.   Outcome: Progressing Towards Goal  Fall Risk Interventions:  Mobility Interventions: Assess mobility with egress test              Elimination Interventions: Call light in reach

## 2019-03-12 NOTE — PROGRESS NOTES
Reason for Admission:   COPD exacerbation               RRAT Score:    34              Resources/supports as identified by patient/family:   Spouse                Top Challenges facing patient (as identified by patient/family and CM): Finances/Medication cost?  Insured by [employer]? Patient drives              Support system or lack thereof? spouse                     Living arrangements? Lives with spouse           Self-care/ADLs/Cognition? Fully independent          Current Advanced Directive/Advance Care Plan:  None on file                          Plan for utilizing home health:   None                       Likelihood of readmission: High based on RRAT                 Transition of Care Plan:     Nydia Iverson is a 78 yr old  male admitted due to COPD exacerbation with PMH significant for  type 2 diabetes; hypertension; dyslipidemia; multiple myeloma; and renal cancer. Upon interview patient was sitting up eating breakfast with 1L of oxygen NC. Patient verified demographics, NOK and that he does to red clinic [facility]. Patient is fully independent and drives. He is not on home oxygen so will need to try and wean prior to discharge. Due to high RRAT and COPD have placed a Hospital to Home consult to [facility]. Consulted senior [facility] for health  and Palliative care for disease management. Patient has no advance directive.     Care Management Interventions  PCP Verified by CM: Yes(Patient goes to red clinic at [facility])  Transition of [address] (CM Consult): [address]: Yes  Physical Therapy Consult: Yes  Occupational Therapy Consult: Yes  Current Support Network: Lives with Spouse  Plan discussed with Pt/Family/Caregiver: Yes  Freedom of Choice Offered: Yes  Discharge Location  Discharge Placement: Home

## 2019-03-12 NOTE — PROGRESS NOTES
Physical Therapy  Orders received. Chart reviewed, noted duplex positive for R popliteal DVT. Also noted heparin initiated, most recent aPTT of 51.0. PT will defer at this time until aPTT is in therapeutic range for mobilization (aPTT 58-92). Have spoken with nursing and new levels not to be drawn until later today so likely will follow up tomorrow.   Guillermo Dc, PT

## 2019-03-13 NOTE — PROGRESS NOTES
Hospitalist Progress Note  Kaitlynn Bello MD  Answering service: [phone] from in house phone  Cell: [phone]      Date of Service:  3/13/2019  NAME:  Mark Hart  :  1940  MRN:  [id number]      Admission Summary:   A 78 M lives with wife, independent, COPD, MM on chemo p/w SOB 1 day hx. Some congestion.     Interval history / Subjective:   He said his breathing improved     Assessment & Plan:     Acute COPD exacerbation  -improving, continue prednisone, pulmicort, levaquin, brovana, duo neb,   -SpO2 % on RA  - PT/OT eval     Acute right popliteal DVT on doppler study on 3/11  -heparin gtt discontinued and started on Eliquis   -V/Q scan obstructive pulmonary disease, there are a few small peripheral subsegmental defects and examination is indeterminate for pulmonary emboli.  -patient with multiple comorbidity,   -patient with low platelet, plavix for stroke, anemia of chronic disease, consult to hem/onc for anticoagulation recomendation    DIANA likely preRenal,   -creatinine improving  -monitor renal function, avoid nephrotoxics    MM  -on lenalidomide    T2DM with hyeprglycemia,   -A1c 7.4, SSI, continue Lantus, monitor sugars 'on prednisone'    Anemia of chronic disease due to MM,  -low but stable, monitor    Thrombocytopenia likely due to MM,   -improving, monitor    Mild Hyponatremia  -repeat bmp in am    Mild Hypomagnesemia  -repeat mg level    Hx of stroke  -on plavix and aspirin    Hx of AAA repair  -stable    Tobacco abuse  -advised to stop smoking       Code status: Full Code  DVT prophylaxis: on heparin gtt    Care Plan discussed with: Patient/Family and Nurse  Disposition: home with family     Hospital Problems  Date Reviewed: 3/10/2019          Codes Class Noted POA    * (Principal) [facility] ICD-10-CM: J44.1  ICD-9-CM: 491.21  2011 Yes                Vital Signs:    Last 24hrs VS reviewed since prior progress note. Most recent are:  Visit Vitals  /69 (BP 1 Location: Left arm, BP Patient Position: At rest)   Pulse 92   Temp 97.4 °F (36.3 °C)   Resp 18   Ht 6' (1.829 m)   Wt 62.1 kg (136 lb 12.8 oz)   SpO2 100%   BMI 18.55 kg/m²         Intake/Output Summary (Last 24 hours) at 3/13/2019 1110  Last data filed at 3/13/2019 0859  Gross per 24 hour   Intake 240 ml   Output 480 ml   Net -240 ml        Physical Examination:             Constitutional:  No acute distress, cooperative, pleasant    ENT:  Oral mucous moist, oropharynx benign. Neck supple,    Resp:  Decrease bronchial breath sound bilaterally. No wheezing/rhonchi/rales. No accessory muscle use   CV:  Regular rhythm, normal rate, no murmurs, gallops, rubs    GI:  Soft, non distended, non tender. normoactive bowel sounds, no hepatosplenomegaly     Musculoskeletal:  No edema,    Neurologic:  Moves all extremities. AAOx3, CN II-XII reviewed     Skin:  Good turgor, no rashes or ulcers       Data Review:    Review and/or order of clinical lab test      Labs:     Recent Labs     03/11/19  1803 03/11/19  0117   WBC 5.5 6.2   HGB 8.3* 8.6*   HCT 26.7* 27.9*   * 121*     Recent Labs     03/11/19  0117 03/10/19  1900   * 140   K 4.5 3.8    104   CO2 23 27   BUN 30* 30*   CREA 1.44* 1.60*   * 34*   CA 8.8 9.6   MG 1.5* 1.9   PHOS 3.2  --      Recent Labs     03/11/19  0117 03/10/19  1900   SGOT 9* 11*   ALT 17 18   AP 57 64   TBILI 0.4 0.3   TP 5.9* 6.4   ALB 2.6* 2.9*   GLOB 3.3 3.5     Recent Labs     03/12/19  1433 03/12/19  0521 03/12/19  0205   APTT 34.8* 51.0* 121.5*      Recent Labs     03/13/19  0121 03/11/19  0116   TIBC  --  227*   PSAT  --  20   FERR 167 125      Lab Results   Component Value Date/Time    Folate 14.1 03/11/2019 01:16 AM      No results for input(s): PH, PCO2, PO2 in the last 72 hours.   Recent Labs     03/11/19  0117 03/10/19  1900   CPK 70  --    CKNDX 9.1*  --    TROIQ <0.05 <0.05     Lab Results   Component Value Date/Time    Cholesterol, total 101 03/11/2019 01:17 AM    HDL Cholesterol 59 03/11/2019 01:17 AM    LDL, calculated 31.6 03/11/2019 01:17 AM    Triglyceride 52 03/11/2019 01:17 AM    CHOL/HDL Ratio 1.7 03/11/2019 01:17 AM     Lab Results   Component Value Date/Time    Glucose (POC) 91 03/13/2019 06:06 AM    Glucose (POC) 86 03/12/2019 09:22 PM    Glucose (POC) 231 (H) 03/12/2019 04:40 PM    Glucose (POC) 119 (H) 03/12/2019 06:40 AM    Glucose (POC) 183 (H) 03/11/2019 09:55 PM     Lab Results   Component Value Date/Time    Color YELLOW/STRAW 01/27/2019 07:45 PM    Appearance CLEAR 01/27/2019 07:45 PM    Specific gravity 1.018 01/27/2019 07:45 PM    pH (UA) 7.0 01/27/2019 07:45 PM    Protein 100 (A) 01/27/2019 07:45 PM    Glucose 250 (A) 01/27/2019 07:45 PM    Ketone TRACE (A) 01/27/2019 07:45 PM    Bilirubin NEGATIVE  01/27/2019 07:45 PM    Urobilinogen 1.0 01/27/2019 07:45 PM    Nitrites NEGATIVE  01/27/2019 07:45 PM    Leukocyte Esterase NEGATIVE  01/27/2019 07:45 PM    Epithelial cells FEW 01/27/2019 07:45 PM    Bacteria NEGATIVE  01/27/2019 07:45 PM    WBC 0-4 01/27/2019 07:45 PM    RBC 0-5 01/27/2019 07:45 PM         Medications Reviewed:     Current Facility-Administered Medications   Medication Dose Route Frequency    levoFLOXacin (LEVAQUIN) tablet 250 mg  250 mg Oral Q24H    apixaban (ELIQUIS) tablet 10 mg  10 mg Oral BID    [START ON 3/19/2019] apixaban (ELIQUIS) tablet 5 mg  5 mg Oral BID    dextrose (D50W) injection syrg 12.5-25 g  25-50 mL IntraVENous PRN    insulin glargine (LANTUS) injection 25 Units  25 Units SubCUTAneous QHS    insulin lispro (HUMALOG) injection 6 Units  6 Units SubCUTAneous TID WITH MEALS    acetaminophen (TYLENOL) tablet 650 mg  650 mg Oral Q4H PRN    albuterol-ipratropium (DUO-NEB) 2.5 MG-0.5 MG/3 ML  3 mL Nebulization Q4H PRN    aspirin chewable tablet 81 mg  81 mg Oral DAILY    calcium carbonate (TUMS) chewable tablet 200 mg [elemental]  200 mg Oral BID WITH MEALS    clopidogrel (PLAVIX) tablet 75 mg  75 mg Oral DAILY    DULoxetine (CYMBALTA) capsule 30 mg  30 mg Oral DAILY    montelukast (SINGULAIR) tablet 10 mg  10 mg Oral DAILY    pantoprazole (PROTONIX) tablet 40 mg  40 mg Oral BID    rosuvastatin (CRESTOR) tablet 20 mg  20 mg Oral QHS    sodium chloride (NS) flush 5-40 mL  5-40 mL IntraVENous Q8H    sodium chloride (NS) flush 5-40 mL  5-40 mL IntraVENous PRN    morphine injection 2 mg  2 mg IntraVENous Q4H PRN    ondansetron (ZOFRAN) injection 4 mg  4 mg IntraVENous Q4H PRN    bisacodyl (DULCOLAX) tablet 5 mg  5 mg Oral DAILY PRN    nicotine (NICODERM CQ) 21 mg/24 hr patch 1 Patch  1 Patch TransDERmal Q24H    predniSONE (DELTASONE) tablet 40 mg  40 mg Oral DAILY WITH BREAKFAST    insulin lispro (HUMALOG) injection   SubCUTAneous AC&HS    glucose chewable tablet 16 g  4 Tab Oral PRN    dextrose (D50W) injection syrg 12.5-25 g  12.5-25 g IntraVENous PRN    glucagon (GLUCAGEN) injection 1 mg  1 mg IntraMUSCular PRN    arformoterol (BROVANA) neb solution 15 mcg  15 mcg Nebulization BID RT    And    budesonide (PULMICORT) 500 mcg/2 ml nebulizer suspension  500 mcg Nebulization BID RT     ______________________________________________________________________  EXPECTED LENGTH OF STAY: 3d 2h  ACTUAL LENGTH OF STAY:          3                 Janell Bledsoe MD

## 2019-03-13 NOTE — DISCHARGE INSTRUCTIONS
Discharge Instructions       PATIENT ID: Cristobal Edwards  MRN: [id number]   YOB: 1940    DATE OF ADMISSION: 3/10/2019  6:48 PM    DATE OF DISCHARGE: 3/13/2019    PRIMARY CARE PROVIDER: Hilda Guillory MD     ATTENDING PHYSICIAN: Johnathon Nick MD  DISCHARGING PROVIDER: Katie Burton MD    To contact this individual call [phone] and ask the  to page. If unavailable ask to be transferred the Adult Hospitalist Department. DISCHARGE DIAGNOSES   Acute COPD exacerbation  Acute right popliteal DVT on doppler study on 3/11  DIANA likely preRenal,   MM  T2DM with hyeprglycemia  Anemia of chronic disease due to MM  Thrombocytopenia likely due to MM   Mild Hyponatremia  Mild Hypomagnesemia  Hx of stroke  Hx of AAA repair  Tobacco abuse    CONSULTATIONS: IP CONSULT TO PALLIATIVE CARE - PROVIDER  IP CONSULT TO PEDIATRIC HEMATOLOGY ONCOLOGY  IP CONSULT TO HEMATOLOGY    PROCEDURES/SURGERIES: * No surgery found *    PENDING TEST RESULTS:   At the time of discharge the following test results are still pending: none    FOLLOW UP APPOINTMENTS:   Follow-up Information     Follow up With Specialties Details Why Contact Info    [facility] Keshav Restrepo [address] [facility] Area Office on [address]    Andie Cruz MD Hematology and Oncology, Hematology, Oncology On 3/18/2019 at 3:00pm [address]  [address]  [phone]      Primary Care Physician   In one week  Patient can only remember the practice name and not the physician         ADDITIONAL CARE RECOMMENDATIONS:     DIET: Diabetic Diet    ACTIVITY: Activity as tolerated    WOUND CARE: None    EQUIPMENT needed: None      DISCHARGE MEDICATIONS:   See Medication Reconciliation Form    · It is important that you take the medication exactly as they are prescribed.    · Keep your medication in the bottles provided by the pharmacist and keep a list of the medication names, dosages, and times to be taken in your wallet. · Do not take other medications without consulting your doctor. NOTIFY YOUR PHYSICIAN FOR ANY OF THE FOLLOWING:   Fever over 101 degrees for 24 hours. Chest pain, shortness of breath, fever, chills, nausea, vomiting, diarrhea, change in mentation, falling, weakness, bleeding. Severe pain or pain not relieved by medications. Or, any other signs or symptoms that you may have questions about.       DISPOSITION:    Home With:   OT  PT  HH  RN       SNF/Inpatient Rehab/LTAC   x Independent/assisted living    Hospice    Other:     CDMP Checked:   Yes x     PROBLEM LIST Updated:  Yes x       Signed:   Ford Everett MD  3/13/2019  11:46 AM

## 2019-03-13 NOTE — PROGRESS NOTES
Per review patient has been weaned off oxygen. Palliative care to see patient today along with therapy. Will follow should therapy have recommendations.      [phone]

## 2019-03-13 NOTE — PROGRESS NOTES
CC:  Carissa Flores is a 78 y.o patient of Dr. Nabil Ga with multiple myeloma referred for evaluation and management of DVT. HPI:  Carissa Flores believes he had a blood clot many years go but does not remember the details. He has had plasma cell neoplasm  since around 2012.     3/2019  He has been on single agent lenalinamide for multiple myeloma most recently. He was previously on CyBORD. He was previously  lenalinamide/dexamethasone but dexamethasone was discontinued because of poor tolerance. He has been on ASA 81 mg along with lenalinamide. 3/2019  He was admitted to [facility] for pneumonia. He subsequently developed a popliteal DVT. Hematology is consulted for management of DVT/myeloma     ROS:  Feels much better   Breathing better   Less cough  Still wheezing ( \"I wheeze all the time at home\"  Making urine  All other systems are reviewed and they are negative.     PHYSICAL EXAMINATION:  GENERAL APPEARANCE: chronically ill appearing  NAD  HEAD:  Normocephalic, atraumatic. MOUTH AND THROAT:  No visible oral lesions Dry oral mucosa. NECK:  Neck is supple. No JVD/adenopathy  CHEST:  Poor airflow Positive expiratory wheeze  HEART:  RRR  ABDOMEN:  Soft, nontender. Normal bowel sounds. CNS:  Alert and oriented x3. No gross focal neurological deficit. EXTREMITIES:  edema 1+ right> left No palpable thrombosis  SKIN: no pigmented lesions visable  PSYCHIATRY:  Normal mood and affect. LYMPHATIC SYSTEM:  No palapable cervical/axilliary LN. Fullness right groin ? ? No change           Dx:  1. Chronic obstructive pulmonary disease with acute exacerbation  --improving  2. Multiple myeloma  -- immunoglobulins normal on lenalinamide  3. Type 2 diabetes  --improved off dexamethasone for myeloma  4. Anemia  --presumed myeloma  --stable  5. Acute kidney injury  --myeloma + ??  --better with hydration   6. Tobacco abuse  --chronic  7. DVT  --common in patients on lenalinamide with increased risk from immobilization/malignancy        Rx:  Continue  Apixiban 10 mg BID x 7 days followed by 5 mg BID   ( he will need prescriptions for outpatient)  Heme test stools  Transfuse PRBC prn  D/C tobacco  Resume lenalinamide  Hydration as tolerated  F/U Dr. Benson Maria        Disc:  Tod Filter has pneumonia/COPD complicating multiple myeloma. He developed a DVT during recent illness/hospitalization/immobilization. He is at increased risk of thrombosis from myeloma and myeloma therapy such as lenolinamide/glucocrticoids. The optimal regimen for anticoagulation in this circumstance is unknown but therapeutic anticoagulation is recommended. Dr. Benson Maria recommends Quita Major. We reviewed risk of bleeding with Tod Filter. His renal function is concerning but renal clearance is adequate for standard apixiban dosing. Renal function was  inadequate for his current lenalinamide dose of 25 mg 21 days on/7 days off but that it is improving   We can resume lenalinamide  He may need a permanent lenalinamide dose reduction if renal fuunction does not recover to normal.      Stable from our perspective  Our thanks to hospitalist service for care of Tod Filter. Please call as needed   Cell [phone]            [facility] records appended below. Patient Name:  Mark Ribera Date: 3/7/2019  Patient Number: [id number] YOB: 1940  FOLLOW UP VISIT  Assessment and Plan  multiple myeloma  He has multiple myeloma and is currently on CyBorD given his earlier renal failure requiring dialysis and he tolerated the regimen fairly well. He is not on  a  maintenance regimen such as Revlimid + dexamethasone. He is not tolerating the dexamethasone so I will drop this and continue with single agent  revlimid. For his swollen legs, I have started maxzide. I need to check his BMP in about two weeks. He tends to run high K's and it looks like this was  due to gallons of V8 juice that he is drinking. He will stop this. When he returns, his peripherla edema will be reassessed. He has been instructed to  contact the office immediately if any worrisome side effects or unexplained symptoms occur. Primary Diagnosis  Date Type ICD9 ICD10 Description Disease Status Status Date  2/22/2018 Primary 203.00 C90.00 Multiple myeloma not having achieved remission  3/29/2018 Primary V58.11 Z51.11 Encounter for antineoplastic chemotherapy  Hematology/Oncology Summary  Aimee Reid is a  78year old gentleman who was admitted to [address] [place] [address] with hypoglycemia associated with profound diarrhea. He does have a history of  diabetes mellitus. He was noted to not only being hypoglycemic at the time of admission, but dehydrated with acute renal failure He has a history of  diarrhea off and on for the last 15 years or so for which he has undergone multiple procedures including colonoscopy without ever a clear diagnosis  being made. When he presented to the hospital, he was noted, not only of having a creatinine of 4.9. a BUIN of 79, his glucose has risen to 73, his  potassium was 5.2. He was started on kayexalate, an amp of D50, 5 units of IV insulin, which was about half a dose since he was only mildly  hypoglycemic at this time. He was seen in consultation by Dr. Luis Guadalupe from the nephrology service. He had been on ACE inhibitor. This was held  because of his renal failure & hyperkalemia. He has been gently hydrated and his lab tests improved. His potassium is down to 3.6. However his  creatinine is still 4.8 and his BUN is 64. ferritin 585. His albumin was rather low at 2.3 and 3.3 with a total protein of 8.5, which is high. He was seen by Dr. William Stevenson   and his creatinine had dropped to 1.26. His bone marrow results finalized 5/24/12 and revealed atypical strongly CD56+  monoclonal plasmacytosis (roughly 10%) with focal cytologic plasma cell atypia worrisome for aggressive behavior.  There was small amounts of storage  iron present without pathologic erythroid  iron. His skeletal survey on 5/29/12 revealed no obvious bony lesions. He returned to the office  and felt  well. He had no bony symptoms. He was pretty discouraged with his [place] doctors and staff. Skeletal survey on 5/29/12  revealed no suspicious lesions and an abdominal aortic aneurysm with 5.5 cm AP dimension. He was worked up at the [facility] for cancer cells in his urine, he reports but the workup turned up negative. He had a CT scan on 4/10/14 which  revealed a right nephrectomy, no evidence of recurrence in the nephrectomy bed, no evidence of left kidney or ureter mass, no evidence of bladder  mass, questionable 9 mm RLL nodule but in the setting of adjacent atelectasis, decreased diameter of AAA s/p EVAR, right lateral abdominal wall  hernia containing a nonobstructed loop of distal small bowel, subtotal colectomy with ileocolonic anastomosis and enlarged prostate gland. He was hospitalized in November 2015 for pneumonia. He has had cataracts removed bilaterally in February and his vision much improved. 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 He has since sold  and has a contract  pending on an 8th. 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 He came to the office with word that he had a stroke on 9/9/16 and ended up in [facility]. He was going out to breakfast and while driving became  extremely  dizzy. He pulled over and then became limp on the left side. EMS got there within 23 minutes and he was given 4 baby ASA to chew. He  was \"Out of it\" so he did not remember all that happened. He went to rehab and got most of his function back. He gave up smoking. He had a throbbing  behind his left eye earlier. He returned to the office  and reported that tax day was depressing. He sold 8 houses last year  the profits from 4 houses ended up going to taxes. He  tapped into his RAIN to pay his bills. He  returned to the office 11/15/17 and reported that he was doing well. He was going to look at a house that day  to consider buying it. 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 His hgb had dropped just  abit to 12.2 with a wbc of 9200 and plt count of 347,000. His creatinine  was 1.22 with a K of 5.5 and Ca of 10.3. His B2M had climbed to 4.0 from 2.8  and his free lambda light chains had jumped from 1219 to 2061 with a Laymon Form : 1940 ([id number]) Page 1 of 6   spike of 2.2. He was contacted and asked about his oral calcium consumption and asked to consider a repeat BM asp/bx. Unfortunately this was not  scheduled until he present to [facility] with the flu and was noted while in the hospital to have a rapidly climbing creatinine. He required 3 sessions with  dialysis. BM aspirate and biopsy on 18 revealed 50% involvement with a monoclonal plasmacytosis with atypical/immature morphology (focally  plasmoblastic). He was started on CyBorD. He came back to the office 18 and reported that he saw his PCP at the [place] earlier today. His insulin dose was being decreased. He was very  unhappy with his experience at the [facility]. He has followup with his nephrologist soon on [facility].    He returned to the office 5/3/18 and reported that he was dong fairly well but he had noticed pain in his knees and right shoulder. He missed his  skeletal survey so he needs to have this rescheduled. Balance was a bit off and he was using a cane. He had an appointment with his PCP at the end  of the month. He was discharged from the hospital with a bottle of regular insulin but no instructions on how to use it. Skeletal survey on 18 revealed no significant change in multiple small lytic foci. He came back to the office 18 (cycle 5, day 8) and reported that he does not sleep the day of chemo and since adding an antiemetic to the  regimen, he does not have nausea.  He was mostly doing well. He returned to the office 18 and reported some mild fatigue but on the whole, he was doing well with his treatment. He used his walker if he has to  walk any distance down the street but inside he walks without assistive devices. He came back to the office 18 and reported that he was very depressed and he was dealing with pain in his knees, an occasional electric shock  sensation below his knees and tingling running down his right arm on occasion. He returned to the office 18 and reported that he was doing better but still dealing neuropathy in his hands and feet, more left side than right. He  felt weak and fatigued compared with preillness but better overall. CT scan of the chest on 19 reported shifting pattern of pulmonary densities, overall improved from prior, likely reflecting infectious process. severe  bullous emphysema. He comes back to [address] [place] [address] 3/7/19 and reports that he is having more issues with leg swelling. His vision is affected the days that he takes the steroid pills. HIs appetite is better. Disease Features  Multiple myeloma, diagnosed 12  CD56+ monoclonal plasmacytosis (roughly 10%) with focal cytologic plasma cell atypia worrisome for aggressive behavior. Small amounts of storage  iron present without pathologic erythroid iron.    Treatment History  CyBorD  Current Treatment  rev +/ dex  Treatment Goal: palliation  Disease Status: Stable  Chief Complaint  followup for abnormal free light chain ratio  Active Problems  multiple myeloma  diabetes  hypertension  Past Medical History  asthma  COPD  depressed  diabetes  hearing loss  hypertension  influenza vaccine 2014  pneumonia [facility] 2015  AAA repair endovascular graft Aretha Dang)  bilateral cataract removal 2016 & 2016  blood transfusion  colon resection  hernia repair  kidney removed  Reyna Spaulding : 1940 ([id number]) Page 2 of 6   ruptured esophagus  ROS  Except as noted above or in the interim history, a 10 point ROS is negative  Allergies  Allergy Reaction (Severity)  No Known Allergies  Medications  Inside Drug Script Date Qty Rfls Instructions  acetaminophen 500 mg tablet 3/22/2018 0 1 p.o. q. day  Albuterol Sulfate HFA IN Aerosol, solution 1980 0 Aerosol,  solution 1  Inhalation  daily PRN  Aspirin 81 mg Tablet, chewable 1980 0 81 mg  Take  1 Tablet,  chewable Oral  daily  Calcium 500 500 mg calcium (1,250 mg) tablet 2019 0 1 p.o. twice a  day (BID)  Cardizem 120 mg Tablet 1980 0 120 mg   Take 2 Tablet  Oral daily  cholecalciferol (vitamin D3) 1,000 unit tablet 3/22/2018 0 1 p.o. q. day  Crestor 40 mg Tablet 1980 0 40 mg  Take  0.5 Tablet  Oral daily  Y Cymbalta 30 mg capsule,delayed release 2018 30 11 1 po qhs  Y dexamethasone 4 mg tablet 2018 20 11 Take 5 tabs  po once a  week with  food  docusate sodium 100 mg tablet 3/22/2018 0 1 p.o. twice a  day (BID)  constipation  emollient topical 3/22/2018 0 apply to all  over topically  every day  shift of skin  care  GlipiZIDE 10 mg Tablet 1980 0 10 mg  Take  2 Tablet Oral  daily  Insulin 8 Units Injectable 1980 0 8 Units  1  Subcutaneous  at bedtime  ipratropiumalbuterol 0.5 mg3 mg(2.5 mg base)/3 mL nebulization soln 3/22/2018 0 every 4 hours  for SOB.   loperamide 2 mg capsule 3/22/2018 0 1 p.o. q. day  every 6 hours  as needed for  Jennifer Lopez : 1940 ([id number]) Page 3 of 6   diarrhea  Y [id number] 37.5 mg25 mg tablet 3/7/2019 30 6 1 p.o. q. day  prn swelling  melatonin 10 mg capsule 3/22/2018 0 1 p.o. q. day  pantoprazole 40 mg tablet,delayed release 3/22/2018 0 1 p.o. q. day  Plavix 75 mg tablet 3/22/2018 0 1 p.o. q. day  Proventil IN Aerosol, solution 1980 0 Aerosol,  solution  Inhalation  PRN  Y Revlimid 25 mg capsule 2019 21 0 1 p.o. q. day  for 21 days,  off 7 days  [facility]  [id number]  Singulair 10 mg Tablet 1980 0 10 mg  Take  1 Tablet Oral  daily  Y sodium polystyrene sulfonate (sorbitol free) 15 gram/60 mL oral susp 2018 120 0 Take 120 ml  (30 G) po  once today  Spiriva HandiHaler IN Capsule 1980 0 1 Capsule  Inhalation  daily  sucralfate 1 gram tablet 3/22/2018 0 Give 10ml by  mouth before  meals at hs  Symbicort 1604.5 mcg/act Aerosol 1980 0 Aerosol  Inhalation  tuberculin PPD 5 tub. unit/0.1 mL intradermal injection solution 3/22/2018 0 every evening  7 days  FH/SH  Family History:   mother    father    no new FHx  Social History:      quit smoking 2016 ,  smoked  . 5 pack/day for 60   years  retired  Vital Signs  Vitals on 3/7/2019 10:55:00 AM: Height=72.00in, Etdqer=167.4lb, Temp=97.5f, BJONX=72, RTRB=63, EMZBZLWZPD=647, FXDWXAJCFON=73, Pulse OJ=24%  Plan of Care for Pain  Treatment Recommendations:  Continue current pain regimen  No action needed  Exam  : Fully active, able to carry on all predisease performance without restriction ECOG Scale 1: Restricted in physically strenuous activity but ambulatory  and able to carry out work of a light or sedentary nature, e.g., light house work, office work Constitutional: Alert, cooperative, oriented. Mood and affect  [place] : 1940 ([id number]) Page 4 of 6   appropriate. Appears close to chronological age. Well nourished. Well developed. Head: Normocephalic? no scars  Eyes: Conjunctivae and sclerae are clear and without icterus. Pupils are round  ENMT: Sinuses are nontender. No oral exudates, ulcers, masses, thrush or mucositis. Oropharynx clear. Tongue normal.  Neck: Supple without masses or thyromegaly. No jugular venous distension. Hematologic/Lymphatic: No petechiae or purpura. No tender or palpable lymph nodes in the cervical, supraclavicular, axillary or inguinal area. Respiratory: clear today. Cardiovascular: Regular rate and rhythm of heart without murmurs, gallops or rubs. Chest/Line Site: Chest is symmetric with no chest wall deformities.   Breasts: N/A  Abdomen: Nontender, nondistended, no masses, ascites or hepatosplenomegaly. Good bowel sounds. Musculoskeletal: No tenderness or swelling, normal range of motion without obvious weakness. Extremities: No visible deformities, no cyanosis, clubbing. 2+ edema. Skin: No rashes, scars, or lesions suggestive of malignancy. Neurologic: No sensory or motor deficits noted but not specifically tested. Psychiatric: Alert and oriented. Coherent speech. Verbalizes understanding of our discussions today. Time Spent with Patient  CC min/Total min 20  Lab Results Table  Lab results for 3/7/2019  Result Value Units Range Comment  WBC 4.0 K/uL 3.410.8  RBC 2.9 M/uL 4.15.8  HGB 8.5 g/dL 1317.7  HCT 27.8 % 37.551  MCV 95.9 fL 7997  MCH 29.3 pg 26.633  MCHC 30.6 g/dL 31.535.7  Plat 136 K/uL 384684  ANC 2.9 K/uL 1.47  Lymph# 0.6 K/uL 0.73.1  MONO# 0.3 K/uL 0.10.9  BASO# 0.1 K/uL 00.2  EOS# 0.2 K/uL 00.4  Neut% 71.1 % 4074  Lymph% 15.1 % 1446  MONO% 7.4 % 412  BASO% 1.2 % 03  EOS% 5.2 % 05  Imaging Results  Other Physicians:  Lem Boast, [phone]? Nahomy Obando [place]? Provider Name Contact Information  Physician Gelacio Hill MD [facility] Provider [address] [place] [address] Fax: [phone], Office: [phone]  Work: [address] [place] [address],   [facility] : 1940 ([id number]) Page 5 of 6   Primary Care Provider Camden Benz MD Fax: [phone], Work: [phone]  Work: [address]  [address] [facility],  [address]  Primary Care Provider Lem Boast, MD Fax: [phone], Work: [phone]  Work: [facility],  [address]  Referring Provider Nahomy Obando M.D.  Fax: [phone], Work: [phone]  Work: [facility] [place],  [id number]  Signed By: ___________________________  Gelacio Hill MD [address] [facility]  , NPI: [id number], on 3/7/2019

## 2019-03-13 NOTE — PROGRESS NOTES
Occupational Therapy  2nd attempt to see for evaluation, discussed with PT who reported patient close to baseline and noted discharge order for this date. Screen completed with patient and his wife who report no needs at this time. Educated on safe transitions and fall prevention, will sign off.    Pearrichard Scales, OTR/L

## 2019-03-13 NOTE — ACP (ADVANCE CARE PLANNING)
Non-Provider Advance Care Planning (ACP) Note    Date of ACP Conversation: 3/13/2019  Persons included in Conversation: patient and family friend, Geri John of ACP Conversation in minutes: <16 minutes (Non-Billable)    Conversation requested by:   Provider    Authorized Decision Maker (if patient is incapable of making informed decisions): This person is: Other Legally Authorized Decision Maker (e.g. Next of Kin), wife Florina Lesch ACP for ALL Patients with Decision Making Capacity:    Advance Directive Conversation with Patients who have not yet planned:  NA    Review of Existing Advance Directive: (Select questions covered)  pt believes he has an AMD at home that his  meseret up for him. Instructed him to please locate document and review to make sure his goals of care are still in alignment with his wishes today.

## 2019-03-13 NOTE — CONSULTS
Palliative Medicine    Patient is being discharged home today. We tried to come by, but the [facility] team was in the room at that time and needs have been addressed. AMD has been discussed by them. Attending MD canceling our consult for now, happy to see on future admissions.

## 2019-03-13 NOTE — PROGRESS NOTES
physical Therapy EVALUATION/DISCHARGE  Patient: Michael Salas (85 y.o. male)  Date: 3/13/2019  Primary Diagnosis: COPD with acute exacerbation (HCC) [J44.1]       Precautions: fall     ASSESSMENT :  Based on the objective data described below, the patient presents with general weakness, altered sensation due to neuropathy, impaired balance, decreased cardiopulmonary endurance due to COPD exacerbation. Noted acute DVT R LE with change from IV heparin to NOAC this date >3 hrs ago. Of note, pt recently undergoing oral chemo for multiple myeloma. Pt tolerated activity on RA with SpO2 high 90s; notes that he paces activity at home due to pulmonary issues. Mobilized with SPC and CGA/ SBA. Pt c/o dizziness with position change; noted BP drop from 111/72 sitting to 86/57 standing. RN informed. Pt demo good awareness of symptoms which he reports occur at home as well; sits back down as needed, moves more slowly, no h/o falls. Pt amb with SPC or rollator at baseline. Family available to assist as needed. Endorses near baseline LOF, declines HH PT eval. Noted pt to d/c home today per chart. Further skilled acute physical therapy is not indicated at this time. PLAN :  Discharge Recommendations: Family assist  Further Equipment Recommendations for Discharge: pt owns [facility] and rollator     SUBJECTIVE:   Patient stated I get dizzy sometimes when I get up.     OBJECTIVE DATA SUMMARY:   HISTORY:    Past Medical History:   Diagnosis Date    [facility]     Asthma     Cancer ([facility]     kidney ca    COPD     Diabetes ([facility]     Gastrointestinal disorder     ruptured esphogus    Hypertension     Other ill-defined conditions(799.89)      Past Surgical History:   Procedure Laterality Date    ABDOMEN SURGERY PROC UNLISTED      hernia    ABDOMEN SURGERY PROC UNLISTED      colon resection    HX GI      part of colon removed, ruptured esophagus    HX OTHER SURGICAL      kidney removed    Ana Yates       Prior Level of Function/Home Situation: pt lives with wife in 2 level home. Sleeps on first floor (couch); negotiates stairs 1x/ day for shower. Wife able to assist, kids live next door, close friend 1 mile away  Personal factors and/or comorbidities impacting plan of care: undergoing chemo    Home Situation  Home Environment: Private residence  One/Two Story Residence: Two story  # of Interior Steps: 12  Living Alone: No  Support Systems: Spouse/Significant Other/Partner, Family member(s), Friends \ neighbors  Patient Expects to be Discharged to[de-identified] Private residence  Current DME Used/Available at Home: [employer], straight, Kathyrn Piles, rollator    EXAMINATION/PRESENTATION/DECISION MAKING:   Critical Behavior:              Hearing: Auditory  Auditory Impairment: Hard of hearing, bilateral    Range Of Motion:  AROM: Generally decreased, functional                       Strength:    Strength: Generally decreased, functional                    Tone & Sensation:   Tone: Normal              Sensation: Impaired(nueropathy bilat hands/ feet)               Coordination:  Coordination: Within functional limits  Vision:      Functional Mobility:  Bed Mobility:              Transfers:                             Balance:   Sitting: Intact  Standing: Impaired  Standing - Static: Good  Standing - Dynamic : Fair  Ambulation/Gait Training:  Distance (ft): 30 Feet (ft)  Assistive Device: Cane, straight  Ambulation - Level of Assistance: Contact guard assistance; Adaptive equipment        Gait Abnormalities: Altered arm swing;Decreased step clearance              Speed/Veronica: Pace decreased (<100 feet/min)  Step Length: Left shortened;Right shortened                                  Functional Measure:  Tinetti test:    Sitting Balance: 1  Arises: 1  Attempts to Rise: 2  Immediate Standing Balance: 1  Standing Balance: 1  Nudged: 1  Eyes Closed: 1  Turn 360 Degrees - Continuous/Discontinuous: 1  Turn 360 Degrees - Steady/Unsteady: 1  Sitting Down: 1  Balance Score: 11  Indication of Gait: 1  R Step Length/Height: 1  L Step Length/Height: 1  R Foot Clearance: 1  L Foot Clearance: 1  Step Symmetry: 1  Step Continuity: 1  Path: 1  Trunk: 0  Walking Time: 0  Gait Score: 8  Total Score: 19         Tinetti Tool Score Risk of Falls  <19 = High Fall Risk  19-24 = Moderate Fall Risk  25-28 = Low Fall Risk  Tinetti ME. Performance-Oriented Assessment of Mobility Problems in Elderly Patients. [facility]; [id number]. (Scoring Description: PT Bulletin Feb. 10, 1993)    Older adults: Jas Crowe et al, 2009; n = 1000 [facility] elderly evaluated with ABC, ABDULLAHI, ADL, and IADL)  · Mean ABDULLAHI score for males aged 69-68 years = 26.21(3.40)  · Mean ABDULLAHI score for females age 69-68 years = 25.16(4.30)  · Mean ABDULLAHI score for males over 80 years = 23.29(6.02)  · Mean ABDULLAHI score for females over 80 years = 17.20(8.32)          Physical Therapy Evaluation Charge Determination   History Examination Presentation Decision-Making   MEDIUM  Complexity : 1-2 comorbidities / personal factors will impact the outcome/ POC  LOW Complexity : 1-2 Standardized tests and measures addressing body structure, function, activity limitation and / or participation in recreation  LOW Complexity : Stable, uncomplicated  LOW Complexity : FOTO score of       Based on the above components, the patient evaluation is determined to be of the following complexity level: LOW     Pain:  Pain Scale 1: Numeric (0 - 10)  Pain Intensity 1: 0     Activity Tolerance:   Pt tolerated activity on RA with stable SpO2; noted orthostatic hypotension during session, RN aware  Please refer to the flowsheet for vital signs taken during this treatment.   After treatment:   []   Patient left in no apparent distress sitting up in chair  [x]   Patient left in no apparent distress sitting EOB  [x]   Call bell left within reach  [x]   Nursing notified  [x]   Caregiver present  []   Bed alarm activated    COMMUNICATION/EDUCATION:   Communication/Collaboration:  [x]   Fall prevention education was provided and the patient/caregiver indicated understanding. [x]   Patient/family have participated as able and agree with findings and recommendations. []   Patient is unable to participate in plan of care at this time.   Findings and recommendations were discussed with: Occupational Therapist and Registered Nurse    Thank you for this referral.  Remigio Eduardo, PT   Time Calculation: 20 mins

## 2019-03-13 NOTE — PROGRESS NOTES
Transitional Care Team: Initial HUG Note    Date of Assessment: 03/13/19  Time of Assessment:  11:39 AM  Hospital Nurse Navigator: Eliu Max, RN, BSN, [facility]    Eliane Delaney is a 78 y.o. male inpatient at [facility] with COPD with acute exacerbation ([facility] [J44.1] on  3/10/2019. Assessment & Plan   1. Advanced Medical Directive - pt and friend Nurys Avila confirmed that he has a \"Will\" at home that was drawn up by his  and there should be language incorporated into document regarding is healthcare wishes. Advised pt to look for this document and bring to Dr Acevedo's office so that it can be reviewed and scanned into his chart. 2. BMP reviewed - Na trending down, Cr improving, Mg 1.5 trending down. Recommend repeat labs when sees Dr Emiliano Gomez on 3/18/19. Will send her a message regarding this. Lab Results   Component Value Date/Time    Sodium 135 (L) 03/11/2019 01:17 AM    Potassium 4.5 03/11/2019 01:17 AM    Chloride 102 03/11/2019 01:17 AM    CO2 23 03/11/2019 01:17 AM    Anion gap 10 03/11/2019 01:17 AM    Glucose 249 (H) 03/11/2019 01:17 AM    BUN 30 (H) 03/11/2019 01:17 AM    Creatinine 1.44 (H) 03/11/2019 01:17 AM    BUN/Creatinine ratio 21 (H) 03/11/2019 01:17 AM    GFR est AA 57 (L) 03/11/2019 01:17 AM    GFR est non-AA 47 (L) 03/11/2019 01:17 AM    Calcium 8.8 03/11/2019 01:17 AM        Primary Diagnosis  COPD with acute exacerbation ([facility]    1. Advance Directive:  not on file. 2. Current Code Status:  Full Code    3. Referral to to Hospice/ Palliative Care Appropriate: yes, Palliative is supposed to see today, Wed 3/13/19.    4. Awareness of Medical Conditions (Trajectory of illness and pts expectations):  Pt has had COPD for years. He is aware that this disease causes a steady decrease in lung function and he may require O2 down the road. Says he is compliant with inhalers, Symbicort, Spiriva at home.   He is also aware that he has MM and is currently on chemo, Lenalinamide and followed by Dr Dent Staff. He pays $38,000/month. 5. Discharge Needs (to include safety issues):  Still needs to be seen by therapy to make sure he is safe to return home. Lives with wife. Uses cane and has walker for ambulation. Pt iADLs PTA. Wife, Almita Vergara does the grocery shopping and meal preparation. Pt manages his own meds. 6. Barriers Identified: None at this time. 7. Patient is willing to go to SNF/Inpatient Rehab if recommended: TBD, should be able to return home. 8. Medication Reconciliation:  was not performed by this NN as no d/c orders in Epic at this time. Addendum:  Pt's RN reviewed meds with patient and friend, Rosalia Gill. Rosalia Gill was taking hard scripts of Eliquis, Nicoderm, and Prednisone to pharmacy to fill. 9. Can patient afford medications:  yes    10. Who manages medications at home: self    Lisset Stoner is a 78 y.o. male inpatient at admitted with COPD on 3/10/19. Chart reviewed by Pavan Mckeon RN and HUG (Healthy Understanding of Goals) program introduced to patient/family. The Transitional Care Team bridges the gaps in care and education surrounding discharge from the acute care facility. The objective is to empower the patient and family in taking a proactive role in the task of preventing readmission within the first thirty days after discharge from the acute care setting, The team is also involved in the efforts to reduce readmission to the acute care setting after stabilization and discharge from the acute care environment either to skilled nursing facilities or community. [facility] RN/NP will return with [facility] Calendar/ follow up appointments/ Ambulatory Nurse Navigation name and contact information when the patient is ready for discharge. No future appointments. Non-[facility] follow up appointment(s): Dr Dent Staff, Peter Conley on 3/18/19 at 3:00pm.     Patient education focused on readmission zones as described as:     The Red Zone: High risk for readmission, days 1-21   The Yellow Zone: Moderate  risk for readmission, days 22-29   The Green Zone: Lower risk for readmission, days 30 and after    The TC Team will follow patient from a distance while inpatient as well as be available for further transition disposition as needed. The MURRAY TEAM will continue to offer support during the 30- 90 day discharge from acute care setting.       Past Medical History:   Diagnosis Date    [facility]     Asthma     Cancer ([facility]     kidney ca    COPD     Diabetes ([facility]     Gastrointestinal disorder     ruptured esphogus    Hypertension     Other ill-defined conditions(689.89)

## 2019-03-14 NOTE — DISCHARGE SUMMARY
Discharge Summary       PATIENT ID: Glory Arthur  MRN: [id number]   YOB: 1940    DATE OF ADMISSION: 3/10/2019  6:48 PM    DATE OF DISCHARGE: 3/13/2019  PRIMARY CARE PROVIDER: Sari Spaulding MD     ATTENDING PHYSICIAN: Monica Vickers MD  DISCHARGING PROVIDER: Flash Oliva MD    To contact this individual call [phone] and ask the  to page. If unavailable ask to be transferred the Adult Hospitalist Department. CONSULTATIONS: None    PROCEDURES/SURGERIES: * No surgery found *    ADMITTING DIAGNOSES & HOSPITAL COURSE:     A 78 M lives with wife, independent, COPD, MM on chemo p/w SOB 1 day hx. Some congestion.     Acute COPD exacerbation  -improving, continue prednisone, pulmicort, levaquin, brovana, duo neb,   -SpO2 % on RA  - PT/OT eval  Acute right popliteal DVT on doppler study on 3/11  -heparin gtt discontinued and started on Eliquis   -V/Q scan obstructive pulmonary disease, there are a few small peripheral subsegmental defects and examination is indeterminate for pulmonary emboli.  -patient with multiple comorbidity,   -patient with low platelet, plavix for stroke, anemia of chronic disease, consult to hem/onc for anticoagulation recomendation  DIANA likely preRenal   -creatinine improving  -monitor renal function, avoid nephrotoxics   MM  -on lenalidomide  T2DM with hyeprglycemia  -A1c 7.4, SSI, continue Lantus, monitor sugars 'on prednisone'  Anemia of chronic disease due to MM  -low but stable, monitor  Thrombocytopenia likely due to MM  -improving, monitor   Mild Hyponatremia  -repeat bmp in am   Mild Hypomagnesemia  -repeat mg level   Hx of stroke  -on plavix and aspirin  Hx of AAA repair  -stable   Tobacco abuse  -advised to stop smoking         Code status: Full Code  DVT prophylaxis: on heparin gtt      DISCHARGE DIAGNOSES / PLAN:      Acute COPD exacerbation  -improved,   -continue prednisone 40 mg for a total of 5 days, prn duo neb  Acute right popliteal DVT on doppler study on 3/11  -started on Eliquis 10 mg po bid for 7 days then 5 mg po bid  DIANA likely preRenal   -creatinine improved  MM  -continue bruce regimen lenalidomide and decadron  T2DM with hyeprglycemia  -resume home Lantus and sliding scale  Anemia of chronic disease due to MM  -low but stable  Thrombocytopenia likely due to MM  -improving, monitor   Mild Hyponatremia  -repeat bmp in am   Mild Hypomagnesemia  -repeat mg level   Hx of stroke  -on plavix and aspirin  Hx of AAA repair  -stable   Tobacco abuse  -advised to stop smoking     PENDING TEST RESULTS:   At the time of discharge the following test results are still pending: none    FOLLOW UP APPOINTMENTS:    Follow-up Information     Follow up With Specialties Details Why Contact Info    [address] [facility] Office on Aging    [address]    Katelyn Vo MD Hematology and Oncology, Hematology, Oncology On 3/18/2019 at 3:00pm [address]  [address]  [facility]  [phone]      Shyla Cueva MD Surgery  [facility].  Pt to schedule his own appt. [address]iola Shen [phone]  [phone]      [facility]  On 3/21/2019 at 2:45pm TERRY/ Dameon Ball [address]    Primary Care Physician  In one week           DIET: Diabetic Diet    ACTIVITY: Activity as tolerated    WOUND CARE: none     EQUIPMENT needed: none      DISCHARGE MEDICATIONS:  Discharge Medication List as of 3/13/2019  1:17 PM      START taking these medications    Details   !! apixaban (ELIQUIS) 5 mg tablet Take 2 Tabs by mouth two (2) times a day for 7 days. , Normal, Disp-28 Tab, R-0      !! apixaban (ELIQUIS) 5 mg tablet Take 1 Tab by mouth two (2) times a day for 30 days. , Print, Disp-60 Tab, R-0      nicotine (NICODERM CQ) 21 mg/24 hr 1 Patch by TransDERmal route every twenty-four (24) hours for 30 days. , Print, Disp-30 Patch, R-0       !! - Potential duplicate medications found. Please discuss with provider. CONTINUE these medications which have CHANGED    Details   predniSONE (DELTASONE) 20 mg tablet Take 40 mg by mouth daily (with breakfast) for 2 days. , Print, Disp-4 Tab, R-0         CONTINUE these medications which have NOT CHANGED    Details   calcium carbonate (TUMS) 200 mg calcium (500 mg) chew Take 1 Tab by mouth two (2) times daily (with meals). , Print, Disp-20 Tab, R-0      DULoxetine (CYMBALTA) 30 mg capsule Take 30 mg by mouth daily. , Historical Med      pantoprazole (PROTONIX) 40 mg tablet Take 40 mg by mouth two (2) times a day., Historical Med      dexamethasone (DECADRON) 4 mg tablet Take 20 mg by mouth Every Thursday., Historical Med      lenalidomide (REVLIMID) 25 mg cap Take 25 mg by mouth. DAILY ON DAYS 1 TO 21 FOR A 28 DAY CYCLE (OFF MED 7 DAYS BEFORE STARTING AGAIN) NEXT DOSE DUE Thursday 1/31/19 TO START NEXT ROUND, Historical Med      insulin glargine (LANTUS) 100 unit/mL injection 30 Units by SubCUTAneous route every morning., Historical Med      !! insulin aspart U-100 (NOVOLOG FLEXPEN U-100 INSULIN) 100 unit/mL inpn 6 Units by SubCUTAneous route daily (with breakfast). , Historical Med      !! insulin aspart U-100 (NOVOLOG FLEXPEN U-100 INSULIN) 100 unit/mL inpn 6 Units by SubCUTAneous route daily (with lunch). , Historical Med      !! insulin aspart U-100 (NOVOLOG FLEXPEN U-100 INSULIN) 100 unit/mL inpn 10 Units by SubCUTAneous route daily (with dinner). , Historical Med      albuterol-ipratropium (DUO-NEB) 2.5 mg-0.5 mg/3 ml nebu 3 mL by Nebulization route every four (4) hours as needed., Normal, Disp-30 Nebule, R-1      acetaminophen (TYLENOL) 325 mg tablet Take 2 Tabs by mouth every four (4) hours as needed for Fever (For temp greater than or equal to 38.5 C or 101.3 F (Unless hepatic failure or contrindicated). Give first line for fever. )., Print, Disp-40 Tab, R-0      aspirin 81 mg chewable tablet Take 1 Tab by mouth daily. , Print, Disp-30 Tab, R-1      clopidogrel (PLAVIX) 75 mg tablet Take 1 Tab by mouth daily. , Print, Disp-30 Tab, R-1      cholecalciferol (VITAMIN D3) 1,000 unit cap Take 1,000 Units by mouth daily. , Historical Med      rosuvastatin (CRESTOR) 40 mg tablet Take 20 mg by mouth nightly., Historical Med      tiotropium (SPIRIVA WITH HANDIHALER) 18 mcg inhalation capsule Take 1 Cap by inhalation daily. , Historical Med      budesonide-formoterol (SYMBICORT) 160-4.5 mcg/actuation HFA inhaler Take 2 Puffs by inhalation daily. Indications: COPD ASSOCIATED WITH CHRONIC BRONCHITIS, Historical Med      albuterol (PROVENTIL HFA, VENTOLIN HFA) 90 mcg/Actuation inhaler Take 2 Puffs by inhalation every six (6) hours as needed for Wheezing and Shortness of Breath., Print, Disp-1 Inhaler, R-2      montelukast (SINGULAIR) 10 mg tablet Take 10 mg by mouth daily. , Historical Med       !! - Potential duplicate medications found. Please discuss with provider. NOTIFY YOUR PHYSICIAN FOR ANY OF THE FOLLOWING:   Fever over 101 degrees for 24 hours. Chest pain, shortness of breath, fever, chills, nausea, vomiting, diarrhea, change in mentation, falling, weakness, bleeding. Severe pain or pain not relieved by medications. Or, any other signs or symptoms that you may have questions about.     DISPOSITION:    Home With:   OT  PT  HH  RN       Long term SNF/Inpatient Rehab   x Independent/assisted living    Hospice    Other:       PATIENT CONDITION AT DISCHARGE:     Functional status    Poor     Deconditioned    x Independent      Cognition   x  Lucid     Forgetful     Dementia      Catheters/lines (plus indication)    Peoples     PICC     PEG    x None      Code status    x Full code     DNR      PHYSICAL EXAMINATION AT DISCHARGE:   Refer to Progress Note      CHRONIC MEDICAL DIAGNOSES:  Problem List as of 3/13/2019 Date Reviewed: 3/10/2019 Codes Class Noted - Resolved    Pneumonia ICD-10-CM: J18.9  ICD-9-CM: 470  1/27/2019 - Present        COPD exacerbation ([place] [address].) ICD-10-CM: J44.1  ICD-9-CM: 491.21  1/27/2019 - Present        Sepsis ([place] [address].) ICD-10-CM: A41.9  ICD-9-CM: 038.9, 995.91  1/27/2019 - Present        Hypoxemia ICD-10-CM: R09.02  ICD-9-CM: 799.02  2/19/2018 - Present        Stenosis of both internal carotid arteries ICD-10-CM: I65.23  ICD-9-CM: 433.10, 433.30  9/13/2016 - Present        Diabetic peripheral neuropathy associated with type 2 diabetes mellitus (HCC) ICD-10-CM: E11.42  ICD-9-CM: 250.60, 357.2  9/13/2016 - Present        Thrombotic stroke involving left cerebellar artery ([place] [address] ICD-10-CM: D84.208  ICD-9-CM: 434.01  9/12/2016 - Present        Stroke (cerebrum) ([facility] ICD-10-CM: I63.9  ICD-9-CM: 434.91  9/9/2016 - Present        Multiple myeloma ([facility] ICD-10-CM: C90.00  ICD-9-CM: 203.00  10/26/2015 - Present        Anemia ICD-10-CM: D64.9  ICD-9-CM: 285.9  5/13/2012 - Present        Diarrhea ICD-10-CM: R19.7  ICD-9-CM: 787.91  5/12/2012 - Present        DM (diabetes mellitus), type 2, uncontrolled ([facility] ICD-10-CM: E11.65  ICD-9-CM: 250.02  5/12/2012 - Present        S/p nephrectomy (Chronic) ICD-10-CM: Z90.5  ICD-9-CM: V45.73  5/12/2012 - Present    Overview Signed 5/12/2012 10:35 AM by Rashida Montano.              AAA (abdominal aortic aneurysm) ([facility] (Chronic) ICD-10-CM: I71.4  ICD-9-CM: 441.4  5/12/2012 - Present        Gastrointestinal disorder ICD-10-CM: K92.9  ICD-9-CM: 569.9  Unknown - Present    Overview Signed 5/12/2012 10:36 AM by Rashida Stephens     reptured esphogus             Cancer ([facility] ICD-10-CM: C80.1  ICD-9-CM: 199.1  Unknown - Present    Overview Signed 5/12/2012 10:36 AM by Kinza [facility]     kidney ca             COPD (chronic obstructive pulmonary disease) ([facility] (Chronic) ICD-10-CM: J44.9  ICD-9-CM: 197  5/11/2012 - Present        Cancer of kidney ([facility] ICD-10-CM: C64.9  ICD-9-CM: 189.0 5/11/2012 - Present        Hypoglycemia, unspecified ICD-10-CM: E16.2  ICD-9-CM: 251.2  5/11/2012 - Present        Acute renal failure (HCC) ICD-10-CM: N17.9  ICD-9-CM: 584.9  5/11/2012 - Present        Hyperkalemia ICD-10-CM: E87.5  ICD-9-CM: 276.7  5/11/2012 - Present        S/P partial colectomy ICD-10-CM: Z90.49  ICD-9-CM: V45.89  5/11/2012 - Present        HTN (hypertension) ICD-10-CM: I10  ICD-9-CM: 401.9  4/28/2011 - Present        Acute respiratory failure (HCC) ICD-10-CM: J96.00  ICD-9-CM: 518.81  4/26/2011 - Present        * (Principal) COPD with acute exacerbation (HCC) ICD-10-CM: J44.1  ICD-9-CM: 491.21  4/26/2011 - Present        HTN (hypertension) ICD-10-CM: I10  ICD-9-CM: 401.9  4/26/2011 - Present              Greater than 45 minutes were spent with the patient on counseling and coordination of care    Signed:   Dasia Rodriguez MD  3/13/2019  8:43 PM

## 2019-03-14 NOTE — PROGRESS NOTES
Hospital Discharge Follow-Up      Date/Time:  3/14/2019 12:42 PM    Patient was admitted to [facility] on 3/10 and discharged on 3/13 for COPD exacerbation & R popliteal DVT. The physician discharge summary was available at the time of outreach. Patient was contacted within 1 business day of discharge. Top Challenges reviewed with the provider   none         Method of communication with provider :none    Inpatient RRAT score: 29  Was this a readmission? no   Patient stated reason for the readmission: n/a    Nurse Navigator (NN) contacted the patient by telephone to perform post hospital discharge assessment. Verified name and  with patient as identifiers. Provided introduction to self, and explanation of the Nurse Navigator role. Reviewed discharge instructions and red flags with patient who verbalized understanding. Patient given an opportunity to ask questions and does not have any further questions or concerns at this time. The patient agrees to contact the PCP office for questions related to their healthcare.      Disease Specific:   COPD     COPD Note    Smoking History: Yes, has not resumed smoking since home    Name of Pulmonologist:  [facility] jessica [facility]    Last office visit w/ Pulm: unable to assess    Next office visit w/ Pulm: 3/21 @ 2:45 PM      Med Rec*   MEAGAN Short acting Beta2-agonist bronchodilators   []  Pro Air HFA / albuterol sulfate   []  Pro Air Respiclick /albuterol sulfate inhalation powder    [x]  Proventil HFA / albuterol sulfate    []  Ventolin HFA / albuterol sulfate    []  Xopenex HFA/ l evalbuterol tartrate     FRITZ/MEAGAN Short acting muscarinic antagonist and Short acting                 Beta2-agonist bronchodilators   [x]  Combivent Respimat / ipratropium bromide and albuterol nebulizer solution    LAMA Long acting muscarinic antagonist   []  Ilean Heir Neohaler / Glycopyrrolate inhalation powder    []  Incruse Ellipta / Benjaman [place] inhalation powder    [x] Spiriva HandiHaler / tiotropium bromide inhalation powder    []  Spiriva Respimat / tiotropium bromide    []  Majo Mike / aclidinium bromide inhalation powder    ICS/LABA Inhaled Corticosteroids and Long-acting beta2-agonist bronchodilators   []  Advair Diskus / Fluticasone propionate and salmeterol inhalation                           powder     []  Advair HFA / fluticasone propionate and salmeterol xinaoate    []  Breo Ellipta / fluticasone furoate and vilanterol inhalation powder    []  Dulera / mometasone furoate and formoterol fumarate dehydrate   []                 Y    Symbicort (HFA) / budesonide and formoterol fumarate dehydrate      Long term oxygen therapy (LTOT): no  How many liters? N/A    Antibiotics at time of discharge: No    DME: O2: No  nebulizer: Yes     Zone: (Pt Reported)  yellow     Provider Notified: no    Unable to complete full assessment d/t patient very [place] and limited time to complete the call. Summary of patient's top problems:    1. COPD: still has HUERTA, baseline wheezing, compliant w/ inhalers, unable to complete further assessment d/t pt [place] & pt ready to end call    2.  Orthostatic Hypotension: noted in the hospital, pt reports feeling \"dizzy all of the time\" for at least one month, has safety awareness regarding rising slowly, does not check BP at home, unable to continue assessment d/t pt [place] & pt ready to end the call      Home Health orders at discharge: patient refused, none  [address] [place] [address] Way: n/a  Date of initial visit: [facility] ordered/company: n/a  Durable Medical Equipment received: n/a    Barriers to care? none identified    Advance Care Planning:   Does patient have an Advance Directive:  Living Will at home, encouraged to review & provide to his medical team     Medication(s):   New Medications at Discharge: Eliquis, Nicotine patch  Changed Medications at Discharge: prednisone  Discontinued Medications at Discharge: none    Partial medication reconciliation was performed with patient, who verbalizes understanding of administration of home medications. There were no barriers to obtaining medications identified at this time. Referral to Pharm D needed: no     Current Outpatient Medications   Medication Sig    apixaban (ELIQUIS) 5 mg tablet Take 2 Tabs by mouth two (2) times a day for 7 days.  [START ON 3/19/2019] apixaban (ELIQUIS) 5 mg tablet Take 1 Tab by mouth two (2) times a day for 30 days.  predniSONE (DELTASONE) 20 mg tablet Take 40 mg by mouth daily (with breakfast) for 2 days.  nicotine (NICODERM CQ) 21 mg/24 hr 1 Patch by TransDERmal route every twenty-four (24) hours for 30 days.  calcium carbonate (TUMS) 200 mg calcium (500 mg) chew Take 1 Tab by mouth two (2) times daily (with meals).  DULoxetine (CYMBALTA) 30 mg capsule Take 30 mg by mouth daily.  pantoprazole (PROTONIX) 40 mg tablet Take 40 mg by mouth two (2) times a day.  dexamethasone (DECADRON) 4 mg tablet Take 20 mg by mouth Every Thursday.  lenalidomide (REVLIMID) 25 mg cap Take 25 mg by mouth. DAILY ON DAYS 1 TO 21 FOR A 28 DAY CYCLE (OFF MED 7 DAYS BEFORE STARTING AGAIN) NEXT DOSE DUE Thursday 1/31/19 TO START NEXT ROUND    insulin glargine (LANTUS) 100 unit/mL injection 30 Units by SubCUTAneous route every morning.  insulin aspart U-100 (NOVOLOG FLEXPEN U-100 INSULIN) 100 unit/mL inpn 6 Units by SubCUTAneous route daily (with breakfast).  insulin aspart U-100 (NOVOLOG FLEXPEN U-100 INSULIN) 100 unit/mL inpn 6 Units by SubCUTAneous route daily (with lunch).  insulin aspart U-100 (NOVOLOG FLEXPEN U-100 INSULIN) 100 unit/mL inpn 10 Units by SubCUTAneous route daily (with dinner).  albuterol-ipratropium (DUO-NEB) 2.5 mg-0.5 mg/3 ml nebu 3 mL by Nebulization route every four (4) hours as needed.     acetaminophen (TYLENOL) 325 mg tablet Take 2 Tabs by mouth every four (4) hours as needed for Fever (For temp greater than or equal to 38.5 C or 101.3 F (Unless hepatic failure or contrindicated). Give first line for fever. ).  aspirin 81 mg chewable tablet Take 1 Tab by mouth daily.  clopidogrel (PLAVIX) 75 mg tablet Take 1 Tab by mouth daily.  cholecalciferol (VITAMIN D3) 1,000 unit cap Take 1,000 Units by mouth daily.  rosuvastatin (CRESTOR) 40 mg tablet Take 20 mg by mouth nightly.  tiotropium (SPIRIVA WITH HANDIHALER) 18 mcg inhalation capsule Take 1 Cap by inhalation daily.  budesonide-formoterol (SYMBICORT) 160-4.5 mcg/actuation HFA inhaler Take 2 Puffs by inhalation daily. Indications: COPD ASSOCIATED WITH CHRONIC BRONCHITIS    albuterol (PROVENTIL HFA, VENTOLIN HFA) 90 mcg/Actuation inhaler Take 2 Puffs by inhalation every six (6) hours as needed for Wheezing and Shortness of Breath.  montelukast (SINGULAIR) 10 mg tablet Take 10 mg by mouth daily. No current facility-administered medications for this visit. There are no discontinued medications. [facility] follow up appointment(s): No future appointments. Non-[facility] follow up appointment(s): Oncologist Dr. Declan Gonzalez 3/18, [address] [place] [address] 3/21, Endocrinologist next week, PCP 4/11  [facility]:  information provided as a resource   Because patient has so many appts scheduled next week, declined [facility] visit at this time. He is aware that he can call them for a home visit if he has concerns. Pt is very [place] & wanted to end the call, so unable to address scheduling earlier PCP follow up. Goals      Pt will attend [facility] appts      3/14/19: Pt does not have calendar available to confirm exact dates, but has discharge paperwork which he will refer to for appt dates.  Oncologist Dr. Declan Gonzalez 3/18 @ 3 PM, Pulmonary 3/21 @ 2:45 PM, Endocrinologist-unknown name/practice in [place]-next week, PCP Dr. Grzegorz Malcolm at [facility] on 4/11. -SRW

## 2019-03-16 PROBLEM — I95.9 HYPOTENSION: Status: ACTIVE | Noted: 2019-01-01

## 2019-03-16 PROBLEM — J96.01 ACUTE RESPIRATORY FAILURE WITH HYPOXEMIA (HCC): Status: ACTIVE | Noted: 2019-01-01

## 2019-03-16 NOTE — ED PROVIDER NOTES
78 y.o. male with past medical history significant for asthma, COPD, DM, HTN, kidney cancer, and AAA who presents with chief complaint of SOB. Pt complains of SOB that started last night and has increased since. Per EMS, pt had sats at 83% on room air upon their arrival. EMS gave a duoneb with some relief and 1 albuterol. Per EMS, pt got up to 98% sat PTA. EMS noted pt HR at 99 and /68. Pt is noted to be on anticoagulants for a blood clot and had a fall last night. Pt states he is not on home O2. Pt states he was recently seen here for the same symptoms 3 days ago. Pt denies fever. There are no other acute medical concerns at this time. Social hx: Current Smoker. Occasional EtOH Use. PCP: Other, MD [place]    Note written by Coreen Gibson. Baljit Conklin, as dictated by Briana Huynh MD 6:28 PM        The history is provided by the patient.         Past Medical History:   Diagnosis Date    AAA (abdominal aortic aneurysm) (HCC)     Asthma     Cancer ([facility] [address]     kidney ca    COPD     Diabetes ([facility] [address]     Gastrointestinal disorder     ruptured esphogus    Hypertension     Other ill-defined conditions(799.89)        Past Surgical History:   Procedure Laterality Date    ABDOMEN SURGERY PROC UNLISTED      hernia    ABDOMEN SURGERY PROC UNLISTED      colon resection    HX GI      part of colon removed, ruptured esophagus    HX OTHER SURGICAL      kidney removed    REMV KIDNEY,COMPLICATED           Family History:   Problem Relation Age of Onset    Hypertension Mother        Social History     Socioeconomic History    Marital status:      Spouse name: Not on file    Number of children: Not on file    Years of education: Not on file    Highest education level: Not on file   Social Needs    Financial resource strain: Not on file    Food insecurity - worry: Not on file    Food insecurity - inability: Not on file    Transportation needs - medical: Not on file   [employer] needs - non-medical: Not on file   Occupational History    Not on file   Tobacco Use    Smoking status: Current Every Day Smoker     Packs/day: 0.50     Years: 60.00     Pack years: 30.00    Smokeless tobacco: Never Used   Substance and Sexual Activity    Alcohol use: Yes     Comment: 2 drinks a month    Drug use: No    Sexual activity: Not on file   Other Topics Concern    Not on file   Social History Narrative    ** Merged History Encounter **              ALLERGIES: Niacin and Niacin    Review of Systems   Constitutional: Negative for chills and fever. Respiratory: Positive for shortness of breath. Cardiovascular: Negative for chest pain. Gastrointestinal: Negative for abdominal pain, constipation, diarrhea and vomiting. Skin: Positive for wound. Neurological: Negative for dizziness and light-headedness. All other systems reviewed and are negative. Vitals:    03/16/19 1915 03/16/19 1919 03/16/19 1930 03/16/19 1945   BP: 146/55  99/69 111/51   Pulse: (!) 133  (!) 137 (!) 128   Resp: 17  21 20   Temp:  95.5 °F (35.3 °C)     SpO2: 94%  96% 100%   Weight:       Height:                Physical Exam   Constitutional: He appears well-developed and well-nourished. No distress. HENT:   Head: Normocephalic and atraumatic. Eyes: Conjunctivae are normal. Pupils are equal, round, and reactive to light. Neck: Normal range of motion. Cardiovascular: Regular rhythm. Tachycardia present. Pulmonary/Chest: Breath sounds normal. Tachypnea noted. He is in respiratory distress (Moderate. ). Retractions. Abdominal: Soft. He exhibits no distension. There is no tenderness. Musculoskeletal: Normal range of motion. Skin tear on right forearm. Neurological: He is alert. Skin: Skin is dry. Capillary refill takes less than 2 seconds. Nursing note and vitals reviewed. Note written by Efraín Espinosa.  Radha Montoya, as dictated by Beatrice Green MD 6:33 PM      MDM  Number of Diagnoses or Management Options  Acute respiratory failure with hypoxia [facility]):   Atrial fibrillation with RVR ([facility] [address]):   Gastrointestinal hemorrhage with melena:   Sepsis, due to unspecified organism [facility]):   Diagnosis management comments: Amanda Caldwell MD has spent 50 minutes of critical care time involved in lab review, consultations with specialist, family decision-making, and documentation. During this entire length of time I was immediately available to the patient. Critical Care: The reason for providing this level of medical care for this critically ill patient was due a critical illness that impaired one or more vital organ systems such that there was a high probability of imminent or life threatening deterioration in the patients condition. This care involved high complexity decision making to assess, manipulate, and support vital system functions, to treat this degreee vital organ system failure and to prevent further life threatening deterioration of the patients condition. Total critical care time spent exclusive of procedures:  50 minutes    Procedures    ED EKG interpretation:  Rhythm: atrial fib with RVR; and regular . Rate (approx.): 167; Axis: normal; ST/T wave: Lateral ST depression; No STEMI. Note written by Haritha Haney. David Gutierrez, as dictated by Carrol Trujillo MD 6:40 PM       PROGRESS NOTE:  7:44 PM  Pt's HR down to 120 after fluid bolus, and amlodipine. Pt is resting comfortably on bipap. Hospitalist Nico for Admission  8:04 PM    ED Room Number: ER09/09  Patient Name and age:  Lisset Stoner 78 y.o.  male  Working Diagnosis:   1. Sepsis, due to unspecified organism ([facility] [address]    2. Acute respiratory failure with hypoxia (HCC)    3. Atrial fibrillation with RVR ([facility] [address]    4. Gastrointestinal hemorrhage with melena      Readmission: yes  Isolation Requirements:  no  Recommended Level of Care:  ICU  Code Status:  Full  Other:   On amiodarone and BIPAP

## 2019-03-16 NOTE — ED TRIAGE NOTES
Arrived via EMS from home for complaint of SOB. Patient was recently discharged from the hospital for the same symptoms. Upon EMS arrival patient was 83% on room air. HR low 125D, BP 877X systolic. Patient placed on non rebreather and O2 came up to 98%. Upon arrival to ER room patient HR 160s-220s. Patient on blood thinners for hx of blood clots.

## 2019-03-17 NOTE — PROGRESS NOTES
0320: Pt has not voided. Obtained bladder scan with 600ml. Pt voided 250 via condom cath. 0550: Pt desatted to 46s. Pt ashen. Elevated HOB to 90 degrees. Increased BIPAP FiO2 to 100%. O2 sats increased to upper 90s. Respiratory paged. ABGs obtained. Placed order for CXR.    7029: Pt no longer ashen. Skin color appropriate for race. Within 5 min pt sats above 90%. 3208: Pt converted to NSR on monitor with PACs.

## 2019-03-17 NOTE — PROGRESS NOTES
0730: Bedside shift change report given to Liang Wetzel RN (oncoming nurse) by Calista Rose RN (offgoing nurse). Report included the following information SBAR, Kardex, ED Summary, Procedure Summary, Intake/Output, MAR, Accordion, Recent Results and Med Rec Status. 0830: Assessment complete. Patient placed on 3L NC. Tolerating ok. Congested cough and course lung sounds. Passed STAND and took PO good. No pain or complaints. 1340: Patient rang out, having trouble breathing, O2 in 80s and dropping into 70s, RR in 40s, HR 130s. Patient stating \" This isnt working\". FiO2 increased to 100%, patient pulled up and HOB 90.   1400: Patient O2 starting to recover into 90s. RT notified. 1420: Able to wean O2 back to 80%. 1437: Patient rang out again, having trouble breathing. O2 once again in low 80s, FiO2 increased to 100%. RT paged and Dr. Tricia Pastrana notified. Orders for ABG placed. 1451: ABG performed at bedside. 1500: Dr. Stuart Damico, Pulmonary paged. Updated. Orders to Intubate patient. Wife called and updated. 1541: Wife here to sign consents. Anesthesia paged. 1615: Patient Intubated by anesthesia 7.5 ETT at 24 teeth. Peoples placed by RN. 1653: Dopplers being performed at bedside. 1719: 1/2 RBCs started. 1930: Bedside shift change report given to Calista Rose RN (oncoming nurse) by Liang Wetzel RN (offgoing nurse). Report included the following information SBAR, Kardex, ED Summary, Procedure Summary, Intake/Output, MAR, Accordion and Recent Results.

## 2019-03-17 NOTE — H&P
HISTORY AND PHYSICAL      PCP: Other, MD Roopa  History source: EMR, ED staff, wife over the phone, pt is unable to provide history      CC: shortness of breath      HPI: 78 y.o man with COPD, DM, HTN, multiple myeloma, recently discharged from here for COPD exacerbation and DVT, who presents with dyspnea. He is currently on bipap, somnolent. Nods when asked if feeling better. He reportedly developed dyspnea last night which persisted until today. EMS was called and found him to be dyspneic and hypoxic with O2 sats down to 83% on RA. His wife tells me that since his discharge from the last admission he's been very fatigued. He was smoking last night and his dyspnea got much worse this morning. He also fell last night, told her that he fainted. She is unaware of any bleeding or fever. PMH/PSH:  Past Medical History:   Diagnosis Date    AAA (abdominal aortic aneurysm) (HCC)     Asthma     Cancer ([address]     kidney ca    COPD     Diabetes ([address]     Gastrointestinal disorder     ruptured esphogus    Hypertension     Other ill-defined conditions(799.89)      Past Surgical History:   Procedure Laterality Date    ABDOMEN SURGERY PROC UNLISTED      hernia    ABDOMEN SURGERY PROC UNLISTED      colon resection    HX GI      part of colon removed, ruptured esophagus    HX OTHER SURGICAL      kidney removed    [facility] meds:   Prior to Admission medications    Medication Sig Start Date End Date Taking? Authorizing Provider   apixaban (ELIQUIS) 5 mg tablet Take 2 Tabs by mouth two (2) times a day for 7 days. 3/13/19 3/20/19  Nhi Allen MD   apixaban (ELIQUIS) 5 mg tablet Take 1 Tab by mouth two (2) times a day for 30 days. 3/19/19 4/18/19  Nhi Allen MD   nicotine (NICODERM CQ) 21 mg/24 hr 1 Patch by TransDERmal route every twenty-four (24) hours for 30 days.  3/13/19 4/12/19  Nhi Allen MD   predniSONE (DELTASONE) 20 mg tablet Take 40 mg by mouth daily (with breakfast) for 2 days. 3/14/19 3/16/19  Bhanu Gaines MD   calcium carbonate (TUMS) 200 mg calcium (500 mg) chew Take 1 Tab by mouth two (2) times daily (with meals). 2/2/19   Donald SHERIFF,    DULoxetine (CYMBALTA) 30 mg capsule Take 30 mg by mouth daily. Provider, Historical   pantoprazole (PROTONIX) 40 mg tablet Take 40 mg by mouth two (2) times a day. Provider, Historical   dexamethasone (DECADRON) 4 mg tablet Take 20 mg by mouth Every Thursday. Provider, Historical   lenalidomide (REVLIMID) 25 mg cap Take 25 mg by mouth. DAILY ON DAYS 1 TO 21 FOR A 28 DAY CYCLE (OFF MED 7 DAYS BEFORE STARTING AGAIN) NEXT DOSE DUE Thursday 1/31/19 TO START NEXT ROUND    Provider, Historical   insulin glargine (LANTUS) 100 unit/mL injection 30 Units by SubCUTAneous route every morning. Provider, Historical   insulin aspart U-100 (NOVOLOG FLEXPEN U-100 INSULIN) 100 unit/mL inpn 6 Units by SubCUTAneous route daily (with breakfast). Provider, Historical   insulin aspart U-100 (NOVOLOG FLEXPEN U-100 INSULIN) 100 unit/mL inpn 6 Units by SubCUTAneous route daily (with lunch). Provider, Historical   insulin aspart U-100 (NOVOLOG FLEXPEN U-100 INSULIN) 100 unit/mL inpn 10 Units by SubCUTAneous route daily (with dinner). Provider, Historical   albuterol-ipratropium (DUO-NEB) 2.5 mg-0.5 mg/3 ml nebu 3 mL by Nebulization route every four (4) hours as needed. 3/9/18   Yocasta Quezada MD   acetaminophen (TYLENOL) 325 mg tablet Take 2 Tabs by mouth every four (4) hours as needed for Fever (For temp greater than or equal to 38.5 C or 101.3 F (Unless hepatic failure or contrindicated). Give first line for fever. ). 9/12/16   Ian Berman MD   aspirin 81 mg chewable tablet Take 1 Tab by mouth daily. 9/12/16   Ian Berman MD   clopidogrel (PLAVIX) 75 mg tablet Take 1 Tab by mouth daily.  9/12/16   Ian Berman MD   cholecalciferol (VITAMIN D3) 1,000 unit cap Take 1,000 Units by mouth daily. Roopa Davis MD   rosuvastatin (CRESTOR) 40 mg tablet Take 20 mg by mouth nightly. Roopa Davis MD   tiotropium (SPIRIVA WITH HANDIHALER) 18 mcg inhalation capsule Take 1 Cap by inhalation daily. Provider, Eriberto   budesonide-formoterol (SYMBICORT) 160-4.5 mcg/actuation HFA inhaler Take 2 Puffs by inhalation daily. Indications: COPD ASSOCIATED WITH CHRONIC BRONCHITIS    Roopa Davis MD   albuterol (PROVENTIL HFA, VENTOLIN HFA) 90 mcg/Actuation inhaler Take 2 Puffs by inhalation every six (6) hours as needed for Wheezing and Shortness of Breath. 4/28/11   Irene Schwartz MD   montelukast (SINGULAIR) 10 mg tablet Take 10 mg by mouth daily. Roopa Davis MD       Allergies: Allergies   Allergen Reactions    Niacin Rash    Niacin Unknown (comments)     Hot flashes       FH:  Family History   Problem Relation Age of Onset   Joseph Prior Hypertension Mother        SH:  Social History     Tobacco Use    Smoking status: Current Every Day Smoker     Packs/day: 0.50     Years: 60.00     Pack years: 30.00    Smokeless tobacco: Never Used   Substance Use Topics    Alcohol use: Yes     Comment: 2 drinks a month       ROS: Review of systems not obtained due to patient factors.       PHYSICAL EXAM:  Visit Vitals  /51   Pulse (!) 128   Temp 96.4 °F (35.8 °C)   Resp 20   Ht 6' (1.829 m)   Wt 60.6 kg (133 lb 9.6 oz)   SpO2 100%   BMI 18.12 kg/m²       Gen: on bipap, somnolent, warming blanket in place  HEENT: anicteric sclerae, normal conjunctiva  Neck: supple  Heart: irreg irreg, tachycardic, distant heart sounds, no JVD, no peripheral edema  Lungs: bilateral wheeze, diminished breath sounds globally, non-labored respirations on bipap  Abd: soft, NT, ND, BS+  Extr: warm  Skin: R arm w/ multiple ecchymoses, skin laceration wrapped in the ED  Neuro/psych: somnolent, follows simple commands when awakened, moves all extremities, grossly non-focal       Labs/Imaging:  Recent Results (from the past 24 hour(s)) EKG, 12 LEAD, INITIAL    Collection Time: 03/16/19  6:34 PM   Result Value Ref Range    Ventricular Rate 167 BPM    Atrial Rate 178 BPM    QRS Duration 88 ms    Q-T Interval 286 ms    QTC Calculation (Bezet) 477 ms    Calculated R Axis 69 degrees    Calculated T Axis 61 degrees    Diagnosis       Atrial fibrillation  ST depression, consider subendocardial injury  When compared with ECG of 10-MAR-2019 18:56,  Atrial fibrillation has replaced Sinus rhythm  ST more depressed in Lateral leads  Nonspecific T wave abnormality now evident in Inferior leads  Nonspecific T wave abnormality now evident in Lateral leads     SAMPLES BEING HELD    Collection Time: 03/16/19  6:41 PM   Result Value Ref Range    SAMPLES BEING HELD 2BC(SILVER),2BLU,1RED,1PST     COMMENT        Add-on orders for these samples will be processed based on acceptable specimen integrity and analyte stability, which may vary by analyte. CBC WITH AUTOMATED DIFF    Collection Time: 03/16/19  6:41 PM   Result Value Ref Range    WBC 7.8 4.1 - 11.1 K/uL    RBC 3.07 (L) 4.10 - 5.70 M/uL    HGB 8.8 (L) 12.1 - 17.0 g/dL    HCT 29.9 (L) 36.6 - 50.3 %    MCV 97.4 80.0 - 99.0 FL    MCH 28.7 26.0 - 34.0 PG    MCHC 29.4 (L) 30.0 - 36.5 g/dL    RDW 20.2 (H) 11.5 - 14.5 %    PLATELET 266 157 - 686 K/uL    MPV 11.4 8.9 - 12.9 FL    NRBC 0.3 (H) 0  WBC    ABSOLUTE NRBC 0.02 (H) 0.00 - 0.01 K/uL    NEUTROPHILS 57 32 - 75 %    LYMPHOCYTES 19 12 - 49 %    MONOCYTES 16 (H) 5 - 13 %    EOSINOPHILS 6 0 - 7 %    BASOPHILS 1 0 - 1 %    IMMATURE GRANULOCYTES 1 (H) 0.0 - 0.5 %    ABS. NEUTROPHILS 4.4 1.8 - 8.0 K/UL    ABS. LYMPHOCYTES 1.5 0.8 - 3.5 K/UL    ABS. MONOCYTES 1.2 (H) 0.0 - 1.0 K/UL    ABS. EOSINOPHILS 0.5 (H) 0.0 - 0.4 K/UL    ABS. BASOPHILS 0.1 0.0 - 0.1 K/UL    ABS. IMM.  GRANS. 0.1 (H) 0.00 - 0.04 K/UL    DF SMEAR SCANNED      RBC COMMENTS ANISOCYTOSIS  2+        RBC COMMENTS POIKILOCYTOSIS  1+        RBC COMMENTS OVALOCYTES  PRESENT        RBC COMMENTS SCHISTOCYTES  PRESENT       METABOLIC PANEL, BASIC    Collection Time: 03/16/19  6:41 PM   Result Value Ref Range    Sodium 142 136 - 145 mmol/L    Potassium 4.0 3.5 - 5.1 mmol/L    Chloride 109 (H) 97 - 108 mmol/L    CO2 28 21 - 32 mmol/L    Anion gap 5 5 - 15 mmol/L    Glucose 42 (LL) 65 - 100 mg/dL    BUN 48 (H) 6 - 20 MG/DL    Creatinine 1.44 (H) 0.70 - 1.30 MG/DL    BUN/Creatinine ratio 33 (H) 12 - 20      GFR est AA 57 (L) >60 ml/min/1.73m2    GFR est non-AA 47 (L) >60 ml/min/1.73m2    Calcium 9.8 8.5 - 10.1 MG/DL   NT-PRO BNP    Collection Time: 03/16/19  6:41 PM   Result Value Ref Range    NT pro- (H) <450 PG/ML   TROPONIN I    Collection Time: 03/16/19  6:41 PM   Result Value Ref Range    Troponin-I, Qt. <0.05 <0.05 ng/mL   POC LACTIC ACID    Collection Time: 03/16/19  6:43 PM   Result Value Ref Range    Lactic Acid (POC) 2.64 (HH) 0.40 - 2.00 mmol/L   OCCULT BLOOD, STOOL    Collection Time: 03/16/19  7:33 PM   Result Value Ref Range    Occult blood, stool POSITIVE (A) NEG     GLUCOSE, POC    Collection Time: 03/16/19  8:04 PM   Result Value Ref Range    Glucose (POC) 134 (H) 65 - 100 mg/dL    Performed by WILBERT Mehta        Recent Labs     03/16/19 1841   WBC 7.8   HGB 8.8*   HCT 29.9*        Recent Labs     03/16/19 1841      K 4.0   *   CO2 28   BUN 48*   CREA 1.44*   GLU 42*   CA 9.8     No results for input(s): SGOT, GPT, ALT, AP, TBIL, TBILI, TP, ALB, GLOB, GGT, AML, LPSE in the last 72 hours. No lab exists for component: AMYP, HLPSE    Recent Labs     03/16/19 1841   TROIQ <0.05       No results for input(s): INR, PTP, APTT in the last 72 hours. No lab exists for component: INREXT     No results for input(s): PH, PCO2, PO2 in the last 72 hours. Xr Chest Port    Result Date: 3/16/2019  IMPRESSION: COPD. No acute process identified.            Assessment & Plan:     Acute hypoxic respiratory failure: (POA) likely due to acute COPD exacerbation  -continue BiPAP support  -inhaled bronchodilators  -systemic steroids  -no ABG checked will obtain one now on bipap    GI bleed:  -hold anticoagulation and ASA  -monitor H/H  -IV PPI Q12H  -consult GI    Afib with RVR:  -check TSH (recently low) and FT4  -continue amiodarone drip  -check TTE  -consult cardiology    Possible sepsis: based on hypothermia, tachycardia, and hypotension  -IV fluids  -no clear focus of infection, f/u blood cultures  -continue empiric vancomycin and cefepime for now  -check random cortisol (add-on to specimen given before steroids)    Recent acute R popliteal DVT:  -unable to resume anticoagulation  -may need IVC filter, too unstable currently    Type 2 DM with hypoglycemia:  -monitor BG  -hold all hypoglycemics    Multiple myeloma    History of AAA repair    Tobacco use    Call wife with any changes in clinical status. DVT ppx: SCDs  Code status: full - ER MD spoke to patient when he was more alert. I spoke to the wife as well who reiterated this.   Disposition: TBD    Signed By: Nilda Melgar MD     March 16, 2019

## 2019-03-17 NOTE — PROGRESS NOTES
Pulm/CCM    Chart reviewed  D/w RN    77 yo w COPD/emphysema and MM  Recent admit ---> Had COPD exacerbation/ acute r popliteal DVT  Indeterminate V/Q  D/c home 3/13 on Eliquis    Smoked at home  Cough congestion  ? syncope  Weak  To ED room air sats 83%  Hypothermic  lacate 2.6  Readmitted hypercapneic  afib RVR   ? GI Bleed-- initial h/h/ stable but hemoccult +  To ICU on Bipap  Off/off bipap this am    Addendum:    Worsened clinical status this p.m. --> severe desats/respiratory distess not recovering despite BiPAP 100% FiO2. .   ---> to be intubated    Updated data reviewed:  ECHO w/o obvious abnml or R heart strain- cards following    Hgb down--> getting PRBCs  Note- he was d/c on Eliqius + ASA and Plavix with h/o GI issues  Per old notes:  - EGD 2/28/18 large H hernia. Multiple lance's erosions, severe duodenitis vs large laterally spreading villous adenoma. S/p colectomy for diverticulitis  Hx esophageal rupture from violent vomiting    Pro calcitonin low--> on broad spectrum abx empirically /   resp viral PCR ordered/ check UA reflex cx    Had non-occlusive right popliteal  DVT 3/11 and indeterminate V/Q (severe emphysema on last CT)  ? PE issue despite eliquis? Prior severe flu 2018. .. Will:  initiate Vent support  cx sputum ETT  resp viral PCR  Bump steroids for?  Copd exac  Recheck LE doppler-> if sig residual /increased clot --> urgent IVC filter  Trend h/h      Patient Active Problem List   Diagnosis Code    Acute respiratory failure (HCC) J96.00    COPD with acute exacerbation ([facility] [address] J44.1    HTN (hypertension) I10    HTN (hypertension) I10    COPD (chronic obstructive pulmonary disease) (HCC) J44.9    Cancer of kidney ([facility] [address] C64.9    Hypoglycemia, unspecified E16.2    Acute renal failure ([facility] [address] N17.9    Hyperkalemia E87.5    S/P partial colectomy Z90.49    Diarrhea R19.7    DM (diabetes mellitus), type 2, uncontrolled ([address] E11.65    S/p nephrectomy Z90.5    AAA (abdominal aortic aneurysm) (HCC) I71.4    GI bleed K92.2    Cancer (HCC) C80.1    Anemia D64.9    Multiple myeloma (HCC) C90.00    Stroke (cerebrum) (HCC) I63.9    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    Stenosis of both internal carotid arteries I65.23    Diabetic peripheral neuropathy associated with type 2 diabetes mellitus (HCC) E11.42    Hypoxemia R09.02    Pneumonia J18.9    COPD exacerbation (HCC) J44.1    Sepsis (HCC) A41.9    Acute respiratory failure with hypoxemia (HCC) J96.01    Hypotension I95.9       Past Medical History:   Diagnosis Date    AAA [facility] aortic aneurysm) [facility])     Asthma     Cancer ([facility] [address]     kidney ca    COPD     Diabetes ([facility] [address]     Gastrointestinal disorder     ruptured esphogus    Hypertension     Other ill-defined conditions(799.89)      Past Surgical History:   Procedure Laterality Date    ABDOMEN SURGERY PROC UNLISTED      hernia    ABDOMEN SURGERY PROC UNLISTED      colon resection    HX GI      part of colon removed, ruptured esophagus    HX OTHER SURGICAL      kidney removed    REMV KIDNEY,COMPLICATED       Allergies   Allergen Reactions    Niacin Rash    Niacin Unknown (comments)     Hot flashes     Prior to Admission Medications   Prescriptions Last Dose Informant Patient Reported? Taking? DULoxetine (CYMBALTA) 30 mg capsule   Yes No   Sig: Take 30 mg by mouth daily. acetaminophen (TYLENOL) 325 mg tablet   No No   Sig: Take 2 Tabs by mouth every four (4) hours as needed for Fever (For temp greater than or equal to 38.5 C or 101.3 F (Unless hepatic failure or contrindicated). Give first line for fever. ). albuterol (PROVENTIL HFA, VENTOLIN HFA) 90 mcg/Actuation inhaler   No No   Sig: Take 2 Puffs by inhalation every six (6) hours as needed for Wheezing and Shortness of Breath. albuterol-ipratropium (DUO-NEB) 2.5 mg-0.5 mg/3 ml nebu   No No   Sig: 3 mL by Nebulization route every four (4) hours as needed.    apixaban (ELIQUIS) 5 mg tablet   No No   Sig: Take 2 Tabs by mouth two (2) times a day for 7 days. apixaban (ELIQUIS) 5 mg tablet   No No   Sig: Take 1 Tab by mouth two (2) times a day for 30 days. aspirin 81 mg chewable tablet   No No   Sig: Take 1 Tab by mouth daily. budesonide-formoterol (SYMBICORT) 160-4.5 mcg/actuation HFA inhaler   Yes No   Sig: Take 2 Puffs by inhalation daily. Indications: COPD ASSOCIATED WITH CHRONIC BRONCHITIS   calcium carbonate (TUMS) 200 mg calcium (500 mg) chew   No No   Sig: Take 1 Tab by mouth two (2) times daily (with meals). cholecalciferol (VITAMIN D3) 1,000 unit cap   Yes No   Sig: Take 1,000 Units by mouth daily. clopidogrel (PLAVIX) 75 mg tablet   No No   Sig: Take 1 Tab by mouth daily. dexamethasone (DECADRON) 4 mg tablet   Yes No   Sig: Take 20 mg by mouth Every Thursday. insulin aspart U-100 (NOVOLOG FLEXPEN U-100 INSULIN) 100 unit/mL inpn   Yes No   Si Units by SubCUTAneous route daily (with breakfast). insulin aspart U-100 (NOVOLOG FLEXPEN U-100 INSULIN) 100 unit/mL inpn   Yes No   Si Units by SubCUTAneous route daily (with lunch). insulin aspart U-100 (NOVOLOG FLEXPEN U-100 INSULIN) 100 unit/mL inpn   Yes No   Sig: 10 Units by SubCUTAneous route daily (with dinner). insulin glargine (LANTUS) 100 unit/mL injection   Yes No   Si Units by SubCUTAneous route every morning. lenalidomide (REVLIMID) 25 mg cap   Yes No   Sig: Take 25 mg by mouth. DAILY ON DAYS 1 TO 21 FOR A 28 DAY CYCLE (OFF MED 7 DAYS BEFORE STARTING AGAIN) NEXT DOSE DUE 19 TO START NEXT ROUND   montelukast (SINGULAIR) 10 mg tablet   Yes No   Sig: Take 10 mg by mouth daily. nicotine (NICODERM CQ) 21 mg/24 hr   No No   Si Patch by TransDERmal route every twenty-four (24) hours for 30 days. pantoprazole (PROTONIX) 40 mg tablet   Yes No   Sig: Take 40 mg by mouth two (2) times a day.    predniSONE (DELTASONE) 20 mg tablet   No No   Sig: Take 40 mg by mouth daily (with breakfast) for 2 days. rosuvastatin (CRESTOR) 40 mg tablet   Yes No   Sig: Take 20 mg by mouth nightly. tiotropium (SPIRIVA WITH HANDIHALER) 18 mcg inhalation capsule   Yes No   Sig: Take 1 Cap by inhalation daily.       Facility-Administered Medications: None     Social History     Socioeconomic History    Marital status:      Spouse name: Not on file    Number of children: Not on file    Years of education: Not on file    Highest education level: Not on file   Social Needs    Financial resource strain: Not on file    Food insecurity - worry: Not on file    Food insecurity - inability: Not on file    Transportation needs - medical: Not on file   Timbre needs - non-medical: Not on file   Occupational History    Not on file   Tobacco Use    Smoking status: Current Every Day Smoker     Packs/day: 0.50     Years: 60.00     Pack years: 30.00    Smokeless tobacco: Never Used   Substance and Sexual Activity    Alcohol use: Yes     Comment: 2 drinks a month    Drug use: No    Sexual activity: Not on file   Other Topics Concern    Not on file   Social History Narrative    ** Merged History Encounter **          Family History   Problem Relation Age of Onset    Hypertension Mother      VITALS  Patient Vitals for the past 12 hrs:   Temp Pulse Resp BP SpO2   03/17/19 0943  (!) 102      03/17/19 0900  (!) 102 22 152/69 93 %   03/17/19 0830  87 15  100 %   03/17/19 0800 97.2 °F (36.2 °C) 89 15 120/67 100 %   03/17/19 0733     92 %   03/17/19 0700  (!) 106 21 144/71 92 %   03/17/19 0600  (!) 110 23 145/84 100 %   03/17/19 0500  (!) 133 23 121/63 98 %   03/17/19 0400 97.6 °F (36.4 °C) (!) 126 16 125/70 99 %   03/17/19 0322     100 %   03/17/19 0321     100 %   03/17/19 0300  (!) 119 17 113/58 100 %   03/17/19 0200  (!) 129 17 120/65 100 %   03/17/19 0100  (!) 139 23 140/84 100 %   03/17/19 0034     97 %   03/17/19 0000 98.1 °F (36.7 °C) (!) 138 21 126/62 97 %   03/16/19 2335 98.1 °F (36.7 °C) (!) 147 24 120/60 98 %   03/16/19 2330     98 %   03/16/19 2315  (!) 142 22 114/55 98 %   03/16/19 2215  (!) 149 23 133/63 96 %       Somnolent  NC/AT  EOMI moist MM  BiPAP  Chest decrased bilat exp wheeze  Cv s1s2  abd soft  LE no edema  Non-focal    Recent Results (from the past 24 hour(s))   EKG, 12 LEAD, INITIAL    Collection Time: 03/16/19  6:34 PM   Result Value Ref Range    Ventricular Rate 167 BPM    Atrial Rate 178 BPM    QRS Duration 88 ms    Q-T Interval 286 ms    QTC Calculation (Bezet) 477 ms    Calculated R Axis 69 degrees    Calculated T Axis 61 degrees    Diagnosis       Atrial fibrillation  ST depression, consider subendocardial injury  When compared with ECG of 10-MAR-2019 18:56,  Atrial fibrillation has replaced Sinus rhythm  ST more depressed in Lateral leads  Nonspecific T wave abnormality now evident in Inferior leads  Nonspecific T wave abnormality now evident in Lateral leads     SAMPLES BEING HELD    Collection Time: 03/16/19  6:41 PM   Result Value Ref Range    SAMPLES BEING HELD 2BC(SILVER),2BLU,1RED,1PST     COMMENT        Add-on orders for these samples will be processed based on acceptable specimen integrity and analyte stability, which may vary by analyte. CBC WITH AUTOMATED DIFF    Collection Time: 03/16/19  6:41 PM   Result Value Ref Range    WBC 7.8 4.1 - 11.1 K/uL    RBC 3.07 (L) 4.10 - 5.70 M/uL    HGB 8.8 (L) 12.1 - 17.0 g/dL    HCT 29.9 (L) 36.6 - 50.3 %    MCV 97.4 80.0 - 99.0 FL    MCH 28.7 26.0 - 34.0 PG    MCHC 29.4 (L) 30.0 - 36.5 g/dL    RDW 20.2 (H) 11.5 - 14.5 %    PLATELET 379 332 - 641 K/uL    MPV 11.4 8.9 - 12.9 FL    NRBC 0.3 (H) 0  WBC    ABSOLUTE NRBC 0.02 (H) 0.00 - 0.01 K/uL    NEUTROPHILS 57 32 - 75 %    LYMPHOCYTES 19 12 - 49 %    MONOCYTES 16 (H) 5 - 13 %    EOSINOPHILS 6 0 - 7 %    BASOPHILS 1 0 - 1 %    IMMATURE GRANULOCYTES 1 (H) 0.0 - 0.5 %    ABS. NEUTROPHILS 4.4 1.8 - 8.0 K/UL    ABS.  LYMPHOCYTES 1.5 0.8 - 3.5 K/UL ABS. MONOCYTES 1.2 (H) 0.0 - 1.0 K/UL    ABS. EOSINOPHILS 0.5 (H) 0.0 - 0.4 K/UL    ABS. BASOPHILS 0.1 0.0 - 0.1 K/UL    ABS. IMM.  GRANS. 0.1 (H) 0.00 - 0.04 K/UL    DF SMEAR SCANNED      RBC COMMENTS ANISOCYTOSIS  2+        RBC COMMENTS POIKILOCYTOSIS  1+        RBC COMMENTS OVALOCYTES  PRESENT        RBC COMMENTS SCHISTOCYTES  PRESENT       METABOLIC PANEL, BASIC    Collection Time: 03/16/19  6:41 PM   Result Value Ref Range    Sodium 142 136 - 145 mmol/L    Potassium 4.0 3.5 - 5.1 mmol/L    Chloride 109 (H) 97 - 108 mmol/L    CO2 28 21 - 32 mmol/L    Anion gap 5 5 - 15 mmol/L    Glucose 42 (LL) 65 - 100 mg/dL    BUN 48 (H) 6 - 20 MG/DL    Creatinine 1.44 (H) 0.70 - 1.30 MG/DL    BUN/Creatinine ratio 33 (H) 12 - 20      GFR est AA 57 (L) >60 ml/min/1.73m2    GFR est non-AA 47 (L) >60 ml/min/1.73m2    Calcium 9.8 8.5 - 10.1 MG/DL   NT-PRO BNP    Collection Time: 03/16/19  6:41 PM   Result Value Ref Range    NT pro- (H) <450 PG/ML   TROPONIN I    Collection Time: 03/16/19  6:41 PM   Result Value Ref Range    Troponin-I, Qt. <0.05 <0.05 ng/mL   CULTURE, BLOOD, PAIRED    Collection Time: 03/16/19  6:41 PM   Result Value Ref Range    Special Requests: NO SPECIAL REQUESTS      Culture result: NO GROWTH AFTER 12 HOURS     CORTISOL    Collection Time: 03/16/19  6:41 PM   Result Value Ref Range    Cortisol, random 37.1 ug/dL   POC LACTIC ACID    Collection Time: 03/16/19  6:43 PM   Result Value Ref Range    Lactic Acid (POC) 2.64 (HH) 0.40 - 2.00 mmol/L   OCCULT BLOOD, STOOL    Collection Time: 03/16/19  7:33 PM   Result Value Ref Range    Occult blood, stool POSITIVE (A) NEG     GLUCOSE, POC    Collection Time: 03/16/19  8:04 PM   Result Value Ref Range    Glucose (POC) 134 (H) 65 - 100 mg/dL    Performed by WILBERT Mehta    LACTIC ACID    Collection Time: 03/16/19 10:42 PM   Result Value Ref Range    Lactic acid 1.0 0.4 - 2.0 MMOL/L   POC G3 - PUL    Collection Time: 03/16/19 10:59 PM   Result Value Ref Range FIO2 (POC) 30 %    pH (POC) 7.374 7.35 - 7.45      pCO2 (POC) 36.7 35.0 - 45.0 MMHG    pO2 (POC) 105 (H) 80 - 100 MMHG    HCO3 (POC) 21.4 (L) 22 - 26 MMOL/L    sO2 (POC) 98 (H) 92 - 97 %    Base deficit (POC) 4 mmol/L    Site LEFT BRACHIAL      Device: BIPAP      PEEP/CPAP (POC) 5 cmH2O    PIP (POC) 10      Allens test (POC) N/A      Specimen type (POC) ARTERIAL      Spontaneous timed YES     GLUCOSE, POC    Collection Time: 03/17/19 12:46 AM   Result Value Ref Range    Glucose (POC) 121 (H) 65 - 100 mg/dL    Performed by Vilma Flores    GLUCOSE, POC    Collection Time: 03/17/19  4:12 AM   Result Value Ref Range    Glucose (POC) 166 (H) 65 - 100 mg/dL    Performed by Vilma Flores    METABOLIC PANEL, BASIC    Collection Time: 03/17/19  4:22 AM   Result Value Ref Range    Sodium 143 136 - 145 mmol/L    Potassium 5.0 3.5 - 5.1 mmol/L    Chloride 114 (H) 97 - 108 mmol/L    CO2 20 (L) 21 - 32 mmol/L    Anion gap 9 5 - 15 mmol/L    Glucose 157 (H) 65 - 100 mg/dL    BUN 47 (H) 6 - 20 MG/DL    Creatinine 1.51 (H) 0.70 - 1.30 MG/DL    BUN/Creatinine ratio 31 (H) 12 - 20      GFR est AA 54 (L) >60 ml/min/1.73m2    GFR est non-AA 45 (L) >60 ml/min/1.73m2    Calcium 8.2 (L) 8.5 - 10.1 MG/DL   CBC W/O DIFF    Collection Time: 03/17/19  4:22 AM   Result Value Ref Range    WBC 7.2 4.1 - 11.1 K/uL    RBC 2.49 (L) 4.10 - 5.70 M/uL    HGB 7.1 (L) 12.1 - 17.0 g/dL    HCT 24.9 (L) 36.6 - 50.3 %    .0 (H) 80.0 - 99.0 FL    MCH 28.5 26.0 - 34.0 PG    MCHC 28.5 (L) 30.0 - 36.5 g/dL    RDW 20.2 (H) 11.5 - 14.5 %    PLATELET 035 (L) 925 - 400 K/uL    MPV 11.3 8.9 - 12.9 FL    NRBC 0.0 0  WBC    ABSOLUTE NRBC 0.00 0.00 - 0.01 K/uL   MAGNESIUM    Collection Time: 03/17/19  4:22 AM   Result Value Ref Range    Magnesium 1.7 1.6 - 2.4 mg/dL   PHOSPHORUS    Collection Time: 03/17/19  4:22 AM   Result Value Ref Range    Phosphorus 3.9 2.6 - 4.7 MG/DL   TSH 3RD GENERATION    Collection Time: 03/17/19  4:22 AM   Result Value Ref Range    TSH 0.12 (L) 0.36 - 3.74 uIU/mL   T4, FREE    Collection Time: 03/17/19  4:22 AM   Result Value Ref Range    T4, Free 1.2 0.8 - 1.5 NG/DL   POC G3 - PUL    Collection Time: 03/17/19  6:12 AM   Result Value Ref Range    FIO2 (POC) 40 %    pH (POC) 7.168 (LL) 7.35 - 7.45      pCO2 (POC) 53.2 (H) 35.0 - 45.0 MMHG    pO2 (POC) 66 (L) 80 - 100 MMHG    HCO3 (POC) 19.3 (L) 22 - 26 MMOL/L    sO2 (POC) 86 (L) 92 - 97 %    Base deficit (POC) 9 mmol/L    Site LEFT BRACHIAL      Device: BIPAP      PEEP/CPAP (POC) 6 cmH2O    PIP (POC) 14      Allens test (POC) N/A      Specimen type (POC) ARTERIAL      Total resp. rate 26     GLUCOSE, POC    Collection Time: 03/17/19  8:48 AM   Result Value Ref Range    Glucose (POC) 181 (H) 65 - 100 mg/dL    Performed by Dulce Durbin        IMPRESSIONS:  Acute hypercapneic resp failure  COPD w active smoking  ?  Acute sepsis- risk nosocomial infection  hypothermia  recent DVT-- on Eliquis until admit  Heme + w/o overt drop h/h  afib RVR--> to NSR  MM on chemo agent    REC  ICU care  Re COPD  BiPAP as needed/ tolerated  empiric abx-- check procalcitonin  Check Viral PCR  AC held/ protonix GI eval  Cards to see/ ECHO  *Will need IVC filter if no AC   critically ill 35\"

## 2019-03-17 NOTE — PROGRESS NOTES
Pharmacist Note - Vancomycin Dosing    Consult provided for this 78 y.o. male for indication of sepsis. Antibiotic regimen(s): Vanc + Cefepime    Recent Labs     19  1841   WBC 7.8   CREA 1.44*   BUN 48*     Frequency of BMP: daily  Height: 183 cm  Weight: 61 kg  Est CrCl: 36 ml/min  Temp (24hrs), Av.5 °F (35.8 °C), Min:95.5 °F (35.3 °C), Max:97.5 °F (36.4 °C)    Goal trough = 15 - 20 mcg/mL    A 1500 mg loading dose was ordered in the ED; to be followed by a maintenance dose of 1000 mg IV every 24 hours. Pharmacy to follow patient daily and order levels / make dose adjustments as appropriate.

## 2019-03-17 NOTE — PROGRESS NOTES
Hospitalist Progress Note  Quan Estrella MD  Answering service: [phone] OR [phone] from in house phone  Cell:       Date of Service:  3/17/2019  NAME:  Valencia Lucero  :  1940  MRN:  [id number]      Admission Summary:   78 y.o man with COPD, DM, HTN, multiple myeloma, recently discharged from here for COPD exacerbation and DVT, who presents with dyspnea. He is currently on bipap, somnolent. Nods when asked if feeling better. He reportedly developed dyspnea last night which persisted until today. EMS was called and found him to be dyspneic and hypoxic with O2 sats down to 83% on RA. His wife tells me that since his discharge from the last admission he's been very fatigued. He was smoking last night and his dyspnea got much worse this morning. He also fell last night, told her that he fainted. She is unaware of any bleeding or fever        Interval history / Subjective: On BIPAP at this time. Unable to get much history. Discussed condition with wife. He is full code. Probably will be intubated due to respiratory status. Discussed with wife, daughter who are on bed side he says clearly that it is ok to intubate now but he will not like to stay on Vent for long period of time. Assessment & Plan:     Acute hypoxic respiratory failure:  likely due to acute COPD exacerbation  -On BIPAP needs intubation  -inhaled bronchodilators  -systemic steroids     GI bleed:  Stool heme positive but no active bleeding seen in ICU  -hold anticoagulation and ASA.  He was started on Eliquis recently.  -monitor H/H Hemoglobin is 7.1 this am. He has history of MM.  -IV PPI Q12H  -GI consulted  -Transfuse if Hemoglobin < 7     Afib with RVR:  -Resolved  -Now in NSR still on Amiodarone drip  -check TTE  - cardiology consulted     Possible sepsis: based on hypothermia, tachycardia, and hypotension  -IV fluids  -no clear focus of infection, cultures are negative so far  -continue empiric vancomycin and cefepime for now de escalate soon.     Recent acute R popliteal DVT:  -unable to resume anticoagulation till ruled out for Gi bleed  -may need IVC filter.     Type 2 DM with hypoglycemia:  -monitor BG  -hold all hypoglycemics     Multiple myeloma mmunoglobulins normal on lenalinamide. Partial Colon Resection per wife due to bleed many years ago.          History of AAA repair      Code status: full  DVT prophylaxis: SCD    Care Plan discussed with: Patient/Family  Disposition: Home w/Family and TBD     Hospital Problems  Date Reviewed: 3/10/2019          Codes Class Noted POA    * (Principal) Acute respiratory failure with hypoxemia ([facility] [address] ICD-10-CM: J96.01  ICD-9-CM: 518.81  3/16/2019 Yes        Hypotension ICD-10-CM: I95.9  ICD-9-CM: 458.9  3/16/2019 Unknown        GI bleed ICD-10-CM: K92.2  ICD-9-CM: 578.9  Unknown Yes    Overview Signed 5/12/2012 10:36 AM by Alayna castellanos                     Review of Systems:   A comprehensive review of systems was negative except for that written in the HPI. Vital Signs:    Last 24hrs VS reviewed since prior progress note. Most recent are:  Visit Vitals  /68   Pulse 96   Temp 97.2 °F (36.2 °C)   Resp 18   Ht 6' (1.829 m)   Wt 60.3 kg (132 lb 14.4 oz)   SpO2 100%   BMI 18.02 kg/m²         Intake/Output Summary (Last 24 hours) at 3/17/2019 1048  Last data filed at 3/17/2019 0943  Gross per 24 hour   Intake 5185.33 ml   Output 505 ml   Net 4680.33 ml        Physical Examination:             Constitutional:  No acute distress, cooperative, pleasant    ENT:  Oral mucous moist, oropharynx benign. Neck supple,    Resp:  CTA bilaterally. No wheezing/rhonchi/rales. No accessory muscle use   CV:  Regular rhythm, normal rate, no murmurs, gallops, rubs    GI:  Soft, non distended, non tender.  normoactive bowel sounds, no hepatosplenomegaly     Musculoskeletal:  No edema, warm, 2+ pulses throughout Neurologic:  Moves all extremities. AAOx3, CN II-XII reviewed     Skin:  Good turgor, no rashes or ulcers       Data Review:    Review and/or order of clinical lab test      Labs:     Recent Labs     03/17/19 0422 03/16/19 1841   WBC 7.2 7.8   HGB 7.1* 8.8*   HCT 24.9* 29.9*   * 188     Recent Labs     03/17/19  0422 03/16/19 1841    142   K 5.0 4.0   * 109*   CO2 20* 28   BUN 47* 48*   CREA 1.51* 1.44*   * 42*   CA 8.2* 9.8   MG 1.7  --    PHOS 3.9  --      No results for input(s): SGOT, GPT, ALT, AP, TBIL, TBILI, TP, ALB, GLOB, GGT, AML, LPSE in the last 72 hours. No lab exists for component: AMYP, HLPSE  No results for input(s): INR, PTP, APTT in the last 72 hours. No lab exists for component: INREXT   No results for input(s): FE, TIBC, PSAT, FERR in the last 72 hours. Lab Results   Component Value Date/Time    Folate 14.1 03/11/2019 01:16 AM      No results for input(s): PH, PCO2, PO2 in the last 72 hours.   Recent Labs     03/16/19 1841   TROIQ <0.05     Lab Results   Component Value Date/Time    Cholesterol, total 101 03/11/2019 01:17 AM    HDL Cholesterol 59 03/11/2019 01:17 AM    LDL, calculated 31.6 03/11/2019 01:17 AM    Triglyceride 52 03/11/2019 01:17 AM    CHOL/HDL Ratio 1.7 03/11/2019 01:17 AM     Lab Results   Component Value Date/Time    Glucose (POC) 181 (H) 03/17/2019 08:48 AM    Glucose (POC) 166 (H) 03/17/2019 04:12 AM    Glucose (POC) 121 (H) 03/17/2019 12:46 AM    Glucose (POC) 134 (H) 03/16/2019 08:04 PM    Glucose (POC) 232 (H) 03/13/2019 11:43 AM     Lab Results   Component Value Date/Time    Color YELLOW/STRAW 01/27/2019 07:45 PM    Appearance CLEAR 01/27/2019 07:45 PM    Specific gravity 1.018 01/27/2019 07:45 PM    pH (UA) 7.0 01/27/2019 07:45 PM    Protein 100 (A) 01/27/2019 07:45 PM    Glucose 250 (A) 01/27/2019 07:45 PM    Ketone TRACE (A) 01/27/2019 07:45 PM    Bilirubin NEGATIVE  01/27/2019 07:45 PM    Urobilinogen 1.0 01/27/2019 07:45 PM Nitrites NEGATIVE  01/27/2019 07:45 PM    Leukocyte Esterase NEGATIVE  01/27/2019 07:45 PM    Epithelial cells FEW 01/27/2019 07:45 PM    Bacteria NEGATIVE  01/27/2019 07:45 PM    WBC 0-4 01/27/2019 07:45 PM    RBC 0-5 01/27/2019 07:45 PM         Medications Reviewed:     Current Facility-Administered Medications   Medication Dose Route Frequency    DULoxetine (CYMBALTA) capsule 30 mg  30 mg Oral DAILY    rosuvastatin (CRESTOR) tablet 20 mg  20 mg Oral QHS    albuterol-ipratropium (DUO-NEB) 2.5 MG-0.5 MG/3 ML  3 mL Nebulization Q4H RT    methylPREDNISolone (PF) (SOLU-MEDROL) injection 40 mg  40 mg IntraVENous Q8H    0.9% sodium chloride infusion  100 mL/hr IntraVENous CONTINUOUS    pantoprazole (PROTONIX) 40 mg in sodium chloride 0.9% 10 mL injection  40 mg IntraVENous Q12H    cefepime (MAXIPIME) 2 g in 0.9% sodium chloride (MBP/ADV) 100 mL  2 g IntraVENous Q12H    balsam peru-castor oil (VENELEX) ointment   Topical BID    budesonide (PULMICORT) 500 mcg/2 ml nebulizer suspension  500 mcg Nebulization BID RT    arformoterol (BROVANA) neb solution 15 mcg  15 mcg Nebulization BID RT    amiodarone (CORDARONE) 375 mg/250 mL D5W infusion  0.5-1 mg/min IntraVENous TITRATE    sodium chloride (NS) flush 5-10 mL  5-10 mL IntraVENous PRN    Vancomycin- pharmacy to dose   Other Rx Dosing/Monitoring    glucose chewable tablet 16 g  4 Tab Oral PRN    dextrose (D50W) injection syrg 12.5-25 g  12.5-25 g IntraVENous PRN    glucagon (GLUCAGEN) injection 1 mg  1 mg IntraMUSCular PRN    vancomycin (VANCOCIN) 1,000 mg in 0.9% sodium chloride (MBP/ADV) 250 mL  1,000 mg IntraVENous Q24H    insulin lispro (HUMALOG) injection   SubCUTAneous Q4H     ______________________________________________________________________  EXPECTED LENGTH OF STAY: - - -  ACTUAL LENGTH OF STAY:          1                 Juliet Osuna MD

## 2019-03-17 NOTE — CONSULTS
[facility].  [address] [facility] [address]  [place], [address]  [phone]                     GI CONSULTATION NOTE      NAME:  Shelton Paniagua   :   1940   MRN:   [id number]     Consult Date: 3/17/2019     Chief Complaint:  + FOB    History of Present Illness:  Patient is a 78 y. o.M with history of CODP, multiple myeloma, DM, CVA on plavix and ASA, DVT/afib on eliquis who is seen in consultation at the request of Dr. Maryjane Bedolla for + FOB. He was admitted with dyspnea and hypercapnia requiring BIPAP and found to be in afib w RVR. He is on BIPAP when I see him and cannot answer questions. He underwent an egd one year ago (2018) with Dr. Mya Eduardo noting for desquamating/moderately inflamed mucosa in the distal esophagus, large (6-7 cm) hiatal hernia and multiple Wilbur's erosions. Stomach only notable for erythema in the body, though in duodenum there is \" immediate post bulbar mucosa is very abnormal suggesting severe duodenitis vs a large laterally spreading villous adenoma. It bleeds on contact\". No biopsies were taken as patient was on plavix. When plavix could be safely held it was recommended he return for egd with biopsy, though I do not see that this has occurred.      He has not had any evidence of GI bleeding. No melena. Hb was 8.3 on d/c 3/11 -->7.1 today. . Plt 146. Cr 1.5 and BUN 47 - both above baseline. Of note, Dr. Casper Ocampo prior notes comment \"He has a PMH significant for a ruptured esophagus following violent retching that required an 8 hr surgery to repair.   This was years ago at an out of state hospital\"    PMH:  Past Medical History:   Diagnosis Date    [facility]     Asthma     Cancer ([facility]     kidney ca    COPD     Diabetes ([facility] [facility]     Gastrointestinal disorder     ruptured esphogus    Hypertension     Other ill-defined conditions(799.89)        PSH:  Past Surgical History:   Procedure Laterality Date    ABDOMEN SURGERY PROC UNLISTED      hernia    ABDOMEN SURGERY PROC UNLISTED      colon resection    HX GI      part of colon removed, ruptured esophagus    HX OTHER SURGICAL      kidney removed    REMV KIDNEY,COMPLICATED         Allergies: Allergies   Allergen Reactions    Niacin Rash    Niacin Unknown (comments)     Hot flashes       Home Medications:  Prior to Admission Medications   Prescriptions Last Dose Informant Patient Reported? Taking? DULoxetine (CYMBALTA) 30 mg capsule   Yes No   Sig: Take 30 mg by mouth daily. acetaminophen (TYLENOL) 325 mg tablet   No No   Sig: Take 2 Tabs by mouth every four (4) hours as needed for Fever (For temp greater than or equal to 38.5 C or 101.3 F (Unless hepatic failure or contrindicated). Give first line for fever. ). albuterol (PROVENTIL HFA, VENTOLIN HFA) 90 mcg/Actuation inhaler   No No   Sig: Take 2 Puffs by inhalation every six (6) hours as needed for Wheezing and Shortness of Breath. albuterol-ipratropium (DUO-NEB) 2.5 mg-0.5 mg/3 ml nebu   No No   Sig: 3 mL by Nebulization route every four (4) hours as needed. apixaban (ELIQUIS) 5 mg tablet   No No   Sig: Take 2 Tabs by mouth two (2) times a day for 7 days. apixaban (ELIQUIS) 5 mg tablet   No No   Sig: Take 1 Tab by mouth two (2) times a day for 30 days. aspirin 81 mg chewable tablet   No No   Sig: Take 1 Tab by mouth daily. budesonide-formoterol (SYMBICORT) 160-4.5 mcg/actuation HFA inhaler   Yes No   Sig: Take 2 Puffs by inhalation daily. Indications: COPD ASSOCIATED WITH CHRONIC BRONCHITIS   calcium carbonate (TUMS) 200 mg calcium (500 mg) chew   No No   Sig: Take 1 Tab by mouth two (2) times daily (with meals). cholecalciferol (VITAMIN D3) 1,000 unit cap   Yes No   Sig: Take 1,000 Units by mouth daily. clopidogrel (PLAVIX) 75 mg tablet   No No   Sig: Take 1 Tab by mouth daily. dexamethasone (DECADRON) 4 mg tablet   Yes No   Sig: Take 20 mg by mouth Every Thursday.    insulin aspart U-100 (NOVOLOG FLEXPEN U-100 INSULIN) 100 unit/mL inpn   Yes No   Si Units by SubCUTAneous route daily (with breakfast). insulin aspart U-100 (NOVOLOG FLEXPEN U-100 INSULIN) 100 unit/mL inpn   Yes No   Si Units by SubCUTAneous route daily (with lunch). insulin aspart U-100 (NOVOLOG FLEXPEN U-100 INSULIN) 100 unit/mL inpn   Yes No   Sig: 10 Units by SubCUTAneous route daily (with dinner). insulin glargine (LANTUS) 100 unit/mL injection   Yes No   Si Units by SubCUTAneous route every morning. lenalidomide (REVLIMID) 25 mg cap   Yes No   Sig: Take 25 mg by mouth. DAILY ON DAYS 1 TO 21 FOR A 28 DAY CYCLE (OFF MED 7 DAYS BEFORE STARTING AGAIN) NEXT DOSE DUE 19 TO START NEXT ROUND   montelukast (SINGULAIR) 10 mg tablet   Yes No   Sig: Take 10 mg by mouth daily. nicotine (NICODERM CQ) 21 mg/24 hr   No No   Si Patch by TransDERmal route every twenty-four (24) hours for 30 days. pantoprazole (PROTONIX) 40 mg tablet   Yes No   Sig: Take 40 mg by mouth two (2) times a day. predniSONE (DELTASONE) 20 mg tablet   No No   Sig: Take 40 mg by mouth daily (with breakfast) for 2 days. rosuvastatin (CRESTOR) 40 mg tablet   Yes No   Sig: Take 20 mg by mouth nightly. tiotropium (SPIRIVA WITH HANDIHALER) 18 mcg inhalation capsule   Yes No   Sig: Take 1 Cap by inhalation daily.       Facility-Administered Medications: None       Hospital Medications:  Current Facility-Administered Medications   Medication Dose Route Frequency    DULoxetine (CYMBALTA) capsule 30 mg  30 mg Oral DAILY    rosuvastatin (CRESTOR) tablet 20 mg  20 mg Oral QHS    albuterol-ipratropium (DUO-NEB) 2.5 MG-0.5 MG/3 ML  3 mL Nebulization Q4H RT    methylPREDNISolone (PF) (SOLU-MEDROL) injection 40 mg  40 mg IntraVENous Q8H    0.9% sodium chloride infusion  100 mL/hr IntraVENous CONTINUOUS    pantoprazole (PROTONIX) 40 mg in sodium chloride 0.9% 10 mL injection  40 mg IntraVENous Q12H    cefepime (MAXIPIME) 2 g in 0.9% sodium chloride (MBP/ADV) 100 mL  2 g IntraVENous Q12H    balsam peru-castor oil (VENELEX) ointment   Topical BID    amiodarone (CORDARONE) 375 mg/250 mL D5W infusion  0.5-1 mg/min IntraVENous TITRATE    sodium chloride (NS) flush 5-10 mL  5-10 mL IntraVENous PRN    Vancomycin- pharmacy to dose   Other Rx Dosing/Monitoring    glucose chewable tablet 16 g  4 Tab Oral PRN    dextrose (D50W) injection syrg 12.5-25 g  12.5-25 g IntraVENous PRN    glucagon (GLUCAGEN) injection 1 mg  1 mg IntraMUSCular PRN    vancomycin (VANCOCIN) 1,000 mg in 0.9% sodium chloride (MBP/ADV) 250 mL  1,000 mg IntraVENous Q24H    insulin lispro (HUMALOG) injection   SubCUTAneous Q4H       Social History:  Social History     Tobacco Use    Smoking status: Current Every Day Smoker     Packs/day: 0.50     Years: 60.00     Pack years: 30.00    Smokeless tobacco: Never Used   Substance Use Topics    Alcohol use: Yes     Comment: 2 drinks a month       Family History:  Family History   Problem Relation Age of Onset    Hypertension Mother        Review of Systems:    Unable to obtain     Objective:     Patient Vitals for the past 8 hrs:   BP Temp Pulse Resp SpO2   03/17/19 1000 116/68  96 18 100 %   03/17/19 0943   (!) 102     03/17/19 0900 152/69  (!) 102 22 93 %   03/17/19 0830   87 15 100 %   03/17/19 0800 120/67 97.2 °F (36.2 °C) 89 15 100 %   03/17/19 0733     92 %   03/17/19 0700 144/71  (!) 106 21 92 %   03/17/19 0600 145/84  (!) 110 23 100 %   03/17/19 0500 121/63  (!) 133 23 98 %   03/17/19 0400 125/70 97.6 °F (36.4 °C) (!) 126 16 99 %   03/17/19 0322     100 %   03/17/19 0321     100 %   03/17/19 0300 113/58  (!) 119 17 100 %     03/17 0701 - 03/17 1900  In: -   Out: 30 [Urine:30]    PHYSICAL EXAM:  Gen: M on BIPAP  HEENT: PEERLA, EOMI  Neck: supple  Chest: Coarse BS  Abd: soft, NT, ND  Ext: no edema  Skin: no rash or lesions noted  Neuro: Moving all extremities, no gross deficits    Data Review Recent Labs     03/17/19  0422 03/16/19  1841   WBC 7.2 7.8   HGB 7.1* 8.8*   HCT 24.9* 29.9*   * 188     Recent Labs     03/17/19  0422 03/16/19  1841    142   K 5.0 4.0   * 109*   CO2 20* 28   BUN 47* 48*   CREA 1.51* 1.44*   * 42*   PHOS 3.9  --    CA 8.2* 9.8     No results for input(s): SGOT, GPT, AP, TBIL, TP, ALB, GLOB, GGT, AML, LPSE in the last 72 hours. No lab exists for component: AMYP, HLPSE  No results for input(s): INR, PTP, APTT in the last 72 hours. No lab exists for component: INREXT       Assessment:     77 yo M with history of CODP, multiple myeloma, DM, CVA on plavix and ASA, DVT/afib on eliquis who is seen in consultation at the request of Dr. Michi Araya for + FOB. Hb is down 1.5 gm, though without evidence of melena or evidence of active GI bleeding. He underwent an egd one year ago (Feb 2018) with Dr. Tonia Fleming noting for desquamating/moderately inflamed mucosa in the distal esophagus, large (6-7 cm) hiatal hernia and multiple Wilbur's erosions. Stomach only notable for erythema in the body, though in duodenum there is \" immediate post bulbar mucosa is very abnormal suggesting severe duodenitis vs a large laterally spreading villous adenoma. It bleeds on contact\". No biopsies were taken as patient was on plavix. He has multiple potential sources of possible chronic slow upper GI bleeding, and triple therapy of eliquis/plavix and ASA is noted. Plan:     - No plans for EGD acutely as he is on BIPAP and currently stable without evidence of active GI bleeding  - Continue IV PPI  - Trend CBC, transfuse as needed  - When he stabilizes from a cardiopulmonary viewpoint there will need to be a conversation re: role of egd. He has multiple advanced comorbities (has previousy been followed by palliative care), and ideally egd would be performed while off blood thinners/anti-plt so that biopsies can be obtained.   If this is deemed not an option, we could still discuss the role of an endoscopy, though likely the findings such as lance's lesions and above noted duodenal abnormalities will not be amenable to focal therapeutic intervention

## 2019-03-17 NOTE — CONSULTS
[address]    Name:  Alisson Haynes  MR#:  [id number]  :  1940  ACCOUNT #:  [de-identified]  DATE OF SERVICE:  2019    CARDIOLOGY CONSULTATION    REFERRING PHYSICIAN:  Jessica Khan MD    HISTORY OF PRESENT ILLNESS:  This 40-year-old man with COPD, diabetes mellitus, hypertension, multiple myeloma recently admitted for COPD seen by Palliative Care. He has a history of DVT. He presented with respiratory arrest and is in the ICU. He had atrial fibrillation with rapid ventricular response on arrival.  He is now in sinus rhythm, sinus tachycardia. He is more alert. He indicates he has never had any known heart problems. Last EKG was sinus rhythm in the past.  He had an echo in 2016 that showed normal LV systolic function, and the transesophageal echo which also showed normal LV function. This was done as part of a TIA workup. He apparently had full resolution. He has not had chest pain. There is no presyncope, syncope, no awareness of palpitations. PAST MEDICAL HISTORY:  Otherwise remarkable for,  1. Hypertension. 2.  Past history of ruptured esophagus. 3.  Diabetes. 4.  COPD. 5.  Kidney cancer. 6.  Asthma. 7.  Abdominal aortic aneurysm along with multiple myeloma. PAST SURGICAL HISTORY:  He had nephrectomy, herniorrhaphy, colon resection, kidney removed, part of colon removed with an operation for the ruptured esophagus. CURRENT MEDICATIONS:  1. Two jet nebulizers. 2.  Brovana nebulizer. 3.  Venelex ointment. 4.  Pulmicort inhaler. 5.  Cefepime 2 g every 12.  6.  Cymbalta 30 mg daily. 7.  Sliding scale insulin coverage. 8.  Solu-Medrol 40 IV q.8.  9.  Protonix 40 mg IV q.12.  10.  Crestor 20 mg at bedtime. 11.  Vancomycin per pharmacy dosing. 12.  Saline at 100 an hour. 13.  Amiodarone drip. ALLERGIES:  NIACIN. FAMILY HISTORY:  Hypertension. SOCIAL HISTORY:  Continues to smoke. Social drinker, continues. .     REVIEW OF SYSTEMS:  Basically as per HPI, otherwise unremarkable. PHYSICAL EXAMINATION:  GENERAL:  On exam, he is easily aroused, remains on BiPAP. VITAL SIGNS:  Blood pressure 116/68, pulse 96 and regular. HEENT:  There is no JVD. Carotids full without bruits. Sclerae clear. LUNGS:  Show distant breath sounds. No overt wheezing. No definite rales. HEART:  Regular rate and rhythm without murmur. ABDOMEN:  Soft, nontender. EXTREMITIES:  Without edema. Pedal pulses are intact. LABORATORY DATA:  Free T4 is normal at 1.2, TSH 0.12 on low side. Hemoglobin 7.1, hematocrit 24.9, white count 7200, platelets 096,299. Sodium 143, potassium 5.0, chloride 114, glucose 157, BUN 47, creatinine 1.51. Troponins less than 0.05 x2. PROBLEMS:  1. Paroxysmal atrial fibrillation in the setting of sepsis, now in sinus rhythm. We will try to stop amiodarone, can always be restarted. 2.  Multiple myeloma. 3.  Acute respiratory failure likely due to COPD exacerbation. No definite pneumonic process on chest x-ray. 4.  History of GI bleed. 5.  Anemia with Hemoccult positive stool. 6.  History of abdominal aortic aneurysm. 7.  Syncope. 8.  Possible septic process. Culture is pending. 9.  Recent DVT. Again, may need filter. PLAN/RECOMMENDATIONS:  1. Check echo. 2.  Continue support. 3.  Transfuse as needed. 4.  There appears to be no primary cardiac issues at this point, and we will see again on request depending clinical course.       Kelly Hensley MD [email or web address]  D:  03/17/2019 11:24  T:  03/17/2019 12:35  JOB #:  [id number]

## 2019-03-17 NOTE — ED NOTES
TRANSFER - OUT REPORT:    Verbal report given to Tono(name) on Diana Escobar  being transferred to ICU(unit) for routine progression of care       Report consisted of patients Situation, Background, Assessment and   Recommendations(SBAR). Information from the following report(s) SBAR, ED Summary and Cardiac Rhythm Rapid Afib was reviewed with the receiving nurse. Lines:   Peripheral IV 03/16/19 Left Forearm (Active)   Site Assessment Clean, dry, & intact 3/16/2019  6:42 PM   Phlebitis Assessment 0 3/16/2019  6:42 PM   Infiltration Assessment 0 3/16/2019  6:42 PM   Dressing Status Clean, dry, & intact 3/16/2019  6:42 PM   Dressing Type Transparent 3/16/2019  6:42 PM   Hub Color/Line Status Pink;Flushed;Patent 3/16/2019  6:42 PM   Action Taken Blood drawn 3/16/2019  6:42 PM       Peripheral IV 03/16/19 Right Antecubital (Active)   Site Assessment Clean, dry, & intact 3/16/2019  8:16 PM   Phlebitis Assessment 0 3/16/2019  8:16 PM   Infiltration Assessment 0 3/16/2019  8:16 PM   Dressing Status Clean, dry, & intact 3/16/2019  8:16 PM   Dressing Type Transparent 3/16/2019  8:16 PM   Hub Color/Line Status Green;Flushed;Patent 3/16/2019  8:16 PM   Action Taken Blood drawn 3/16/2019  8:16 PM        Opportunity for questions and clarification was provided.       Patient transported with:   Monitor  Registered Nurse   BiPap  Respiratory

## 2019-03-18 NOTE — PROGRESS NOTES
Code stroke called after the patient was noted to have a dilated right pupil. Sedation has been turned off and the patient is following commands. Exam shows a dilated ~4mm R pupil that is non-reactive to light, L pupil 2 mm and normal. Hand  and lower-extremity plantar flexion symmetric b/l. He follows simple commands. Plan:  CT/CTA head (code stroke protocol).   Teleneurology consult

## 2019-03-18 NOTE — PROGRESS NOTES
Bedside and Verbal shift change report given to Andrae RN (oncoming nurse) by Anay Palmer RN (offgoing nurse). Report included the following information SBAR.     0800: Shift assessment. Pt intubated on ventilator, alert, nods appropriate, QUISPE purposeful. Wife at bedside. 0900: Dr. Bettye Nash at bedside- orders received to start propofol. MD aware of Hgb.     0920: GI paged per Dr. Flash Aguayo request to update on pt condition and determine plan. GI plans to move forward when pt is more stable. 1000: Dr. Bettye Nash made aware of GI plan- orders received to move forward with SBT.     1200: Reassessment, no changes. 1440: Dr. Bettye Nash ordered OGT insertion and KUB per protocol    1600: Reassessment, no changes    1620: Per XRY- OGT ok to use. 1800: Tube feeds started. Bedside and Verbal shift change report given to Kerry Vu (oncoming nurse) by Meeta Granado RN (offgoing nurse). Report included the following information SBAR.

## 2019-03-18 NOTE — ROUTINE PROCESS
RN responding to Code S called in ICU Rm 7. ICU staff notice patient had dilated pupil in R eyes >6 mm. Non-reactive to light. Left eye reactive. Patient intubated, lethargic but arousable. Patient able to track visually with both eyes. Both eyes respond to visual threat in 4 quadrants. Patient verbal interactions severely limited by intubation. Patient able to nod appropriately to stimuli in all extremeties. Patient upper extremities weak bilaterally. In restraints to avoid accidental extubation. Patient to go for CT/CTA. In Touch response at 2050.

## 2019-03-18 NOTE — PROGRESS NOTES
1930: Bedside shift change report received from Jefry Madrid., RN    0298: On initial assessment, pt had pupillary irregularity R5 L2. R eye deviated up and L eye deviated laterally to the L.     2018: Notified charge RN    2020: Paged  to call Code S.    2030: Charge RN, RT, hospitalist, neurointerventional PA and supervisors at bedside. 2050: Pt transported to CT.    2110: Pt back to floor from CT. Pupils remain unequal. R pupil 5, L pupil 3.     2230: CBC obtained during Code S showed Hg 8.4. Paged hospitalist. Per Dr. Nato Patterson second unit of PRBCs held.

## 2019-03-18 NOTE — PROGRESS NOTES
Spiritual Care Assessment/Progress Note  [facility]      NAME: Jesse Tobar      MRN: [id number]  AGE: 78 y.o. SEX: male  Sabianist Affiliation: Synagogue   Language: English     3/18/2019     Total Time (in minutes): 5     Spiritual Assessment begun in [facility] 7S1 INTENSIVE CARE through conversation with:         []Patient        [] Family    [] Friend(s)        Reason for Consult: Palliative Care, Initial/Spiritual Assessment     Spiritual beliefs: (Please include comment if needed)     [] Identifies with a javy tradition:         [] Supported by a javy community:            [] Claims no spiritual orientation:           [] Seeking spiritual identity:                [] Adheres to an individual form of spirituality:           [x] Not able to assess:                           Identified resources for coping:      [] Prayer                               [] Music                  [] Guided Imagery     [] Family/friends                 [] Pet visits     [] Devotional reading                         [] Unknown     [] Other:                                               Interventions offered during this visit: (See comments for more details)    Patient Interventions: Initial visit           Plan of Care:     [] Support spiritual and/or cultural needs    [] Support AMD and/or advance care planning process      [] Support grieving process   [] Coordinate Rites and/or Rituals    [] Coordination with community clergy   [] No spiritual needs identified at this time   [] Detailed Plan of Care below (See Comments)  [] Make referral to Music Therapy  [] Make referral to Pet Therapy     [] Make referral to Addiction services  [] Make referral to [facility]  [] Make referral to Spiritual Care Partner  [] No future visits requested        [x] Follow up visits as needed     Attempted to visit pt in ICU. Pt was intubated and no family was present. Chaplains will continue to support as needed.   Chaplain Tiffany, MDiv, MS, [facility]  [phone])

## 2019-03-18 NOTE — DIABETES MGMT
DTC Progress Note    Recommendations/ Comments: Chart reviewed due to variable BG. Noted pt receiving steroids and NPO status but plans for enteral nutrition to begin. If appropriate, please consider:  Addition of Lantus, 8 units - titrate as needed  Correction as needed    Current hospital DM medication: lispro correction, normal sensitivity    Chart reviewed on Aniya Hughes. Patient is a 78 y.o. male with known DM on Lantus, 30 units and Novolog, 6 units breakfast and lunch, 10 units dinner at home. A1c:   Lab Results   Component Value Date/Time    Hemoglobin A1c 7.4 (H) 01/28/2019 04:44 AM    Hemoglobin A1c 9.8 (H) 02/20/2018 05:14 AM       Recent Glucose Results:   Lab Results   Component Value Date/Time     (H) 03/18/2019 05:59 AM     (H) 03/17/2019 08:45 PM    GLUCPOC 214 (H) 03/18/2019 11:41 AM    GLUCPOC 182 (H) 03/18/2019 05:33 AM    GLUCPOC 150 (H) 03/18/2019 12:06 AM        Lab Results   Component Value Date/Time    Creatinine 1.62 (H) 03/18/2019 05:59 AM     Estimated Creatinine Clearance: 31.2 mL/min (A) (based on SCr of 1.62 mg/dL (H)). Active Orders   There are no active orders of the following type(s): Diet. PO intake:   Patient Vitals for the past 72 hrs:   % Diet Eaten   03/18/19 1600 0 %   03/18/19 1200 0 %   03/18/19 0800 0 %       Will continue to follow as needed.     Thank you  Brandon Shea RD       Time spent: 4 minutes

## 2019-03-18 NOTE — CONSULTS
Palliative Medicine Consult  Александр: [phone] ([phone])    Patient Name: Shelton Paniagua  YOB: 1940    Date of Initial Consult: 3/18/19  Reason for Consult: care decisions  Requesting Provider: Bruce Grahma  Primary Care Physician: Roopa Davis MD     SUMMARY:   Shelton Paniagua is a 78 y.o. with a past history of Multiple myeloma (Dr. Milena Dejesus, dx 2012- on lenalinamide), COPD/severe bullous emphysema, hx CVA, DM, hx renal cell cancer s/p nephrectomy, s/p AAA repair, HTN, GERD, recent admissions 1/27-2/2/19 pneumonia And discharge 3/13/19 with COPD exacerbation/ acute DVT/ DIANA, who was admitted on 3/16/2019 from home with a diagnosis of shortness of breath. Current medical issues leading to Palliative Medicine involvement include: acute on chronic respiratory failure, intubated 3/17/19, failed SBT today, also anemia/ heme + stool, s/p 1 U PRBCs 3/17 (now blood thinners stopped - was on eliquis/ asa/ plavix). 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 He is  to wife Buzz Parks [phone]  They have two adult daughters (Fitz lopez) and a son   PALLIATIVE DIAGNOSES:   1. Acute on chronic respiratory failure, severe COPD/emphysema  2. Anemia, heme + stool  3. Debility  4. Multiple myeloma  5. Acute renal failure  6. Weight loss (10 pounds in past month)       PLAN:   1. Case discussed with bedside nurse. Oncology notes reviewed. 2. Wife has gone home for the day, we will plan to catch up with her tomorrow to introduce palliative services and discuss medical issues and goals of care. 3. Initial consult note routed to primary continuity provider and/or primary health care team members  4. Communicated plan of care with: Palliative [facility] Team     GOALS OF CARE / TREATMENT PREFERENCES:     GOALS OF CARE:  Patient/Health Care Proxy Stated Goals:  Other (comment)(not discussed yet)to be discussed    TREATMENT PREFERENCES:   Code Status: Prior    Advance Care Planning:  [] The [facility] Interdisciplinary Team has updated the ACP Navigator with [address] and Patient Capacity  Wife Radha Mcgovern is [phone] Planning 3/11/2019   Patient's Healthcare Decision Maker is: -   Primary Decision Maker Name -   Primary Decision Maker Relationship to Patient -   Confirm Advance Directive None   Patient Would Like to Complete Advance Directive -       Medical Interventions: Other (comment)     Other Instructions: Other:    As far as possible, the palliative care team has discussed with patient / health care proxy about goals of care / treatment preferences for patient. HISTORY:     History obtained from: chart    CHIEF COMPLAINT: admitted with SOB    HPI/SUBJECTIVE:    The patient is:   [] Verbal and participatory  [x] Non-participatory due to: intubated    78year old male admitted with worsening dyspnea, now intubated in ICU    Clinical Pain Assessment (nonverbal scale for severity on nonverbal patients):   Clinical Pain Assessment  Severity: 0     Activity (Movement): Lying quietly, normal position    Duration: for how long has pt been experiencing pain (e.g., 2 days, 1 month, years)  Frequency: how often pain is an issue (e.g., several times per day, once every few days, constant)     FUNCTIONAL ASSESSMENT:     Palliative Performance Scale (PPS):  PPS: 20       PSYCHOSOCIAL/SPIRITUAL SCREENING:     Palliative IDT has assessed this patient for cultural preferences / practices and a referral made as appropriate to needs (Cultural Services, Patient Advocacy, Ethics, etc.)    Any spiritual / Catholic concerns:  [] Yes /  [x] No    Caregiver Burnout:  [] Yes /  [x] No /  [] No Caregiver Present      Anticipatory grief assessment:   [x] Normal  / [] Maladaptive       ESAS Anxiety:      ESAS Depression:          REVIEW OF SYSTEMS:     Positive and pertinent negative findings in ROS are noted above in HPI.   The following systems were [x] reviewed / [] unable to be reviewed as noted in HPI  Other findings are noted below. Systems: constitutional, ears/nose/mouth/throat, respiratory, gastrointestinal, genitourinary, musculoskeletal, integumentary, neurologic, psychiatric, endocrine. Positive findings noted below. Modified ESAS Completed by: provider           Pain: 0           Dyspnea: 5                    PHYSICAL EXAM:     From RN flowsheet:  Wt Readings from Last 3 Encounters:   03/18/19 131 lb 9.8 oz (59.7 kg)   03/11/19 136 lb 12.8 oz (62.1 kg)   02/02/19 142 lb 3.2 oz (64.5 kg)     Blood pressure 107/66, pulse 100, temperature 97.9 °F (36.6 °C), resp. rate 15, height 6' (1.829 m), weight 131 lb 9.8 oz (59.7 kg), SpO2 94 %. Pain Scale 1: Adult Nonverbal Pain Scale  Pain Intensity 1: 0                 Last bowel movement, if known:     Constitutional: awake on vent, waves to us as we enter room  Eyes: pupils equal, anicteric  Thin, chronically ill appearing     HISTORY:     Principal Problem:    Acute respiratory failure with hypoxemia ([facility] (3/16/2019)    Active Problems:    GI bleed ()      Overview: reptured esphogus      Hypotension (3/16/2019)      Past Medical History:   Diagnosis Date    AAA (abdominal aortic aneurysm) (HCC)     Asthma     Cancer ([facility] [address] [facility]     kidney ca    COPD     Diabetes ([facility] [address] [facility]     Gastrointestinal disorder     ruptured esphogus    Hypertension     Other ill-defined conditions(799.89)       Past Surgical History:   Procedure Laterality Date    ABDOMEN SURGERY PROC UNLISTED      hernia    ABDOMEN SURGERY PROC UNLISTED      colon resection    HX GI      part of colon removed, ruptured esophagus    HX OTHER SURGICAL      kidney removed    [facility]        Family History   Problem Relation Age of Onset    Hypertension Mother       History reviewed, no pertinent family history.   Social History     Tobacco Use    Smoking status: Current Every Day Smoker     Packs/day: 0.50     Years: 60.00     Pack years: 30.00  Smokeless tobacco: Never Used   Substance Use Topics    Alcohol use: Yes     Comment: 2 drinks a month     Allergies   Allergen Reactions    Niacin Rash    Niacin Unknown (comments)     Hot flashes      Current Facility-Administered Medications   Medication Dose Route Frequency    0.45% sodium chloride 1,000 mL with sodium bicarbonate (8.4%) 75 mEq infusion   IntraVENous CONTINUOUS    dexmedeTOMidine (PRECEDEX) 400 mcg in 0.9% sodium chloride 100 mL infusion  0.2-0.7 mcg/kg/hr IntraVENous TITRATE    DULoxetine (CYMBALTA) capsule 30 mg  30 mg Oral DAILY    rosuvastatin (CRESTOR) tablet 20 mg  20 mg Oral QHS    albuterol-ipratropium (DUO-NEB) 2.5 MG-0.5 MG/3 ML  3 mL Nebulization Q4H RT    pantoprazole (PROTONIX) 40 mg in sodium chloride 0.9% 10 mL injection  40 mg IntraVENous Q12H    cefepime (MAXIPIME) 2 g in 0.9% sodium chloride (MBP/ADV) 100 mL  2 g IntraVENous Q12H    balsam peru-castor oil (VENELEX) ointment   Topical BID    budesonide (PULMICORT) 500 mcg/2 ml nebulizer suspension  500 mcg Nebulization BID RT    arformoterol (BROVANA) neb solution 15 mcg  15 mcg Nebulization BID RT    0.9% sodium chloride infusion 250 mL  250 mL IntraVENous PRN    insulin lispro (HUMALOG) injection   SubCUTAneous Q6H    methylPREDNISolone (PF) (SOLU-MEDROL) injection 60 mg  60 mg IntraVENous Q6H    fentaNYL citrate (PF) injection 12.5 mcg  12.5 mcg IntraVENous Q4H PRN    sodium chloride (NS) flush 5-10 mL  5-10 mL IntraVENous PRN    Vancomycin- pharmacy to dose   Other Rx Dosing/Monitoring    glucose chewable tablet 16 g  4 Tab Oral PRN    dextrose (D50W) injection syrg 12.5-25 g  12.5-25 g IntraVENous PRN    glucagon (GLUCAGEN) injection 1 mg  1 mg IntraMUSCular PRN    vancomycin (VANCOCIN) 1,000 mg in 0.9% sodium chloride (MBP/ADV) 250 mL  1,000 mg IntraVENous Q24H          LAB AND IMAGING FINDINGS:     Lab Results   Component Value Date/Time    WBC 5.8 03/18/2019 05:59 AM    HGB 7.1 (L) 03/18/2019 05:59 AM    PLATELET 938 (L) 61/03/3636 05:59 AM     Lab Results   Component Value Date/Time    Sodium 144 03/18/2019 05:59 AM    Potassium 4.3 03/18/2019 05:59 AM    Chloride 116 (H) 03/18/2019 05:59 AM    CO2 19 (L) 03/18/2019 05:59 AM    BUN 47 (H) 03/18/2019 05:59 AM    Creatinine 1.62 (H) 03/18/2019 05:59 AM    Calcium 7.0 (L) 03/18/2019 05:59 AM    Magnesium 1.7 03/17/2019 04:22 AM    Phosphorus 3.9 03/17/2019 04:22 AM      Lab Results   Component Value Date/Time    AST (SGOT) 9 (L) 03/11/2019 01:17 AM    Alk. phosphatase 57 03/11/2019 01:17 AM    Protein, total 5.9 (L) 03/11/2019 01:17 AM    Albumin 2.6 (L) 03/11/2019 01:17 AM    Globulin 3.3 03/11/2019 01:17 AM     Lab Results   Component Value Date/Time    INR 1.1 03/17/2019 08:45 PM    Prothrombin time 11.5 (H) 03/17/2019 08:45 PM    aPTT 20.7 (L) 03/17/2019 08:45 PM      Lab Results   Component Value Date/Time    Iron 45 03/11/2019 01:17 AM    TIBC 227 (L) 03/11/2019 01:16 AM    Iron % saturation 20 03/11/2019 01:16 AM    Ferritin 167 03/13/2019 01:21 AM      Lab Results   Component Value Date/Time    pH 7.37 02/19/2018 03:50 PM    PCO2 34 (L) 02/19/2018 03:50 PM    PO2 70 (L) 02/19/2018 03:50 PM     No components found for: [place]   Lab Results   Component Value Date/Time    CK 70 03/11/2019 01:17 AM    CK - MB 6.4 (H) 03/11/2019 01:17 AM                Total time:   Counseling / coordination time, spent as noted above:   > 50% counseling / coordination?:     Prolonged service was provided for  []30 min   []75 min in face to face time in the presence of the patient, spent as noted above. Time Start:   Time End:   Note: this can only be billed with 06979 (initial) or 11919 (follow up). If multiple start / stop times, list each separately.

## 2019-03-18 NOTE — PROGRESS NOTES
[address]  [address] [facility] [address]   [phone]                GI PROGRESS NOTE  Virgil Hernandez, [facility]  Work Cell: [phone]      NAME:   Mariano Boogie   :    1940   MRN:    [id number]     Assessment/Plan   1. GI bleed - w/ + FOB but no overt evidence of bleeding. He has multiple potential sources of possible chronic slow upper GI bleeding and was on triple anticoagulant therapy. Previous EGD  w/ desquamating/moderately inflamed mucosa in the distal esophagus, large (6-7 cm) hiatal hernia and multiple Wilbur's erosions. Stomach only notable for erythema in the body, though in duodenum there is \"immediate post bulbar mucosa is very abnormal suggesting severe duodenitis vs a large laterally spreading villous adenoma. It bleeds on contact\". No biopsies were taken as patient was on plavix. - Supportive management per primary team  - Continue IV PPI   - Hold Plavix if possible   - Consider EGD once acute cardiopulmonary issues have improved if anticoagulation can be safety held. If not an option, could still discuss role of endoscopy. - Follow clinical course for any evidence of overt bleeding   - Monitor H&H, transfuse as needed    2. Acute respiratory failure - intubated on ventilator  3. Atrial fibrillation w/ RVR  4. Acute DVT  5.  Multiple myeloma      Patient Active Problem List   Diagnosis Code    Acute respiratory failure (HCC) J96.00    COPD with acute exacerbation ([address] J44.1    HTN (hypertension) I10    HTN (hypertension) I10    COPD (chronic obstructive pulmonary disease) (HCC) J44.9    Cancer of kidney ([address] C64.9    Hypoglycemia, unspecified E16.2    Acute renal failure ([address] N17.9    Hyperkalemia E87.5    S/P partial colectomy Z90.49    Diarrhea R19.7    DM (diabetes mellitus), type 2, uncontrolled ([address] E11.65    S/p nephrectomy Z90.5    AAA (abdominal aortic aneurysm) ([address] I71.4    GI bleed K92.2    Cancer ([address] C80.1  Anemia D64.9    Multiple myeloma (HCC) C90.00    Stroke (cerebrum) (HCC) I63.9    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 Denies any abdominal pain. Hgb 7.1 today. Per nursing, there has been no evidence of any active GI bleeding. Objective:     VITALS:   Last 24hrs VS reviewed since prior hospitalist progress note. Most recent are:  Visit Vitals  /67   Pulse (!) 101   Temp 98.2 °F (36.8 °C)   Resp 17   Ht 6' (1.829 m)   Wt 59.7 kg (131 lb 9.8 oz)   SpO2 95%   BMI 17.85 kg/m²       Intake/Output Summary (Last 24 hours) at 3/18/2019 1105  Last data filed at 3/18/2019 1000  Gross per 24 hour   Intake 3324.47 ml   Output 2600 ml   Net 724.47 ml        PHYSICAL EXAM:  General   well developed, intubated, drowsy, in no acute distress  EENT  Normocephalic, Atraumatic, PERRLA, EOMI, sclera clear  Respiratory   Diminished bilaterally   Cardiology  Regular Rate and Rythmn  - no murmurs, rubs or gallops  Abdominal  Soft, non-tender, non-distended, positive bowel sounds, no hepatosplenomegaly, no palpable mass  Extremities  Ecchymosis over bilateral UEs. Pulses intact. Neurological  No focal neurological deficits noted.        Lab Data   Recent Results (from the past 12 hour(s))   GLUCOSE, POC    Collection Time: 03/18/19 12:06 AM   Result Value Ref Range    Glucose (POC) 150 (H) 65 - 100 mg/dL    Performed by [address] [place] [address], POC    Collection Time: 03/18/19  5:33 AM   Result Value Ref Range    Glucose (POC) 182 (H) 65 - 100 mg/dL    Performed by David Alcantar METABOLIC PANEL, BASIC    Collection Time: 03/18/19  5:59 AM   Result Value Ref Range    Sodium 144 136 - 145 mmol/L    Potassium 4.3 3.5 - 5.1 mmol/L    Chloride 116 (H) 97 - 108 mmol/L    CO2 19 (L) 21 - 32 mmol/L    Anion gap 9 5 - 15 mmol/L    Glucose 174 (H) 65 - 100 mg/dL    BUN 47 (H) 6 - 20 MG/DL    Creatinine 1.62 (H) 0.70 - 1.30 MG/DL    BUN/Creatinine ratio 29 (H) 12 - 20      GFR est AA 50 (L) >60 ml/min/1.73m2    GFR est non-AA 41 (L) >60 ml/min/1.73m2    Calcium 7.0 (L) 8.5 - 10.1 MG/DL   CBC W/O DIFF    Collection Time: 03/18/19  5:59 AM   Result Value Ref Range    WBC 5.8 4.1 - 11.1 K/uL    RBC 2.33 (L) 4.10 - 5.70 M/uL    HGB 7.1 (L) 12.1 - 17.0 g/dL    HCT 24.1 (L) 36.6 - 50.3 %    .4 (H) 80.0 - 99.0 FL    MCH 30.5 26.0 - 34.0 PG    MCHC 29.5 (L) 30.0 - 36.5 g/dL    RDW 19.3 (H) 11.5 - 14.5 %    PLATELET 909 (L) 899 - 400 K/uL    MPV 11.6 8.9 - 12.9 FL    NRBC 0.5 (H) 0  WBC    ABSOLUTE NRBC 0.03 (H) 0.00 - 0.01 K/uL         Medications: Reviewed    PMH/ reviewed - no change compared to H&P  Mid-Level Provider: Brianne Chowdary NP   Date/Time:  3/18/2019

## 2019-03-18 NOTE — PROGRESS NOTES
Hospitalist Progress Note  Belgica Rodríguez MD  Answering service: [phone] OR [phone] from in house phone  Cell:       Date of Service:  3/18/2019  NAME:  Florinda Singleton  :  1940  MRN:  [id number]      Admission Summary:   78 y.o man with COPD, DM, HTN, multiple myeloma, recently discharged from here for COPD exacerbation and DVT, who presents with dyspnea. He is currently on bipap, somnolent. Nods when asked if feeling better. He reportedly developed dyspnea last night which persisted until today. EMS was called and found him to be dyspneic and hypoxic with O2 sats down to 83% on RA. His wife tells me that since his discharge from the last admission he's been very fatigued. He was smoking last night and his dyspnea got much worse this morning. He also fell last night, told her that he fainted. She is unaware of any bleeding or fever        Interval history / Subjective:   Remains intubated . Assessment & Plan:     Acute hypoxic respiratory failure:  likely due to acute COPD exacerbation  -intubated on vent   -inhaled bronchodilators  -systemic steroids     GI bleed:  Stool heme positive but no active bleeding seen in ICU  -hold anticoagulation and ASA.  He was started on Eliquis recently.  -monitor H/H Hemoglobin is 7.1 this am. He has history of MM.  -IV PPI Q12H  -GI consulted no plan for EGD now   -Transfuse if Hemoglobin < 7     Afib with RVR:  -Resolved  -Now in NSR still on Amiodarone drip  -check TTE  - cardiology consulted     Possible sepsis: based on hypothermia, tachycardia, and hypotension  -IV fluids  -no clear focus of infection, cultures are negative so far  -continue empiric vancomycin and cefepime for now de escalate soon.     Recent acute R popliteal DVT:  -unable to resume anticoagulation till ruled out for Gi bleed  -may need IVC filter.     Type 2 DM with hypoglycemia:  -monitor BG  -hold all hypoglycemics    Acidosis from renal failure and resp failure    Hypocalcemia - replete     Multiple myeloma mmunoglobulins normal on lenalinamide. Partial Colon Resection per wife due to bleed many years ago. History of AAA repair    Code status: full  DVT prophylaxis: SCD    Care Plan discussed with: Patient/Family  Disposition: Home w/Family and TBD     Hospital Problems  Date Reviewed: 3/10/2019          Codes Class Noted POA    * (Principal) Acute respiratory failure with hypoxemia ([facility] [address] ICD-10-CM: J96.01  ICD-9-CM: 518.81  3/16/2019 Yes        Hypotension ICD-10-CM: I95.9  ICD-9-CM: 458.9  3/16/2019 Unknown        GI bleed ICD-10-CM: K92.2  ICD-9-CM: 578.9  Unknown Yes    Overview Signed 5/12/2012 10:36 AM by John castellanos                     Review of Systems:   A comprehensive review of systems was negative except for that written in the HPI. Vital Signs:    Last 24hrs VS reviewed since prior progress note. Most recent are:  Visit Vitals  /67   Pulse (!) 101   Temp 98.2 °F (36.8 °C)   Resp 17   Ht 6' (1.829 m)   Wt 59.7 kg (131 lb 9.8 oz)   SpO2 95%   BMI 17.85 kg/m²         Intake/Output Summary (Last 24 hours) at 3/18/2019 1100  Last data filed at 3/18/2019 1000  Gross per 24 hour   Intake 3324.47 ml   Output 2600 ml   Net 724.47 ml        Physical Examination:             Constitutional:  sedated and intubated   ENT:  MMM   Resp:  CTA   CV:  Regular rhythm, normal rate, no     GI:  Soft, non distended, non tender. bs+    Musculoskeletal:  No edema, warm, 2+ pulses throughout    Neurologic:  sedated     Skin:  Good turgor, no rashes or ulcers       Data Review:    Review and/or order of clinical lab test    Xr Chest Port    Result Date: 3/18/2019  IMPRESSION: ET tube is in satisfactory position. Lungs are hyperexpanded and clear of a focal airspace process. Xr Chest Port    Result Date: 3/17/2019  IMPRESSION: Possible developing airspace process left mid lung.     Xr Chest Port    Result Date: 3/17/2019  IMPRESSION: Hyperaeration without acute finding or change    Xr Chest Port    Result Date: 3/16/2019  IMPRESSION: COPD. No acute process identified. Cta Code Neuro Head And Neck W Cont    Result Date: 3/17/2019  IMPRESSION: CTA Head: 1. No evidence of flow-limiting stenosis or aneurysm. 2. A 15 x 5 mm left lateral frontal convexity meningioma. No significant mass effect. CTA Neck: 1. No evidence of significant stenosis. 2. Severe emphysema. Spiculated nodule in the medial aspect of the right upper lobe measuring 13 mm, in an area of previously seen infectious process. This may represent residual scarring. Follow-up chest CT in 3 months is recommended. Additional small consolidative and groundglass nodular opacities in the left upper lobe, which are likely infectious or inflammatory. This can also be evaluated on follow-up chest CT. CT perfusion: 1. No acute abnormality. 23X     Ct Code Neuro Head Wo Contrast    Result Date: 3/17/2019  IMPRESSION: 1. No evidence of acute intracranial abnormality. 2. 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 3. A 15 mm left lateral frontal convexity meningioma. Ct Code Neuro Perf W Cbf    Result Date: 3/17/2019  IMPRESSION: CTA Head: 1. No evidence of flow-limiting stenosis or aneurysm. 2. A 15 x 5 mm left lateral frontal convexity meningioma. No significant mass effect. CTA Neck: 1. No evidence of significant stenosis. 2. Severe emphysema. Spiculated nodule in the medial aspect of the right upper lobe measuring 13 mm, in an area of previously seen infectious process. This may represent residual scarring. Follow-up chest CT in 3 months is recommended. Additional small consolidative and groundglass nodular opacities in the left upper lobe, which are likely infectious or inflammatory. This can also be evaluated on follow-up chest CT. CT perfusion: 1. No acute abnormality.  23X     Labs:     Recent Labs 03/18/19 0559 03/17/19 2045   WBC 5.8 5.8   HGB 7.1* 8.4*   HCT 24.1* 28.2*   * 128*     Recent Labs     03/18/19 0559 03/17/19 2045 03/17/19  0422    143 143   K 4.3 5.0 5.0   * 116* 114*   CO2 19* 20* 20*   BUN 47* 54* 47*   CREA 1.62* 1.72* 1.51*   * 164* 157*   CA 7.0* 7.4* 8.2*   MG  --   --  1.7   PHOS  --   --  3.9     No results for input(s): SGOT, GPT, ALT, AP, TBIL, TBILI, TP, ALB, GLOB, GGT, AML, LPSE in the last 72 hours. No lab exists for component: AMYP, HLPSE  Recent Labs     03/17/19 2045   INR 1.1   PTP 11.5*   APTT 20.7*      No results for input(s): FE, TIBC, PSAT, FERR in the last 72 hours. Lab Results   Component Value Date/Time    Folate 14.1 03/11/2019 01:16 AM      No results for input(s): PH, PCO2, PO2 in the last 72 hours.   Recent Labs     03/16/19  1841   TROIQ <0.05     Lab Results   Component Value Date/Time    Cholesterol, total 101 03/11/2019 01:17 AM    HDL Cholesterol 59 03/11/2019 01:17 AM    LDL, calculated 31.6 03/11/2019 01:17 AM    Triglyceride 52 03/11/2019 01:17 AM    CHOL/HDL Ratio 1.7 03/11/2019 01:17 AM     Lab Results   Component Value Date/Time    Glucose (POC) 182 (H) 03/18/2019 05:33 AM    Glucose (POC) 150 (H) 03/18/2019 12:06 AM    Glucose (POC) 161 (H) 03/17/2019 08:50 PM    Glucose (POC) 198 (H) 03/17/2019 06:41 PM    Glucose (POC) 181 (H) 03/17/2019 08:48 AM     Lab Results   Component Value Date/Time    Color YELLOW/STRAW 03/17/2019 05:33 PM    Appearance CLOUDY (A) 03/17/2019 05:33 PM    Specific gravity 1.019 03/17/2019 05:33 PM    pH (UA) 5.0 03/17/2019 05:33 PM    Protein 30 (A) 03/17/2019 05:33 PM    Glucose NEGATIVE  03/17/2019 05:33 PM    Ketone NEGATIVE  03/17/2019 05:33 PM    Bilirubin NEGATIVE  03/17/2019 05:33 PM    Urobilinogen 0.2 03/17/2019 05:33 PM    Nitrites NEGATIVE  03/17/2019 05:33 PM    Leukocyte Esterase NEGATIVE  03/17/2019 05:33 PM    Epithelial cells FEW 03/17/2019 05:33 PM    Bacteria NEGATIVE 03/17/2019 05:33 PM    WBC 5-10 03/17/2019 05:33 PM    RBC 0-5 03/17/2019 05:33 PM         Medications Reviewed:     Current Facility-Administered Medications   Medication Dose Route Frequency    DULoxetine (CYMBALTA) capsule 30 mg  30 mg Oral DAILY    rosuvastatin (CRESTOR) tablet 20 mg  20 mg Oral QHS    albuterol-ipratropium (DUO-NEB) 2.5 MG-0.5 MG/3 ML  3 mL Nebulization Q4H RT    0.9% sodium chloride infusion  100 mL/hr IntraVENous CONTINUOUS    pantoprazole (PROTONIX) 40 mg in sodium chloride 0.9% 10 mL injection  40 mg IntraVENous Q12H    cefepime (MAXIPIME) 2 g in 0.9% sodium chloride (MBP/ADV) 100 mL  2 g IntraVENous Q12H    balsam peru-castor oil (VENELEX) ointment   Topical BID    budesonide (PULMICORT) 500 mcg/2 ml nebulizer suspension  500 mcg Nebulization BID RT    arformoterol (BROVANA) neb solution 15 mcg  15 mcg Nebulization BID RT    0.9% sodium chloride infusion 250 mL  250 mL IntraVENous PRN    insulin lispro (HUMALOG) injection   SubCUTAneous Q6H    methylPREDNISolone (PF) (SOLU-MEDROL) injection 60 mg  60 mg IntraVENous Q6H    fentaNYL citrate (PF) injection 12.5 mcg  12.5 mcg IntraVENous Q4H PRN    sodium chloride (NS) flush 5-10 mL  5-10 mL IntraVENous PRN    Vancomycin- pharmacy to dose   Other Rx Dosing/Monitoring    glucose chewable tablet 16 g  4 Tab Oral PRN    dextrose (D50W) injection syrg 12.5-25 g  12.5-25 g IntraVENous PRN    glucagon (GLUCAGEN) injection 1 mg  1 mg IntraMUSCular PRN    vancomycin (VANCOCIN) 1,000 mg in 0.9% sodium chloride (MBP/ADV) 250 mL  1,000 mg IntraVENous Q24H     ______________________________________________________________________  EXPECTED LENGTH OF STAY: 4d 19h  ACTUAL LENGTH OF STAY:          2                 Edilberto Frances MD

## 2019-03-18 NOTE — PROGRESS NOTES
CC:  Dot Dunn is a 78 y.o patient of Dr. Tyler Nettles with multiple myeloma/COPD/recurrent DVT admitted with respiratory failure. Oncology consulted for management of myeloma/DVT/GI bleed.         HPI:  Dot Dunn has had plasma cell neoplasm  since around 2012.      3/2019  He has been on single agent lenalinamide for multiple myeloma most recently. He was previously on CyBORD. He was previously  lenalinamide/dexamethasone but dexamethasone was discontinued because of poor tolerance. He has been on ASA 81 mg along with lenalinamide.     3/2019  He was admitted to [facility] for pneumonia./respiratory failure. He subsequently developed a popliteal DVT. He was discharged on apixiban for recurrent DVT    3/17/2019  He was readmitted with respiratory failure/anemia/Heme positive stools. He required mechanical ventilation/transfusion. Hematology reconsulted for anticoagulation/multiple myeloma.       ROS:  No gross bleeding reported  Pt unable to give ROS due to sedation/intubation     PHYSICAL EXAMINATION:  GENERAL APPEARANCE:sedated/intubated  HEAD:  Normocephalic, atraumatic. MOUTH AND THROAT:  No visible oral lesions ETT in place  NECK:  No mass/JVD  CHEST:  Poor airflow   HEART:  RRR/tachycardia  ABDOMEN:No palpable mass  CNS:Sedated intubated flaccid  EXTREMITIES:edema right>left. No palpable thrombosis  Neuro:flaccid unresponsive             Dx:  1.  Chronic obstructive pulmonary disease with acute exacerbation  --relapsed  --intubated    2.  Multiple myeloma  -- immunoglobulins normal on lenalinamide  --no new data    3.  Type 2 diabetes  --improved off dexamethasone for myeloma    4.  Anemia  --myeloma + GI bleed  --GI bleed exacerbated by anitcoagulation  --marginal but adequate  following transfusion of PRBC    5. Acute kidney injury  --myeloma + MOSF    6.  Tobacco abuse  --chronic    7. DVT  --common in patients on lenalinamide with increased risk from immobilization/malignancy  --no new lesions  --intolerant of therapeutic anticoagulation due to anemia/heme positive stools           Rx:  Hold apixiban/ASA/Plavix  Transfuse PRBC prn  SCD  D/C tobacco  Hold lenalinamide  Hydration as tolerated  IVC filter ? ? F/U Dr. Estela Fam if he recovers  [facility] conference   --?? Goals/prognosis  --? ?DNR       Disc:  Inocente Lyle has had multiple myeloma for many years. In 3/2019, Inocente Lyle had pneumonia/COPD complicating multiple myeloma. He developed a DVT during that recent illness/hospitalization/immobilization. He was at increased risk of thrombosis from his myeloma and his  myeloma therapy with lenolinamide/glucocorticoids. The optimal regimen for anticoagulation in this circumstance is unknown but therapeutic anticoagulation was recommended. Dr. Estela Fam was counseled and we recommended apixiban. Unfortunately, his GI bleeding was exacerbated leading to worsening anemia. He has since declined significantly requiring mechanical ventilation and transfusion. He clearly will not be able to tolerate therapeutic anticoagulation. His condition is critical.  His prognosis is very poor due to myeloma/COPD/thrombosis/bleeding.   We doubt he can recover adequately to resume myeloma therapy.                PMH/PSH:       Past Medical History:   Diagnosis Date    AAA (abdominal aortic aneurysm) (HCC)      Asthma      Cancer ([facility]       kidney ca    COPD      Diabetes ([facility]      Gastrointestinal disorder       ruptured esphogus    Hypertension      Other ill-defined conditions(799.89)              Past Surgical History:   Procedure Laterality Date    ABDOMEN SURGERY PROC UNLISTED         hernia    ABDOMEN SURGERY PROC UNLISTED         colon resection    HX GI         part of colon removed, ruptured esophagus    HX OTHER SURGICAL         kidney removed    REMV KIDNEY,COMPLICATED             Home meds:           Prior to Admission medications    Medication Sig Start Date End Date Taking? Authorizing Provider   apixaban (ELIQUIS) 5 mg tablet Take 2 Tabs by mouth two (2) times a day for 7 days. 3/13/19 3/20/19   Bernabe Prakash MD   apixaban (ELIQUIS) 5 mg tablet Take 1 Tab by mouth two (2) times a day for 30 days. 3/19/19 4/18/19   Bernabe Prakash MD   nicotine (NICODERM CQ) 21 mg/24 hr 1 Patch by TransDERmal route every twenty-four (24) hours for 30 days. 3/13/19 4/12/19   Bernabe Prakash MD   predniSONE (DELTASONE) 20 mg tablet Take 40 mg by mouth daily (with breakfast) for 2 days. 3/14/19 3/16/19   Bernabe Prakash MD   calcium carbonate (TUMS) 200 mg calcium (500 mg) chew Take 1 Tab by mouth two (2) times daily (with meals). 2/2/19     Lidya SHERIFF DO   DULoxetine (CYMBALTA) 30 mg capsule Take 30 mg by mouth daily.       Provider, Historical   pantoprazole (PROTONIX) 40 mg tablet Take 40 mg by mouth two (2) times a day.       Provider, Historical   dexamethasone (DECADRON) 4 mg tablet Take 20 mg by mouth Every Thursday.       Provider, Historical   lenalidomide (REVLIMID) 25 mg cap Take 25 mg by mouth. DAILY ON DAYS 1 TO 21 FOR A 28 DAY CYCLE (OFF MED 7 DAYS BEFORE STARTING AGAIN) NEXT DOSE DUE Thursday 1/31/19 TO START NEXT ROUND       Provider, Historical   insulin glargine (LANTUS) 100 unit/mL injection 30 Units by SubCUTAneous route every morning.       Provider, Historical   insulin aspart U-100 (NOVOLOG FLEXPEN U-100 INSULIN) 100 unit/mL inpn 6 Units by SubCUTAneous route daily (with breakfast).       Provider, Historical   insulin aspart U-100 (NOVOLOG FLEXPEN U-100 INSULIN) 100 unit/mL inpn 6 Units by SubCUTAneous route daily (with lunch).       Provider, Historical   insulin aspart U-100 (NOVOLOG FLEXPEN U-100 INSULIN) 100 unit/mL inpn 10 Units by SubCUTAneous route daily (with dinner).       Provider, Historical   albuterol-ipratropium (DUO-NEB) 2.5 mg-0.5 mg/3 ml nebu 3 mL by Nebulization route every four (4) hours as needed. 3/9/18     Everett Leija MD   acetaminophen (TYLENOL) 325 mg tablet Take 2 Tabs by mouth every four (4) hours as needed for Fever (For temp greater than or equal to 38.5 C or 101.3 F (Unless hepatic failure or contrindicated). Give first line for fever. ). 9/12/16     Anca Smith MD   aspirin 81 mg chewable tablet Take 1 Tab by mouth daily. 9/12/16     Anca Smith MD   clopidogrel (PLAVIX) 75 mg tablet Take 1 Tab by mouth daily. 9/12/16     Anca Smith MD   cholecalciferol (VITAMIN D3) 1,000 unit cap Take 1,000 Units by mouth daily.       Roopa Davis MD   rosuvastatin (CRESTOR) 40 mg tablet Take 20 mg by mouth nightly.       Roopa Davis MD   tiotropium (SPIRIVA WITH HANDIHALER) 18 mcg inhalation capsule Take 1 Cap by inhalation daily.       Provider, Eriberto   budesonide-formoterol (SYMBICORT) 160-4.5 mcg/actuation HFA inhaler Take 2 Puffs by inhalation daily. Indications: COPD ASSOCIATED WITH CHRONIC BRONCHITIS       Roopa Davis MD   albuterol (PROVENTIL HFA, VENTOLIN HFA) 90 mcg/Actuation inhaler Take 2 Puffs by inhalation every six (6) hours as needed for Wheezing and Shortness of Breath. 4/28/11     Fela Schwartz MD   montelukast (SINGULAIR) 10 mg tablet Take 10 mg by mouth daily.       Roopa Davis MD         Allergies: Allergies   Allergen Reactions    Niacin Rash    Niacin Unknown (comments)       Hot flashes         FH:        Family History   Problem Relation Age of Onset    Hypertension Mother           SH:  Social History            Tobacco Use    Smoking status: Current Every Day Smoker       Packs/day: 0.50       Years: 60.00       Pack years: 30.00    Smokeless tobacco: Never Used   Substance Use Topics    Alcohol use:  Yes       Comment: 2 drinks a month                   Cell [phone]                 [facility]CI records appended below.           Patient Name:  Jasmeet Luevano Date: 3/7/2019  Patient Number: [id number] YOB: 1940  FOLLOW UP VISIT  Assessment and Plan  multiple myeloma  He has multiple myeloma and is currently on CyBorD given his earlier renal failure requiring dialysis and he tolerated the regimen fairly well. He is not on  a  maintenance regimen such as Revlimid + dexamethasone. He is not tolerating the dexamethasone so I will drop this and continue with single agent  revlimid. For his swollen legs, I have started maxzide. I need to check his BMP in about two weeks. He tends to run high K's and it looks like this was  due to gallons of V8 juice that he is drinking. He will stop this. When he returns, his peripherla edema will be reassessed. He has been instructed to  contact the office immediately if any worrisome side effects or unexplained symptoms occur. Primary Diagnosis  Date Type ICD9 ICD10 Description Disease Status Status Date  2/22/2018 Primary 203.00 C90.00 Multiple myeloma not having achieved remission  3/29/2018 Primary V58.11 Z51.11 Encounter for antineoplastic chemotherapy  Hematology/Oncology Summary  Cheyenne Lau is a  78year old gentleman who was admitted to [facility] with hypoglycemia associated with profound diarrhea. He does have a history of  diabetes mellitus. He was noted to not only being hypoglycemic at the time of admission, but dehydrated with acute renal failure He has a history of  diarrhea off and on for the last 15 years or so for which he has undergone multiple procedures including colonoscopy without ever a clear diagnosis  being made. When he presented to the hospital, he was noted, not only of having a creatinine of 4.9. a BUIN of 79, his glucose has risen to 73, his  potassium was 5.2. He was started on kayexalate, an amp of D50, 5 units of IV insulin, which was about half a dose since he was only mildly  hypoglycemic at this time. He was seen in consultation by Dr. Guerda Zurita from the nephrology service. He had been on ACE inhibitor. This was held  because of his renal failure & hyperkalemia.  He has been gently hydrated and his lab tests improved. His potassium is down to 3.6. However his  creatinine is still 4.8 and his BUN is 64. ferritin 585. His albumin was rather low at 2.3 and 3.3 with a total protein of 8.5, which is high. He was seen by Dr. Kathrin Awad   and his creatinine had dropped to 1.26. His bone marrow results finalized 5/24/12 and revealed atypical strongly CD56+  monoclonal plasmacytosis (roughly 10%) with focal cytologic plasma cell atypia worrisome for aggressive behavior. There was small amounts of storage  iron present without pathologic erythroid  iron. His skeletal survey on 5/29/12 revealed no obvious bony lesions. He returned to the office  and felt  well. He had no bony symptoms. He was pretty discouraged with his [place] doctors and staff. Skeletal survey on 5/29/12  revealed no suspicious lesions and an abdominal aortic aneurysm with 5.5 cm AP dimension. He was worked up at the [facility] for cancer cells in his urine, he reports but the workup turned up negative. He had a CT scan on 4/10/14 which  revealed a right nephrectomy, no evidence of recurrence in the nephrectomy bed, no evidence of left kidney or ureter mass, no evidence of bladder  mass, questionable 9 mm RLL nodule but in the setting of adjacent atelectasis, decreased diameter of AAA s/p EVAR, right lateral abdominal wall  hernia containing a nonobstructed loop of distal small bowel, subtotal colectomy with ileocolonic anastomosis and enlarged prostate gland. He was hospitalized in November 2015 for pneumonia. He has had cataracts removed bilaterally in February and his vision much improved. 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 He has since sold  and has a contract  pending on an 8th. 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 He came to the office with word that he had a stroke on 9/9/16 and ended up in [facility].  He was going out to breakfast and while driving became  extremely  dizzy. He pulled over and then became limp on the left side. EMS got there within 23 minutes and he was given 4 baby ASA to chew. He  was \"Out of it\" so he did not remember all that happened. He went to rehab and got most of his function back. He gave up smoking. He had a throbbing  behind his left eye earlier. He returned to the office  and reported that tax day was depressing. He sold 8 houses last year  the profits from 4 houses ended up going to taxes. He  tapped into his RAIN to pay his bills. He  returned to the office 11/15/17 and reported that he was doing well. He was going to look at a house that day  to consider buying it. 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 His hgb had dropped just  abit to 12.2 with a wbc of 9200 and plt count of 347,000. His creatinine  was 1.22 with a K of 5.5 and Ca of 10.3. His B2M had climbed to 4.0 from 2.8  and his free lambda light chains had jumped from 1219 to 2061 with a [facility] : 1940 ([id number]) Page 1 of 6   spike of 2.2. He was contacted and asked about his oral calcium consumption and asked to consider a repeat BM asp/bx. Unfortunately this was not  scheduled until he present to [facility] with the flu and was noted while in the hospital to have a rapidly climbing creatinine. He required 3 sessions with  dialysis. BM aspirate and biopsy on 18 revealed 50% involvement with a monoclonal plasmacytosis with atypical/immature morphology (focally  plasmoblastic). He was started on CyBorD. He came back to the office 18 and reported that he saw his PCP at the [place] earlier today. His insulin dose was being decreased. He was very  unhappy with his experience at the [facility]. He has followup with his nephrologist soon on [facility].    He returned to the office 5/3/18 and reported that he was dong fairly well but he had noticed pain in his knees and right shoulder. He missed his  skeletal survey so he needs to have this rescheduled. Balance was a bit off and he was using a cane. He had an appointment with his PCP at the end  of the month. He was discharged from the hospital with a bottle of regular insulin but no instructions on how to use it. Skeletal survey on 5/11/18 revealed no significant change in multiple small lytic foci. He came back to the office 6/28/18 (cycle 5, day 8) and reported that he does not sleep the day of chemo and since adding an antiemetic to the  regimen, he does not have nausea. He was mostly doing well. He returned to the office 8/2/18 and reported some mild fatigue but on the whole, he was doing well with his treatment. He used his walker if he has to  walk any distance down the street but inside he walks without assistive devices. He came back to the office 9/27/18 and reported that he was very depressed and he was dealing with pain in his knees, an occasional electric shock  sensation below his knees and tingling running down his right arm on occasion. He returned to the office 11/29/18 and reported that he was doing better but still dealing neuropathy in his hands and feet, more left side than right. He  felt weak and fatigued compared with preillness but better overall. CT scan of the chest on 1/27/19 reported shifting pattern of pulmonary densities, overall improved from prior, likely reflecting infectious process. severe  bullous emphysema. He comes back to [address] [place] [address] 3/7/19 and reports that he is having more issues with leg swelling. His vision is affected the days that he takes the steroid pills. HIs appetite is better. Disease Features  Multiple myeloma, diagnosed 5/24/12  CD56+ monoclonal plasmacytosis (roughly 10%) with focal cytologic plasma cell atypia worrisome for aggressive behavior. Small amounts of storage  iron present without pathologic erythroid iron.    Treatment History  CyBorD  Current Treatment  rev +/ dex  Treatment Goal: palliation  Disease Status: Stable  Chief Complaint  followup for abnormal free light chain ratio  Active Problems  multiple myeloma  diabetes  hypertension  Past Medical History  asthma  COPD  depressed  diabetes  hearing loss  hypertension  influenza vaccine 2014  pneumonia [facility] 2015  AAA repair endovascular graft She Vásquez)  bilateral cataract removal 2016 & 2016  blood transfusion  colon resection  hernia repair  kidney removed  Odessa Proffer : 1940 ([id number]) Page 2 of 6   ruptured esophagus  ROS  Except as noted above or in the interim history, a 10 point ROS is negative  Allergies  Allergy Reaction (Severity)  No Known Allergies  Medications  Inside Drug Script Date Qty Rfls Instructions  acetaminophen 500 mg tablet 3/22/2018 0 1 p.o. q. day  Albuterol Sulfate HFA IN Aerosol, solution 1980 0 Aerosol,  solution 1  Inhalation  daily PRN  Aspirin 81 mg Tablet, chewable 1980 0 81 mg  Take  1 Tablet,  chewable Oral  daily  Calcium 500 500 mg calcium (1,250 mg) tablet 2019 0 1 p.o. twice a  day (BID)  Cardizem 120 mg Tablet 1980 0 120 mg   Take 2 Tablet  Oral daily  cholecalciferol (vitamin D3) 1,000 unit tablet 3/22/2018 0 1 p.o. q. day  Crestor 40 mg Tablet 1980 0 40 mg  Take  0.5 Tablet  Oral daily  Y Cymbalta 30 mg capsule,delayed release 2018 30 11 1 po qhs  Y dexamethasone 4 mg tablet 2018 20 11 Take 5 tabs  po once a  week with  food  docusate sodium 100 mg tablet 3/22/2018 0 1 p.o. twice a  day (BID)  constipation  emollient topical 3/22/2018 0 apply to all  over topically  every day  shift of skin  care  GlipiZIDE 10 mg Tablet 1980 0 10 mg  Take  2 Tablet Oral  daily  Insulin 8 Units Injectable 1980 0 8 Units  1  Subcutaneous  at bedtime  ipratropiumalbuterol 0.5 mg3 mg(2.5 mg base)/3 mL nebulization soln 3/22/2018 0 every 4 hours  for SOB.   loperamide 2 mg capsule 3/22/2018 0 1 p.o. q. day  every 6 hours  as needed for  Alvira Geena, Rosa Rueda : 1940 ([id number]) Page 3 of 6   diarrhea  Y [id number] 37.5 mg25 mg tablet 3/7/2019 30 6 1 p.o. q. day  prn swelling  melatonin 10 mg capsule 3/22/2018 0 1 p.o. q. day  pantoprazole 40 mg tablet,delayed release 3/22/2018 0 1 p.o. q. day  Plavix 75 mg tablet 3/22/2018 0 1 p.o. q. day  Proventil IN Aerosol, solution 1980 0 Aerosol,  solution  Inhalation  PRN  Y Revlimid 25 mg capsule 2019 21 0 1 p.o. q. day  for 21 days,  off 7 days  [facility]  [id number]  Singulair 10 mg Tablet 1980 0 10 mg  Take  1 Tablet Oral  daily  Y sodium polystyrene sulfonate (sorbitol free) 15 gram/60 mL oral susp 2018 120 0 Take 120 ml  (30 G) po  once today  Spiriva HandiHaler IN Capsule 1980 0 1 Capsule  Inhalation  daily  sucralfate 1 gram tablet 3/22/2018 0 Give 10ml by  mouth before  meals at hs  Symbicort 1604.5 mcg/act Aerosol 1980 0 Aerosol  Inhalation  tuberculin PPD 5 tub. unit/0.1 mL intradermal injection solution 3/22/2018 0 every evening  7 days  FH/SH  Family History:   mother    father    no new FHx  Social History:      quit smoking 2016 ,  smoked  . 5 pack/day for 60   years  retired  Vital Signs  Vitals on 3/7/2019 10:55:00 AM: Height=72.00in, Enkgdy=205.4lb, Temp=97.5f, VRBLL=57, PEZD=14, YOGJFKUQAB=946, YYLQNTCCOHX=39, Pulse LT=30%  Plan of Care for Pain  Treatment Recommendations:  Continue current pain regimen  No action needed  Exam  : Fully active, able to carry on all predisease performance without restriction ECOG Scale 1: Restricted in physically strenuous activity but ambulatory  and able to carry out work of a light or sedentary nature, e.g., light house work, office work Constitutional: Alert, cooperative, oriented. Mood and affect  Nando Frazier : 1940 ([id number]) Page 4 of 6   appropriate. Appears close to chronological age. Well nourished. Well developed. Head: Normocephalic? no scars  Eyes: Conjunctivae and sclerae are clear and without icterus. Pupils are round  ENMT: Sinuses are nontender. No oral exudates, ulcers, masses, thrush or mucositis. Oropharynx clear. Tongue normal.  Neck: Supple without masses or thyromegaly. No jugular venous distension. Hematologic/Lymphatic: No petechiae or purpura. No tender or palpable lymph nodes in the cervical, supraclavicular, axillary or inguinal area. Respiratory: clear today. Cardiovascular: Regular rate and rhythm of heart without murmurs, gallops or rubs. Chest/Line Site: Chest is symmetric with no chest wall deformities. Breasts: N/A  Abdomen: Nontender, nondistended, no masses, ascites or hepatosplenomegaly. Good bowel sounds. Musculoskeletal: No tenderness or swelling, normal range of motion without obvious weakness. Extremities: No visible deformities, no cyanosis, clubbing. 2+ edema. Skin: No rashes, scars, or lesions suggestive of malignancy. Neurologic: No sensory or motor deficits noted but not specifically tested. Psychiatric: Alert and oriented. Coherent speech. Verbalizes understanding of our discussions today. Time Spent with Patient  CC min/Total min 20  Lab Results Table  Lab results for 3/7/2019  Result Value Units Range Comment  WBC 4.0 K/uL 3.410.8  RBC 2.9 M/uL 4.15.8  HGB 8.5 g/dL 1317.7  HCT 27.8 % 37.551  MCV 95.9 fL 7997  MCH 29.3 pg 26.633  MCHC 30.6 g/dL 31.535.7  Plat 136 K/uL [id number]  ANC 2.9 K/uL 1.47  Lymph# 0.6 K/uL 0.73.1  MONO# 0.3 K/uL 0.10.9  BASO# 0.1 K/uL 00.2  EOS# 0.2 K/uL 00.4  Neut% 71.1 % 4074  Lymph% 15.1 % 1446  MONO% 7.4 % 412  BASO% 1.2 % 03  EOS% 5.2 % 05  Imaging Results  Other Physicians:  HITESH [phone]? SahilCoolidge, [place]?   Provider Name Contact Information  Physician Chace Givens MD [facility] Provider [address] Fax: [phone], Office: [phone]  Work: [address] [place] [address],   [address] : 1940 ([id number]) Page 5 of 6   Primary Care Provider Mavis Lincoln MD Fax: [phone], Work: [phone]  Work: [address]  [address] [facility],  [address]  Primary Care Provider Prudence Pearson MD Fax: [phone], Work: [phone]  Work: [facility],  [address]  Referring Provider Rk uLgo M.D.  Fax: [phone], Work: [phone]  Work: [facility]  [address], [address]  Signed By: ___________________________  Daniel Hair MD FAC, NPI: [id number], on 3/7/2019           Electronically signed by Blessing Woodson MD at 03/13/19 1016  Electronically signed by Blessing Woodson MD at 03/13/19 1140   Note Details     Author Blessing Woodson MD File Time 03/13/19 1140   Author Type Physician Status Addendum   Last  Blessing Woodson MD Service Hematology and [address] [de-identified] Admit Date 3/10/2019       ED to Hosp-Admission (Discharged) on 3/10/2019            Revision History            Detailed Report

## 2019-03-18 NOTE — PROGRESS NOTES
SPEECH THERAPY SCREENING:  SERVICES ARE NOT INDICATED AT THIS TIME    [facility] therapy based on results obtained during the nursing admission assessment. The patients chart was reviewed and the patient is not appropriate for a skilled therapy evaluation at this time. Please consult speech therapy if any therapy needs arise. Thank you. Ana Vanegas MS, CCC-SLP, BCS-S

## 2019-03-18 NOTE — PROGRESS NOTES
[facility]  Pulmonary, Critical Care, and Sleep Medicine  Name: Colin Leach MRN: [id number]   : 1940 Hospital: [facility]    Date: 3/18/2019          79 yo w COPD/emphysema and MM  Recent admit ---> Had COPD exacerbation/ acute r popliteal DVT  Indeterminate V/Q  D/c home 3/13 on Eliquis     Smoked at home  Cough congestion  ? syncope  Weak  To ED room air sats 83%  Hypothermic  lacate 2.6  Readmitted hypercapneic  afib RVR   ? GI Bleed-- initial h/h/ stable but hemoccult +  To ICU on Bipap  Off/off bipap this am  Prior severe flu . .. 3/17:  Intubated/ vented after failing BiPAP. Hgb > 7.0  D/c on Eliquis but off now.  - EGD 18 large H hernia. Multiple lance's erosions, severe duodenitis vs large laterally spreading villous adenoma. S/p colectomy for diverticulitis  Hx esophageal rupture from violent vomiting    3/18: Intubated and vented. No significant secretions. RVP neg. Duplex LE - right peroneal dvt. Echo no Rv dysfunction. IMPRESSION:   -Acute hypercapneic resp failure  -COPD w active smoking  -? Acute sepsis- risk nosocomial infection  -recent DVT-- on Eliquis until admit  -Heme + w/o overt drop h/h  -afib RVR--> to NSR  -MM on chemo agent      RECOMMENDATIONS:please see orders for details. Case d/w nursing and on Multi D rounds. Vent settings reviewed. Hx Gi issues. Will check with GI if plan to scope otherwise will try to get him off the ventilator. empiric abx -- procalcitonin borderline, will d/c vanco.  AC held/ protonix IV / trend H & H. The patient is Critically ill  with acute respiratory failure and at risk for deterioration. Time spent was exclusive of any procedures and did not overlap with another provider. Time Spent:  32 minutes.      Routine management   Stress ulcer prophylaxis  DVT prophylaxis - SCD  Discussed with nursing   Daily delirium assessment with CAM - ICU  Target RASS 0 to -1          I have personally reviewed the radiology films and reports. Subjective/Interval History:   I have reviewed the flowsheet and previous days notes. Pt is critically ill and unable to give history.     Objective:     Mode Rate Tidal Volume Pressure FiO2 PEEP   Assist control, Volume control   520 ml    30 % 5 cm H20     Peak airway pressure: 26 cm H2O    Minute ventilation: 9.64 l/min      Vital Signs:    Visit Vitals  /58 (BP 1 Location: Left arm, BP Patient Position: At rest)   Pulse (!) 101   Temp 98.2 °F (36.8 °C)   Resp 17   Ht 6' (1.829 m)   Wt 59.7 kg (131 lb 9.8 oz)   SpO2 96%   BMI 17.85 kg/m²                 Ventilator Pressures  PIP Observed (cm H2O): 26 cm H2O  Plateau Pressure (cm H2O): 20 cm H2O  MAP (cm H2O): 9.6  PEEP/VENT (cm H2O): 5 cm [id number](24)Ventilator Pressures  PIP Observed (cm H2O): 26 cm H2O  Plateau Pressure (cm H2O): 20 cm H2O  MAP (cm H2O): 9.6  PEEP/VENT (cm H2O): 5 cm H20  Safety & Alarms  Circuit Temperature: (hme)  Backup Mode Checked/Apnea: Yes  Pressure Max: 40 cm H2O  Ve Min: 2  Ve Max: 20  Vt Min: 200 ml  Vt Max: 1000 ml  RR Max: 40  Intake/Output:   Last shift:      Ventilator Volumes  Vt Set (ml): 520 ml  Vt Exhaled (Machine Breath) (ml): 748 ml  Vt Spont (ml): 919 ml  Ve Observed (l/min): 9.64 l/min  Last 3 shifts: 03/18 0701 - 03/18 1900  In: 100 [I.V.:100]  Out: 15 [Urine:15]RRIOLAST3    Intake/Output Summary (Last 24 hours) at 3/18/2019 0912  Last data filed at 3/18/2019 0800  Gross per 24 hour   Intake 3324.47 ml   Output 2380 ml   Net 944.47 ml     EXAM       exam  Other   general Ill appearing/somnolent/ appears stated age    HEENT:  Op moist no ulcers, JVD not elevated, no cervical LAD    Chest No pectus deformity, normal chest rise b/l    HEART:  RRR no m/r/g no rubs    Lungs:  CTA b/l no r/r/w, diminished BS at bases    ABD Soft/NT non rigid mildly distended, hypoactive BS    EXT No c/c/e normal peripheral pulses    Skin No rashes or ulcers, no mottling    Neuro RASS is -2 Data    I have personally reviewed data, flowsheets for the last 24 hours.         Labs:  Recent Labs     03/18/19  0559 03/17/19  2045 03/17/19  1239   WBC 5.8 5.8 7.1   HGB 7.1* 8.4* 6.8*   HCT 24.1* 28.2* 23.4*   * 128* 133*     Recent Labs     03/18/19  0559 03/17/19 2045 03/17/19  0422    143 143   K 4.3 5.0 5.0   * 116* 114*   CO2 19* 20* 20*   * 164* 157*   BUN 47* 54* 47*   CREA 1.62* 1.72* 1.51*   CA 7.0* 7.4* 8.2*   MG  --   --  1.7   PHOS  --   --  3.9   INR  --  1.1  --        ABG Recent Labs     03/17/19  1826 03/17/19  1451 03/17/19  0612   PHI 7.429 7.261* 7.168*   PO2I 247* 70* 66*   PCO2I 38.0 47.3* 53.2*        Binh Hardin MD  [facility]

## 2019-03-18 NOTE — PROGRESS NOTES
NUTRITION COMPLETE ASSESSMENT    RECOMMENDATIONS:   Add basal insulin prn (once on full enteral support)     Interventions/Plan:   Food/Nutrient Delivery:  Initiate enteral nutrition      Glucerna 1.2 @ 55 ml/hr with 90 ml water flush q 4 hr    Assessment:   Reason for Assessment:   [x] Provider Consult-Tube Feeding management    Diet: NPO  Nutritionally Significant Medications: [x] Reviewed & Includes: Calcium gluconate, correction scale insulin, Solumedrol, 1/2 NS @ 100 ml/hr    Subjective:  Nodded yes that he had lost a lot of weight over the past year. Objective:  Mr Franklin Palma was admitted with Acute respiratory failure with hypoxemia. Noted: Intubated 3/17-failed SBT; GIB-?chronic/slow UGIB. PMHx: COPD, DM 2, multiple myeloma, others noted. Patient has had almost 30% weight loss over the past year-suspect 2/2 COPD and MM. Muscle wasting noted. BUN and creatinine elevated-?d/t GIB/?CKD; Lytes WNL. Poor BG control PTA per elevated A1c. May need to add basal insulin once on full enteral support. Due to poor nutritional status and inability to extubate today-recommend enteral nutrition support. Suggested goal: Glucerna 1.2 @ 55 ml/hr with 90 ml water flush q 4 hr. This will provide 1320 ml, 1585 calories, 79 gm protein and 1600 ml free water (tube feeding/flush) per day to meet estimated needs.       Patient meets criteria for Chronic Moderate Protein Calorie Malnutrition as evidenced by:   ASPEN Malnutrition Criteria  Acute Illness, Chronic Illness, or Social/Enviornmental: Chronic illness  Energy Intake: <75% est energy req for greater than/equal to 1 month  Weight Loss: Greater than 20% x 1 yr  Muscle Mass: Severe  ASPEN Malnutrition Score - Chronic Illness: 13.        Estimated Nutrition Needs:   Kcals/day: 1560 Kcals/day  Protein: 72 g(72-84 (1.2-1.4g/kg)  Fluid: (1ml/kcal)  Based On: Aneudy State (2003b)  Weight Used: Actual wt(60 kg)    Pt expected to meet estimated nutrient needs:  []   Yes     []  No [x] Unable to predict at this time    Nutrition Diagnosis:   1. Inadequate protein-energy intake related to acute respiratory failure and ?GIB as evidenced by NPO x 3 days. Goals: Tolerate tube feeding at goal in next 1-2 days. Monitoring & Evaluation:    - Enteral/parenteral nutrition intake   - Electrolyte and renal profile, Weight/weight change, Glucose profile     Previous Nutrition Goals Met:  N/A  Previous Recommendations:      N/A    Education & Discharge Needs:   [x] None Identified   [] Identified and addressed    [x] Participated in care plan, discharge planning, and/or interdisciplinary rounds        Cultural, Confucianism and ethnic food preferences identified:   None    Skin Integrity: [x]Intact  []Other  Edema: [x]None []Other  Last BM: PTA  Food Allergies: [x]None []Other    Anthropometrics:    Weight Loss Metrics 3/18/2019 3/11/2019 2/2/2019 3/9/2018 8/10/2017 9/13/2016 9/9/2016   Today's Wt 131 lb 9.8 oz 136 lb 12.8 oz 142 lb 3.2 oz 167 lb 8.8 oz 179 lb 11.2 oz 176 lb 3 oz 173 lb 11.6 oz   BMI 17.85 kg/m2 18.55 kg/m2 19.29 kg/m2 22.72 kg/m2 24.37 kg/m2 23.9 kg/m2 23.56 kg/m2      Last 3 Recorded Weights in this Encounter    03/17/19 1411 03/18/19 0700 03/18/19 1454   Weight: 59.9 kg (132 lb) 59.7 kg (131 lb 9.8 oz) 59.7 kg (131 lb 9.8 oz)      Weight Source: Bed  Height: 6' (182.9 cm),    Body mass index is 17.85 kg/m².   IBW : 80.7 kg (178 lb), % IBW (Calculated): 73.94 %   ,      Labs:    Lab Results   Component Value Date/Time    Sodium 144 03/18/2019 05:59 AM    Potassium 4.3 03/18/2019 05:59 AM    Chloride 116 (H) 03/18/2019 05:59 AM    CO2 19 (L) 03/18/2019 05:59 AM    Glucose 174 (H) 03/18/2019 05:59 AM    BUN 47 (H) 03/18/2019 05:59 AM    Creatinine 1.62 (H) 03/18/2019 05:59 AM    Calcium 7.0 (L) 03/18/2019 05:59 AM    Magnesium 1.7 03/17/2019 04:22 AM    Phosphorus 3.9 03/17/2019 04:22 AM    Albumin 2.6 (L) 03/11/2019 01:17 AM     Lab Results   Component Value Date/Time    Hemoglobin A1c 7.4 (H) 01/28/2019 04:44 AM     Lab Results   Component Value Date/Time    Glucose (POC) 214 (H) 03/18/2019 11:41 AM      Lab Results   Component Value Date/Time    ALT (SGPT) 17 03/11/2019 01:17 AM    AST (SGOT) 9 (L) 03/11/2019 01:17 AM    Alk.  phosphatase 57 03/11/2019 01:17 AM    Bilirubin, direct 0.1 01/31/2019 03:21 AM    Bilirubin, total 0.4 03/11/2019 01:17 AM        Kirt Haile RD [facility]

## 2019-03-18 NOTE — PALLIATIVE CARE
Benoit Gomez and I met with patient. No family present. Wife had just left to do some volunteer work. Patient on SBT and awake; frustrated by tube. Oriented to plan to hopefully get extubated soon. Will follow-up with patient and/or family to further discuss care goals; code; AMD.        Thank you for the opportunity to be involved in the care of Col. Isabella Rpap. Sally Dooley, JAMAL, [facility]-  Palliative Medicine   Respecting Choices ® ACP Facilitator   [phone]

## 2019-03-19 NOTE — PROGRESS NOTES
Hospitalist Progress Note Monica Calvo MD 
Answering service: [phone] OR [phone] from in house phone Cell:   
  
Date of Service:  3/19/2019 NAME:  Filemon Maurer :  1940 MRN:  [id number] Admission Summary:  
78 y.o man with COPD, DM, HTN, multiple myeloma, recently discharged from here for COPD exacerbation and DVT, who presents with dyspnea. He is currently on bipap, somnolent. Nods when asked if feeling better. He reportedly developed dyspnea last night which persisted until today. EMS was called and found him to be dyspneic and hypoxic with O2 sats down to 83% on RA. His wife tells me that since his discharge from the last admission he's been very fatigued. He was smoking last night and his dyspnea got much worse this morning. He also fell last night, told her that he fainted. She is unaware of any bleeding or fever Interval history / Subjective:  
Remains intubated . D/w pall care will do compassionate extubation tomorrow Assessment & Plan:  
 
Acute hypoxic respiratory failure:  likely due to acute COPD exacerbation 
-intubated on vent  
-inhaled bronchodilators 
-systemic steroids 
- wean off as tolerates 
 
 GI bleed: 
Stool heme positive but no active bleeding seen in ICU 
-hold anticoagulation and ASA. He was started on Eliquis recently. 
-monitor H/H Hemoglobin is 7.1 this am. He has history of MM. 
-IV PPI Q12H 
-GI consulted no plan for EGD now  
-Transfuse if Hemoglobin < 7 
  
Afib with RVR: 
-Resolved 
-Now in NSR still on Amiodarone drip 
-check TTE 
-cardiology consulted 
  
Sepsis POA: based on hypothermia, tachycardia, and hypotension -IV fluids 
-no clear focus of infection, cultures are negative so far 
-continue empiric vancomycin and cefepime for now de escalate soon.  
- sputum grow pseudomonas c/s to cefepime 
 
 Recent acute R popliteal DVT: 
 -unable to resume anticoagulation till ruled out for Gi bleed 
-may need IVC filter. 
  
Type 2 DM with hypoglycemia: 
-monitor BG 
-hold all hypoglycemics Acidosis from renal failure and resp failure Hypocalcemia - replete 
  
Multiple myeloma mmunoglobulins normal on lenalinamide. Partial Colon Resection per wife due to bleed many years ago. History of AAA repair Code status: Partial remains intubated compassionate extubation tomorrow DVT prophylaxis: SCD Care Plan discussed with: Patient/Family Disposition: Home w/Family and TBD Hospital Problems  Date Reviewed: 3/10/2019 Codes Class Noted POA * (Principal) Acute respiratory failure with hypoxemia ([facility] [address] ICD-10-CM: J96.01 
ICD-9-CM: 518.81  3/16/2019 Yes Hypotension ICD-10-CM: I95.9 ICD-9-CM: 458.9  3/16/2019 Unknown GI bleed ICD-10-CM: K92.2 ICD-9-CM: 578.9  Unknown Yes Overview Signed 5/12/2012 10:36 AM by Elvia Dennison Review of Systems: A comprehensive review of systems was negative except for that written in the HPI. Vital Signs:  
 Last 24hrs VS reviewed since prior progress note. Most recent are: 
Visit Vitals /58 Pulse (!) 134 Temp 98.1 °F (36.7 °C) Resp 15 Ht 6' (1.829 m) Wt 59.9 kg (132 lb 0.9 oz) SpO2 99% BMI 17.91 kg/m² Intake/Output Summary (Last 24 hours) at 3/19/2019 1125 Last data filed at 3/19/2019 1000 Gross per 24 hour Intake 4004.18 ml Output 1055 ml Net 2949.18 ml Physical Examination:  
 
 
     
Constitutional:  sedated and intubated ENT:  MMM Resp:  CTA  
CV:  Regular rhythm, normal rate, no   
 GI:  Soft, non distended, non tender. bs+ Musculoskeletal:  No edema, warm, 2+ pulses throughout Neurologic:  sedated Skin:  Good turgor, no rashes or ulcers Data Review:  
 Review and/or order of clinical lab test 
 
WESLEY ENRIQUEZ [facility] Result Date: 3/19/2019 IMPRESSION: No focal airspace opacity. Stable lung hyperinflation. Xr Chest [facility] Result Date: 3/18/2019 IMPRESSION: ET tube is in satisfactory position. Lungs are hyperexpanded and clear of a focal airspace process. Xr Chest [facility] Result Date: 3/17/2019 IMPRESSION: Possible developing airspace process left mid lung. Xr Chest [facility] Result Date: 3/17/2019 IMPRESSION: Hyperaeration without acute finding or change Xr Chest [facility] Result Date: 3/16/2019 IMPRESSION: COPD. No acute process identified. Xr Abd Port  1 V Result Date: 3/18/2019 IMPRESSION: OG tube in appropriate position. Cta Code Neuro Head And Neck W Cont Result Date: 3/17/2019 IMPRESSION: CTA Head: 1. No evidence of flow-limiting stenosis or aneurysm. 2. A 15 x 5 mm left lateral frontal convexity meningioma. No significant mass effect. CTA Neck: 1. No evidence of significant stenosis. 2. Severe emphysema. Spiculated nodule in the medial aspect of the right upper lobe measuring 13 mm, in an area of previously seen infectious process. This may represent residual scarring. Follow-up chest CT in 3 months is recommended. Additional small consolidative and groundglass nodular opacities in the left upper lobe, which are likely infectious or inflammatory. This can also be evaluated on follow-up chest CT. CT perfusion: 1. No acute abnormality. 23X Ct Code Neuro Head Wo Contrast 
 
Result Date: 3/17/2019 IMPRESSION: 1. No evidence of acute intracranial abnormality. 2. 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 3. A 15 mm left lateral frontal convexity meningioma. Ct Code Neuro Perf W Cbf Result Date: 3/17/2019 IMPRESSION: CTA Head: 1. No evidence of flow-limiting stenosis or aneurysm. 2. A 15 x 5 mm left lateral frontal convexity meningioma. No significant mass effect. CTA Neck: 1. No evidence of significant stenosis. 2. Severe emphysema. Spiculated nodule in the medial aspect of the right upper lobe measuring 13 mm, in an area of previously seen infectious process. This may represent residual scarring. Follow-up chest CT in 3 months is recommended. Additional small consolidative and groundglass nodular opacities in the left upper lobe, which are likely infectious or inflammatory. This can also be evaluated on follow-up chest CT. CT perfusion: 1. No acute abnormality. 23X Labs:  
 
Recent Labs  
  03/19/19 
0553 03/18/19 0559 WBC 2.9* 5.8 HGB 7.1* 7.1*  
HCT 23.7* 24.1*  
PLT 93* 115* Recent Labs  
  03/19/19 
0553 03/18/19 
0559 03/17/19 2045 03/17/19 
0422 * 144 143 143  
K 4.2 4.3 5.0 5.0  
* 116* 116* 114* CO2 23 19* 20* 20* BUN 52* 47* 54* 47* CREA 1.50* 1.62* 1.72* 1.51* * 174* 164* 157* CA 7.6* 7.0* 7.4* 8.2* MG  --   --   --  1.7 PHOS  --   --   --  3.9 No results for input(s): SGOT, GPT, ALT, AP, TBIL, TBILI, TP, ALB, GLOB, GGT, AML, LPSE in the last 72 hours. No lab exists for component: AMYP, HLPSE Recent Labs  
  03/17/19 2045 INR 1.1 PTP 11.5* APTT 20.7* No results for input(s): FE, TIBC, PSAT, FERR in the last 72 hours. Lab Results Component Value Date/Time Folate 14.1 03/11/2019 01:16 AM  
  
No results for input(s): PH, PCO2, PO2 in the last 72 hours. Recent Labs  
  03/16/19 
1841 TROIQ <0.05 Lab Results Component Value Date/Time Cholesterol, total 101 03/11/2019 01:17 AM  
 HDL Cholesterol 59 03/11/2019 01:17 AM  
 LDL, calculated 31.6 03/11/2019 01:17 AM  
 Triglyceride 52 03/11/2019 01:17 AM  
 CHOL/HDL Ratio 1.7 03/11/2019 01:17 AM  
 
Lab Results Component Value Date/Time Glucose (POC) 306 (H) 03/19/2019 06:06 AM  
 Glucose (POC) 250 (H) 03/18/2019 11:41 PM  
 Glucose (POC) 202 (H) 03/18/2019 05:34 PM  
 Glucose (POC) 214 (H) 03/18/2019 11:41 AM  
 Glucose (POC) 182 (H) 03/18/2019 05:33 AM  
 
Lab Results Component Value Date/Time Color YELLOW/STRAW 03/17/2019 05:33 PM  
 Appearance CLOUDY (A) 03/17/2019 05:33 PM  
 Specific gravity 1.019 03/17/2019 05:33 PM  
 pH (UA) 5.0 03/17/2019 05:33 PM  
 Protein 30 (A) 03/17/2019 05:33 PM  
 Glucose NEGATIVE  03/17/2019 05:33 PM  
 Ketone NEGATIVE  03/17/2019 05:33 PM  
 Bilirubin NEGATIVE  03/17/2019 05:33 PM  
 Urobilinogen 0.2 03/17/2019 05:33 PM  
 Nitrites NEGATIVE  03/17/2019 05:33 PM  
 Leukocyte Esterase NEGATIVE  03/17/2019 05:33 PM  
 Epithelial cells FEW 03/17/2019 05:33 PM  
 Bacteria NEGATIVE  03/17/2019 05:33 PM  
 WBC 5-10 03/17/2019 05:33 PM  
 RBC 0-5 03/17/2019 05:33 PM  
 
 
 
Medications Reviewed:  
 
Current Facility-Administered Medications Medication Dose Route Frequency  amiodarone (CORDARONE) 375 mg/250 mL D5W infusion  0.5 mg/min IntraVENous TITRATE  methylPREDNISolone (PF) (SOLU-MEDROL) injection 40 mg  40 mg IntraVENous Q6H  
 insulin glargine (LANTUS) injection 10 Units  10 Units SubCUTAneous DAILY  fentaNYL (PF) 1,500 mcg/30 mL (50 mcg/mL) infusion  0-200 mcg/hr IntraVENous CONTINUOUS  
 DULoxetine (CYMBALTA) capsule 30 mg  30 mg Oral DAILY  rosuvastatin (CRESTOR) tablet 20 mg  20 mg Oral QHS  albuterol-ipratropium (DUO-NEB) 2.5 MG-0.5 MG/3 ML  3 mL Nebulization Q4H RT  
 pantoprazole (PROTONIX) 40 mg in sodium chloride 0.9% 10 mL injection  40 mg IntraVENous Q12H  cefepime (MAXIPIME) 2 g in 0.9% sodium chloride (MBP/ADV) 100 mL  2 g IntraVENous Q12H  
 balsam peru-castor oil (VENELEX) ointment   Topical BID  budesonide (PULMICORT) 500 mcg/2 ml nebulizer suspension  500 mcg Nebulization BID RT  
 arformoterol (BROVANA) neb solution 15 mcg  15 mcg Nebulization BID RT  
 0.9% sodium chloride infusion 250 mL  250 mL IntraVENous PRN  
 insulin lispro (HUMALOG) injection   SubCUTAneous Q6H  
 fentaNYL citrate (PF) injection 12.5 mcg  12.5 mcg IntraVENous Q4H PRN  
  sodium chloride (NS) flush 5-10 mL  5-10 mL IntraVENous PRN  
 glucose chewable tablet 16 g  4 Tab Oral PRN  
 dextrose (D50W) injection syrg 12.5-25 g  12.5-25 g IntraVENous PRN  
 glucagon (GLUCAGEN) injection 1 mg  1 mg IntraMUSCular PRN  
 
______________________________________________________________________ EXPECTED LENGTH OF STAY: 4d 19h ACTUAL LENGTH OF STAY:          3 Edilberto Frances MD

## 2019-03-19 NOTE — PROGRESS NOTES
2000: Assumed care of the pt. Pt resting in bed, intubated and sedated. Pt bladder scanned, bladder scanner reading 266 mL. Pt alert and shaking head \"no\" when asked if he needed to void. 2200: Pt's wife called ICU, updates given. 2330: Bedside and Verbal shift change report given to [address] (oncoming nurse) by Mahogany Lubin RN (offgoing nurse). Report included the following information SBAR, Kardex, Intake/Output, MAR, Recent Results, Cardiac Rhythm NSR to Sinus tach and Alarm Parameters .

## 2019-03-19 NOTE — PROGRESS NOTES
[facility] Name: Fili Walters MRN: [id number] : 1940 Hospital: [facility] Date: 3/19/2019     
 
 
77 yo w COPD/emphysema and MM Recent admit ---> Had COPD exacerbation/ acute r popliteal DVT Indeterminate V/Q 
D/c home 3/13 on Eliquis 
  
Smoked at home Cough congestion 
? syncope Weak  To ED room air sats 83% Hypothermic 
lacate 2.6 Readmitted hypercapneic 
afib RVR ? GI Bleed-- initial h/h/ stable but hemoccult + To ICU on Bipap Off/off bipap this am 
Prior severe flu . .. 3/17: Intubated/ vented after failing BiPAP. Hgb > 7.0 D/c on Eliquis but off now. 
- EGD 18 large H hernia. Multiple lance's erosions, severe duodenitis vs large laterally spreading villous adenoma. S/p colectomy for diverticulitis Hx esophageal rupture from violent vomiting 3/18: Intubated and vented. No significant secretions. RVP neg. Duplex LE - right peroneal dvt. Echo no Rv dysfunction. 3/19:  
intubated and vented. Failed PS due to low pH - metabolic acidosis. No significant secretions. IMPRESSION:  
-Acute hypercapneic resp failure 
-COPD w active smoking 
-? Acute sepsis- risk nosocomial infection 
-recent DVT-- on Eliquis until admit 
-Heme + w/o overt drop h/h 
-afib RVR--> to NSR 
-MM on chemo agent RECOMMENDATIONS:please see orders for details. Case d/w nursing and on Multi D rounds. Vent settings reviewed. D/c bicarb gtt. 
pseduomonas in the ett secretions. C/w cefepime. AC held/ protonix IV / trend H & H. 
Palliative care seeing patient. Family interested. The patient is Critically ill  with acute respiratory failure and at risk for deterioration. Time spent was exclusive of any procedures and did not overlap with another provider. Time Spent:  31 minutes. Routine management Stress ulcer prophylaxis DVT prophylaxis - SCD Discussed with nursing Daily delirium assessment with CAM - ICU Target RASS 0 to -1 I have personally reviewed the radiology films and reports. Subjective/Interval History:  
I have reviewed the flowsheet and previous days notes. Pt is critically ill and unable to give history. Objective: Mode Rate Tidal Volume Pressure FiO2 PEEP Assist control, Volume control   520 ml  5 cm H2O 30 % 5 cm H20 Peak airway pressure: 27 cm H2O Minute ventilation: 8.96 l/min Vital Signs:   
Visit Vitals /58 (BP 1 Location: Left arm, BP Patient Position: At rest) Pulse (!) 131 Temp 98.1 °F (36.7 °C) Resp 19 Ht 6' (1.829 m) Wt 59.9 kg (132 lb 0.9 oz) SpO2 98% BMI 17.91 kg/m² Ventilator Pressures Pressure Support (cm H2O): 5 cm H2O 
PIP Observed (cm H2O): 27 cm H2O Plateau Pressure (cm H2O): 16 cm H2O 
MAP (cm H2O): 13.2 PEEP/VENT (cm H2O): 5 cm H20 Auto PEEP Observed (cm H2O): 0 cm N4EUUAL(24)Ventilator Pressures Pressure Support (cm H2O): 5 cm H2O 
PIP Observed (cm H2O): 27 cm H2O Plateau Pressure (cm H2O): 16 cm H2O 
MAP (cm H2O): 13.2 PEEP/VENT (cm H2O): 5 cm H20 Auto PEEP Observed (cm H2O): 0 cm H2O Safety & Alarms Circuit Temperature: (hme) Backup Mode Checked/Apnea: Yes 
Pressure Max: 40 cm H2O Ve Min: 2 Ve Max: 20 Vt Min: 200 ml Vt Max: 1000 ml 
RR Max: 40 Intake/Output:  
Last shift:      Ventilator Volumes Vt Set (ml): 520 ml Vt Exhaled (Machine Breath) (ml): 599 ml Vt Spont (ml): 919 ml Ve Observed (l/min): 8.96 l/min Last 3 shifts: 03/19 0701 - 03/19 1900 In: 292.5 [I.V.:147.5] Out: 50 [Urine:50]RRIOLAST3 Intake/Output Summary (Last 24 hours) at 3/19/2019 1013 Last data filed at 3/19/2019 0800 Gross per 24 hour Intake 3744.54 ml Output 855 ml Net 2889.54 ml EXAM 
 
exam  Other  
general Ill appearing/somnolent/ appears stated age HEENT:  Op moist no ulcers, JVD not elevated, no cervical LAD   
 Chest No pectus deformity, normal chest rise b/l HEART:  RRR no m/r/g no rubs Lungs:  CTA b/l no r/r/w, diminished BS at bases ABD Soft/NT non rigid mildly distended, hypoactive BS   
EXT No c/c/e normal peripheral pulses Skin No rashes or ulcers, no mottling Neuro RASS is -2 Data I have personally reviewed data, flowsheets for the last 24 hours. Labs: 
Recent Labs  
  03/19/19 
0553 03/18/19 
0559 03/17/19 2045 WBC 2.9* 5.8 5.8 HGB 7.1* 7.1* 8.4* HCT 23.7* 24.1* 28.2*  
PLT 93* 115* 128* Recent Labs  
  03/19/19 
0553 03/18/19 
0559 03/17/19 2045 03/17/19 
0422 * 144 143 143  
K 4.2 4.3 5.0 5.0  
* 116* 116* 114* CO2 23 19* 20* 20* * 174* 164* 157* BUN 52* 47* 54* 47* CREA 1.50* 1.62* 1.72* 1.51* CA 7.6* 7.0* 7.4* 8.2* MG  --   --   --  1.7 PHOS  --   --   --  3.9 INR  --   --  1.1  --   
 
 
ABG Recent Labs  
  03/19/19 
0830 03/18/19 
1215 03/17/19 
1826 PHI 7.402 7.274* 7.429 PO2I 96 79* 247* PCO2I 38.3 40.7 38.0 Laurie Stevenson MD 
[facility]

## 2019-03-19 NOTE — DIABETES MGMT
DTC Progress Note Recommendations/ Comments: Chart reviewed due to variable BG. Noted pt receiving steroids (dose decreased today) and tube feedings. Noted Lantus 10 units was added today. If appropriate, please consider: 
 - if blood sugars remain >180 mg/dL, add a second dose of Lantus (given q 12 hours) so that if tube feeds are stopped, Lantus can be adjusted more timely Current hospital DM medication: lispro correction, normal sensitivity Chart reviewed on Aniya Ellen. Patient is a 78 y.o. male with known DM on Lantus, 30 units and Novolog, 6 units breakfast and lunch, 10 units dinner at home. A1c:  
Lab Results Component Value Date/Time Hemoglobin A1c 7.4 (H) 01/28/2019 04:44 AM  
 Hemoglobin A1c 9.8 (H) 02/20/2018 05:14 AM  
 
 
Recent Glucose Results:  
Lab Results Component Value Date/Time  (H) 03/19/2019 05:53 AM  
 GLUCPOC 360 (H) 03/19/2019 11:24 AM  
 GLUCPOC 306 (H) 03/19/2019 06:06 AM  
 GLUCPOC 250 (H) 03/18/2019 11:41 PM  
  
 
Lab Results Component Value Date/Time Creatinine 1.50 (H) 03/19/2019 05:53 AM  
 
Estimated Creatinine Clearance: 33.8 mL/min (A) (based on SCr of 1.5 mg/dL (H)). Active Orders There are no active orders of the following type(s): Diet. PO intake:  
Patient Vitals for the past 72 hrs: 
 % Diet Eaten 03/19/19 0800 0 % 03/18/19 1600 0 % 03/18/19 1200 0 % 03/18/19 0800 0 % Will continue to follow as needed. Thank you Seth Womack, MS, RN, CDE Time spent: 4 minutes

## 2019-03-19 NOTE — PROGRESS NOTES
Reason for Admission:   Presented to the ED with increased shortness of breath, O2 sats 83% on RA. Patient placed on Bipap RRAT Score:     40 Resources/supports as identified by patient/family:  Patient has Medicare A,B and . Is seen at the [facility] [address] [place] [address] patient (as identified by patient/family and CM): Finances/Medication cost?   See above Transportation? Prior to admission was driving Support system or lack thereof? Wife Madalyn Harvey [phone] Living arrangements? Lives in his own home with his wife Self-care/ADLs/Cognition? Was independent with ADL's Current Advanced Directive/Advance Care Plan: Wife August Osborne is NOK, no AMD 
                       
Plan for utilizing home health:    TBD Likelihood of readmission: high Transition of Care Plan:  TBD Patient is currently intubated in the ICU. Palliative team is meeting with family. Prior to admission in March patient was independent to include driving. Patient discharged with a [facility] visit and a referral to [facility]. CM will follow for discharge needs. Guicho Murray RN,CRM Care Management Interventions PCP Verified by CM: Yes([facility] MyChart Signup: No 
Discharge Durable Medical Equipment: No 
Physical Therapy Consult: No 
Occupational Therapy Consult: No 
Speech Therapy Consult: No 
Current Support Network: Lives with Spouse(wife Madalyn Harvey [phone])

## 2019-03-19 NOTE — PROGRESS NOTES
0530: pt converted into afib rate 130s-150s. 3011: paged hospitalist concerning continued Afib.   0630: Dr. Bertram Preston ordered Amiodarone 150mg bolus followed by Amiodarone 0.5 mg/hr drip.

## 2019-03-19 NOTE — PROGRESS NOTES
CC: 
Kayla Brooks is a 78 y.o patient of Dr. Jazzy Stokes with multiple myeloma/COPD/recurrent DVT admitted with respiratory failure. Oncology consulted for management of myeloma/DVT/GI bleed. 
 
 
  
HPI: 
Kayla Brooks has had plasma cell neoplasm  since around 2012. 
  
 3/2019 He has been on single agent lenalinamide for multiple myeloma most recently. He was previously on CyBORD. He was previously  lenalinamide/dexamethasone but dexamethasone was discontinued because of poor tolerance. He has been on ASA 81 mg along with lenalinamide. 
  
3/2019 He was admitted to [facility] for pneumonia./respiratory failure. He subsequently developed a popliteal DVT. He was discharged on apixiban for recurrent DVT 
 
3/17/2019 He was readmitted with respiratory failure/anemia/Heme positive stools. He required mechanical ventilation/transfusion. Hematology reconsulted for anticoagulation/multiple myeloma. 
 
  
ROS: 
No gross bleeding reported Pt unable to give ROS due to swedation/intubation 
  
PHYSICAL EXAMINATION: 
GENERAL APPEARANCE:sedated/intubated MOUTH AND THROAT: ETT in place NECK:  No mass/JVD CHEST:  Poor airflow HEART:  RRR/tachycardia CNS: intubated  Opens eyes and arouses to voice   
  
  
Dx: 1.  Chronic obstructive pulmonary disease with acute exacerbation 
--relapsed --intubated 2.  Multiple myeloma -- immunoglobulins normal on lenalinamide 
--no new data 3.  Type 2 diabetes --improved off dexamethasone for myeloma 4.  Anemia 
--myeloma + GI bleed 
--GI bleed exacerbated by anitcoagulation 
--marginal but adequate  following transfusion of PRBC 5. Acute kidney injury --myeloma + MOSF 6.  Tobacco abuse --chronic 7. DVT 
--common in patients on lenalinamide with increased risk from immobilization/malignancy 
--no new lesions --intolerant of therapeutic anticoagulation due to anemia/heme positive stools   
  
  
Rx: 
 Consult Palliative care/Hospice Transfuse PRBC prn 
SCD 
D/C tobacco 
D/C lenalinamide Hydration as tolerated ? ?DNR 
 
  
 
Disc: 
Laura Bloch has had multiple myeloma for many years. In 3/2019, Laura Bloch had pneumonia/COPD complicating multiple myeloma. He developed a DVT during that recent illness/hospitalization/immobilization. He was at increased risk of thrombosis from his myeloma and his  myeloma therapy with lenolinamide/glucocorticoids. The optimal regimen for anticoagulation in this circumstance is unknown but therapeutic anticoagulation was recommended. Dr. Gerardo Roberts was counseled and we recommended apixiban. Unfortunately, his GI bleeding was exacerbated leading to worsening anemia. He has since declined significantly requiring mechanical ventilation and transfusion. He clearly will not be able to tolerate therapeutic anticoagulation. His condition is critical. 
His prognosis is very poor due to myeloma/COPD/thrombosis/bleeding. He cannot can recover adequately to resume myeloma therapy. We agree with comfort measures. Our thanks to hospitalist team/Pulmonary medicine Please reconsult if we can help further. 
  
[facility] notes appended 3/18/2019 
 
       
  
 
     
  
     
  
    
     
     
     
    
  
    
    
 
 
 
  
  
 
 
Author Mary Ann Floyd MD File Time 03/13/19 1140 Author Type Physician Status Addendum Last  Mary Ann Floyd MD [facility] Acct # [de-identified] Admit Date 3/10/2019

## 2019-03-19 NOTE — PROGRESS NOTES
[address]  [address] [facility] [address]   [phone]                GI PROGRESS NOTE  Hector Stanford AGACNP-BC  Work Cell: [phone]      NAME:   Jesse Tobar   :    1940   MRN:    [id number]     Assessment/Plan   1. GI bleed - w/ + FOB but no overt evidence of bleeding. He has multiple potential sources of possible chronic slow upper GI bleeding and was on triple anticoagulant therapy. Previous EGD  w/ desquamating/moderately inflamed mucosa in the distal esophagus, large (6-7 cm) hiatal hernia and multiple Wilbur's erosions. Stomach only notable for erythema in the body, though in duodenum there is \"immediate post bulbar mucosa is very abnormal suggesting severe duodenitis vs a large laterally spreading villous adenoma. It bleeds on contact\". No biopsies were taken as patient was on plavix.   - Supportive management per primary team  - Continue IV PPI   - Hold Plavix if possible   - Consider EGD once acute cardiopulmonary issues have improved if anticoagulation can be safety held. If not an option, could still discuss role of endoscopy. - Follow clinical course for any evidence of overt bleeding   - Monitor H&H, transfuse as needed     2. Acute respiratory failure - intubated on ventilator  3. Atrial fibrillation w/ RVR  4. Acute DVT  5. Multiple myeloma     Palliative to meet with family and discuss goals of care today. Will follow.       Patient Active Problem List   Diagnosis Code    Acute respiratory failure (HCC) J96.00    COPD with acute exacerbation ([address] J44.1    HTN (hypertension) I10    HTN (hypertension) I10    COPD (chronic obstructive pulmonary disease) (HCC) J44.9    Cancer of kidney ([address] C64.9    Hypoglycemia, unspecified E16.2    Acute renal failure ([address] N17.9    Hyperkalemia E87.5    S/P partial colectomy Z90.49    Diarrhea R19.7    DM (diabetes mellitus), type 2, uncontrolled ([address] E11.65    S/p nephrectomy Z90.5    AAA (abdominal aortic aneurysm) (HCC) I71.4    GI bleed K92.2    Cancer (HCC) C80.1    Anemia D64.9    Multiple myeloma (HCC) C90.00    Stroke (cerebrum) (HCC) I63.9    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 Hgb stable. Objective:     VITALS:   Last 24hrs VS reviewed since prior hospitalist progress note. Most recent are:  Visit Vitals  /58 (BP 1 Location: Left arm, BP Patient Position: At rest)   Pulse (!) 131   Temp 98.1 °F (36.7 °C)   Resp 19   Ht 6' (1.829 m)   Wt 59.9 kg (132 lb 0.9 oz)   SpO2 97%   BMI 17.91 kg/m²       Intake/Output Summary (Last 24 hours) at 3/19/2019 0914  Last data filed at 3/19/2019 0800  Gross per 24 hour   Intake 3844.54 ml   Output 1005 ml   Net 2839.54 ml        PHYSICAL EXAM:  General   well developed, intubated, awake, in no acute distress  EENT  Normocephalic, Atraumatic, PERRLA, EOMI, sclera clear  Respiratory   Diminished bilaterally   Cardiology  Regular Rate and Rythmn  - no murmurs, rubs or gallops  Abdominal  Soft, non-tender, non-distended, positive bowel sounds, no hepatosplenomegaly, no palpable mass  Extremities  Ecchymosis over bilateral UEs. Pulses intact. Neurological  No focal neurological deficits noted.        Lab Data   Recent Results (from the past 12 hour(s))   GLUCOSE, POC    Collection Time: 03/18/19 11:41 PM   Result Value Ref Range    Glucose (POC) 250 (H) 65 - 100 mg/dL    Performed by Isabel Sheth    CBC W/O DIFF    Collection Time: 03/19/19  5:53 AM   Result Value Ref Range    WBC 2.9 (L) 4.1 - 11.1 K/uL    RBC 2.33 (L) 4.10 - 5.70 M/uL    HGB 7.1 (L) 12.1 - 17.0 g/dL    HCT 23.7 (L) 36.6 - 50.3 %    .7 (H) 80.0 - 99.0 FL    MCH 30.5 26.0 - 34.0 PG    MCHC 30.0 30.0 - 36.5 g/dL    RDW 20.2 (H) 11.5 - 14.5 %    PLATELET 93 (L) 076 - 400 K/uL    MPV 11.6 8.9 - 12.9 FL    NRBC 1.7 (H) 0  WBC    ABSOLUTE NRBC 0.05 (H) 0.00 - 5.40 K/uL   METABOLIC PANEL, BASIC    Collection Time: 03/19/19  5:53 AM   Result Value Ref Range    Sodium 147 (H) 136 - 145 mmol/L    Potassium 4.2 3.5 - 5.1 mmol/L    Chloride 116 (H) 97 - 108 mmol/L    CO2 23 21 - 32 mmol/L    Anion gap 8 5 - 15 mmol/L    Glucose 297 (H) 65 - 100 mg/dL    BUN 52 (H) 6 - 20 MG/DL    Creatinine 1.50 (H) 0.70 - 1.30 MG/DL    BUN/Creatinine ratio 35 (H) 12 - 20      GFR est AA 55 (L) >60 ml/min/1.73m2    GFR est non-AA 45 (L) >60 ml/min/1.73m2    Calcium 7.6 (L) 8.5 - 10.1 MG/DL   GLUCOSE, POC    Collection Time: 03/19/19  6:06 AM   Result Value Ref Range    Glucose (POC) 306 (H) 65 - 100 mg/dL    Performed by Yazmin Shelby Rolling) [facility]    POC G3 - PUL    Collection Time: 03/19/19  8:30 AM   Result Value Ref Range    pH (POC) 7.402 7.35 - 7.45      pCO2 (POC) 38.3 35.0 - 45.0 MMHG    pO2 (POC) 96 80 - 100 MMHG    HCO3 (POC) 23.8 22 - 26 MMOL/L    sO2 (POC) 98 (H) 92 - 97 %    Base deficit (POC) 1 mmol/L    Site LEFT BRACHIAL      Device: VENT      Mode ASSIST CONTROL      Tidal volume 520 ml    Set Rate 14 bpm    PEEP/CPAP (POC) 5 cmH2O    PIP (POC) 26      Allens test (POC) YES      Specimen type (POC) ARTERIAL           Medications: Reviewed    PMH/SH reviewed - no change compared to H&P  Mid-Level Provider: Farhat Lema NP   Date/Time:  3/19/2019

## 2019-03-19 NOTE — PALLIATIVE CARE
Palliative Medicine Social Work Spoke with nursing and care manager this morning who report heightened emotion in family at bedside who feel patient is suffering. Dr. Aditya Dowling and I met with wife, Kiran Richards; and daughter, Tricia Costa ([phone]). Both related their sense of his suffering. Talked more about the reality of a limited life expectancy regardless of decisions made. Discussed his advanced COPD and future impending respiratory failure even if successfully weaned from support now. Discussed his worsening hemodynamics and reality that he is no longer be a candidate for multiple myeloma treatments. They recounted the many times he has beaten the odds; \"his nine lives\"; healthcare miracles of others in the family and their knowing this time is different for him. Talked about what a shift in focus to comfort and Compassionate Extubation would look like; the control this may provide around what his dying experience looks like. Wife if only able to sit in hospital in spurts and there has been some concern about patent's potential for decline while they are home; his being alone. Engaged in discussion regarding resuscitation status. Wife in agreement with No CPR or shock. Two other children, Anita and Claudene Plummer, will be arriving later this afternoon. Family would like to reconvene then to further discuss. They are leaning toward decision for Compassionate Extubation tomorrow. Wife's coping seems adaptive. She is realistic with good sense of boundaries for emotions; a good support to her children. Thank you for the opportunity to be involved in the care of Col. Franklin Palma. Salyl Dooley, KARLW, [facility] Palliative Medicine  Respecting Choices ® ACP Facilitator [phone]

## 2019-03-19 NOTE — PROGRESS NOTES
Bedside and Verbal shift change report given to Andrae RN (oncoming nurse) by Ira Fuentes RN (offgoing nurse). Report included the following information SBAR.  
  
0800: Shift assessment. Pt intubated on ventilator, alert, nods appropriate, QUISPE purposeful. Wife at bedside requesting palliative consult- palliative team met with family. 1200: Dr. Eleuterio Sykes made aware of pt HR in 160s and - orders received. Pt reassessment, no changes. 1600: Reassessment, no changes. 1730: Dr. Claudell Song paged for a . Orders received to administer 10 units of lispro. Bedside and Verbal shift change report given to [employer] Lawson (oncoming nurse) by Alfie De La O (offgoing nurse). Report included the following information SBAR.

## 2019-03-19 NOTE — CONSULTS
Palliative Medicine Consult Александр: [phone] ([phone]) Patient Name: Michael Salas YOB: 1940 Date of Initial Consult: 3/18/19 Reason for Consult: care decisions Requesting Provider: Stewart Chakraborty Primary Care Physician: Ryan, MD Roopa 
 
 SUMMARY:  
Michael Salas is a 78 y.o. with a past history of Multiple myeloma (Dr. Micah Hardwick, dx 2012- on lenalinamide), COPD/severe bullous emphysema, hx CVA, DM, hx renal cell cancer s/p nephrectomy, s/p AAA repair, HTN, GERD, recent admissions 1/27-2/2/19 pneumonia And discharge 3/13/19 with COPD exacerbation/ acute DVT/ DIANA, who was admitted on 3/16/2019 from home with a diagnosis of shortness of breath. Current medical issues leading to Palliative Medicine involvement include: acute on chronic respiratory failure, intubated 3/17/19, failed SBT today, also anemia/ heme + stool, s/p 1 U PRBCs 3/17 (now blood thinners stopped - was on eliquis/ asa/ plavix). 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 He is  to wife Michael Jarquin [phone]  For about 60 years They have two adult daughters Fitz Sanz) and a son Porter Mclaughlin PALLIATIVE DIAGNOSES:  
1. Acute on chronic respiratory failure, severe COPD/emphysema 2. Anemia, heme + stool 3. Debility 4. Multiple myeloma, end stage 5. Acute renal failure 6. Weight loss (10 pounds in past month), cachexia PLAN:  
1. Family Meeting held with wife Michael Jarquin and their daughter Burton Gavin. See Wilberto Mccarthy note for complete details. 1. Walsh of long journey, many difficult medical issues. He's \"had 9 lives\" - they understand this time is different. 2. Family has very good insight that his life expectancy is limited. He has changed over the past month. 3. Discussed option of shift of focus to comfort, answered questions about what that will look like.  
4. Danyell Moy strong in her belief that he is tired of all this, ready to go, that it's time to let him be comfortable. It's very hard for them watching him awake, begging for tube to be out. 5. They will discuss tonight as a family or maybe later today, and talk about timing of shift to comfort. 6. In the meantime, they would like to protect him from shock, CPR if he dies while on ventilator. Discussed with Pulmonary attending Dr Lara Padilla, bedside nurse GOALS OF CARE / TREATMENT PREFERENCES:  
 
GOALS OF CARE: 
Patient/Health Care Proxy Stated Goals: Other (comment)(not discussed yet)to be discussed TREATMENT PREFERENCES:  
Code Status: Partial Code Advance Care Planning: 
[] [employer] [facility] Interdisciplinary Team has updated the ACP Navigator with [place] and Patient Capacity Wife Delphine Will is [employer]  Advance Care Planning 3/11/2019 Patient's Healthcare Decision Maker is: -  
Primary Decision Maker Name -  
Primary Decision Maker Relationship to Patient -  
Confirm Advance Directive None Patient Would Like to Complete Advance Directive - Medical Interventions: Other (comment) Other Instructions: Other: As far as possible, the palliative care team has discussed with patient / health care proxy about goals of care / treatment preferences for patient. HISTORY:  
 
History obtained from: chart CHIEF COMPLAINT: admitted with SOB 
 
HPI/SUBJECTIVE: The patient is:  
[] Verbal and participatory [x] Non-participatory due to: intubated 78year old male admitted with worsening dyspnea, now intubated in ICU Clinical Pain Assessment (nonverbal scale for severity on nonverbal patients):  
Clinical Pain Assessment Severity: 0 Activity (Movement): Restless, excessive activity and/or withdrawal reflexes Duration: for how long has pt been experiencing pain (e.g., 2 days, 1 month, years) Frequency: how often pain is an issue (e.g., several times per day, once every few days, constant)  FUNCTIONAL ASSESSMENT:  
 
 Palliative Performance Scale (PPS): PPS: 20 
 
 
 PSYCHOSOCIAL/SPIRITUAL SCREENING:  
 
Palliative IDT has assessed this patient for cultural preferences / practices and a referral made as appropriate to needs (Cultural Services, Patient Advocacy, Ethics, etc.) Any spiritual / Evangelical concerns: 
[] Yes /  [x] No 
 
Caregiver Burnout: 
[] Yes /  [x] No /  [] No Caregiver Present Anticipatory grief assessment:  
[x] Normal  / [] Maladaptive ESAS Anxiety: ESAS Depression:    
 
 
 REVIEW OF SYSTEMS:  
 
Positive and pertinent negative findings in ROS are noted above in HPI. The following systems were [x] reviewed / [] unable to be reviewed as noted in HPI Other findings are noted below. Systems: constitutional, ears/nose/mouth/throat, respiratory, gastrointestinal, genitourinary, musculoskeletal, integumentary, neurologic, psychiatric, endocrine. Positive findings noted below. Modified ESAS Completed by: provider Pain: 0 Dyspnea: 5 PHYSICAL EXAM:  
 
From RN flowsheet: 
Wt Readings from Last 3 Encounters:  
03/19/19 132 lb 0.9 oz (59.9 kg) 03/11/19 136 lb 12.8 oz (62.1 kg) 02/02/19 142 lb 3.2 oz (64.5 kg) Blood pressure 107/58, pulse (!) 131, temperature 98.1 °F (36.7 °C), resp. rate 19, height 6' (1.829 m), weight 132 lb 0.9 oz (59.9 kg), SpO2 98 %. Pain Scale 1: Adult Nonverbal Pain Scale Pain Intensity 1: 8 Last bowel movement, if known:  
 
Constitutional: awake on vent, waves to us as we enter room Eyes: pupils equal, anicteric Thin, chronically ill appearing HISTORY:  
 
Principal Problem: 
  Acute respiratory failure with hypoxemia ([facility] [address] (3/16/2019) Active Problems: 
  GI bleed () Overview: reptured Blaine Sow Hypotension (3/16/2019) Past Medical History:  
Diagnosis Date  AAA (abdominal aortic aneurysm) ([facility] [address]  Asthma  Cancer ([facility] [address]   
 kidney ca  COPD  Diabetes ([facility] [address]  Gastrointestinal disorder   
 ruptured esphogus  Hypertension  Other ill-defined conditions(799.89) Past Surgical History:  
Procedure Laterality Date  ABDOMEN SURGERY PROC UNLISTED    
 hernia  ABDOMEN SURGERY PROC UNLISTED    
 colon resection  HX GI    
 part of colon removed, ruptured esophagus  HX OTHER SURGICAL    
 kidney removed [place] Family History Problem Relation Age of Onset  Hypertension Mother History reviewed, no pertinent family history. Social History Tobacco Use  Smoking status: Current Every Day Smoker Packs/day: 0.50 Years: 60.00 Pack years: 30.00  Smokeless tobacco: Never Used Substance Use Topics  Alcohol use: Yes Comment: 2 drinks a month Allergies Allergen Reactions  Niacin Rash  Niacin Unknown (comments) Hot flashes Current Facility-Administered Medications Medication Dose Route Frequency  amiodarone (CORDARONE) 375 mg/250 mL D5W infusion  0.5 mg/min IntraVENous TITRATE  methylPREDNISolone (PF) (SOLU-MEDROL) injection 40 mg  40 mg IntraVENous Q6H  
 insulin glargine (LANTUS) injection 10 Units  10 Units SubCUTAneous DAILY  fentaNYL (PF) 1,500 mcg/30 mL (50 mcg/mL) infusion  0-200 mcg/hr IntraVENous CONTINUOUS  
 oseltamivir (TAMIFLU) 6 mg/mL oral suspension 30 mg  30 mg Per G Tube BID  DULoxetine (CYMBALTA) capsule 30 mg  30 mg Oral DAILY  rosuvastatin (CRESTOR) tablet 20 mg  20 mg Oral QHS  albuterol-ipratropium (DUO-NEB) 2.5 MG-0.5 MG/3 ML  3 mL Nebulization Q4H RT  
 pantoprazole (PROTONIX) 40 mg in sodium chloride 0.9% 10 mL injection  40 mg IntraVENous Q12H  cefepime (MAXIPIME) 2 g in 0.9% sodium chloride (MBP/ADV) 100 mL  2 g IntraVENous Q12H  
 balsam peru-castor oil (VENELEX) ointment   Topical BID  budesonide (PULMICORT) 500 mcg/2 ml nebulizer suspension  500 mcg Nebulization BID RT  
  arformoterol (BROVANA) neb solution 15 mcg  15 mcg Nebulization BID RT  
 0.9% sodium chloride infusion 250 mL  250 mL IntraVENous PRN  
 insulin lispro (HUMALOG) injection   SubCUTAneous Q6H  
 fentaNYL citrate (PF) injection 12.5 mcg  12.5 mcg IntraVENous Q4H PRN  
 sodium chloride (NS) flush 5-10 mL  5-10 mL IntraVENous PRN  
 glucose chewable tablet 16 g  4 Tab Oral PRN  
 dextrose (D50W) injection syrg 12.5-25 g  12.5-25 g IntraVENous PRN  
 glucagon (GLUCAGEN) injection 1 mg  1 mg IntraMUSCular PRN  
 
 
 
 LAB AND IMAGING FINDINGS:  
 
Lab Results Component Value Date/Time WBC 2.9 (L) 03/19/2019 05:53 AM  
 HGB 7.1 (L) 03/19/2019 05:53 AM  
 PLATELET 93 (L) 64/67/9202 05:53 AM  
 
Lab Results Component Value Date/Time Sodium 147 (H) 03/19/2019 05:53 AM  
 Potassium 4.2 03/19/2019 05:53 AM  
 Chloride 116 (H) 03/19/2019 05:53 AM  
 CO2 23 03/19/2019 05:53 AM  
 BUN 52 (H) 03/19/2019 05:53 AM  
 Creatinine 1.50 (H) 03/19/2019 05:53 AM  
 Calcium 7.6 (L) 03/19/2019 05:53 AM  
 Magnesium 1.7 03/17/2019 04:22 AM  
 Phosphorus 3.9 03/17/2019 04:22 AM  
  
Lab Results Component Value Date/Time AST (SGOT) 9 (L) 03/11/2019 01:17 AM  
 Alk. phosphatase 57 03/11/2019 01:17 AM  
 Protein, total 5.9 (L) 03/11/2019 01:17 AM  
 Albumin 2.6 (L) 03/11/2019 01:17 AM  
 Globulin 3.3 03/11/2019 01:17 AM  
 
Lab Results Component Value Date/Time INR 1.1 03/17/2019 08:45 PM  
 Prothrombin time 11.5 (H) 03/17/2019 08:45 PM  
 aPTT 20.7 (L) 03/17/2019 08:45 PM  
  
Lab Results Component Value Date/Time Iron 45 03/11/2019 01:17 AM  
 TIBC 227 (L) 03/11/2019 01:16 AM  
 Iron % saturation 20 03/11/2019 01:16 AM  
 Ferritin 167 03/13/2019 01:21 AM  
  
Lab Results Component Value Date/Time pH 7.37 02/19/2018 03:50 PM  
 PCO2 34 (L) 02/19/2018 03:50 PM  
 PO2 70 (L) 02/19/2018 03:50 PM  
 
No components found for: [place] Lab Results Component Value Date/Time  CK 70 03/11/2019 01:17 AM  
 CK - MB 6.4 (H) 03/11/2019 01:17 AM  
  
 
 
   
 
Total time: 65min Counseling / coordination time, spent as noted above: 45 
> 50% counseling / coordination?: yes Prolonged service was provided for  []30 min   []75 min in face to face time in the presence of the patient, spent as noted above. Time Start: 9:20 Time End: 10:30 Note: this can only be billed with 41400 (initial) or 52408 (follow up). If multiple start / stop times, list each separately.

## 2019-03-19 NOTE — PROGRESS NOTES
Day #1 of Tamiflu Indication:  Possible flu (despite negative respiratory viral panel per Dr. Antonella Duran) Current regimen:  75 mg NG BID x 5 days Abx regimen: Cefepime + Oseltamivir Recent Labs  
  19 
0553 19 
0559 19 
2045 WBC 2.9* 5.8 5.8 CREA 1.50* 1.62* 1.72* BUN 52* 47* 54* Est CrCl: ~30-35 ml/min; UO: ~0.7 ml/kg/hr Temp (24hrs), Av °F (36.7 °C), Min:97.5 °F (36.4 °C), Max:98.4 °F (36.9 °C) Cultures:  
3/16 Blood: NGTD 
3/17 Sputum: light probable Pseudomonas spp, pending 3/17 Resp viral panel: negative Plan: Change to 30 mg NG BID x 5 days per [facility] P&T Committee Protocol with respect to renal function. Pharmacy will continue to monitor patient daily and will make dosage adjustments based upon changing renal function.

## 2019-03-20 NOTE — PROGRESS NOTES
Palliative Medicine Consult Александр: [phone] ([phone]) Patient Name: Saranya Raza YOB: 1940 Date of Initial Consult: 3/18/19 Reason for Consult: care decisions Requesting Provider: Viktoriya Del Real Primary Care Physician: Ryan, MD Ro[place] 
 
 SUMMARY:  
Saranya Raza is a 78 y.o. with a past history of Multiple myeloma (Dr. Judy Paz, dx 2012- on lenalinamide), COPD/severe bullous emphysema, hx CVA, DM, hx renal cell cancer s/p nephrectomy, s/p AAA repair, HTN, GERD, recent admissions 1/27-2/2/19 pneumonia And discharge 3/13/19 with COPD exacerbation/ acute DVT/ DIANA, who was admitted on 3/16/2019 from home with a diagnosis of shortness of breath. Current medical issues leading to Palliative Medicine involvement include: acute on chronic respiratory failure, intubated 3/17/19, failed SBT today, also anemia/ heme + stool, s/p 1 U PRBCs 3/17 (now blood thinners stopped - was on eliquis/ asa/ plavix). 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 He is  to wife Ana Orf [phone]  For about 60 years. She was an RN for many years at [employer]. He has lived a full, active life. They have two adult daughters (Eliane Latin) and a son Nalini Calle PALLIATIVE DIAGNOSES:  
1. Acute on chronic respiratory failure, severe COPD/emphysema 2. Anemia, heme + stool 3. Debility 4. Multiple myeloma, end stage 5. Acute renal failure 6. Weight loss (10 pounds in past month), cachexia PLAN:  
1. Notified by ICU team that family wishes to proceed w/ comfort measures only w/ compassionate extubation. 2. Along w/  Mena Bourgeois meet w/ wife Preet Howard and Radames Montalvo and pt- he is somewhat alert, following commands. 3. Pt has mult diseases that are chronic and worsening. Family meeting held in detail yest w/ our team and they wish to honor him by allowing him to die naturally and comfortably. I explain to him as well, and he nods and agrees. 4. Need to proactively manage sx, and they understand that in order to do this w/ severe bullous emphysema he will likely have the side effect of sedation and be no longer able to interact w/ his family. Will treat SOB, pain, anxiety. 5. Family understands that pt may live minutes, hours or short days and that if he stabilizes some, will move to medical floor. 6. Plan- Incr Fentanyl ggt to 200mcg/h. 
7. Proactive treat w/ Dilaudid, Ativan and Robinul now. 8. Before RT comes, will give another dose of medication to ensure SOB managed. 9. Extubate and will follow. 8. Family coping well, they feel peace knowing that this is definitely what pt wants- no longer has quality of life. He was a very active man, has many stories - and not being able to shave and do basics ADLs is not for him. Discussed with Dr Erinn Cochran; German Ray RN 
 
 GOALS OF CARE / TREATMENT PREFERENCES:  
 
GOALS OF CARE: 
Patient/Health Care Proxy Stated Goals: Comfort TREATMENT PREFERENCES:  
Code Status: DNR Advance Care Planning: 
[] [facility] Interdisciplinary Team has updated the ACP Navigator with Jocelyn and Patient Capacity Advance Care Planning 3/16/2019 Patient's Healthcare Decision Maker is: -  
Primary Decision Maker Name -  
Primary Decision Maker Relationship to Patient -  
Confirm Advance Directive None Patient Would Like to Complete Advance Directive -  
 
  Primary Decision MakerTherese Mac - [facility] - [phone] Medical Interventions: Comfort measures Other Instructions: Other: 
 
 HISTORY:  
 
 
 
CHIEF COMPLAINT: SOB 
 
HPI/SUBJECTIVE: The patient is:  
[] Verbal and participatory [x] Non-participatory due to: intubated Pt alert, nodding when I am talking to him. Clinical Pain Assessment (nonverbal scale for severity on nonverbal patients):  
Clinical Pain Assessment Severity: 0 Activity (Movement): Lying quietly, normal position Duration: for how long has pt been experiencing pain (e.g., 2 days, 1 month, years) Frequency: how often pain is an issue (e.g., several times per day, once every few days, constant) FUNCTIONAL ASSESSMENT:  
 
Palliative Performance Scale (PPS): PPS: 20 
 
 
 PSYCHOSOCIAL/SPIRITUAL SCREENING:  
 
Palliative IDT has assessed this patient for cultural preferences / practices and a referral made as appropriate to needs (Cultural Services, Patient Advocacy, Ethics, etc.) Any spiritual / Roman Catholic concerns: 
[] Yes /  [x] No 
 
Caregiver Burnout: 
[] Yes /  [x] No /  [] No Caregiver Present Anticipatory grief assessment:  
[x] Normal  / [] Maladaptive ESAS Anxiety: Anxiety: 0 
 
ESAS Depression: Depression: 0 REVIEW OF SYSTEMS:  
 
Positive and pertinent negative findings in ROS are noted above in HPI. The following systems were [x] reviewed / [] unable to be reviewed as noted in HPI Other findings are noted below. Systems: constitutional, ears/nose/mouth/throat, respiratory, gastrointestinal, genitourinary, musculoskeletal, integumentary, neurologic, psychiatric, endocrine. Positive findings noted below. Modified ESAS Completed by: provider Fatigue: 8 Drowsiness: 4 Depression: 0 Pain: 0 Anxiety: 0 Nausea: 0 Anorexia: 0 Dyspnea: 3 PHYSICAL EXAM:  
 
From RN flowsheet: 
Wt Readings from Last 3 Encounters:  
03/20/19 137 lb 2 oz (62.2 kg) 03/11/19 136 lb 12.8 oz (62.1 kg) 02/02/19 142 lb 3.2 oz (64.5 kg) Blood pressure 117/69, pulse 94, temperature 97 °F (36.1 °C), resp. rate 19, height 6' (1.829 m), weight 137 lb 2 oz (62.2 kg), SpO2 93 %. Pain Scale 1: Adult Nonverbal Pain Scale Pain Intensity 1: 0 Last bowel movement, if known:  
 
Constitutional: awake on vent,NAD, no signs of anxiety Eyes: pupils equal, anicteric ENT: OT tube Resp: coarse lung sounds Chest: irreg Abd: Soft Ext: Edema in arms, weeping wounds Neuro: following commands HISTORY:  
 
Principal Problem: 
  Acute respiratory failure with hypoxemia ([facility] [address] (3/16/2019) Active Problems: 
  GI bleed () Overview: reptured Attila Messina Hypotension (3/16/2019) Past Medical History:  
Diagnosis Date  AAA (abdominal aortic aneurysm) ([facility] [address]  Asthma  Cancer ([facility] [address]   
 kidney ca  COPD  Diabetes ([facility] [address]  Gastrointestinal disorder   
 ruptured esphogus  Hypertension  Other ill-defined conditions(799.89) Past Surgical History:  
Procedure Laterality Date  ABDOMEN SURGERY PROC UNLISTED    
 hernia  ABDOMEN SURGERY PROC UNLISTED    
 colon resection  HX GI    
 part of colon removed, ruptured esophagus  HX OTHER SURGICAL    
 kidney removed [place] Family History Problem Relation Age of Onset  Hypertension Mother History reviewed, no pertinent family history. Social History Tobacco Use  Smoking status: Current Every Day Smoker Packs/day: 0.50 Years: 60.00 Pack years: 30.00  Smokeless tobacco: Never Used Substance Use Topics  Alcohol use: Yes Comment: 2 drinks a month Allergies Allergen Reactions  Niacin Rash  Niacin Unknown (comments) Hot flashes Current Facility-Administered Medications Medication Dose Route Frequency  LORazepam (ATIVAN) injection 2 mg  2 mg IntraVENous Q15MIN PRN  
 HYDROmorphone (PF) (DILAUDID) injection 2 mg  2 mg IntraVENous Q15MIN PRN  
 dexamethasone (DECADRON) 4 mg/mL injection 2 mg  2 mg IntraVENous Q6H PRN  
 scopolamine (TRANSDERM-SCOP) 1 mg over 3 days 1 Patch  1 Patch TransDERmal Q72H PRN  
 methylPREDNISolone (PF) (SOLU-MEDROL) injection 40 mg  40 mg IntraVENous Q6H  
 fentaNYL (PF) 1,500 mcg/30 mL (50 mcg/mL) infusion  0-200 mcg/hr IntraVENous CONTINUOUS  
 arformoterol (BROVANA) neb solution 15 mcg  15 mcg Nebulization BID RT  
  0.9% sodium chloride infusion 250 mL  250 mL IntraVENous PRN  
 sodium chloride (NS) flush 5-10 mL  5-10 mL IntraVENous PRN  
 
 
 
 LAB AND IMAGING FINDINGS:  
 
Lab Results Component Value Date/Time WBC 4.7 03/20/2019 04:15 AM  
 HGB 7.9 (L) 03/20/2019 04:15 AM  
 PLATELET 449 (L) 12/02/6626 04:15 AM  
 
Lab Results Component Value Date/Time Sodium 147 (H) 03/20/2019 04:15 AM  
 Potassium 5.0 03/20/2019 04:15 AM  
 Chloride 113 (H) 03/20/2019 04:15 AM  
 CO2 24 03/20/2019 04:15 AM  
 BUN 72 (H) 03/20/2019 04:15 AM  
 Creatinine 2.04 (H) 03/20/2019 04:15 AM  
 Calcium 7.6 (L) 03/20/2019 04:15 AM  
 Magnesium 1.7 03/17/2019 04:22 AM  
 Phosphorus 3.9 03/17/2019 04:22 AM  
  
Lab Results Component Value Date/Time AST (SGOT) 9 (L) 03/11/2019 01:17 AM  
 Alk. phosphatase 57 03/11/2019 01:17 AM  
 Protein, total 5.9 (L) 03/11/2019 01:17 AM  
 Albumin 2.6 (L) 03/11/2019 01:17 AM  
 Globulin 3.3 03/11/2019 01:17 AM  
 
Lab Results Component Value Date/Time INR 1.1 03/17/2019 08:45 PM  
 Prothrombin time 11.5 (H) 03/17/2019 08:45 PM  
 aPTT 20.7 (L) 03/17/2019 08:45 PM  
  
Lab Results Component Value Date/Time Iron 45 03/11/2019 01:17 AM  
 TIBC 227 (L) 03/11/2019 01:16 AM  
 Iron % saturation 20 03/11/2019 01:16 AM  
 Ferritin 167 03/13/2019 01:21 AM  
  
Lab Results Component Value Date/Time pH 7.37 02/19/2018 03:50 PM  
 PCO2 34 (L) 02/19/2018 03:50 PM  
 PO2 70 (L) 02/19/2018 03:50 PM  
 
No components found for: [place] Lab Results Component Value Date/Time CK 70 03/11/2019 01:17 AM  
 CK - MB 6.4 (H) 03/11/2019 01:17 AM  
  
 
 
   
 
Total time:  
Counseling / coordination time, spent as noted above 
> 50% counseling / coordination?: 
 
Prolonged service was provided for  []30 min   []75 min in face to face time in the presence of the patient, spent as noted above. Time Start: 
Time End: 
Note: this can only be billed with 54898 (initial) or 80427 (follow up). If multiple start / stop times, list each separately.

## 2019-03-20 NOTE — PROGRESS NOTES
0730- Bedside shift change report given to Fawn Gardner RN (oncoming nurse) by TOI Harrison (offgoing nurse). Report included the following information SBAR, Kardex, ED Summary, Intake/Output, MAR, Accordion, Recent Results, Med Rec Status, Cardiac Rhythm A. Fib and Alarm Parameters . 1000- Patient extubated to comfort care, family and family  at bedside. 1033- Patient asystole on monitor, no respiration or HR auscultated. Dr. Mark Berman and Dr. Madelaine Ruiz notified.

## 2019-03-20 NOTE — PROGRESS NOTES
CC: 
Eboni Gallardo is a 78 y.o patient of Dr. Debbi Rizvi with multiple myeloma/COPD/recurrent DVT admitted with respiratory failure. Oncology consulted for management of myeloma/DVT/GI bleed. 
 
 
  
HPI: 
Eboni Gallardo has had plasma cell neoplasm  since around 2012. 
  
 3/2019 He has been on single agent lenalinamide for multiple myeloma most recently. He was previously on CyBORD. He was previously  lenalinamide/dexamethasone but dexamethasone was discontinued because of poor tolerance. He has been on ASA 81 mg along with lenalinamide. 
  
3/2019 He was admitted to [address] [place] [address] for pneumonia./respiratory failure. He subsequently developed a popliteal DVT. He was discharged on apixiban for recurrent DVT 
 
3/17/2019 He was readmitted with respiratory failure/anemia/Heme positive stools. He required mechanical ventilation/transfusion. Hematology reconsulted for anticoagulation/multiple myeloma. 
 
  
ROS: 
No gross bleeding reported Pt unable to give ROS due to swedation/intubation Family at bedside 
  
PHYSICAL EXAMINATION: 
GENERAL APPEARANCE:sedated/intubated MOUTH AND THROAT: ETT in place HEART: Tachycardia CNS: No response  
 
  
  
  
Dx: 1.  Chronic obstructive pulmonary disease with acute exacerbation 
--relapsed --intubated 2.  Multiple myeloma -- immunoglobulins normal on lenalinamide 
--no new data 3.  Type 2 diabetes --improved off dexamethasone for myeloma 4.  Anemia 
--myeloma + GI bleed 
--GI bleed exacerbated by anitcoagulation 
--marginal but adequate  following transfusion of PRBC 5. Acute kidney injury --myeloma + MOSF 6.  Tobacco abuse --chronic 7. DVT 
--common in patients on lenalinamide with increased risk from immobilization/malignancy 
--no new lesions --intolerant of therapeutic anticoagulation due to anemia/heme positive stools   
  
  
Rx: 
Consult Palliative care/Hospice 
--comfort measures No further transfusions/labwork/myeloma therapy DNR 
 
  
 
Disc: 
Kateryna Hedrick has had multiple myeloma for many years. In 3/2019, Kateryna Hedrick had pneumonia/COPD complicating multiple myeloma. He developed a DVT during that recent illness/hospitalization/immobilization. He was at increased risk of thrombosis from his myeloma and his  myeloma therapy with lenolinamide/glucocorticoids. The optimal regimen for anticoagulation in this circumstance is unknown but therapeutic anticoagulation was recommended. Dr. Adi Tracey was counseled and we recommended apixiban. Unfortunately, his GI bleeding was exacerbated leading to worsening anemia. He has since declined significantly requiring mechanical ventilation and transfusion. He clearly will not be able to tolerate therapeutic anticoagulation. His condition is critical. 
His prognosis is very poor due to myeloma/COPD/thrombosis/bleeding. He cannot can recover adequately to resume myeloma therapy. We agree with comfort measures. Our thanks to hospitalist team/Pulmonary medicine Please reconsult if we can help further. 
  
[facility] notes appended 3/18/2019 
 
       
  
 
     
  
     
  
    
     
     
     
    
  
    
    
 
 
 
  
  
 
 
Author Ally Hutson MD File Time 03/13/19 1140 Author Type Physician Status Addendum Last  Ally Hutson MD [facility] Acct # [de-identified] Admit Date 3/10/2019

## 2019-03-20 NOTE — DISCHARGE SUMMARY
Discharge Summary PATIENT ID: Valencia Lucero MRN: [id number] YOB: 1940 DATE OF ADMISSION: 3/16/2019  6:22 PM   
DATE OF DISCHARGE: 3/20/19 PRIMARY CARE PROVIDER: Roopa Davis MD  
 
ATTENDING PHYSICIAN: Odette Almanzar DISCHARGING PROVIDER: Jaz Lundy MD   
To contact this individual call [phone] and ask the  to page. If unavailable ask to be transferred the Adult Hospitalist Department. CONSULTATIONS: IP CONSULT TO HOSPITALIST 
IP CONSULT TO GASTROENTEROLOGY 
IP CONSULT TO PALLIATIVE CARE - PROVIDER 
IP CONSULT TO PALLIATIVE CARE - PROVIDER 
IP CONSULT TO HEMATOLOGY 
IP CONSULT TO CARDIOLOGY PROCEDURES/SURGERIES: * No surgery found * [address] COURSE:  
78 y.o man with COPD, DM, HTN, multiple myeloma, recently discharged from here for COPD exacerbation and DVT, who presents with dyspnea. He is currently on bipap, somnolent. Nods when asked if feeling better. He reportedly developed dyspnea last night which persisted until today. EMS was called and found him to be dyspneic and hypoxic with O2 sats down to 83% on RA. His wife tells me that since his discharge from the last admission he's been very fatigued. He was smoking last night and his dyspnea got much worse this morning. He also fell last night, told her that he fainted. All Bhakta is unaware of any bleeding or fever 
  
 Compassionate extubation and pt  thereafter immediately   
  
Assessment & Plan:  
  
Acute hypoxic respiratory failure:  likely due to acute COPD exacerbation GI bleed: 
 
  
Afib with RVR: 
  
Sepsis POA: based on hypothermia, tachycardia, and hypotension 
 
 Recent acute R popliteal DVT: 
 
  
Type 2 DM with hypoglycemia: 
 
  
Acidosis from renal failure and resp failure MM and AAA DISCHARGE DIAGNOSES / PLAN:   
 
1.   DISCHARGE MEDICATIONS: 
Current Discharge Medication List  
  
STOP taking these medications apixaban (ELIQUIS) 5 mg tablet Comments:  
Reason for Stopping:   
   
 apixaban (ELIQUIS) 5 mg tablet Comments:  
Reason for Stopping:   
   
 nicotine (NICODERM CQ) 21 mg/24 hr Comments:  
Reason for Stopping:   
   
 predniSONE (DELTASONE) 20 mg tablet Comments:  
Reason for Stopping:   
   
 calcium carbonate (TUMS) 200 mg calcium (500 mg) chew Comments:  
Reason for Stopping: DULoxetine (CYMBALTA) 30 mg capsule Comments:  
Reason for Stopping:   
   
 pantoprazole (PROTONIX) 40 mg tablet Comments:  
Reason for Stopping:   
   
 dexamethasone (DECADRON) 4 mg tablet Comments:  
Reason for Stopping:   
   
 lenalidomide (REVLIMID) 25 mg cap Comments:  
Reason for Stopping:   
   
 insulin glargine (LANTUS) 100 unit/mL injection Comments:  
Reason for Stopping:   
   
 insulin aspart U-100 (NOVOLOG FLEXPEN U-100 INSULIN) 100 unit/mL inpn Comments:  
Reason for Stopping:   
   
 insulin aspart U-100 (NOVOLOG FLEXPEN U-100 INSULIN) 100 unit/mL inpn Comments:  
Reason for Stopping:   
   
 insulin aspart U-100 (NOVOLOG FLEXPEN U-100 INSULIN) 100 unit/mL inpn Comments:  
Reason for Stopping:   
   
 albuterol-ipratropium (DUO-NEB) 2.5 mg-0.5 mg/3 ml nebu Comments:  
Reason for Stopping:   
   
 acetaminophen (TYLENOL) 325 mg tablet Comments:  
Reason for Stopping:   
   
 aspirin 81 mg chewable tablet Comments:  
Reason for Stopping:   
   
 clopidogrel (PLAVIX) 75 mg tablet Comments:  
Reason for Stopping:   
   
 cholecalciferol (VITAMIN D3) 1,000 unit cap Comments:  
Reason for Stopping:   
   
 rosuvastatin (CRESTOR) 40 mg tablet Comments:  
Reason for Stopping:   
   
 tiotropium (SPIRIVA WITH HANDIHALER) 18 mcg inhalation capsule Comments:  
Reason for Stopping:   
   
 budesonide-formoterol (SYMBICORT) 160-4.5 mcg/actuation HFA inhaler Comments:  
Reason for Stopping:   
   
 albuterol (PROVENTIL HFA, VENTOLIN HFA) 90 mcg/Actuation inhaler Comments:  
Reason for Stopping: montelukast (SINGULAIR) 10 mg tablet Comments:  
Reason for Stopping:   
   
  
 
 
 
NOTIFY YOUR PHYSICIAN FOR ANY OF THE FOLLOWING:  
Fever over 101 degrees for 24 hours. Chest pain, shortness of breath, fever, chills, nausea, vomiting, diarrhea, change in mentation, falling, weakness, bleeding. Severe pain or pain not relieved by medications. Or, any other signs or symptoms that you may have questions about. DISPOSITION: 
  Home With: 
 OT  PT  HH  RN  
  
 Long term SNF/Inpatient Rehab Independent/assisted living Hospice Other:  
 
 
PATIENT CONDITION AT DISCHARGE:  
 
Functional status Poor Deconditioned Independent Cognition Kristan Cowden Forgetful Dementia Catheters/lines (plus indication) Peoples PICC   
 PEG None Code status Full code DNR   
 
 
 
 
CHRONIC MEDICAL DIAGNOSES: 
Problem List as of 3/20/2019 Date Reviewed: 3/10/2019 Codes Class Noted - Resolved * (Principal) Acute respiratory failure with hypoxemia [facility]) ICD-10-CM: J96.01 
ICD-9-CM: 518.81  3/16/2019 - Present Hypotension ICD-10-CM: I95.9 ICD-9-CM: 458.9  3/16/2019 - Present Pneumonia ICD-10-CM: J18.9 ICD-9-CM: 757  1/27/2019 - Present COPD exacerbation ([facility] ICD-10-CM: J44.1 ICD-9-CM: 491.21  1/27/2019 - Present Sepsis ([facility] ICD-10-CM: A41.9 ICD-9-CM: 038.9, 995.91  1/27/2019 - Present Hypoxemia ICD-10-CM: R09.02 
ICD-9-CM: 799.02  2/19/2018 - Present Stenosis of both internal carotid arteries ICD-10-CM: I65.23 ICD-9-CM: 433.10, 433.30  9/13/2016 - Present Diabetic peripheral neuropathy associated with type 2 diabetes mellitus ([facility] ICD-10-CM: E11.42 
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) ICD-10-CM: W03.694 ICD-9-CM: 434.01  9/12/2016 - Present Stroke (cerebrum) ([facility] ICD-10-CM: I63.9 ICD-9-CM: 434.91  9/9/2016 - Present Multiple myeloma (HCC) ICD-10-CM: C90.00 ICD-9-CM: 203.00  10/26/2015 - Present Anemia ICD-10-CM: D64.9 ICD-9-CM: 285.9  5/13/2012 - Present Diarrhea ICD-10-CM: R19.7 ICD-9-CM: 787.91  5/12/2012 - Present DM (diabetes mellitus), type 2, uncontrolled ([facility] ICD-10-CM: E11.65 ICD-9-CM: 250.02  5/12/2012 - Present S/p nephrectomy (Chronic) ICD-10-CM: Z90.5 ICD-9-CM: V45.73  5/12/2012 - Present Overview Signed 5/12/2012 10:35 AM by Alayna Montano.  
  
  
   
 AAA (abdominal aortic aneurysm) (HCC) (Chronic) ICD-10-CM: I71.4 ICD-9-CM: 441.4  5/12/2012 - Present GI bleed ICD-10-CM: K92.2 ICD-9-CM: 578.9  Unknown - Present Overview Signed 5/12/2012 10:36 AM by Alayna Mercer  
  reptured [facility]) ICD-10-CM: C80.1 ICD-9-CM: 199.1  Unknown - Present Overview Signed 5/12/2012 10:36 AM by Alayna Mercer  
  kidney ca COPD (chronic obstructive pulmonary disease) (HCC) (Chronic) ICD-10-CM: J44.9 ICD-9-CM: 668  5/11/2012 - Present Cancer of kidney [facility]) ICD-10-CM: C64.9 ICD-9-CM: 189.0  5/11/2012 - Present Hypoglycemia, unspecified ICD-10-CM: E16.2 ICD-9-CM: 251.2  5/11/2012 - Present Acute renal failure (HCC) ICD-10-CM: N17.9 ICD-9-CM: 584.9  5/11/2012 - Present Hyperkalemia ICD-10-CM: E87.5 ICD-9-CM: 276.7  5/11/2012 - Present S/P partial colectomy ICD-10-CM: Z90.49 ICD-9-CM: V45.89  5/11/2012 - Present HTN (hypertension) ICD-10-CM: I10 
ICD-9-CM: 401.9  4/28/2011 - Present Acute respiratory failure ([facility] ICD-10-CM: J96.00 
ICD-9-CM: 518.81  4/26/2011 - Present COPD with acute exacerbation ([facility] ICD-10-CM: J44.1 ICD-9-CM: 491.21  4/26/2011 - Present HTN (hypertension) ICD-10-CM: I10 
ICD-9-CM: 401.9  4/26/2011 - Present Greater than 15 minutes were spent with the patient on counseling and coordination of care Signed: Reyes Gaxiola MD 
3/20/2019 
11:24 AM

## 2019-03-20 NOTE — ROUTINE PROCESS
2000. Bedside and Verbal shift change report given to [address] (oncoming nurse) by Ben Oates (offgoing nurse). Report included the following information SBAR, Kardex, ED Summary, Procedure Summary, Intake/Output, MAR, Accordion, Recent Results, Cardiac Rhythm a.fib and Alarm Parameters .

## 2019-03-20 NOTE — PROGRESS NOTES
Death Note: 
 
Notified of asystole after changed to comfort measures. Rhythm strip printed. Pt w/ absent respirations, absent pulses, absent heart sounds. Pupils fixed and dilated. Primary team notified. Family and  at bedside, coping appropriately.  Anita also present.   
 
Time of death: 10:33am

## 2019-03-20 NOTE — PROGRESS NOTES
Brief GI note: 
 
Chart reviewed and plans noted for transition to comfort care. Will sign off. Kianna Smith NP [facility] Gastroenterology

## 2019-03-20 NOTE — PROGRESS NOTES
Returned to pt's room at time of pt's death. Accompanied Palliative Dr Teague Both as she pronounced patient. Their  remains present and is supporting family. Consulted with pt's nurse prior to leaving the unit; please page if any additional needs arise. Anita Martínez, Palliative

## 2019-03-20 NOTE — PROGRESS NOTES
Spiritual Care Assessment/Progress Note [address] 
 
 
NAME: Diana Escobar      MRN: [id number] AGE: 78 y.o. SEX: male [place] Affiliation: Alevism  
Language: English  
 
3/20/2019     Total Time (in minutes): 52 Spiritual Assessment begun in [address] [facility] through conversation with: 
  
    []Patient        [x] Family    [] Friend(s) Reason for Consult: Palliative Care, Follow-up, End-of-life support Spiritual beliefs: (Please include comment if needed) 
   [] Identifies with a javy tradition:     
   [x] Supported by a javy community:        
   [] Claims no spiritual orientation:       
   [] Seeking spiritual identity:            
   [] Adheres to an individual form of spirituality:       
   [] Not able to assess:                   
 
    
Identified resources for coping:  
   [] Prayer                           
   [] Music                  [] Guided Imagery 
   [] Family/friends                 [] Pet visits [] Devotional reading                         [x] Unknown 
   [] Other Interventions offered during this visit: (See comments for more details) Patient Interventions: Initial/Spiritual assessment, Critical care Family/Friend(s): Affirmation of emotions/emotional suffering, Life review/legacy, Normalization of emotional/spiritual concerns, End of life issues discussed, Catharsis/review of pertinent events in supportive environment Plan of Care: 
 
 [] Support spiritual and/or cultural needs  
 [] Support AMD and/or advance care planning process [x] Support grieving process 
 [] Coordinate Rites and/or Rituals  
 [] Coordination with community clergy [] No spiritual needs identified at this time 
 [] Detailed Plan of Care below (See Comments)  [] Make referral to Music Therapy 
[] Make referral to Pet Therapy    
[] Make referral to Addiction services 
[] Make referral to [facility] [] Make referral to Spiritual Care Partner 
[] No future visits requested       
[] Follow up visits as needed Comments: Offered support to pt's family with Palliative Dr Harjeet Wilcox prior to compassionate withdrawal. Pt's wife and two daughters were present at bedside. Pt's son visited last night per family. Family reflected on what pt has endured and expressed that a shift to comfort care would best honor patient at this time. Emotional support offered. A  friend arrived and remained at bedside during and following extubation. This  remained present on unit to assess for any additional needs. Followed up with family and clergy at bedside, assuring them of ongoing prayers. Please page [phone] for  support if any additional needs arise and at time of pt's death. Anita Hsu, Palliative

## 2019-03-21 LAB
ABO + RH BLD: NORMAL
BACTERIA SPEC CULT: NORMAL
BLD PROD TYP BPU: NORMAL
BLD PROD TYP BPU: NORMAL
BLOOD GROUP ANTIBODIES SERPL: NORMAL
BPU ID: NORMAL
BPU ID: NORMAL
CROSSMATCH RESULT,%XM: NORMAL
CROSSMATCH RESULT,%XM: NORMAL
SERVICE CMNT-IMP: NORMAL
SPECIMEN EXP DATE BLD: NORMAL
STATUS OF UNIT,%ST: NORMAL
STATUS OF UNIT,%ST: NORMAL
UNIT DIVISION, %UDIV: 0
UNIT DIVISION, %UDIV: 0

## 2024-11-18 NOTE — PROGRESS NOTES
Contacted pt with results.  Pt verbalized understanding and denies any concerns or questions at this time.  Phone number provided to schedule x-ray.   Pharmacy  Enoxaparin (Lovenox®) Monitoring/Dosing      Indication: VTE prophylaxis      Current Dose: Enoxaparin 30 subcutaneously every 24 hours    Creatinine Clearance (mL/min): 18 mL/min    Current Weight: 77.7 kg    Labs:  Recent Labs      02/20/18   0514  02/19/18   2102  02/19/18   1507   CREA  3.63*  3.01*  2.49*   HGB   --    --   12.8   PLT   --    --   261     Wt Readings from Last 1 Encounters:   02/19/18 77.7 kg (171 lb 4.8 oz)     Ht Readings from Last 1 Encounters:   02/19/18 182.9 cm (72\")       Impression/Plan:    SCr worsened today from 3.01-->3.63 and CrCl now <20 mL/min   Enoxaparin has been discontinued and heparin 5000 unit Sc q 8 hours initiated per protocol for renal function and indication per protocol     Thanks,    Michael Tony, PharmD, [facility]

## (undated) DEVICE — 1200 GUARD II KIT W/5MM TUBE W/O VAC TUBE: Brand: GUARDIAN

## (undated) DEVICE — SOLIDIFIER MEDC 1200ML -- CONVERT TO 356117

## (undated) DEVICE — SYR 3ML LL TIP 1/10ML GRAD --

## (undated) DEVICE — Z DISCONTINUED PER MEDLINE LINE GAS SAMPLING O2/CO2 LNG AD 13 FT NSL W/ TBNG FILTERLINE

## (undated) DEVICE — SYR 10ML LUER LOK 1/5ML GRAD --

## (undated) DEVICE — NEONATAL-ADULT SPO2 SENSOR: Brand: NELLCOR

## (undated) DEVICE — MEDI-VAC YANK SUCT HNDL W/TPRD BULBOUS TIP: Brand: CARDINAL HEALTH

## (undated) DEVICE — TOWEL 4 PLY TISS 19X30 SUE WHT

## (undated) DEVICE — SET ADMIN 16ML TBNG L100IN 2 Y INJ SITE IV PIGGY BK DISP

## (undated) DEVICE — BASIN EMSIS 16OZ GRAPHITE PLAS KID SHP MOLD GRAD FOR ORAL

## (undated) DEVICE — KENDALL RADIOLUCENT FOAM MONITORING ELECTRODE RECTANGULAR SHAPE: Brand: KENDALL

## (undated) DEVICE — NEEDLE HYPO 18GA L1.5IN PNK S STL HUB POLYPR SHLD REG BVL

## (undated) DEVICE — Device

## (undated) DEVICE — BLOCK BITE ENDOSCP AD 21 MM W/ DIL BLU LF DISP